# Patient Record
Sex: MALE | Race: BLACK OR AFRICAN AMERICAN | URBAN - METROPOLITAN AREA
[De-identification: names, ages, dates, MRNs, and addresses within clinical notes are randomized per-mention and may not be internally consistent; named-entity substitution may affect disease eponyms.]

---

## 2018-10-28 ENCOUNTER — INPATIENT (INPATIENT)
Facility: HOSPITAL | Age: 58
LOS: 2 days | Discharge: HOME | End: 2018-10-31
Attending: INTERNAL MEDICINE | Admitting: INTERNAL MEDICINE
Payer: COMMERCIAL

## 2018-10-28 VITALS
DIASTOLIC BLOOD PRESSURE: 110 MMHG | OXYGEN SATURATION: 100 % | SYSTOLIC BLOOD PRESSURE: 167 MMHG | HEART RATE: 76 BPM | TEMPERATURE: 99 F | HEIGHT: 69 IN | WEIGHT: 164.91 LBS | RESPIRATION RATE: 20 BRPM

## 2018-10-28 LAB
ALBUMIN SERPL ELPH-MCNC: 3.7 G/DL — SIGNIFICANT CHANGE UP (ref 3.3–5)
ALP SERPL-CCNC: 116 U/L — SIGNIFICANT CHANGE UP (ref 40–120)
ALT FLD-CCNC: 15 U/L — SIGNIFICANT CHANGE UP (ref 12–78)
ANION GAP SERPL CALC-SCNC: 9 MMOL/L — SIGNIFICANT CHANGE UP (ref 5–17)
APPEARANCE UR: CLEAR — SIGNIFICANT CHANGE UP
APTT BLD: 32 SEC — SIGNIFICANT CHANGE UP (ref 27.5–37.4)
AST SERPL-CCNC: 24 U/L — SIGNIFICANT CHANGE UP (ref 15–37)
BACTERIA # UR AUTO: ABNORMAL
BILIRUB SERPL-MCNC: 0.3 MG/DL — SIGNIFICANT CHANGE UP (ref 0.2–1.2)
BILIRUB UR-MCNC: NEGATIVE — SIGNIFICANT CHANGE UP
BUN SERPL-MCNC: 12 MG/DL — SIGNIFICANT CHANGE UP (ref 7–23)
CALCIUM SERPL-MCNC: 9 MG/DL — SIGNIFICANT CHANGE UP (ref 8.5–10.1)
CHLORIDE SERPL-SCNC: 109 MMOL/L — HIGH (ref 96–108)
CK SERPL-CCNC: 186 U/L — SIGNIFICANT CHANGE UP (ref 26–308)
CO2 SERPL-SCNC: 23 MMOL/L — SIGNIFICANT CHANGE UP (ref 22–31)
COLOR SPEC: YELLOW — SIGNIFICANT CHANGE UP
COMMENT - URINE: SIGNIFICANT CHANGE UP
CREAT SERPL-MCNC: 1.39 MG/DL — HIGH (ref 0.5–1.3)
DIFF PNL FLD: ABNORMAL
EPI CELLS # UR: SIGNIFICANT CHANGE UP
GLUCOSE SERPL-MCNC: 112 MG/DL — HIGH (ref 70–99)
GLUCOSE UR QL: NEGATIVE MG/DL — SIGNIFICANT CHANGE UP
HCT VFR BLD CALC: 44.9 % — SIGNIFICANT CHANGE UP (ref 39–50)
HGB BLD-MCNC: 14.1 G/DL — SIGNIFICANT CHANGE UP (ref 13–17)
HYALINE CASTS # UR AUTO: ABNORMAL /LPF
INR BLD: 1.17 RATIO — HIGH (ref 0.88–1.16)
KETONES UR-MCNC: NEGATIVE — SIGNIFICANT CHANGE UP
LACTATE SERPL-SCNC: 3 MMOL/L — HIGH (ref 0.7–2)
LEUKOCYTE ESTERASE UR-ACNC: NEGATIVE — SIGNIFICANT CHANGE UP
MAGNESIUM SERPL-MCNC: 1.6 MG/DL — SIGNIFICANT CHANGE UP (ref 1.6–2.6)
MCHC RBC-ENTMCNC: 30.3 PG — SIGNIFICANT CHANGE UP (ref 27–34)
MCHC RBC-ENTMCNC: 31.4 GM/DL — LOW (ref 32–36)
MCV RBC AUTO: 96.4 FL — SIGNIFICANT CHANGE UP (ref 80–100)
NITRITE UR-MCNC: NEGATIVE — SIGNIFICANT CHANGE UP
NRBC # BLD: 0 /100 WBCS — SIGNIFICANT CHANGE UP (ref 0–0)
NT-PROBNP SERPL-SCNC: 435 PG/ML — HIGH (ref 0–125)
PH UR: 5 — SIGNIFICANT CHANGE UP (ref 5–8)
PLATELET # BLD AUTO: 287 K/UL — SIGNIFICANT CHANGE UP (ref 150–400)
POTASSIUM SERPL-MCNC: 3.6 MMOL/L — SIGNIFICANT CHANGE UP (ref 3.5–5.3)
POTASSIUM SERPL-SCNC: 3.6 MMOL/L — SIGNIFICANT CHANGE UP (ref 3.5–5.3)
PROT SERPL-MCNC: 8.6 GM/DL — HIGH (ref 6–8.3)
PROT UR-MCNC: 30 MG/DL
PROTHROM AB SERPL-ACNC: 12.7 SEC — SIGNIFICANT CHANGE UP (ref 9.8–12.7)
RBC # BLD: 4.66 M/UL — SIGNIFICANT CHANGE UP (ref 4.2–5.8)
RBC # FLD: 13.9 % — SIGNIFICANT CHANGE UP (ref 10.3–14.5)
RBC CASTS # UR COMP ASSIST: ABNORMAL /HPF (ref 0–4)
SODIUM SERPL-SCNC: 141 MMOL/L — SIGNIFICANT CHANGE UP (ref 135–145)
SP GR SPEC: 1.02 — SIGNIFICANT CHANGE UP (ref 1.01–1.02)
TROPONIN I SERPL-MCNC: 0.26 NG/ML — HIGH (ref 0.01–0.04)
TROPONIN I SERPL-MCNC: 1.59 NG/ML — HIGH (ref 0.01–0.04)
UROBILINOGEN FLD QL: NEGATIVE MG/DL — SIGNIFICANT CHANGE UP
WBC # BLD: 10.88 K/UL — HIGH (ref 3.8–10.5)
WBC # FLD AUTO: 10.88 K/UL — HIGH (ref 3.8–10.5)
WBC UR QL: SIGNIFICANT CHANGE UP

## 2018-10-28 PROCEDURE — 71275 CT ANGIOGRAPHY CHEST: CPT | Mod: 26

## 2018-10-28 PROCEDURE — 71046 X-RAY EXAM CHEST 2 VIEWS: CPT | Mod: 26

## 2018-10-28 PROCEDURE — 99285 EMERGENCY DEPT VISIT HI MDM: CPT

## 2018-10-28 PROCEDURE — 93010 ELECTROCARDIOGRAM REPORT: CPT | Mod: 76

## 2018-10-28 RX ORDER — ASPIRIN/CALCIUM CARB/MAGNESIUM 324 MG
81 TABLET ORAL DAILY
Qty: 0 | Refills: 0 | Status: DISCONTINUED | OUTPATIENT
Start: 2018-10-28 | End: 2018-10-31

## 2018-10-28 RX ORDER — METOPROLOL TARTRATE 50 MG
12.5 TABLET ORAL
Qty: 0 | Refills: 0 | Status: DISCONTINUED | OUTPATIENT
Start: 2018-10-28 | End: 2018-10-31

## 2018-10-28 RX ORDER — CLOPIDOGREL BISULFATE 75 MG/1
75 TABLET, FILM COATED ORAL DAILY
Qty: 0 | Refills: 0 | Status: DISCONTINUED | OUTPATIENT
Start: 2018-10-28 | End: 2018-10-31

## 2018-10-28 RX ORDER — HEPARIN SODIUM 5000 [USP'U]/ML
INJECTION INTRAVENOUS; SUBCUTANEOUS
Qty: 25000 | Refills: 0 | Status: DISCONTINUED | OUTPATIENT
Start: 2018-10-28 | End: 2018-10-29

## 2018-10-28 RX ORDER — SODIUM CHLORIDE 9 MG/ML
2200 INJECTION INTRAMUSCULAR; INTRAVENOUS; SUBCUTANEOUS ONCE
Qty: 0 | Refills: 0 | Status: COMPLETED | OUTPATIENT
Start: 2018-10-28 | End: 2018-10-28

## 2018-10-28 RX ORDER — HEPARIN SODIUM 5000 [USP'U]/ML
4400 INJECTION INTRAVENOUS; SUBCUTANEOUS ONCE
Qty: 0 | Refills: 0 | Status: COMPLETED | OUTPATIENT
Start: 2018-10-28 | End: 2018-10-28

## 2018-10-28 RX ORDER — AZITHROMYCIN 500 MG/1
500 TABLET, FILM COATED ORAL ONCE
Qty: 0 | Refills: 0 | Status: COMPLETED | OUTPATIENT
Start: 2018-10-28 | End: 2018-10-28

## 2018-10-28 RX ORDER — HEPARIN SODIUM 5000 [USP'U]/ML
INJECTION INTRAVENOUS; SUBCUTANEOUS
Qty: 25000 | Refills: 0 | Status: DISCONTINUED | OUTPATIENT
Start: 2018-10-28 | End: 2018-10-28

## 2018-10-28 RX ORDER — METOPROLOL TARTRATE 50 MG
25 TABLET ORAL ONCE
Qty: 0 | Refills: 0 | Status: COMPLETED | OUTPATIENT
Start: 2018-10-28 | End: 2018-10-28

## 2018-10-28 RX ORDER — PANTOPRAZOLE SODIUM 20 MG/1
40 TABLET, DELAYED RELEASE ORAL
Qty: 0 | Refills: 0 | Status: DISCONTINUED | OUTPATIENT
Start: 2018-10-28 | End: 2018-10-31

## 2018-10-28 RX ORDER — LISINOPRIL 2.5 MG/1
5 TABLET ORAL DAILY
Qty: 0 | Refills: 0 | Status: DISCONTINUED | OUTPATIENT
Start: 2018-10-28 | End: 2018-10-29

## 2018-10-28 RX ORDER — HEPARIN SODIUM 5000 [USP'U]/ML
4400 INJECTION INTRAVENOUS; SUBCUTANEOUS EVERY 6 HOURS
Qty: 0 | Refills: 0 | Status: DISCONTINUED | OUTPATIENT
Start: 2018-10-28 | End: 2018-10-28

## 2018-10-28 RX ORDER — ASPIRIN/CALCIUM CARB/MAGNESIUM 324 MG
325 TABLET ORAL ONCE
Qty: 0 | Refills: 0 | Status: COMPLETED | OUTPATIENT
Start: 2018-10-28 | End: 2018-10-28

## 2018-10-28 RX ORDER — CLOPIDOGREL BISULFATE 75 MG/1
300 TABLET, FILM COATED ORAL ONCE
Qty: 0 | Refills: 0 | Status: COMPLETED | OUTPATIENT
Start: 2018-10-28 | End: 2018-10-28

## 2018-10-28 RX ORDER — HEPARIN SODIUM 5000 [USP'U]/ML
4400 INJECTION INTRAVENOUS; SUBCUTANEOUS EVERY 6 HOURS
Qty: 0 | Refills: 0 | Status: DISCONTINUED | OUTPATIENT
Start: 2018-10-28 | End: 2018-10-29

## 2018-10-28 RX ORDER — QUINAPRIL HYDROCHLORIDE 40 MG/1
1 TABLET, FILM COATED ORAL
Qty: 0 | Refills: 0 | COMMUNITY

## 2018-10-28 RX ORDER — ATORVASTATIN CALCIUM 80 MG/1
40 TABLET, FILM COATED ORAL AT BEDTIME
Qty: 0 | Refills: 0 | Status: DISCONTINUED | OUTPATIENT
Start: 2018-10-28 | End: 2018-10-31

## 2018-10-28 RX ORDER — CLOPIDOGREL BISULFATE 75 MG/1
75 TABLET, FILM COATED ORAL ONCE
Qty: 0 | Refills: 0 | Status: DISCONTINUED | OUTPATIENT
Start: 2018-10-28 | End: 2018-10-28

## 2018-10-28 RX ORDER — SODIUM CHLORIDE 9 MG/ML
1000 INJECTION INTRAMUSCULAR; INTRAVENOUS; SUBCUTANEOUS
Qty: 0 | Refills: 0 | Status: DISCONTINUED | OUTPATIENT
Start: 2018-10-28 | End: 2018-10-29

## 2018-10-28 RX ORDER — HEPARIN SODIUM 5000 [USP'U]/ML
4400 INJECTION INTRAVENOUS; SUBCUTANEOUS ONCE
Qty: 0 | Refills: 0 | Status: DISCONTINUED | OUTPATIENT
Start: 2018-10-28 | End: 2018-10-28

## 2018-10-28 RX ORDER — CEFTRIAXONE 500 MG/1
1000 INJECTION, POWDER, FOR SOLUTION INTRAMUSCULAR; INTRAVENOUS ONCE
Qty: 0 | Refills: 0 | Status: COMPLETED | OUTPATIENT
Start: 2018-10-28 | End: 2018-10-28

## 2018-10-28 RX ADMIN — HEPARIN SODIUM 900 UNIT(S)/HR: 5000 INJECTION INTRAVENOUS; SUBCUTANEOUS at 21:49

## 2018-10-28 RX ADMIN — SODIUM CHLORIDE 2200 MILLILITER(S): 9 INJECTION INTRAMUSCULAR; INTRAVENOUS; SUBCUTANEOUS at 21:53

## 2018-10-28 RX ADMIN — Medication 25 MILLIGRAM(S): at 21:28

## 2018-10-28 RX ADMIN — SODIUM CHLORIDE 1466.67 MILLILITER(S): 9 INJECTION INTRAMUSCULAR; INTRAVENOUS; SUBCUTANEOUS at 18:09

## 2018-10-28 RX ADMIN — CEFTRIAXONE 1000 MILLIGRAM(S): 500 INJECTION, POWDER, FOR SOLUTION INTRAMUSCULAR; INTRAVENOUS at 18:10

## 2018-10-28 RX ADMIN — HEPARIN SODIUM 4400 UNIT(S): 5000 INJECTION INTRAVENOUS; SUBCUTANEOUS at 21:49

## 2018-10-28 RX ADMIN — AZITHROMYCIN 500 MILLIGRAM(S): 500 TABLET, FILM COATED ORAL at 19:22

## 2018-10-28 RX ADMIN — AZITHROMYCIN 255 MILLIGRAM(S): 500 TABLET, FILM COATED ORAL at 18:10

## 2018-10-28 RX ADMIN — Medication 325 MILLIGRAM(S): at 21:35

## 2018-10-28 RX ADMIN — CLOPIDOGREL BISULFATE 300 MILLIGRAM(S): 75 TABLET, FILM COATED ORAL at 21:34

## 2018-10-28 NOTE — H&P ADULT - NSHPPHYSICALEXAM_GEN_ALL_CORE
Vital Signs Last 24 Hrs  T(C): 37.2 (28 Oct 2018 15:30), Max: 37.2 (28 Oct 2018 15:30)  T(F): 99 (28 Oct 2018 15:30), Max: 99 (28 Oct 2018 15:30)  HR: 69 (28 Oct 2018 17:39) (69 - 76)  BP: 168/95 (28 Oct 2018 17:39) (167/110 - 168/95)  BP(mean): --  RR: 20 (28 Oct 2018 17:39) (20 - 20)  SpO2: 100% (28 Oct 2018 17:39) (100% - 100%)    GEN: appears comfortable  Neuro: AAOx3, moves all extremities  HEENT: NC/AT, EOMI  Neck: no thyroidmegaly, no JVD  Cardiovascular: S1S2 present, regular rhythm, no murmur  Respiratory: breath sounds normal bilaterally, no wheezing, +basilar rales, no rhonchi  Gastrointestinal: bowel sounds normal, soft, no abdominal tenderness  Musculoskeletal: no muscle tenderness  Extremities: trace pitting edema bilaterally  Skin: No rash, tightened skin

## 2018-10-28 NOTE — ED PROVIDER NOTE - CARE PLAN
Principal Discharge DX:	ACS (acute coronary syndrome)  Secondary Diagnosis:	Hypertensive emergency without congestive heart failure

## 2018-10-28 NOTE — ED ADULT NURSE NOTE - NSIMPLEMENTINTERV_GEN_ALL_ED
Implemented All Fall Risk Interventions:  [place] to call system. Call bell, personal items and telephone within reach. Instruct patient to call for assistance. Room bathroom lighting operational. Non-slip footwear when patient is off stretcher. Physically safe environment: no spills, clutter or unnecessary equipment. Stretcher in lowest position, wheels locked, appropriate side rails in place. Provide visual cue, wrist band, yellow gown, etc. Monitor gait and stability. Monitor for mental status changes and reorient to person, place, and time. Review medications for side effects contributing to fall risk. Reinforce activity limits and safety measures with patient and family.
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
## 2018-10-28 NOTE — ED PROVIDER NOTE - CARDIAC, MLM
Normal rate, regular rhythm.  Heart sounds S1, S2.  No murmurs, rubs or gallops. Hypertensive to 190/100.

## 2018-10-28 NOTE — ED PROVIDER NOTE - MEDICAL DECISION MAKING DETAILS
Pt with chest pain with HTN. Concern for ACS, given Hx of autoimmune disease, will send D-dimer to evaluate for risk of PE. Plan for nitropaste, labs, CXR, and admission. No evidence of STEMI on EKG.

## 2018-10-28 NOTE — ED ADULT TRIAGE NOTE - CHIEF COMPLAINT QUOTE
Pt presents to the ED with complaints of palpitations, chest pain, and shortness of breath. which began approx 30 minutes prior to arrival.

## 2018-10-28 NOTE — ED PROVIDER NOTE - NS_ ATTENDINGSCRIBEDETAILS _ED_A_ED_FT
Arnaud Levy DO (Attending): The history, relevant review of systems, past medical and surgical history, medical decision making, and physical examination was documented by the scribe in my presence and I attest to the accuracy of the documentation.

## 2018-10-28 NOTE — ED PROVIDER NOTE - PROGRESS NOTE DETAILS
Arnaud Levy DO (Attending): labs and imaging reviewed to this point.  Discussed positive findings.  Patient reports hx of GIB in past from ulcers.  Will hold ASA. Arnaud Levy DO (Attending): Patient comfortable, nad, reports full resolution of symptoms, BP curently 150/72.  EKG repeated, NSR, no changes. Arnaud Levy DO (Attending): Discussed with Dr. Hall, cardiology, ACS Heparin, beta blocker, CCU.  Discussed with Dr. Acosta to update.

## 2018-10-28 NOTE — ED PROVIDER NOTE - OBJECTIVE STATEMENT
59 y/o male with a PMHx of scleroderma, HTN on quinapril 10mg, pulmonary fibrosis, s/p hip replacement, on Omeprazole 20mg presents to the ED c/o sudden onset severe chest pain today. Pt's blood pressure was 159/92 right after episode. At time of exam pt's chest pain has improved, described as a pressure and worsened with breathing. +chronic cough that is not better or worse than usual. Pt with a fever 3 days ago. No vomiting, diarrhea, abd pain, edema, HA, numbness, tingling, difficulty with speech. No recent travel, injury, fall. Family cardiac Hx: Mother had heart disease, brother  of MI. Never smoker. Occasional alcohol use. No h/o illicit drug use. Pt was on methotrexate 2 years ago, none since. Rheumatologist/PMD: Dr. Lucho Samuels.

## 2018-10-28 NOTE — ED ADULT NURSE NOTE - CHPI ED NUR SYMPTOMS NEG
no vomiting/no fever/no nausea/no syncope/no dizziness/no chills/no back pain/no congestion/no diaphoresis/no shortness of breath

## 2018-10-28 NOTE — H&P ADULT - ASSESSMENT
58 y.o. male with PMH scleroderma, pulmonary fibrosis dx 1 yr ago, hx GI ulcer/bleed ~9 yr ago presents with chest pain today. Pt states getting worse. The chest pain is substernal, pressure like, severe, constant, nothing makes it better, nonradiating. Associated with shortness of breath. Pt reports getting dressed for dinner when symptom occurred. No fever, chills, abd pain, dysuria, diarrhea. Denies any prior similar episode. Currently, reports no chest pain.  Pt reports seeing Dr Samuels as primary physician. Pt was recently sent to different rheumatologist in the city for clinical trial and to start cellcept. Currently not on cellcept.    #chest pain due to ?STEMI  #elevated troponin  #2nd EKG changes - dynamic T inversion  -admit to CCU  -start ASA, plavix, BB, statin  -cont heparin drip  -decrease ACEI dosage  -gentle iv hydration  -NPO after MN for cardiac cath in AM  -echo  -cardio consult, Dr Hall    #scleroderma, pulmonary fibrosis  -PRN supplemental oxygen, pulse ox monitoring  -ACEI dosage decreased due to CHRIS    #CHRIS  -iv fluid    #hx GI ulcer, GERD  -PPI    #elevated lactate  -likely ischemic demand from MI  -UA neg  -repeat lactate    #DVT ppx  -on heparin drip 58 y.o. male with PMH scleroderma, pulmonary fibrosis dx 1 yr ago, hx GI ulcer/bleed ~9 yr ago presents with chest pain today. Pt states getting worse. The chest pain is substernal, pressure like, severe, constant, nothing makes it better, nonradiating. Associated with shortness of breath. Pt reports getting dressed for dinner when symptom occurred. No fever, chills, abd pain, dysuria, diarrhea. Denies any prior similar episode. Currently, reports no chest pain.  Pt reports seeing Dr Samuels as primary physician. Pt was recently sent to different rheumatologist in the city for clinical trial and to start cellcept. Currently not on cellcept.    #chest pain due to ?STEMI  #elevated troponin  #2nd EKG changes - dynamic T inversion  -admit to CCU  -start ASA, plavix, BB, statin  -cont heparin drip  -decrease ACEI dosage  -gentle iv hydration  -NPO after MN for cardiac cath in AM  -echo  -cardio consult, Dr Hall  -discussed case with Dr Hall    #scleroderma, pulmonary fibrosis  -PRN supplemental oxygen, pulse ox monitoring  -ACEI dosage decreased due to CHRIS    #CHRIS  -iv fluid    #hx GI ulcer, GERD  -PPI    #elevated lactate  -likely ischemic demand from MI  -UA neg  -repeat lactate    #DVT ppx  -on heparin drip    IMPROVE VTE Individual Risk Assessment    RISK                                                                Points    [  ] Previous VTE                                                  3    [  ] Thrombophilia                                               2    [  ] Lower limb paralysis                                      2        (unable to hold up >15 seconds)      [  ] Current Cancer                                              2         (within 6 months)    [  ] Immobilization > 24 hrs                                1    [  ] ICU/CCU stay > 24 hours                              1    [  ] Age > 60                                                      1    IMPROVE VTE Score _____0____ negative...

## 2018-10-28 NOTE — ED PROVIDER NOTE - MUSCULOSKELETAL, MLM
Spine appears normal, range of motion is not limited, no muscle or joint tenderness. +clubbing of fingernails of both hands. 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

## 2018-10-28 NOTE — ED PROVIDER NOTE - SEVERE SEPSIS ALERT DETAILS
Patient presenting with chest pain and dyspnea.  DDx includes pneumonia.  Lactate 3.0, WBC 10.8; RR 20; IVF Bolus 30cc/kg ordered as well as empiric antibiotics.  Sepsis possible in DDx however current VS do not support.  Will continue to monitor

## 2018-10-29 DIAGNOSIS — I21.4 NON-ST ELEVATION (NSTEMI) MYOCARDIAL INFARCTION: ICD-10-CM

## 2018-10-29 DIAGNOSIS — I10 ESSENTIAL (PRIMARY) HYPERTENSION: ICD-10-CM

## 2018-10-29 DIAGNOSIS — M34.9 SYSTEMIC SCLEROSIS, UNSPECIFIED: ICD-10-CM

## 2018-10-29 DIAGNOSIS — J84.10 PULMONARY FIBROSIS, UNSPECIFIED: ICD-10-CM

## 2018-10-29 LAB
ANION GAP SERPL CALC-SCNC: 7 MMOL/L — SIGNIFICANT CHANGE UP (ref 5–17)
APTT BLD: 51.6 SEC — HIGH (ref 27.5–37.4)
APTT BLD: 70.4 SEC — HIGH (ref 27.5–37.4)
BLD GP AB SCN SERPL QL: SIGNIFICANT CHANGE UP
BUN SERPL-MCNC: 8 MG/DL — SIGNIFICANT CHANGE UP (ref 7–23)
CALCIUM SERPL-MCNC: 8.4 MG/DL — LOW (ref 8.5–10.1)
CHLORIDE SERPL-SCNC: 113 MMOL/L — HIGH (ref 96–108)
CO2 SERPL-SCNC: 22 MMOL/L — SIGNIFICANT CHANGE UP (ref 22–31)
CREAT SERPL-MCNC: 1.04 MG/DL — SIGNIFICANT CHANGE UP (ref 0.5–1.3)
GLUCOSE SERPL-MCNC: 90 MG/DL — SIGNIFICANT CHANGE UP (ref 70–99)
HCT VFR BLD CALC: 35.9 % — LOW (ref 39–50)
HCT VFR BLD CALC: 36.2 % — LOW (ref 39–50)
HGB BLD-MCNC: 11.4 G/DL — LOW (ref 13–17)
HGB BLD-MCNC: 11.4 G/DL — LOW (ref 13–17)
LACTATE SERPL-SCNC: 0.8 MMOL/L — SIGNIFICANT CHANGE UP (ref 0.7–2)
MCHC RBC-ENTMCNC: 29.8 PG — SIGNIFICANT CHANGE UP (ref 27–34)
MCHC RBC-ENTMCNC: 30.2 PG — SIGNIFICANT CHANGE UP (ref 27–34)
MCHC RBC-ENTMCNC: 31.5 GM/DL — LOW (ref 32–36)
MCHC RBC-ENTMCNC: 31.8 GM/DL — LOW (ref 32–36)
MCV RBC AUTO: 94.5 FL — SIGNIFICANT CHANGE UP (ref 80–100)
MCV RBC AUTO: 95.2 FL — SIGNIFICANT CHANGE UP (ref 80–100)
NRBC # BLD: 0 /100 WBCS — SIGNIFICANT CHANGE UP (ref 0–0)
NRBC # BLD: 0 /100 WBCS — SIGNIFICANT CHANGE UP (ref 0–0)
PLATELET # BLD AUTO: 238 K/UL — SIGNIFICANT CHANGE UP (ref 150–400)
PLATELET # BLD AUTO: 252 K/UL — SIGNIFICANT CHANGE UP (ref 150–400)
POTASSIUM SERPL-MCNC: 3.5 MMOL/L — SIGNIFICANT CHANGE UP (ref 3.5–5.3)
POTASSIUM SERPL-SCNC: 3.5 MMOL/L — SIGNIFICANT CHANGE UP (ref 3.5–5.3)
RBC # BLD: 3.77 M/UL — LOW (ref 4.2–5.8)
RBC # BLD: 3.83 M/UL — LOW (ref 4.2–5.8)
RBC # FLD: 14.1 % — SIGNIFICANT CHANGE UP (ref 10.3–14.5)
RBC # FLD: 14.1 % — SIGNIFICANT CHANGE UP (ref 10.3–14.5)
SODIUM SERPL-SCNC: 142 MMOL/L — SIGNIFICANT CHANGE UP (ref 135–145)
TROPONIN I SERPL-MCNC: 39.7 NG/ML — HIGH (ref 0.01–0.04)
TROPONIN I SERPL-MCNC: 7.86 NG/ML — HIGH (ref 0.01–0.04)
TYPE + AB SCN PNL BLD: SIGNIFICANT CHANGE UP
WBC # BLD: 11.16 K/UL — HIGH (ref 3.8–10.5)
WBC # BLD: 11.92 K/UL — HIGH (ref 3.8–10.5)
WBC # FLD AUTO: 11.16 K/UL — HIGH (ref 3.8–10.5)
WBC # FLD AUTO: 11.92 K/UL — HIGH (ref 3.8–10.5)

## 2018-10-29 PROCEDURE — 93458 L HRT ARTERY/VENTRICLE ANGIO: CPT | Mod: 26,59

## 2018-10-29 PROCEDURE — 92941 PRQ TRLML REVSC TOT OCCL AMI: CPT | Mod: RC

## 2018-10-29 PROCEDURE — 99233 SBSQ HOSP IP/OBS HIGH 50: CPT | Mod: 25

## 2018-10-29 PROCEDURE — 93010 ELECTROCARDIOGRAM REPORT: CPT

## 2018-10-29 PROCEDURE — 99223 1ST HOSP IP/OBS HIGH 75: CPT

## 2018-10-29 PROCEDURE — 99152 MOD SED SAME PHYS/QHP 5/>YRS: CPT

## 2018-10-29 RX ORDER — AMLODIPINE BESYLATE 2.5 MG/1
2.5 TABLET ORAL
Qty: 0 | Refills: 0 | Status: DISCONTINUED | OUTPATIENT
Start: 2018-10-29 | End: 2018-10-31

## 2018-10-29 RX ORDER — SODIUM CHLORIDE 9 MG/ML
1000 INJECTION, SOLUTION INTRAVENOUS
Qty: 0 | Refills: 0 | Status: DISCONTINUED | OUTPATIENT
Start: 2018-10-29 | End: 2018-10-31

## 2018-10-29 RX ORDER — INFLUENZA VIRUS VACCINE 15; 15; 15; 15 UG/.5ML; UG/.5ML; UG/.5ML; UG/.5ML
0.5 SUSPENSION INTRAMUSCULAR ONCE
Qty: 0 | Refills: 0 | Status: COMPLETED | OUTPATIENT
Start: 2018-10-29 | End: 2018-10-31

## 2018-10-29 RX ORDER — AMLODIPINE BESYLATE 2.5 MG/1
2.5 TABLET ORAL ONCE
Qty: 0 | Refills: 0 | Status: COMPLETED | OUTPATIENT
Start: 2018-10-29 | End: 2018-10-29

## 2018-10-29 RX ADMIN — ATORVASTATIN CALCIUM 40 MILLIGRAM(S): 80 TABLET, FILM COATED ORAL at 21:26

## 2018-10-29 RX ADMIN — CLOPIDOGREL BISULFATE 75 MILLIGRAM(S): 75 TABLET, FILM COATED ORAL at 10:20

## 2018-10-29 RX ADMIN — AMLODIPINE BESYLATE 2.5 MILLIGRAM(S): 2.5 TABLET ORAL at 00:52

## 2018-10-29 RX ADMIN — Medication 12.5 MILLIGRAM(S): at 21:26

## 2018-10-29 RX ADMIN — PANTOPRAZOLE SODIUM 40 MILLIGRAM(S): 20 TABLET, DELAYED RELEASE ORAL at 06:42

## 2018-10-29 RX ADMIN — Medication 12.5 MILLIGRAM(S): at 06:42

## 2018-10-29 RX ADMIN — AMLODIPINE BESYLATE 2.5 MILLIGRAM(S): 2.5 TABLET ORAL at 21:26

## 2018-10-29 RX ADMIN — Medication 81 MILLIGRAM(S): at 10:20

## 2018-10-29 RX ADMIN — LISINOPRIL 5 MILLIGRAM(S): 2.5 TABLET ORAL at 06:42

## 2018-10-29 RX ADMIN — HEPARIN SODIUM 900 UNIT(S)/HR: 5000 INJECTION INTRAVENOUS; SUBCUTANEOUS at 04:00

## 2018-10-29 RX ADMIN — SODIUM CHLORIDE 75 MILLILITER(S): 9 INJECTION, SOLUTION INTRAVENOUS at 16:43

## 2018-10-29 NOTE — PACU DISCHARGE NOTE - COMMENTS
Patient discharge home as per orders. pt A/Ox4. Vital signs stable. PIVL removed. No evidence of infiltration noted at discharge. Patient skin intact, Patient with no complain of pain, SOB, or chest pain at discharge. pt ambulating around unit tolerating well. All paperwork reviewed with pt Rx given with understanding, pt to follow up as directed patient verbalized understanding to all and without concerns at this time.  Right groin femoral site soft with no active bleeding.  No ecchymoses noted. Patient discharge home as per orders. pt A/Ox4. Vital signs stable. PIVL removed. No evidence of infiltration noted at discharge. Patient skin intact, Patient with no complain of pain, SOB, or chest pain at discharge. pt ambulating around unit tolerating well. All paperwork reviewed with pt Rx given with understanding, pt to follow up as directed patient verbalized understanding to all and without concerns at this time.  Right groin femoral site soft with no active bleeding.  No ecchymoses noted. Report given to CUONG Burns
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
## 2018-10-30 LAB
ANION GAP SERPL CALC-SCNC: 8 MMOL/L — SIGNIFICANT CHANGE UP (ref 5–17)
BASOPHILS # BLD AUTO: 0.09 K/UL — SIGNIFICANT CHANGE UP (ref 0–0.2)
BASOPHILS NFR BLD AUTO: 0.7 % — SIGNIFICANT CHANGE UP (ref 0–2)
BUN SERPL-MCNC: 10 MG/DL — SIGNIFICANT CHANGE UP (ref 7–23)
CALCIUM SERPL-MCNC: 9 MG/DL — SIGNIFICANT CHANGE UP (ref 8.5–10.1)
CHLORIDE SERPL-SCNC: 109 MMOL/L — HIGH (ref 96–108)
CO2 SERPL-SCNC: 25 MMOL/L — SIGNIFICANT CHANGE UP (ref 22–31)
CREAT SERPL-MCNC: 1.25 MG/DL — SIGNIFICANT CHANGE UP (ref 0.5–1.3)
EOSINOPHIL # BLD AUTO: 0.33 K/UL — SIGNIFICANT CHANGE UP (ref 0–0.5)
EOSINOPHIL NFR BLD AUTO: 2.7 % — SIGNIFICANT CHANGE UP (ref 0–6)
GLUCOSE SERPL-MCNC: 100 MG/DL — HIGH (ref 70–99)
HCT VFR BLD CALC: 39.5 % — SIGNIFICANT CHANGE UP (ref 39–50)
HGB BLD-MCNC: 12.3 G/DL — LOW (ref 13–17)
IMM GRANULOCYTES NFR BLD AUTO: 0.2 % — SIGNIFICANT CHANGE UP (ref 0–1.5)
LYMPHOCYTES # BLD AUTO: 1.78 K/UL — SIGNIFICANT CHANGE UP (ref 1–3.3)
LYMPHOCYTES # BLD AUTO: 14.6 % — SIGNIFICANT CHANGE UP (ref 13–44)
MCHC RBC-ENTMCNC: 29.5 PG — SIGNIFICANT CHANGE UP (ref 27–34)
MCHC RBC-ENTMCNC: 31.1 GM/DL — LOW (ref 32–36)
MCV RBC AUTO: 94.7 FL — SIGNIFICANT CHANGE UP (ref 80–100)
MONOCYTES # BLD AUTO: 1.19 K/UL — HIGH (ref 0–0.9)
MONOCYTES NFR BLD AUTO: 9.8 % — SIGNIFICANT CHANGE UP (ref 2–14)
NEUTROPHILS # BLD AUTO: 8.76 K/UL — HIGH (ref 1.8–7.4)
NEUTROPHILS NFR BLD AUTO: 72 % — SIGNIFICANT CHANGE UP (ref 43–77)
NRBC # BLD: 0 /100 WBCS — SIGNIFICANT CHANGE UP (ref 0–0)
PLATELET # BLD AUTO: 267 K/UL — SIGNIFICANT CHANGE UP (ref 150–400)
POTASSIUM SERPL-MCNC: 3.7 MMOL/L — SIGNIFICANT CHANGE UP (ref 3.5–5.3)
POTASSIUM SERPL-SCNC: 3.7 MMOL/L — SIGNIFICANT CHANGE UP (ref 3.5–5.3)
RBC # BLD: 4.17 M/UL — LOW (ref 4.2–5.8)
RBC # FLD: 14.3 % — SIGNIFICANT CHANGE UP (ref 10.3–14.5)
SODIUM SERPL-SCNC: 142 MMOL/L — SIGNIFICANT CHANGE UP (ref 135–145)
WBC # BLD: 12.18 K/UL — HIGH (ref 3.8–10.5)
WBC # FLD AUTO: 12.18 K/UL — HIGH (ref 3.8–10.5)

## 2018-10-30 PROCEDURE — 93306 TTE W/DOPPLER COMPLETE: CPT | Mod: 26

## 2018-10-30 PROCEDURE — 99233 SBSQ HOSP IP/OBS HIGH 50: CPT

## 2018-10-30 PROCEDURE — 93010 ELECTROCARDIOGRAM REPORT: CPT

## 2018-10-30 RX ADMIN — Medication 12.5 MILLIGRAM(S): at 05:27

## 2018-10-30 RX ADMIN — CLOPIDOGREL BISULFATE 75 MILLIGRAM(S): 75 TABLET, FILM COATED ORAL at 11:10

## 2018-10-30 RX ADMIN — ATORVASTATIN CALCIUM 40 MILLIGRAM(S): 80 TABLET, FILM COATED ORAL at 21:37

## 2018-10-30 RX ADMIN — Medication 81 MILLIGRAM(S): at 11:10

## 2018-10-30 RX ADMIN — Medication 12.5 MILLIGRAM(S): at 18:35

## 2018-10-30 RX ADMIN — AMLODIPINE BESYLATE 2.5 MILLIGRAM(S): 2.5 TABLET ORAL at 21:37

## 2018-10-30 RX ADMIN — AMLODIPINE BESYLATE 2.5 MILLIGRAM(S): 2.5 TABLET ORAL at 11:10

## 2018-10-30 RX ADMIN — PANTOPRAZOLE SODIUM 40 MILLIGRAM(S): 20 TABLET, DELAYED RELEASE ORAL at 11:10

## 2018-10-30 NOTE — PROGRESS NOTE ADULT - PROBLEM SELECTOR PLAN 2
Meds. adjusted as bp running high.
BP improved. will make further adjustment as need but will keep on current regime for now.

## 2018-10-30 NOTE — PROGRESS NOTE ADULT - SUBJECTIVE AND OBJECTIVE BOX
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 RCA: Proximal subsection.95% stenosis 14 mm length reduced to 0%.Pre   procedure RANJITH III flow was noted. Good runoff was present.The lesion was   diagnosed as a moderate risk lesion.The lesion was tubular.The lesion   showed moderate angulation and moderate tortuosity.     Post Procedure RANJITH III flow was present.     Treatment results:Interventional treatment was successful.     Devices used     * 6FR AR 1 LAUNCHER     * COUGAR XT 190CM     * GUIDELINER 5FR     * 2.0 X 15MM MAVERICK 2     Total duration: 32 sec.2 inflation(s). to a max pressure of: 12 MARTHA.     * Resolute JARRED SINDY     Diameter: 2.25 mm. Length: 16 mm. 0 inflation(s).     * 6FR HSI LAUNCHER     * CHOICE PT 182CM     * Synergy SINDY Stent     Diameter: 2.25 mm. Length: 8 mm. Total duration: 15 sec.1 inflation(s).   to a max pressure of: 22 MARTHA.     * Synergy SINDY Stent     Diameter: 2.25 mm. Length: 8 mm. Total duration: 15 sec.1 inflation(s).   to a max pressure of: 22 MARTHA.    Ramus: Diffuse irregularity.    Valves  +------+----------------------------+----------------------------+---------  +  !Valve !Stenosis                    !Insufficiency                 !Comments !  +------+----------------------------+----------------------------+---------  +  !Aortic!No                          !Not assessed                !           !  +------+----------------------------+----------------------------+---------  +  !Mitral!Not assessed         !No insufficiency            !           !  +------+----------------------------+----------------------------+---------  +    LV function assessed as:Normal.  Ejection Fraction  +----------------------------------------------------------------------+---  +  !Method                                                                  !EF%!  +----------------------------------------------------------------------+---  +  !LV gram                                                                 !55 !  +----------------------------------------------------------------------+---  +    VA  Ventriculography Findings:  Normal left ventricular systolic function.     Coronary tree     Dominance: Mixed     Impression     Diagnostic Conclusions   1 Vessel CAd with RCA disease as culprit for NSTEMI. NL LV FX with mid   inferior hypokinesis     Interventional Conclusions     Successful Coronary Intervention SINDY of RCA.     Recommendations     Manage with medical therapy.    < end of copied text >

## 2018-10-30 NOTE — PROGRESS NOTE ADULT - PROBLEM SELECTOR PLAN 4
will need to reassess pulm. pressures in future once recovered from MI as outpt.
will need to reassess pulm. pressures in future once recoverd from MI as outpt.

## 2018-10-30 NOTE — PROGRESS NOTE ADULT - ASSESSMENT
HPI:  58 y.o. male with PMH scleroderma, pulmonary fibrosis dx 1 yr ago, hx GI ulcer/bleed ~9 yr ago presents with chest pain.       Patient now s/p angiogram  with PCI and SINDY x 4  to the RCA    - monitor in CCU  - AM labs and EKG reviewed   - procedure, outcome and f/u care reviewed with patient  - ambulate as tolerated  - continue ASA/ Plavix  - continue statin/ B' blocker/ CCB  - continue hospitalist service   - f/u with MD in 4-7 days of discharge

## 2018-10-30 NOTE — PROGRESS NOTE ADULT - PROBLEM SELECTOR PLAN 1
s/p PCI of RCA on asa and plavix.  CCU care.
s/p PCI of RCA on asa and plavix.  recovering well.  tolerating medications without issue.  ambulate today and if doing well will likely dc kinga.

## 2018-10-31 ENCOUNTER — TRANSCRIPTION ENCOUNTER (OUTPATIENT)
Age: 58
End: 2018-10-31

## 2018-10-31 VITALS — TEMPERATURE: 99 F

## 2018-10-31 LAB
HCT VFR BLD CALC: 41.5 % — SIGNIFICANT CHANGE UP (ref 39–50)
HGB BLD-MCNC: 13 G/DL — SIGNIFICANT CHANGE UP (ref 13–17)
MCHC RBC-ENTMCNC: 30.1 PG — SIGNIFICANT CHANGE UP (ref 27–34)
MCHC RBC-ENTMCNC: 31.3 GM/DL — LOW (ref 32–36)
MCV RBC AUTO: 96.1 FL — SIGNIFICANT CHANGE UP (ref 80–100)
NRBC # BLD: 0 /100 WBCS — SIGNIFICANT CHANGE UP (ref 0–0)
PLATELET # BLD AUTO: 280 K/UL — SIGNIFICANT CHANGE UP (ref 150–400)
RBC # BLD: 4.32 M/UL — SIGNIFICANT CHANGE UP (ref 4.2–5.8)
RBC # FLD: 14.2 % — SIGNIFICANT CHANGE UP (ref 10.3–14.5)
WBC # BLD: 13.12 K/UL — HIGH (ref 3.8–10.5)
WBC # FLD AUTO: 13.12 K/UL — HIGH (ref 3.8–10.5)

## 2018-10-31 RX ORDER — ATORVASTATIN CALCIUM 80 MG/1
1 TABLET, FILM COATED ORAL
Qty: 30 | Refills: 0 | OUTPATIENT
Start: 2018-10-31 | End: 2018-11-29

## 2018-10-31 RX ORDER — PANTOPRAZOLE SODIUM 20 MG/1
1 TABLET, DELAYED RELEASE ORAL
Qty: 30 | Refills: 0 | OUTPATIENT
Start: 2018-10-31 | End: 2018-11-29

## 2018-10-31 RX ORDER — CLOPIDOGREL BISULFATE 75 MG/1
1 TABLET, FILM COATED ORAL
Qty: 30 | Refills: 0
Start: 2018-10-31 | End: 2018-11-29

## 2018-10-31 RX ORDER — METOPROLOL TARTRATE 50 MG
1 TABLET ORAL
Qty: 30 | Refills: 0 | OUTPATIENT
Start: 2018-10-31 | End: 2018-11-29

## 2018-10-31 RX ORDER — ASPIRIN/CALCIUM CARB/MAGNESIUM 324 MG
1 TABLET ORAL
Qty: 0 | Refills: 0 | COMMUNITY
Start: 2018-10-31

## 2018-10-31 RX ADMIN — INFLUENZA VIRUS VACCINE 0.5 MILLILITER(S): 15; 15; 15; 15 SUSPENSION INTRAMUSCULAR at 10:55

## 2018-10-31 RX ADMIN — Medication 12.5 MILLIGRAM(S): at 06:41

## 2018-10-31 RX ADMIN — CLOPIDOGREL BISULFATE 75 MILLIGRAM(S): 75 TABLET, FILM COATED ORAL at 12:03

## 2018-10-31 RX ADMIN — Medication 81 MILLIGRAM(S): at 12:03

## 2018-10-31 RX ADMIN — AMLODIPINE BESYLATE 2.5 MILLIGRAM(S): 2.5 TABLET ORAL at 10:55

## 2018-10-31 RX ADMIN — PANTOPRAZOLE SODIUM 40 MILLIGRAM(S): 20 TABLET, DELAYED RELEASE ORAL at 08:04

## 2018-10-31 NOTE — DISCHARGE NOTE ADULT - CARE PLAN
Principal Discharge DX:	NSTEMI, initial episode of care  Goal:	stable  Assessment and plan of treatment:	take meds as prescribed; take copy of the CT scan to your lung doc and scleroderma doc

## 2018-10-31 NOTE — DISCHARGE NOTE ADULT - CARE PROVIDER_API CALL
August Hall (MD), Cardiovascular Disease  [address] [place] [address]  Phone: [phone]  Fax: [phone]

## 2018-10-31 NOTE — DISCHARGE NOTE ADULT - HOSPITAL COURSE
58 y.o. male with PMH scleroderma, pulmonary fibrosis dx 1 yr ago, hx GI ulcer/bleed ~9 yr ago presents with chest pain today. Pt states getting worse. The chest pain is substernal, pressure like, severe, constant, nothing makes it better, nonradiating. Associated with shortness of breath. Pt reports getting dressed for dinner when symptom occurred. No fever, chills, abd pain, dysuria, diarrhea. Denies any prior similar episode. Currently, reports no chest pain.  Pt reports seeing Dr Samuels as primary physician. Pt was recently sent to different rheumatologist in the city for clinical trial and to start cellcept. Currently not on cellcept.  Pt seen in cath lab, no c/o s/p 4 stents            PHYSICAL EXAM:    Constitutional: NAD, awake and alert, well-developed  HEENT: PERR, EOMI,  No oral cyananosis. appearance c/w systemic sclerosis  Neck:  supple,  No JVD  Respiratory: Breath sounds are clear bilaterally, No wheezing, rales or rhonchi  Cardiovascular: S1 and S2, regular rate and rhythm, no Murmurs, gallops or rubs  Gastrointestinal: Bowel Sounds present, soft, nontender.   Extremities: No peripheral edema. No clubbing or cyanosis.  Vascular: 2+ peripheral pulses  Neurological: A/O x 3, no focal deficits  Musculoskeletal: no calf tenderness.  Skin: No rashes.      LABS: All Labs Reviewed:                          chest pain due to STEMI  #elevated troponin  #2nd EKG changes - dynamic T inversion  -s/p 4 stents    #scleroderma, pulmonary fibrosis  -will take copy of the ct scan to his pulm doc    #CHRIS  -iv fluid    #hx GI ulcer, GERD  -PPI added    #elevated lactate  -likely ischemic demand from MI

## 2018-10-31 NOTE — DISCHARGE NOTE ADULT - PATIENT PORTAL LINK FT
You can access the [facility] Patient Portal, offered by [facility], by registering with the following website: [email or web address]

## 2018-10-31 NOTE — DISCHARGE NOTE ADULT - MEDICATION SUMMARY - MEDICATIONS TO TAKE
I will START or STAY ON the medications listed below when I get home from the hospital:    aspirin 81 mg oral tablet, chewable  -- 1 tab(s) by mouth once a day  -- Indication: For for stent    quinapril 10 mg oral tablet  -- 1 tab(s) by mouth once a day  -- Indication: For HTN (hypertension)    atorvastatin 40 mg oral tablet  -- 1 tab(s) by mouth once a day (at bedtime)  -- Indication: For Stent    clopidogrel 75 mg oral tablet  -- 1 tab(s) by mouth once a day  -- Indication: For Stent    Toprol-XL 25 mg oral tablet, extended release  -- 1 tab(s) by mouth once a day   -- It is very important that you take or use this exactly as directed.  Do not skip doses or discontinue unless directed by your doctor.  May cause drowsiness.  Alcohol may intensify this effect.  Use care when operating dangerous machinery.  Some non-prescription drugs may aggravate your condition.  Read all labels carefully.  If a warning appears, check with your doctor before taking.  Swallow whole.  Do not crush.  Take with food or milk.  This drug may impair the ability to drive or operate machinery.  Use care until you become familiar with its effects.    -- Indication: For Stent    Protonix 40 mg oral delayed release tablet  -- 1 tab(s) by mouth once a day   -- It is very important that you take or use this exactly as directed.  Do not skip doses or discontinue unless directed by your doctor.  Obtain medical advice before taking any non-prescription drugs as some may affect the action of this medication.  Swallow whole.  Do not crush.    -- Indication: For to protect stomach

## 2018-11-02 LAB
CULTURE RESULTS: SIGNIFICANT CHANGE UP
SPECIMEN SOURCE: SIGNIFICANT CHANGE UP

## 2018-11-06 DIAGNOSIS — N17.9 ACUTE KIDNEY FAILURE, UNSPECIFIED: ICD-10-CM

## 2018-11-06 DIAGNOSIS — K21.9 GASTRO-ESOPHAGEAL REFLUX DISEASE WITHOUT ESOPHAGITIS: ICD-10-CM

## 2018-11-06 DIAGNOSIS — J84.10 PULMONARY FIBROSIS, UNSPECIFIED: ICD-10-CM

## 2018-11-06 DIAGNOSIS — M34.9 SYSTEMIC SCLEROSIS, UNSPECIFIED: ICD-10-CM

## 2018-11-06 DIAGNOSIS — I24.8 OTHER FORMS OF ACUTE ISCHEMIC HEART DISEASE: ICD-10-CM

## 2018-11-06 DIAGNOSIS — I10 ESSENTIAL (PRIMARY) HYPERTENSION: ICD-10-CM

## 2018-11-06 DIAGNOSIS — I21.4 NON-ST ELEVATION (NSTEMI) MYOCARDIAL INFARCTION: ICD-10-CM

## 2018-11-06 DIAGNOSIS — R74.8 ABNORMAL LEVELS OF OTHER SERUM ENZYMES: ICD-10-CM

## 2018-11-06 DIAGNOSIS — I25.10 ATHEROSCLEROTIC HEART DISEASE OF NATIVE CORONARY ARTERY WITHOUT ANGINA PECTORIS: ICD-10-CM

## 2018-11-13 PROBLEM — M34.9 SYSTEMIC SCLEROSIS, UNSPECIFIED: Chronic | Status: ACTIVE | Noted: 2018-10-28

## 2018-11-13 PROBLEM — J84.10 PULMONARY FIBROSIS, UNSPECIFIED: Chronic | Status: ACTIVE | Noted: 2018-10-28

## 2018-11-13 PROBLEM — I10 ESSENTIAL (PRIMARY) HYPERTENSION: Chronic | Status: ACTIVE | Noted: 2018-10-28

## 2018-11-16 ENCOUNTER — APPOINTMENT (OUTPATIENT)
Dept: CARDIOLOGY | Facility: CLINIC | Age: 58
End: 2018-11-16

## 2018-11-27 ENCOUNTER — APPOINTMENT (OUTPATIENT)
Dept: CARDIOLOGY | Facility: CLINIC | Age: 58
End: 2018-11-27
Payer: COMMERCIAL

## 2018-11-27 VITALS — SYSTOLIC BLOOD PRESSURE: 147 MMHG | OXYGEN SATURATION: 97 % | DIASTOLIC BLOOD PRESSURE: 83 MMHG | HEART RATE: 80 BPM

## 2018-11-27 VITALS — BODY MASS INDEX: 23.99 KG/M2 | WEIGHT: 162 LBS | HEIGHT: 69 IN

## 2018-11-27 DIAGNOSIS — Z78.9 OTHER SPECIFIED HEALTH STATUS: ICD-10-CM

## 2018-11-27 DIAGNOSIS — Z83.3 FAMILY HISTORY OF DIABETES MELLITUS: ICD-10-CM

## 2018-11-27 DIAGNOSIS — K21.9 GASTRO-ESOPHAGEAL REFLUX DISEASE W/OUT ESOPHAGITIS: ICD-10-CM

## 2018-11-27 DIAGNOSIS — Z83.79 FAMILY HISTORY OF OTHER DISEASES OF THE DIGESTIVE SYSTEM: ICD-10-CM

## 2018-11-27 DIAGNOSIS — Z82.49 FAMILY HISTORY OF ISCHEMIC HEART DISEASE AND OTHER DISEASES OF THE CIRCULATORY SYSTEM: ICD-10-CM

## 2018-11-27 DIAGNOSIS — Z81.1 FAMILY HISTORY OF ALCOHOL ABUSE AND DEPENDENCE: ICD-10-CM

## 2018-11-27 PROCEDURE — 93000 ELECTROCARDIOGRAM COMPLETE: CPT

## 2018-11-27 PROCEDURE — 99215 OFFICE O/P EST HI 40 MIN: CPT | Mod: 25

## 2018-12-03 ENCOUNTER — NON-APPOINTMENT (OUTPATIENT)
Age: 58
End: 2018-12-03

## 2018-12-03 NOTE — REASON FOR VISIT
[Follow-Up - From Hospitalization] : follow-up of a recent hospitalization for [Hyperlipidemia] : hyperlipidemia [Hypertension] : hypertension [Prior Myocardial Infarction] : a prior myocardial infarction

## 2018-12-03 NOTE — CARDIOLOGY SUMMARY
[___] : [unfilled] [LVEF ___%] : LVEF [unfilled]% [None] : normal LV function [Moderate] : moderate pulmonary hypertension [Enlarged] : enlarged LA size [Mild] : mild mitral regurgitation [___] : [unfilled]

## 2018-12-06 PROBLEM — Z81.1 FAMILY HISTORY OF ALCOHOLISM: Status: ACTIVE | Noted: 2018-12-06

## 2018-12-06 PROBLEM — Z82.49 FAMILY HISTORY OF CORONARY ARTERY DISEASE: Status: ACTIVE | Noted: 2018-12-06

## 2018-12-06 PROBLEM — Z78.9 SOCIAL ALCOHOL USE: Status: ACTIVE | Noted: 2018-12-06

## 2018-12-06 PROBLEM — Z83.3 FAMILY HISTORY OF TYPE 2 DIABETES MELLITUS: Status: ACTIVE | Noted: 2018-12-06

## 2018-12-06 PROBLEM — Z83.79 FAMILY HISTORY OF HEPATIC CIRRHOSIS: Status: ACTIVE | Noted: 2018-12-06

## 2018-12-06 PROBLEM — Z82.49 FAMILY HISTORY OF HYPERTENSION: Status: ACTIVE | Noted: 2018-12-06

## 2018-12-06 PROBLEM — K21.9 GERD WITHOUT ESOPHAGITIS: Status: RESOLVED | Noted: 2018-11-27 | Resolved: 2018-12-06

## 2018-12-06 NOTE — HISTORY OF PRESENT ILLNESS
[FreeTextEntry1] : s/p NSTEMI and PCI of RCA.  Has still been SOB somewhat at home with exertion but has been less active since bring home.  Denies chest. palpitations, orthopnea, PND or LE edema. Had discussions with him  regarding his pulmonary status and the possibility that pulmonary HTN may be contributing to his SOB.  he will follow up with a pulmonologist in this regard.

## 2018-12-06 NOTE — DISCUSSION/SUMMARY
[FreeTextEntry1] : NSTEMI/CAD: s/p PCI of dual antiplatelet therapy and doing well.\par HTN: Controlled on current regime.\par SOB/?Pulm. HTN: Screening echocardiogram to evaluate further.\par Follow up 3 months.

## 2018-12-06 NOTE — PHYSICAL EXAM
[General Appearance - Well Developed] : well developed [General Appearance - Well Nourished] : well nourished [Normal Conjunctiva] : the conjunctiva exhibited no abnormalities [Normal Oral Mucosa] : normal oral mucosa [Normal Oropharynx] : normal oropharynx [Normal Jugular Venous A Waves Present] : normal jugular venous A waves present [Normal Jugular Venous V Waves Present] : normal jugular venous V waves present [] : no respiratory distress [Auscultation Breath Sounds / Voice Sounds] : lungs were clear to auscultation bilaterally [5th Left ICS - MCL] : palpated at the 5th LICS in the midclavicular line [Normal] : normal [Rhythm Regular] : regular [Normal S1] : normal S1 [Normal S2] : normal S2 [S4] : an S4 was heard [No Murmur] : no murmurs heard [2+] : left 2+ [No Pitting Edema] : no pitting edema present [Bowel Sounds] : normal bowel sounds [Abdomen Soft] : soft [Abnormal Walk] : normal gait [Nail Clubbing] : no clubbing of the fingernails [Cyanosis, Localized] : no localized cyanosis [FreeTextEntry1] : skin is affected and tight from scleroderma. [No Anxiety] : not feeling anxious

## 2019-02-18 ENCOUNTER — RX RENEWAL (OUTPATIENT)
Age: 59
End: 2019-02-18

## 2019-02-18 ENCOUNTER — MEDICATION RENEWAL (OUTPATIENT)
Age: 59
End: 2019-02-18

## 2019-02-26 ENCOUNTER — APPOINTMENT (OUTPATIENT)
Dept: CARDIOLOGY | Facility: CLINIC | Age: 59
End: 2019-02-26
Payer: COMMERCIAL

## 2019-02-26 ENCOUNTER — NON-APPOINTMENT (OUTPATIENT)
Age: 59
End: 2019-02-26

## 2019-02-26 VITALS
SYSTOLIC BLOOD PRESSURE: 180 MMHG | HEART RATE: 82 BPM | HEIGHT: 69 IN | WEIGHT: 156 LBS | BODY MASS INDEX: 23.11 KG/M2 | OXYGEN SATURATION: 98 % | DIASTOLIC BLOOD PRESSURE: 92 MMHG

## 2019-02-26 DIAGNOSIS — J84.10 PULMONARY FIBROSIS, UNSPECIFIED: ICD-10-CM

## 2019-02-26 PROCEDURE — 93000 ELECTROCARDIOGRAM COMPLETE: CPT

## 2019-02-26 PROCEDURE — 99215 OFFICE O/P EST HI 40 MIN: CPT | Mod: 25

## 2019-02-26 NOTE — PHYSICAL EXAM
[General Appearance - Well Developed] : well developed [General Appearance - Well Nourished] : well nourished [Normal Conjunctiva] : the conjunctiva exhibited no abnormalities [Normal Oral Mucosa] : normal oral mucosa [Normal Oropharynx] : normal oropharynx [Normal Jugular Venous A Waves Present] : normal jugular venous A waves present [Normal Jugular Venous V Waves Present] : normal jugular venous V waves present [] : no respiratory distress [Auscultation Breath Sounds / Voice Sounds] : lungs were clear to auscultation bilaterally [Bowel Sounds] : normal bowel sounds [Abdomen Soft] : soft [Abnormal Walk] : normal gait [Nail Clubbing] : no clubbing of the fingernails [Cyanosis, Localized] : no localized cyanosis [FreeTextEntry1] : skin is affected and tight from scleroderma. [No Anxiety] : not feeling anxious [5th Left ICS - MCL] : palpated at the 5th LICS in the midclavicular line [Normal] : normal [Rhythm Regular] : regular [Normal S1] : normal S1 [Normal S2] : normal S2 [S4] : an S4 was heard [No Murmur] : no murmurs heard [2+] : left 2+ [No Pitting Edema] : no pitting edema present

## 2019-02-26 NOTE — DISCUSSION/SUMMARY
[FreeTextEntry1] : NSTEMI/CAD: s/p PCI of dual antiplatelet therapy and doing well.\par HTN: Controlled on current regime.\par SOB/?Pulm. HTN: Screening echocardiogram to evaluate further. Unclear if LACEY from primary or secondary pulm. HTN or just pulm. fibrosis.\par HL: Blood work today to check lipids.\par F/u in 2 months.\par

## 2019-02-26 NOTE — CARDIOLOGY SUMMARY
[___] : [unfilled] [LVEF ___%] : LVEF [unfilled]% [None] : normal LV function [Moderate] : moderate pulmonary hypertension [Enlarged] : enlarged LA size [Mild] : mild mitral regurgitation

## 2019-02-26 NOTE — HISTORY OF PRESENT ILLNESS
[FreeTextEntry1] : Recent cold which has been tough to get over, but prior to that was doing well without chest pain or more SOB than her baseline but overall his LACEY compared to 6 months prior to heart attack is worse.  Has been diagnosed with pulmonary fibrosis in past. is scheduled to see pulmonary in upcoming time as it got preempted by MI.

## 2019-04-16 ENCOUNTER — APPOINTMENT (OUTPATIENT)
Dept: CARDIOLOGY | Facility: CLINIC | Age: 59
End: 2019-04-16
Payer: COMMERCIAL

## 2019-04-16 LAB
ALBUMIN SERPL ELPH-MCNC: 4.3 G/DL
ALP BLD-CCNC: 119 U/L
ALT SERPL-CCNC: 10 U/L
ANION GAP SERPL CALC-SCNC: 17 MMOL/L
AST SERPL-CCNC: 29 U/L
BASOPHILS # BLD AUTO: 0.1 K/UL
BASOPHILS NFR BLD AUTO: 1 %
BILIRUB SERPL-MCNC: 0.6 MG/DL
BUN SERPL-MCNC: 13 MG/DL
CALCIUM SERPL-MCNC: 9.6 MG/DL
CHLORIDE SERPL-SCNC: 102 MMOL/L
CHOLEST SERPL-MCNC: 127 MG/DL
CHOLEST/HDLC SERPL: 3.3 RATIO
CK SERPL-CCNC: 209 U/L
CO2 SERPL-SCNC: 23 MMOL/L
CREAT SERPL-MCNC: 1.22 MG/DL
EOSINOPHIL # BLD AUTO: 0.17 K/UL
EOSINOPHIL NFR BLD AUTO: 1.7 %
GLUCOSE SERPL-MCNC: 90 MG/DL
HBA1C MFR BLD HPLC: 5.8 %
HCT VFR BLD CALC: 45.2 %
HDLC SERPL-MCNC: 39 MG/DL
HGB BLD-MCNC: 14.2 G/DL
IMM GRANULOCYTES NFR BLD AUTO: 0.2 %
LDLC SERPL CALC-MCNC: 75 MG/DL
LDLC SERPL DIRECT ASSAY-MCNC: 84 MG/DL
LYMPHOCYTES # BLD AUTO: 1.61 K/UL
LYMPHOCYTES NFR BLD AUTO: 16.4 %
MAN DIFF?: NORMAL
MCHC RBC-ENTMCNC: 29.5 PG
MCHC RBC-ENTMCNC: 31.4 GM/DL
MCV RBC AUTO: 94 FL
MONOCYTES # BLD AUTO: 0.81 K/UL
MONOCYTES NFR BLD AUTO: 8.3 %
NEUTROPHILS # BLD AUTO: 7.08 K/UL
NEUTROPHILS NFR BLD AUTO: 72.4 %
PLATELET # BLD AUTO: 286 K/UL
POTASSIUM SERPL-SCNC: 3.9 MMOL/L
PROT SERPL-MCNC: 8 G/DL
RBC # BLD: 4.81 M/UL
RBC # FLD: 16 %
SODIUM SERPL-SCNC: 142 MMOL/L
TRIGL SERPL-MCNC: 63 MG/DL
TSH SERPL-ACNC: 3.7 UIU/ML
WBC # FLD AUTO: 9.79 K/UL

## 2019-04-16 PROCEDURE — 93306 TTE W/DOPPLER COMPLETE: CPT

## 2019-04-22 LAB — 25(OH)D3 SERPL-MCNC: 43.4 NG/ML

## 2019-04-30 ENCOUNTER — NON-APPOINTMENT (OUTPATIENT)
Age: 59
End: 2019-04-30

## 2019-04-30 ENCOUNTER — APPOINTMENT (OUTPATIENT)
Dept: CARDIOLOGY | Facility: CLINIC | Age: 59
End: 2019-04-30

## 2019-04-30 ENCOUNTER — APPOINTMENT (OUTPATIENT)
Dept: CARDIOLOGY | Facility: CLINIC | Age: 59
End: 2019-04-30
Payer: COMMERCIAL

## 2019-04-30 VITALS
WEIGHT: 158 LBS | DIASTOLIC BLOOD PRESSURE: 93 MMHG | HEIGHT: 69 IN | OXYGEN SATURATION: 92 % | RESPIRATION RATE: 18 BRPM | SYSTOLIC BLOOD PRESSURE: 191 MMHG | BODY MASS INDEX: 23.4 KG/M2 | HEART RATE: 67 BPM

## 2019-04-30 PROCEDURE — 93000 ELECTROCARDIOGRAM COMPLETE: CPT

## 2019-04-30 PROCEDURE — 99214 OFFICE O/P EST MOD 30 MIN: CPT | Mod: 25

## 2019-04-30 NOTE — REASON FOR VISIT
[Follow-Up - Clinic] : a clinic follow-up of [Hyperlipidemia] : hyperlipidemia [Prior Myocardial Infarction] : a prior myocardial infarction [Hypertension] : hypertension

## 2019-05-04 NOTE — PHYSICAL EXAM
[General Appearance - Well Nourished] : well nourished [General Appearance - Well Developed] : well developed [Normal Conjunctiva] : the conjunctiva exhibited no abnormalities [Normal Oropharynx] : normal oropharynx [Normal Oral Mucosa] : normal oral mucosa [Normal Jugular Venous A Waves Present] : normal jugular venous A waves present [Normal Jugular Venous V Waves Present] : normal jugular venous V waves present [] : no respiratory distress [Auscultation Breath Sounds / Voice Sounds] : lungs were clear to auscultation bilaterally [Bowel Sounds] : normal bowel sounds [Abdomen Soft] : soft [Abnormal Walk] : normal gait [Nail Clubbing] : no clubbing of the fingernails [Cyanosis, Localized] : no localized cyanosis [No Anxiety] : not feeling anxious [5th Left ICS - MCL] : palpated at the 5th LICS in the midclavicular line [Normal] : normal [Rhythm Regular] : regular [Normal S2] : normal S2 [Normal S1] : normal S1 [S4] : an S4 was heard [No Murmur] : no murmurs heard [No Pitting Edema] : no pitting edema present [2+] : left 2+ [FreeTextEntry1] : skin is affected and tight from scleroderma.

## 2019-05-04 NOTE — DISCUSSION/SUMMARY
[FreeTextEntry1] : NSTEMI/CAD: s/p PCI of dual antiplatelet therapy will continue current therapy. \par HTN: .Will increase Quinapril to 10 mg twice daily. Will maintain blood pressure log. Discussed low salt diet. \par SOB/?Pulm. Will obtain Pharmacological stress test to rule out ischemia. Will also follow up with pulmonary. \par HL: Blood works reviewed.\par OV in 4 weeks after testing. \par

## 2019-05-04 NOTE — HISTORY OF PRESENT ILLNESS
[FreeTextEntry1] : Presented to clinic for routine check up.Patient still has LACEY with no chest pain. He gets winded even after slight exertion. \par Blood pressure elevated today 170/80.\par

## 2019-06-24 ENCOUNTER — MEDICATION RENEWAL (OUTPATIENT)
Age: 59
End: 2019-06-24

## 2019-06-25 ENCOUNTER — APPOINTMENT (OUTPATIENT)
Dept: CARDIOLOGY | Facility: CLINIC | Age: 59
End: 2019-06-25
Payer: COMMERCIAL

## 2019-06-25 PROCEDURE — A9500: CPT

## 2019-06-25 PROCEDURE — 93015 CV STRESS TEST SUPVJ I&R: CPT

## 2019-06-25 PROCEDURE — 78452 HT MUSCLE IMAGE SPECT MULT: CPT

## 2019-07-02 ENCOUNTER — APPOINTMENT (OUTPATIENT)
Dept: CARDIOLOGY | Facility: CLINIC | Age: 59
End: 2019-07-02

## 2019-07-09 ENCOUNTER — TRANSCRIPTION ENCOUNTER (OUTPATIENT)
Age: 59
End: 2019-07-09

## 2019-07-16 ENCOUNTER — APPOINTMENT (OUTPATIENT)
Dept: CARDIOLOGY | Facility: CLINIC | Age: 59
End: 2019-07-16

## 2019-07-31 ENCOUNTER — APPOINTMENT (OUTPATIENT)
Dept: INTERNAL MEDICINE | Facility: CLINIC | Age: 59
End: 2019-07-31
Payer: COMMERCIAL

## 2019-07-31 ENCOUNTER — APPOINTMENT (OUTPATIENT)
Dept: INTERNAL MEDICINE | Facility: CLINIC | Age: 59
End: 2019-07-31

## 2019-07-31 VITALS
WEIGHT: 154 LBS | RESPIRATION RATE: 18 BRPM | HEART RATE: 71 BPM | BODY MASS INDEX: 22.81 KG/M2 | DIASTOLIC BLOOD PRESSURE: 90 MMHG | SYSTOLIC BLOOD PRESSURE: 180 MMHG | TEMPERATURE: 97.5 F | OXYGEN SATURATION: 93 % | HEIGHT: 69 IN

## 2019-07-31 PROCEDURE — 94729 DIFFUSING CAPACITY: CPT

## 2019-07-31 PROCEDURE — 94010 BREATHING CAPACITY TEST: CPT

## 2019-07-31 PROCEDURE — 94727 GAS DIL/WSHOT DETER LNG VOL: CPT

## 2019-07-31 PROCEDURE — 99205 OFFICE O/P NEW HI 60 MIN: CPT | Mod: 25

## 2019-07-31 PROCEDURE — ZZZZZ: CPT

## 2019-08-06 ENCOUNTER — APPOINTMENT (OUTPATIENT)
Dept: CARDIOLOGY | Facility: CLINIC | Age: 59
End: 2019-08-06
Payer: COMMERCIAL

## 2019-08-06 ENCOUNTER — NON-APPOINTMENT (OUTPATIENT)
Age: 59
End: 2019-08-06

## 2019-08-06 VITALS
OXYGEN SATURATION: 93 % | SYSTOLIC BLOOD PRESSURE: 202 MMHG | HEIGHT: 69 IN | DIASTOLIC BLOOD PRESSURE: 96 MMHG | BODY MASS INDEX: 22.81 KG/M2 | WEIGHT: 154 LBS | RESPIRATION RATE: 18 BRPM | HEART RATE: 90 BPM | TEMPERATURE: 97.9 F

## 2019-08-06 PROCEDURE — 99214 OFFICE O/P EST MOD 30 MIN: CPT | Mod: 25

## 2019-08-06 PROCEDURE — 93000 ELECTROCARDIOGRAM COMPLETE: CPT

## 2019-08-06 NOTE — REASON FOR VISIT
[Follow-Up - Clinic] : a clinic follow-up of [Hyperlipidemia] : hyperlipidemia [Hypertension] : hypertension [Prior Myocardial Infarction] : a prior myocardial infarction

## 2019-08-13 NOTE — HISTORY OF PRESENT ILLNESS
[FreeTextEntry1] : Presented to clinic for routine check up.Patient still has LACEY with no chest pain. He gets winded even after slight exertion. Had increased Quinapril to 10mg twice daily. patient reports that  was light headed yesterday when he checked his systolic blood pressure was up in 170s. he did take extra 10 mg quinapril  Blood pressure log at home average 140s/90s. today blood pressure 130/80. had seen Dr Zayas recently wants to discuss with Dr Hall to start patient on Revatio.

## 2019-08-13 NOTE — PHYSICAL EXAM
[General Appearance - Well Developed] : well developed [General Appearance - Well Nourished] : well nourished [Normal Conjunctiva] : the conjunctiva exhibited no abnormalities [Normal Oral Mucosa] : normal oral mucosa [Normal Oropharynx] : normal oropharynx [Normal Jugular Venous A Waves Present] : normal jugular venous A waves present [] : no respiratory distress [Normal Jugular Venous V Waves Present] : normal jugular venous V waves present [Auscultation Breath Sounds / Voice Sounds] : lungs were clear to auscultation bilaterally [Bowel Sounds] : normal bowel sounds [Abdomen Soft] : soft [Abnormal Walk] : normal gait [Nail Clubbing] : no clubbing of the fingernails [Cyanosis, Localized] : no localized cyanosis [5th Left ICS - MCL] : palpated at the 5th LICS in the midclavicular line [No Anxiety] : not feeling anxious [Normal] : normal [Rhythm Regular] : regular [Normal S1] : normal S1 [Normal S2] : normal S2 [S4] : an S4 was heard [No Murmur] : no murmurs heard [No Pitting Edema] : no pitting edema present [2+] : left 2+ [FreeTextEntry1] : skin is affected and tight from scleroderma.

## 2019-08-13 NOTE — DISCUSSION/SUMMARY
[FreeTextEntry1] : NSTEMI/CAD: s/p PCI of dual antiplatelet therapy will continue current therapy. \par HTN: .Will increase Quinapril to 15 mg twice daily. Will maintain blood pressure log. Discussed low salt diet. \par SOB/?Pulm. . \par HL: Blood works reviewed.\par Discussed with Dr Zayas and pamela Norwood to start patient on Revatio for pulmonary HTN.  \par OV in 4 weeks \par

## 2019-08-29 NOTE — REVIEW OF SYSTEMS
[As Noted in HPI] : as noted in HPI [Rheumatologic] : ~T rheumatologic disorder [Negative] : Sleep Disorder

## 2019-08-29 NOTE — ASSESSMENT
[FreeTextEntry1] : Mr. jones is a 59-year-old male presents for initial pulmonary evaluation. 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 Patient has significant dyspnea with even minimal exertion. Disproportion a low diffusing capacities indicative of significant pulmonary hypertension. He would benefit from a trial of sildenafil 20 mg p.o. t.i.d. 2 decrease pulmonary artery pressure. We will continue her current medication regimen. Patient will followup with his cardiologist. Followup in this office as scheduled.

## 2019-08-29 NOTE — PHYSICAL EXAM
[Normal Conjunctiva] : the conjunctiva exhibited no abnormalities [Normal Oropharynx] : normal oropharynx [Eyelids - No Xanthelasma] : the eyelids demonstrated no xanthelasmas [Neck Appearance] : the appearance of the neck was normal [Neck Cervical Mass (___cm)] : no neck mass was observed [Jugular Venous Distention Increased] : there was no jugular-venous distention [Thyroid Diffuse Enlargement] : the thyroid was not enlarged [Heart Rate And Rhythm] : heart rate and rhythm were normal [Thyroid Nodule] : there were no palpable thyroid nodules [Heart Sounds] : normal S1 and S2 [Respiration, Rhythm And Depth] : normal respiratory rhythm and effort [Exaggerated Use Of Accessory Muscles For Inspiration] : no accessory muscle use [Auscultation Breath Sounds / Voice Sounds] : lungs were clear to auscultation bilaterally [Abdomen Tenderness] : non-tender [Abdomen Soft] : soft [Abdomen Mass (___ Cm)] : no abdominal mass palpated [] : no hepato-splenomegaly [Abnormal Walk] : normal gait [Gait - Sufficient For Exercise Testing] : the gait was sufficient for exercise testing [FreeTextEntry1] : Sclerodactyl of both hands [Deep Tendon Reflexes (DTR)] : deep tendon reflexes were 2+ and symmetric [No Focal Deficits] : no focal deficits [Sensation] : the sensory exam was normal to light touch and pinprick

## 2019-08-29 NOTE — REASON FOR VISIT
[Initial Evaluation] : an initial evaluation [FreeTextEntry2] : Scleroderma [Pulmonary Hypertension] : pulmonary hypertension

## 2019-08-29 NOTE — HISTORY OF PRESENT ILLNESS
[FreeTextEntry1] : Mr. Ibrahim is a 59-year-old male with a history of progressive systemic sclerosis diagnosed approximately 10 years ago. He does have shortness of breath with minimal exertion. Activity such as walking or going up an incline or upstairs cause significant dyspnea. The symptoms do resolve with rest. He has no associated chest pain or palpitations. Mr. ibrahim did have a myocardial infarction in October 2018. He currently has 4 intracoronary stents. He has also had a right total hip replacement. Mr. ibrahim has been diagnosed with pulmonary hypertension most likely related to his systemic scleroderma.

## 2019-08-29 NOTE — PROCEDURE
[FreeTextEntry1] : Complete pulmonary tests show moderate restrictive defect by flow rates. FEV1 is 2.1 L which is 67% predicted. FEV1/FVC ratio is 95%. Total lung capacity is 5.96 L which is 93% predicted. Diffusing capacity is 33%. The disproportionately low diffusing capacity is indicative of significant pulmonary vascular disease.

## 2019-10-31 ENCOUNTER — APPOINTMENT (OUTPATIENT)
Dept: INTERNAL MEDICINE | Facility: CLINIC | Age: 59
End: 2019-10-31

## 2019-11-22 ENCOUNTER — MEDICATION RENEWAL (OUTPATIENT)
Age: 59
End: 2019-11-22

## 2019-11-22 ENCOUNTER — RX RENEWAL (OUTPATIENT)
Age: 59
End: 2019-11-22

## 2020-03-03 ENCOUNTER — APPOINTMENT (OUTPATIENT)
Dept: CARDIOLOGY | Facility: CLINIC | Age: 60
End: 2020-03-03

## 2020-04-03 ENCOUNTER — APPOINTMENT (OUTPATIENT)
Dept: CARDIOLOGY | Facility: CLINIC | Age: 60
End: 2020-04-03

## 2020-08-04 ENCOUNTER — APPOINTMENT (OUTPATIENT)
Dept: CARDIOLOGY | Facility: CLINIC | Age: 60
End: 2020-08-04

## 2020-08-05 ENCOUNTER — APPOINTMENT (OUTPATIENT)
Dept: CARDIOLOGY | Facility: CLINIC | Age: 60
End: 2020-08-05
Payer: COMMERCIAL

## 2020-08-05 VITALS
BODY MASS INDEX: 23.7 KG/M2 | SYSTOLIC BLOOD PRESSURE: 125 MMHG | DIASTOLIC BLOOD PRESSURE: 85 MMHG | WEIGHT: 160 LBS | HEIGHT: 69 IN

## 2020-08-05 PROCEDURE — 99214 OFFICE O/P EST MOD 30 MIN: CPT | Mod: 95

## 2020-08-10 NOTE — DISCUSSION/SUMMARY
[FreeTextEntry1] : NSTEMI/CAD: s/p PCI of dual antiplatelet therapy, currently asymptomatic Continue aspirin. \par HTN: .Blood pressure controlled. Discussed low salt diet, continue current medication. \par HL:Will obtain blood works. \par  Dr Hall to call patient to discuss regarding proceeding with cath to rule out pulmonary hypertension. \par \par

## 2020-08-10 NOTE — HISTORY OF PRESENT ILLNESS
[FreeTextEntry1] : Initiated at 10.20\par Patient still with LACEY, currently follows up with Pulmonary ,has pulmonary fibrosis and considering lung transplant. Last echo shows patient has mild pulmonary hypertension. Patient wants to proceed with a right heart cath to confirm pulmonary hypertension. Blood pressure controlled , Maintains blood pressure log. Currently on O2 6 L nasal canula. \par  [FreeTextEntry4] : BIBI Lopez

## 2020-08-11 ENCOUNTER — RX RENEWAL (OUTPATIENT)
Age: 60
End: 2020-08-11

## 2020-08-12 ENCOUNTER — RX RENEWAL (OUTPATIENT)
Age: 60
End: 2020-08-12

## 2020-10-22 ENCOUNTER — APPOINTMENT (OUTPATIENT)
Dept: CARDIOLOGY | Facility: CLINIC | Age: 60
End: 2020-10-22
Payer: COMMERCIAL

## 2020-10-22 ENCOUNTER — NON-APPOINTMENT (OUTPATIENT)
Age: 60
End: 2020-10-22

## 2020-10-22 VITALS
TEMPERATURE: 97.8 F | BODY MASS INDEX: 23.99 KG/M2 | HEART RATE: 83 BPM | HEIGHT: 69 IN | WEIGHT: 162 LBS | SYSTOLIC BLOOD PRESSURE: 151 MMHG | DIASTOLIC BLOOD PRESSURE: 90 MMHG | OXYGEN SATURATION: 90 % | RESPIRATION RATE: 18 BRPM

## 2020-10-22 DIAGNOSIS — I21.4 NON-ST ELEVATION (NSTEMI) MYOCARDIAL INFARCTION: ICD-10-CM

## 2020-10-22 DIAGNOSIS — M34.9 SYSTEMIC SCLEROSIS, UNSPECIFIED: ICD-10-CM

## 2020-10-22 DIAGNOSIS — Z86.79 PERSONAL HISTORY OF OTHER DISEASES OF THE CIRCULATORY SYSTEM: ICD-10-CM

## 2020-10-22 PROCEDURE — 99215 OFFICE O/P EST HI 40 MIN: CPT

## 2020-10-22 PROCEDURE — 93000 ELECTROCARDIOGRAM COMPLETE: CPT

## 2020-10-22 RX ORDER — MYCOPHENOLATE MOFETIL 500 MG/1
500 TABLET, FILM COATED ORAL TWICE DAILY
Refills: 0 | Status: ACTIVE | COMMUNITY

## 2020-10-22 NOTE — CARDIOLOGY SUMMARY
[No Symptoms] : no Symptoms [___] : [unfilled] [LVEF ___%] : LVEF [unfilled]% [None] : normal LV function [Moderate] : moderate pulmonary hypertension [Enlarged] : enlarged LA size [Mild] : mild mitral regurgitation

## 2020-10-22 NOTE — DISCUSSION/SUMMARY
[FreeTextEntry1] : SOB/Scleroderma/ILD/Hx. of pulm. HTN: Echo to evaluate to see if RHC is needed. \par STEMI/CAD: s/p PCI of dual antiplatelet therapy will continue current therapy. Stress test abnormal but asymptomatic angina wise.  Continue to monitor.\par HTN: .Controlled on current regime. Discussed low salt diet. \par HL: Blood works reviewed.\par F/u in 1 month. \par

## 2020-10-22 NOTE — PHYSICAL EXAM
[General Appearance - Well Developed] : well developed [General Appearance - Well Nourished] : well nourished [Normal Conjunctiva] : the conjunctiva exhibited no abnormalities [Normal Oral Mucosa] : normal oral mucosa [Normal Oropharynx] : normal oropharynx [Normal Jugular Venous V Waves Present] : normal jugular venous V waves present [] : no respiratory distress [Bowel Sounds] : normal bowel sounds [Abdomen Soft] : soft [Abnormal Walk] : normal gait [Nail Clubbing] : no clubbing of the fingernails [Cyanosis, Localized] : no localized cyanosis [FreeTextEntry1] : skin is affected and tight from scleroderma. [Oriented To Time, Place, And Person] : oriented to person, place, and time [No Anxiety] : not feeling anxious [5th Left ICS - MCL] : palpated at the 5th LICS in the midclavicular line [Normal] : normal [Rhythm Regular] : regular [Normal S1] : normal S1 [Normal S2] : normal S2 [S4] : an S4 was heard [I] : a grade 1 [2+] : left 2+ [Right Carotid Bruit] : no bruit heard over the right carotid [Left Carotid Bruit] : no bruit heard over the left carotid [1+] : left 1+ [No Pitting Edema] : no pitting edema present

## 2020-10-22 NOTE — HISTORY OF PRESENT ILLNESS
[FreeTextEntry1] : Windedness on exertion has progressed and is on oxygen now but is intermitantly compliant with it.  Denies Chest pain, orthopnea, PND, or Le edema or ascites/abd. bloating. He discussed this with his pulmonologist and he has recommended being evaluated for a lung transplant, but the patient wants to see if there is a reversible component of his SOB (i.e pulmonary HTN from Scleroderma).  Last echo in 2019 Showed a PAP of 48 mmHg.  Lipids done recently are good. \par

## 2020-11-02 ENCOUNTER — RX RENEWAL (OUTPATIENT)
Age: 60
End: 2020-11-02

## 2020-11-10 ENCOUNTER — RX RENEWAL (OUTPATIENT)
Age: 60
End: 2020-11-10

## 2020-11-19 ENCOUNTER — APPOINTMENT (OUTPATIENT)
Dept: CARDIOLOGY | Facility: CLINIC | Age: 60
End: 2020-11-19

## 2021-01-12 ENCOUNTER — APPOINTMENT (OUTPATIENT)
Dept: CARDIOLOGY | Facility: CLINIC | Age: 61
End: 2021-01-12

## 2021-01-19 ENCOUNTER — APPOINTMENT (OUTPATIENT)
Dept: CARDIOLOGY | Facility: CLINIC | Age: 61
End: 2021-01-19

## 2021-01-29 ENCOUNTER — RX RENEWAL (OUTPATIENT)
Age: 61
End: 2021-01-29

## 2021-02-08 ENCOUNTER — RX RENEWAL (OUTPATIENT)
Age: 61
End: 2021-02-08

## 2021-02-19 ENCOUNTER — APPOINTMENT (OUTPATIENT)
Dept: CARDIOLOGY | Facility: CLINIC | Age: 61
End: 2021-02-19

## 2021-02-23 ENCOUNTER — RX RENEWAL (OUTPATIENT)
Age: 61
End: 2021-02-23

## 2021-02-24 ENCOUNTER — APPOINTMENT (OUTPATIENT)
Dept: CARDIOLOGY | Facility: CLINIC | Age: 61
End: 2021-02-24
Payer: COMMERCIAL

## 2021-02-24 PROCEDURE — 99215 OFFICE O/P EST HI 40 MIN: CPT | Mod: 95

## 2021-02-26 NOTE — ASSESSMENT
[FreeTextEntry1] : A/P:\par \par *Pulmonary hypertension:\par -Echo to evaluate pulmonary pressures\par -return in 2 weeks to review results & consider RHC\par will d/w pulmonologist after echo\par \par *STEMI/CAD: s/p PCI of dual antiplatelet therapy, cont. DAPT, statin, metoprolol\par MPI Jun '19 small, mild basal infereior defect cont. to monitor.\par \par *HTN: will check next visit.\par *HLP: controlled.\par \par Return 2 weeks in person to review results of testing & check BP.\par

## 2021-02-26 NOTE — HISTORY OF PRESENT ILLNESS
[FreeTextEntry1] : ANT SILVERMAN (ANT SILVERMAN)1960(60y)M\par \par 61 y/o w/\par \par *CAD s/p PCI Oct '18 SINDY RCA\par *ILD d/t scleroderma-on cellcept\par *suspected pulmonary hypertension-no RHC\par \par here for telehealth followup.\par Last seen by Dr. Hall Oct '20.\par At that time Echo reccomended to evaluate pulmonar pressures\par to determine if RHC is needed. \par 1 month followup reccomended.\par \par Pt reports chronic severe dyspnea requiring O2\par w/ dyspnea w/ minimal activity-w/ walking 30 feet\par \par Follows w/ Dr. Fidencio Cole pulmonologist at [facility].\par per Oct '20 clinic note Diagnosed w/ ILD d/t scleroderma.\par per pt he is possible lung transplant candidate on waiting list.\par However wants evaluation for pulmonary hypertension & maximize\par medical therapy for this prior to considering surgery.\par \par No other cardiac symptoms: chest pain, palpitations, syncope, lightheadness.\par \par Echo ordered last visit but was cancelled 3 times per pt.\par \par \par EKG: 10/22/2020, NSR, NSST's \par MPI Jun '19 small, mild basal infereior defect\par Echo: 10/2018, LVH, +1 TR, 1+ PI,, normal LV function, moderate pulmonary hypertension, enlarged LA size, mild mitral regurgitation LVEF 60-65%. \par Cardiac Cath: 10/2018, 1 Vessel CAD with NL LV FX \par Stent: 10/2018, SINDY to RCA \par  Labs Sep '20 LDL 97 HDL 36 TG 52  TSH 2.15 HbA1c 5.8 Sep '20 [facility] Hb 15 plat 232\par Cr 1.44

## 2021-03-09 ENCOUNTER — OUTPATIENT (OUTPATIENT)
Dept: OUTPATIENT SERVICES | Facility: HOSPITAL | Age: 61
LOS: 1 days | End: 2021-03-09
Payer: COMMERCIAL

## 2021-03-09 DIAGNOSIS — Z86.79 PERSONAL HISTORY OF OTHER DISEASES OF THE CIRCULATORY SYSTEM: ICD-10-CM

## 2021-03-09 PROCEDURE — 93306 TTE W/DOPPLER COMPLETE: CPT | Mod: 26

## 2021-03-09 PROCEDURE — 93306 TTE W/DOPPLER COMPLETE: CPT

## 2021-03-10 DIAGNOSIS — Z86.79 PERSONAL HISTORY OF OTHER DISEASES OF THE CIRCULATORY SYSTEM: ICD-10-CM

## 2021-03-30 ENCOUNTER — APPOINTMENT (OUTPATIENT)
Dept: CARDIOLOGY | Facility: CLINIC | Age: 61
End: 2021-03-30
Payer: COMMERCIAL

## 2021-03-30 VITALS
SYSTOLIC BLOOD PRESSURE: 164 MMHG | HEIGHT: 69 IN | HEART RATE: 73 BPM | WEIGHT: 165 LBS | DIASTOLIC BLOOD PRESSURE: 78 MMHG | BODY MASS INDEX: 24.44 KG/M2 | OXYGEN SATURATION: 91 %

## 2021-03-30 PROCEDURE — 99215 OFFICE O/P EST HI 40 MIN: CPT

## 2021-03-30 PROCEDURE — 99072 ADDL SUPL MATRL&STAF TM PHE: CPT

## 2021-03-30 PROCEDURE — 93000 ELECTROCARDIOGRAM COMPLETE: CPT

## 2021-03-30 RX ORDER — SILDENAFIL 20 MG/1
20 TABLET ORAL 3 TIMES DAILY
Qty: 270 | Refills: 2 | Status: DISCONTINUED | COMMUNITY
Start: 2019-08-12 | End: 2021-03-30

## 2021-08-26 ENCOUNTER — NON-APPOINTMENT (OUTPATIENT)
Age: 61
End: 2021-08-26

## 2021-08-29 ENCOUNTER — TRANSCRIPTION ENCOUNTER (OUTPATIENT)
Age: 61
End: 2021-08-29

## 2021-09-03 ENCOUNTER — NON-APPOINTMENT (OUTPATIENT)
Age: 61
End: 2021-09-03

## 2021-09-03 ENCOUNTER — APPOINTMENT (OUTPATIENT)
Dept: CARDIOLOGY | Facility: CLINIC | Age: 61
End: 2021-09-03
Payer: COMMERCIAL

## 2021-09-03 VITALS
DIASTOLIC BLOOD PRESSURE: 70 MMHG | HEIGHT: 69 IN | BODY MASS INDEX: 23.7 KG/M2 | WEIGHT: 160 LBS | SYSTOLIC BLOOD PRESSURE: 112 MMHG | OXYGEN SATURATION: 96 %

## 2021-09-03 PROCEDURE — 93000 ELECTROCARDIOGRAM COMPLETE: CPT

## 2021-09-03 PROCEDURE — 99214 OFFICE O/P EST MOD 30 MIN: CPT

## 2021-09-03 NOTE — PHYSICAL EXAM
[Well Developed] : well developed [Well Nourished] : well nourished [No Acute Distress] : no acute distress [Normal Conjunctiva] : normal conjunctiva [Normal Venous Pressure] : normal venous pressure [No Carotid Bruit] : no carotid bruit [Normal S1, S2] : normal S1, S2 [No Murmur] : no murmur [No Rub] : no rub [No Gallop] : no gallop [Clear Lung Fields] : clear lung fields [Good Air Entry] : good air entry [No Respiratory Distress] : no respiratory distress  [Soft] : abdomen soft [Non Tender] : non-tender [No Masses/organomegaly] : no masses/organomegaly [Normal Bowel Sounds] : normal bowel sounds [Normal Gait] : normal gait [No Edema] : no edema [No Cyanosis] : no cyanosis [No Clubbing] : no clubbing [No Varicosities] : no varicosities [No Rash] : no rash [No Skin Lesions] : no skin lesions [Moves all extremities] : moves all extremities [No Focal Deficits] : no focal deficits [Normal Speech] : normal speech [Alert and Oriented] : alert and oriented [Normal memory] : normal memory [de-identified] : hyperpigmentation of arms & smooth skin c/w scleroderma

## 2021-09-03 NOTE — HISTORY OF PRESENT ILLNESS
[FreeTextEntry1] : ANT SILVERMAN (ANT SILVERMAN)1960(60y)M\par \par 61 y/o w/\par \par *CAD s/p PCI Oct '18 SINDY RCA\par *ILD d/t scleroderma-on cellcept\par *suspected pulmonary hypertension-no RHC\par \par here for followup.\par \par Since last visit has seen Dr. Fidencio Sanabrai pulmonologist at [place].\par Dr. sanabria is still considering RHC to evaluate pulmonary hypertension.\par as stated in last note, PA systolic pressures estimated as 33 mmHg based on OK signal.\par Pt is still unsure if he wants lung transplant v. medical therapy.\par Per him Dr. Sanabria favors medical therapy.\par \par *EKG: 10/22/2020, NSR, NSST's \par *MPI Jun '19 small, mild basal infereior defect\par \par *Echo Mar '21 60% mild aortic sclerosis, mild TR, mild OK mild LVH\par (I calculated mean PA pressure of 33 mmHg based on peak early OK signal)\par *Echo: Apr '19 60% PASP 48 c/w mild pul HTN EF 60% mild diastolic dysfunction,\par mild-mod OK, mild LAE, mild MR, mild aortic root dilation\par \par  LVH, +1 TR, 1+ PI,, normal LV function, moderate pulmonary hypertension, enlarged LA size, mild mitral regurgitation LVEF 60-65%. \par Cardiac Cath: 10/2018, 1 Vessel CAD with NL LV FX \par Stent: 10/2018, SINDY to RCA \par  Labs Sep '20 LDL 97 HDL 36 TG 52  TSH 2.15 HbA1c 5.8 Sep '20 [facility] Hb 15 plat 232\par Cr 1.44

## 2021-09-03 NOTE — ASSESSMENT
[FreeTextEntry1] : A/P:\par \par *Pulmonary hypertension:\par -Echo Mar '21 indicated mild pulmonary hypertension.\par -Left message w/ pulmonary physician to discuss RHC.\par I believe this should be performed as part of his transplant workup\par at a center where he plans for eventual treatment.\par \par *STEMI/CAD: s/p PCI of dual antiplatelet therapy, cont. DAPT, statin, metoprolol\par MPI Jun '19 small, mild basal infereior defect cont. to monitor.\par \par *HTN: Sept '20 LDL 97 cont. statin at current dose.\par *HLP: controlled.\par \par Return 3 months\par \par

## 2021-09-07 NOTE — HISTORY OF PRESENT ILLNESS
[FreeTextEntry1] : ANT SILVERMAN (ANT SILVERMAN)1960(60y)M\par \par 59 y/o w/\par \par *CAD s/p PCI Oct '18 SINDY RCA\par *ILD d/t scleroderma-on cellcept\par *suspected pulmonary hypertension-no RHC\par \par here for followup.\par Last telehealth visit echo ordered\par to evaluate for pulmonary hypertension.\par \par Reviewed report & images.\par No TR signal for PA pressure estimation\par Early Peak NE signal noted.  Peak velocity 2.4\par estimated PA mean pressure from this is 33 mmHg.\par This suggests mildly elevated PA pressures dysproportionate\par to pt's dyspnea.\par \par Pt reports hypoxia w/ ADLs w/ desaturation.\par I encouraged evaluation by pulmonary transplant team\par as reccomended by Dr. Fidencio Cole pulmonologist at [place].\par \par \par EKG: 10/22/2020, NSR, NSST's \par MPI Jun '19 small, mild basal infereior defect\par \par Echo Mar '21 60% mild aortic sclerosis, mild TR, mild NE mild LVH\par (I calculated mean PA pressure of 33 mmHg based on peak early NE signal)\par Echo: Apr '19 60% PASP 48 c/w mild pul HTN EF 60% mild diastolic dysfunction,\par mild-mod NE, mild LAE, mild MR, mild aortic root dilation\par \par  LVH, +1 TR, 1+ PI,, normal LV function, moderate pulmonary hypertension, enlarged LA size, mild mitral regurgitation LVEF 60-65%. \par Cardiac Cath: 10/2018, 1 Vessel CAD with NL LV FX \par Stent: 10/2018, SINDY to RCA \par  Labs Sep '20 LDL 97 HDL 36 TG 52  TSH 2.15 HbA1c 5.8 Sep '20 [facility] Hb 15 plat 232\par Cr 1.44

## 2021-09-07 NOTE — PHYSICAL EXAM
[General Appearance - Well Developed] : well developed [Normal Appearance] : normal appearance [Well Groomed] : well groomed [No Deformities] : no deformities [General Appearance - Well Nourished] : well nourished [General Appearance - In No Acute Distress] : no acute distress [Normal Conjunctiva] : the conjunctiva exhibited no abnormalities [Eyelids - No Xanthelasma] : the eyelids demonstrated no xanthelasmas [Normal Oral Mucosa] : normal oral mucosa [No Oral Pallor] : no oral pallor [No Oral Cyanosis] : no oral cyanosis [Normal Jugular Venous A Waves Present] : normal jugular venous A waves present [Normal Jugular Venous V Waves Present] : normal jugular venous V waves present [No Jugular Venous Cheek A Waves] : no jugular venous cheek A waves [Respiration, Rhythm And Depth] : normal respiratory rhythm and effort [Exaggerated Use Of Accessory Muscles For Inspiration] : no accessory muscle use [Auscultation Breath Sounds / Voice Sounds] : lungs were clear to auscultation bilaterally [Heart Rate And Rhythm] : heart rate and rhythm were normal [Heart Sounds] : normal S1 and S2 [Murmurs] : no murmurs present [Abdomen Soft] : soft [Abdomen Tenderness] : non-tender [Abdomen Mass (___ Cm)] : no abdominal mass palpated [Gait - Sufficient For Exercise Testing] : the gait was sufficient for exercise testing [] : no rash [Skin Color & Pigmentation] : normal skin color and pigmentation [No Venous Stasis] : no venous stasis [Skin Lesions] : no skin lesions [No Skin Ulcers] : no skin ulcer [No Xanthoma] : no  xanthoma was observed [Oriented To Time, Place, And Person] : oriented to person, place, and time [Affect] : the affect was normal [Mood] : the mood was normal [No Anxiety] : not feeling anxious [FreeTextEntry1] : contracture of hands & skin changes consistent w/ scleroderma

## 2021-09-07 NOTE — ASSESSMENT
[FreeTextEntry1] : \par A/P:\par \par *Pulmonary hypertension:\par -Echo Mar '21 indicated mild pulmonary hypertension.\par unless high suspicion for severe pulmonary hypertension would not purse RHC\par -recommend pulmonary transplant eval.  they may want RHC during their eval.\par -Left message w/ Dr. Fidencio Cole to discuss pt's workup\par & if RHC indicated but at this time I will not plan\par \par *STEMI/CAD: s/p PCI of dual antiplatelet therapy, cont. DAPT, statin, metoprolol\par MPI Jun '19 small, mild basal infereior defect cont. to monitor.\par \par *HTN: Sept '20 LDL 97 cont. statin at current dose.\par *HLP: controlled.\par \par Return 3 months\par \par \par \par

## 2021-10-19 ENCOUNTER — NON-APPOINTMENT (OUTPATIENT)
Age: 61
End: 2021-10-19

## 2021-11-01 ENCOUNTER — RX RENEWAL (OUTPATIENT)
Age: 61
End: 2021-11-01

## 2021-11-09 ENCOUNTER — RX RENEWAL (OUTPATIENT)
Age: 61
End: 2021-11-09

## 2021-12-29 ENCOUNTER — NON-APPOINTMENT (OUTPATIENT)
Age: 61
End: 2021-12-29

## 2022-01-01 ENCOUNTER — RX RENEWAL (OUTPATIENT)
Age: 62
End: 2022-01-01

## 2022-01-01 ENCOUNTER — NON-APPOINTMENT (OUTPATIENT)
Age: 62
End: 2022-01-01

## 2022-01-01 ENCOUNTER — LABORATORY RESULT (OUTPATIENT)
Age: 62
End: 2022-01-01

## 2022-01-01 ENCOUNTER — RX CHANGE (OUTPATIENT)
Age: 62
End: 2022-01-01

## 2022-01-01 ENCOUNTER — APPOINTMENT (OUTPATIENT)
Dept: CARDIOLOGY | Facility: CLINIC | Age: 62
End: 2022-01-01
Payer: COMMERCIAL

## 2022-01-01 ENCOUNTER — APPOINTMENT (OUTPATIENT)
Dept: CARDIOLOGY | Facility: CLINIC | Age: 62
End: 2022-01-01

## 2022-01-01 ENCOUNTER — RESULT CHARGE (OUTPATIENT)
Age: 62
End: 2022-01-01

## 2022-01-01 ENCOUNTER — APPOINTMENT (OUTPATIENT)
Dept: PULMONOLOGY | Facility: CLINIC | Age: 62
End: 2022-01-01

## 2022-01-01 ENCOUNTER — OUTPATIENT (OUTPATIENT)
Dept: OUTPATIENT SERVICES | Facility: HOSPITAL | Age: 62
LOS: 1 days | End: 2022-01-01
Payer: COMMERCIAL

## 2022-01-01 VITALS
DIASTOLIC BLOOD PRESSURE: 79 MMHG | RESPIRATION RATE: 18 BRPM | WEIGHT: 158 LBS | HEIGHT: 69 IN | SYSTOLIC BLOOD PRESSURE: 127 MMHG | BODY MASS INDEX: 23.4 KG/M2 | OXYGEN SATURATION: 99 % | HEART RATE: 71 BPM

## 2022-01-01 VITALS — DIASTOLIC BLOOD PRESSURE: 82 MMHG | SYSTOLIC BLOOD PRESSURE: 142 MMHG

## 2022-01-01 VITALS — DIASTOLIC BLOOD PRESSURE: 86 MMHG | OXYGEN SATURATION: 99 % | SYSTOLIC BLOOD PRESSURE: 158 MMHG | HEART RATE: 79 BPM

## 2022-01-01 DIAGNOSIS — J84.9 INTERSTITIAL PULMONARY DISEASE, UNSPECIFIED: ICD-10-CM

## 2022-01-01 DIAGNOSIS — R06.00 DYSPNEA, UNSPECIFIED: ICD-10-CM

## 2022-01-01 DIAGNOSIS — M34.81 SYSTEMIC SCLEROSIS WITH LUNG INVOLVEMENT: ICD-10-CM

## 2022-01-01 DIAGNOSIS — K21.9 GASTRO-ESOPHAGEAL REFLUX DISEASE WITHOUT ESOPHAGITIS: ICD-10-CM

## 2022-01-01 DIAGNOSIS — I10 ESSENTIAL (PRIMARY) HYPERTENSION: ICD-10-CM

## 2022-01-01 DIAGNOSIS — E78.2 MIXED HYPERLIPIDEMIA: ICD-10-CM

## 2022-01-01 PROCEDURE — 99214 OFFICE O/P EST MOD 30 MIN: CPT | Mod: 25

## 2022-01-01 PROCEDURE — 93306 TTE W/DOPPLER COMPLETE: CPT

## 2022-01-01 PROCEDURE — 93922 UPR/L XTREMITY ART 2 LEVELS: CPT

## 2022-01-01 PROCEDURE — 78598 LUNG PERF&VENTILAT DIFERENTL: CPT | Mod: 26

## 2022-01-01 PROCEDURE — 93000 ELECTROCARDIOGRAM COMPLETE: CPT

## 2022-01-01 PROCEDURE — 76000 FLUOROSCOPY <1 HR PHYS/QHP: CPT

## 2022-01-01 PROCEDURE — A9540: CPT

## 2022-01-01 PROCEDURE — 78598 LUNG PERF&VENTILAT DIFERENTL: CPT

## 2022-01-01 PROCEDURE — 74220 X-RAY XM ESOPHAGUS 1CNTRST: CPT

## 2022-01-01 PROCEDURE — 74220 X-RAY XM ESOPHAGUS 1CNTRST: CPT | Mod: 26

## 2022-01-01 PROCEDURE — 93880 EXTRACRANIAL BILAT STUDY: CPT

## 2022-01-01 PROCEDURE — A9567: CPT

## 2022-01-01 PROCEDURE — 76000 FLUOROSCOPY <1 HR PHYS/QHP: CPT | Mod: 26,59

## 2022-01-01 PROCEDURE — 99205 OFFICE O/P NEW HI 60 MIN: CPT

## 2022-01-01 RX ORDER — QUINAPRIL HYDROCHLORIDE 10 MG/1
10 TABLET, FILM COATED ORAL
Qty: 270 | Refills: 0 | Status: DISCONTINUED | COMMUNITY
Start: 2020-08-12 | End: 2022-01-01

## 2022-02-17 ENCOUNTER — APPOINTMENT (OUTPATIENT)
Dept: CARDIOLOGY | Facility: CLINIC | Age: 62
End: 2022-02-17
Payer: COMMERCIAL

## 2022-02-17 DIAGNOSIS — I27.20 PULMONARY HYPERTENSION, UNSPECIFIED: ICD-10-CM

## 2022-02-17 PROCEDURE — 99214 OFFICE O/P EST MOD 30 MIN: CPT | Mod: 95

## 2022-02-25 ENCOUNTER — RX RENEWAL (OUTPATIENT)
Age: 62
End: 2022-02-25

## 2022-02-28 NOTE — HISTORY OF PRESENT ILLNESS
[FreeTextEntry1] : ANT SILVERMAN (ANT SILVERMAN)1960(60y)M\par \par 59 y/o w/\par \par *CAD s/p PCI Oct '18 SINDY RCA\par *ILD d/t scleroderma-on cellcept\par *suspected pulmonary hypertension-no RHC\par \par here for telehealth vsiit.\par \par since last visit no changes in chronic dyspnea.\par continuing evaluation for lung transplant w/ [facility]\par in [place].\par He presents multiple tests requested by them for preop eval.\par L&R HC to be scheduled there but\par other testing (EUGENIO,  carotid US, TTE) can be scheduled in this office.

## 2022-02-28 NOTE — ASSESSMENT
[FreeTextEntry1] : A/P:\par \par -EUGENIO\par -Carotid US\par -TTE\par \par as part of lung transplant workup ordered.\par \par Will review results of testing w/ pt via telephone\par once they are complete as pt has difficulty travelling.

## 2022-12-19 PROBLEM — M34.81 SYSTEMIC SCLEROSIS WITH LUNG INVOLVEMENT: Status: ACTIVE | Noted: 2022-01-01

## 2022-12-19 NOTE — PHYSICAL EXAM
[Normal Oropharynx] : normal oropharynx [Nasal congestion] : nasal congestion [Normal Appearance] : normal appearance [No Neck Mass] : no neck mass [Normal Rate/Rhythm] : normal rate/rhythm [Normal S1, S2] : normal s1, s2 [No Murmurs] : no murmurs [Wheeze] : wheeze [Rales] : rales [No Abnormalities] : no abnormalities [Benign] : benign [No Focal Deficits] : no focal deficits [Oriented x3] : oriented x3 [TextBox_2] : ill appearing dyspneic at rest [TextBox_99] : difficulty ambulating because of sob [TextBox_105] : scerodactaly and calcifications noted  [TextBox_125] : fibrosis

## 2022-12-19 NOTE — ASSESSMENT
[FreeTextEntry1] : 62M CAD s/p PCI 2018 SINDY RCA, scleroderma ILD, ?pHTN (no RHC)\par \par #lung transplant evaluation\par -Will need GI work up for esophageal dysmotility due to scleroderma\par - being worked up at [facility] in [facility]\par -will assessdata when duran complete\par - overall seems like a reasonable candidate\par - I spent 63 mins face to face discussing sceroderma and lung transplants as an option with survival and complications including permanent GJ feedings post transplant due to aspiratioon risks\par - will require nutrition and exercise program pre op \par \par I spent a total of 83 mins reviewing case documenting and consulting regarding outcomes face to face and organizing pre transplant care today on [id number]\par  [Systemic Sclerosis with Lung Involvement (710.1\M34.81)] : severe of abdomen

## 2022-12-19 NOTE — HISTORY OF PRESENT ILLNESS
[TextBox_4] : 62M CAD s/p PCI 2018 SINDY RCA, scleroderma ILD, ?pHTN (no RHC)\par \par Patient seen in clinic for initial consultation for lung transplant evaluation.  Patient is on ***L NC\par \par Oxygen requirement \par Diabetes Status: \par Functional status:\par [] performs activities of daily living with NO assistance\par [] performs activities of daily living with SOME assistance\par [] performs activities of daily living with TOTAL assistance\par Assisted ventilation:\par \par PSHx:\par Past Family Hx:\par \par Social history:\par Lives with:\par EtoH/tobacco/illicit drug use:\par Exposures: \par \par COVID 19 History/Vaccination Status:\par \par Health maintenance and vaccines \par \par Prior hospitalizations, ICU admission or intubations/blood transfusions/pregnancies\par \par Allergies: \par \par Meds:\par \par DATA REVIEWED:\par \par PFTS:\par Date: FEV1 (%), FVC (%), DLCO, TLC\par \par 6MWT\par Date:\par \par CT CHEST\par \par ECHO\par \par RHC/LHC

## 2022-12-19 NOTE — END OF VISIT
[FreeTextEntry3] : as noted retrun in feuary 2023 and send over records. phone number and business card given to Mr and ms walls at [facility] completion  [Time Spent: ___ minutes] : I have spent [unfilled] minutes of time on the encounter.

## 2022-12-21 PROBLEM — E78.2 HYPERLIPIDEMIA, MIXED: Status: ACTIVE | Noted: 2018-11-27

## 2022-12-21 PROBLEM — R06.00 DOE (DYSPNEA ON EXERTION): Status: ACTIVE | Noted: 2019-02-26

## 2022-12-21 PROBLEM — I10 HTN (HYPERTENSION), BENIGN: Status: ACTIVE | Noted: 2018-11-27

## 2022-12-21 NOTE — HISTORY OF PRESENT ILLNESS
[FreeTextEntry1] : 61 y/o male PMH: CAD s/p PCI Oct '18 SINDY RCA, ILD d/t scleroderma-on cellcept suspected pulmonary hypertension-no RHC presents today for routine follow up.,  He has chronic dyspnea and is currently being evaluated for lung transplant with [facility] in [facility].  He is in the process of completing multiple tests requested by them for preop eval.  He most likely will be underoing L&R HC either in Jan or Feb.  He offers no complaints of chest pain or palpitations.  He notes chronic shortness of breath.  He monitors his BP at home and notes that readings are generally well controlled.\par \par \par \par \par

## 2022-12-21 NOTE — CARDIOLOGY SUMMARY
[de-identified] : 12/21/22 RSR BETTYT [de-identified] : 6/7/22 NL LVF, minimal MR, mild LVH, mild TR, mild-mod PI, Aortic root 3.9 [de-identified] : carotid 6/2022 normal\par \par EUGENIO 6/1/22 mild PAD right; pt asymptomatic

## 2022-12-21 NOTE — ASSESSMENT
[FreeTextEntry1] : 62 year old male above noted history presents today for routine follow up,  He is currently being worked up for lung transplant.  Blood pressure mildly elevated in the office however he states his BP is better controlled at home. He will continue to monitor his BP at home; goal /80 or below.  Labs ordered to reevaluate Cr.  Patient will follow up 3 months\par \par \par \par

## 2023-01-01 ENCOUNTER — INPATIENT (INPATIENT)
Facility: HOSPITAL | Age: 63
LOS: 16 days | DRG: 207 | End: 2023-04-13
Attending: SURGERY | Admitting: SURGERY
Payer: COMMERCIAL

## 2023-01-01 ENCOUNTER — NON-APPOINTMENT (OUTPATIENT)
Age: 63
End: 2023-01-01

## 2023-01-01 ENCOUNTER — APPOINTMENT (OUTPATIENT)
Dept: NEPHROLOGY | Facility: CLINIC | Age: 63
End: 2023-01-01

## 2023-01-01 ENCOUNTER — LABORATORY RESULT (OUTPATIENT)
Age: 63
End: 2023-01-01

## 2023-01-01 ENCOUNTER — RX RENEWAL (OUTPATIENT)
Age: 63
End: 2023-01-01

## 2023-01-01 ENCOUNTER — APPOINTMENT (OUTPATIENT)
Dept: CARDIOLOGY | Facility: CLINIC | Age: 63
End: 2023-01-01
Payer: COMMERCIAL

## 2023-01-01 ENCOUNTER — TRANSCRIPTION ENCOUNTER (OUTPATIENT)
Age: 63
End: 2023-01-01

## 2023-01-01 ENCOUNTER — APPOINTMENT (OUTPATIENT)
Dept: PULMONOLOGY | Facility: CLINIC | Age: 63
End: 2023-01-01

## 2023-01-01 VITALS
HEART RATE: 99 BPM | WEIGHT: 155.21 LBS | SYSTOLIC BLOOD PRESSURE: 127 MMHG | HEIGHT: 70 IN | OXYGEN SATURATION: 96 % | DIASTOLIC BLOOD PRESSURE: 76 MMHG | TEMPERATURE: 99 F | RESPIRATION RATE: 43 BRPM

## 2023-01-01 VITALS
BODY MASS INDEX: 22.81 KG/M2 | DIASTOLIC BLOOD PRESSURE: 54 MMHG | OXYGEN SATURATION: 94 % | SYSTOLIC BLOOD PRESSURE: 90 MMHG | HEIGHT: 69 IN | WEIGHT: 154 LBS | HEART RATE: 76 BPM

## 2023-01-01 DIAGNOSIS — J96.00 ACUTE RESPIRATORY FAILURE, UNSPECIFIED WHETHER WITH HYPOXIA OR HYPERCAPNIA: ICD-10-CM

## 2023-01-01 DIAGNOSIS — Z71.89 OTHER SPECIFIED COUNSELING: ICD-10-CM

## 2023-01-01 DIAGNOSIS — I25.10 ATHEROSCLEROTIC HEART DISEASE OF NATIVE CORONARY ARTERY WITHOUT ANGINA PECTORIS: ICD-10-CM

## 2023-01-01 DIAGNOSIS — G93.49 OTHER ENCEPHALOPATHY: ICD-10-CM

## 2023-01-01 DIAGNOSIS — R09.89 OTHER SPECIFIED SYMPTOMS AND SIGNS INVOLVING THE CIRCULATORY AND RESPIRATORY SYSTEMS: ICD-10-CM

## 2023-01-01 DIAGNOSIS — R00.1 BRADYCARDIA, UNSPECIFIED: ICD-10-CM

## 2023-01-01 DIAGNOSIS — J96.21 ACUTE AND CHRONIC RESPIRATORY FAILURE WITH HYPOXIA: ICD-10-CM

## 2023-01-01 DIAGNOSIS — R06.00 DYSPNEA, UNSPECIFIED: ICD-10-CM

## 2023-01-01 DIAGNOSIS — R77.8 OTHER SPECIFIED ABNORMALITIES OF PLASMA PROTEINS: ICD-10-CM

## 2023-01-01 LAB
ACANTHOCYTES BLD QL SMEAR: SLIGHT — SIGNIFICANT CHANGE UP
ADD ON TEST-SPECIMEN IN LAB: SIGNIFICANT CHANGE UP
ALBUMIN SERPL ELPH-MCNC: 2.4 G/DL — LOW (ref 3.3–5)
ALBUMIN SERPL ELPH-MCNC: 2.5 G/DL — LOW (ref 3.3–5)
ALBUMIN SERPL ELPH-MCNC: 2.6 G/DL — LOW (ref 3.3–5)
ALBUMIN SERPL ELPH-MCNC: 2.6 G/DL — LOW (ref 3.3–5)
ALBUMIN SERPL ELPH-MCNC: 2.8 G/DL — LOW (ref 3.3–5)
ALBUMIN SERPL ELPH-MCNC: 2.9 G/DL — LOW (ref 3.3–5)
ALBUMIN SERPL ELPH-MCNC: 3.5 G/DL — SIGNIFICANT CHANGE UP (ref 3.3–5)
ALP SERPL-CCNC: 126 U/L — HIGH (ref 40–120)
ALP SERPL-CCNC: 128 U/L — HIGH (ref 40–120)
ALP SERPL-CCNC: 133 U/L — HIGH (ref 40–120)
ALP SERPL-CCNC: 148 U/L — HIGH (ref 40–120)
ALP SERPL-CCNC: 164 U/L — HIGH (ref 40–120)
ALP SERPL-CCNC: 196 U/L — HIGH (ref 40–120)
ALP SERPL-CCNC: 221 U/L — HIGH (ref 40–120)
ALP SERPL-CCNC: 94 U/L — SIGNIFICANT CHANGE UP (ref 40–120)
ALT FLD-CCNC: 1037 U/L — HIGH (ref 12–78)
ALT FLD-CCNC: 13 U/L — SIGNIFICANT CHANGE UP (ref 12–78)
ALT FLD-CCNC: 138 U/L — HIGH (ref 12–78)
ALT FLD-CCNC: 202 U/L — HIGH (ref 12–78)
ALT FLD-CCNC: 272 U/L — HIGH (ref 12–78)
ALT FLD-CCNC: 346 U/L — HIGH (ref 12–78)
ALT FLD-CCNC: 648 U/L — HIGH (ref 12–78)
ALT FLD-CCNC: 924 U/L — HIGH (ref 12–78)
AMMONIA BLD-MCNC: 17 UMOL/L — SIGNIFICANT CHANGE UP (ref 11–32)
AMMONIA BLD-MCNC: 77 UMOL/L — HIGH (ref 11–32)
AMMONIA BLD-MCNC: 85 UMOL/L — HIGH (ref 11–32)
ANION GAP SERPL CALC-SCNC: 10 MMOL/L — SIGNIFICANT CHANGE UP (ref 5–17)
ANION GAP SERPL CALC-SCNC: 12 MMOL/L — SIGNIFICANT CHANGE UP (ref 5–17)
ANION GAP SERPL CALC-SCNC: 12 MMOL/L — SIGNIFICANT CHANGE UP (ref 5–17)
ANION GAP SERPL CALC-SCNC: 13 MMOL/L — SIGNIFICANT CHANGE UP (ref 5–17)
ANION GAP SERPL CALC-SCNC: 13 MMOL/L — SIGNIFICANT CHANGE UP (ref 5–17)
ANION GAP SERPL CALC-SCNC: 14 MMOL/L — SIGNIFICANT CHANGE UP (ref 5–17)
ANION GAP SERPL CALC-SCNC: 15 MMOL/L — SIGNIFICANT CHANGE UP (ref 5–17)
ANION GAP SERPL CALC-SCNC: 16 MMOL/L — SIGNIFICANT CHANGE UP (ref 5–17)
ANION GAP SERPL CALC-SCNC: 17 MMOL/L — SIGNIFICANT CHANGE UP (ref 5–17)
ANION GAP SERPL CALC-SCNC: 19 MMOL/L — HIGH (ref 5–17)
ANION GAP SERPL CALC-SCNC: 5 MMOL/L — SIGNIFICANT CHANGE UP (ref 5–17)
ANION GAP SERPL CALC-SCNC: 6 MMOL/L — SIGNIFICANT CHANGE UP (ref 5–17)
ANION GAP SERPL CALC-SCNC: 7 MMOL/L — SIGNIFICANT CHANGE UP (ref 5–17)
ANION GAP SERPL CALC-SCNC: 8 MMOL/L — SIGNIFICANT CHANGE UP (ref 5–17)
ANION GAP SERPL CALC-SCNC: 9 MMOL/L — SIGNIFICANT CHANGE UP (ref 5–17)
ANISOCYTOSIS BLD QL: SLIGHT — SIGNIFICANT CHANGE UP
APPEARANCE UR: CLEAR — SIGNIFICANT CHANGE UP
AST SERPL-CCNC: 1404 U/L — HIGH (ref 15–37)
AST SERPL-CCNC: 1774 U/L — HIGH (ref 15–37)
AST SERPL-CCNC: 249 U/L — HIGH (ref 15–37)
AST SERPL-CCNC: 31 U/L — SIGNIFICANT CHANGE UP (ref 15–37)
AST SERPL-CCNC: 398 U/L — HIGH (ref 15–37)
AST SERPL-CCNC: 413 U/L — HIGH (ref 15–37)
AST SERPL-CCNC: 748 U/L — HIGH (ref 15–37)
AST SERPL-CCNC: 80 U/L — HIGH (ref 15–37)
BACTERIA # UR AUTO: ABNORMAL
BASE EXCESS BLDA CALC-SCNC: -0.7 MMOL/L — SIGNIFICANT CHANGE UP (ref -2–3)
BASE EXCESS BLDA CALC-SCNC: -0.9 MMOL/L — SIGNIFICANT CHANGE UP (ref -2–3)
BASE EXCESS BLDA CALC-SCNC: -1.5 MMOL/L — SIGNIFICANT CHANGE UP (ref -2–3)
BASE EXCESS BLDA CALC-SCNC: -12 MMOL/L — LOW (ref -2–3)
BASE EXCESS BLDA CALC-SCNC: -2.8 MMOL/L — LOW (ref -2–3)
BASE EXCESS BLDA CALC-SCNC: -3.3 MMOL/L — LOW (ref -2–3)
BASE EXCESS BLDA CALC-SCNC: -4 MMOL/L — LOW (ref -2–3)
BASE EXCESS BLDA CALC-SCNC: -5.9 MMOL/L — LOW (ref -2–3)
BASE EXCESS BLDA CALC-SCNC: -6.6 MMOL/L — LOW (ref -2–3)
BASOPHILS # BLD AUTO: 0.05 K/UL — SIGNIFICANT CHANGE UP (ref 0–0.2)
BASOPHILS NFR BLD AUTO: 0.3 % — SIGNIFICANT CHANGE UP (ref 0–2)
BILIRUB SERPL-MCNC: 0.8 MG/DL — SIGNIFICANT CHANGE UP (ref 0.2–1.2)
BILIRUB SERPL-MCNC: 1 MG/DL — SIGNIFICANT CHANGE UP (ref 0.2–1.2)
BILIRUB SERPL-MCNC: 1 MG/DL — SIGNIFICANT CHANGE UP (ref 0.2–1.2)
BILIRUB SERPL-MCNC: 1.1 MG/DL — SIGNIFICANT CHANGE UP (ref 0.2–1.2)
BILIRUB SERPL-MCNC: 1.2 MG/DL — SIGNIFICANT CHANGE UP (ref 0.2–1.2)
BILIRUB SERPL-MCNC: 1.7 MG/DL — HIGH (ref 0.2–1.2)
BILIRUB SERPL-MCNC: 2.4 MG/DL — HIGH (ref 0.2–1.2)
BILIRUB SERPL-MCNC: 2.6 MG/DL — HIGH (ref 0.2–1.2)
BILIRUB UR-MCNC: NEGATIVE — SIGNIFICANT CHANGE UP
BLD GP AB SCN SERPL QL: SIGNIFICANT CHANGE UP
BLOOD GAS COMMENTS ARTERIAL: SIGNIFICANT CHANGE UP
BUN SERPL-MCNC: 107 MG/DL — HIGH (ref 7–23)
BUN SERPL-MCNC: 113 MG/DL — HIGH (ref 7–23)
BUN SERPL-MCNC: 115 MG/DL — HIGH (ref 7–23)
BUN SERPL-MCNC: 131 MG/DL — HIGH (ref 7–23)
BUN SERPL-MCNC: 16 MG/DL — SIGNIFICANT CHANGE UP (ref 7–23)
BUN SERPL-MCNC: 23 MG/DL — SIGNIFICANT CHANGE UP (ref 7–23)
BUN SERPL-MCNC: 41 MG/DL — HIGH (ref 7–23)
BUN SERPL-MCNC: 47 MG/DL — HIGH (ref 7–23)
BUN SERPL-MCNC: 53 MG/DL — HIGH (ref 7–23)
BUN SERPL-MCNC: 56 MG/DL — HIGH (ref 7–23)
BUN SERPL-MCNC: 58 MG/DL — HIGH (ref 7–23)
BUN SERPL-MCNC: 60 MG/DL — HIGH (ref 7–23)
BUN SERPL-MCNC: 60 MG/DL — HIGH (ref 7–23)
BUN SERPL-MCNC: 66 MG/DL — HIGH (ref 7–23)
BUN SERPL-MCNC: 73 MG/DL — HIGH (ref 7–23)
BUN SERPL-MCNC: 74 MG/DL — HIGH (ref 7–23)
BUN SERPL-MCNC: 75 MG/DL — HIGH (ref 7–23)
BUN SERPL-MCNC: 83 MG/DL — HIGH (ref 7–23)
BUN SERPL-MCNC: 83 MG/DL — HIGH (ref 7–23)
BUN SERPL-MCNC: 91 MG/DL — HIGH (ref 7–23)
BUN SERPL-MCNC: 93 MG/DL — HIGH (ref 7–23)
CALCIUM SERPL-MCNC: 6.9 MG/DL — LOW (ref 8.5–10.1)
CALCIUM SERPL-MCNC: 6.9 MG/DL — LOW (ref 8.5–10.1)
CALCIUM SERPL-MCNC: 7 MG/DL — LOW (ref 8.5–10.1)
CALCIUM SERPL-MCNC: 7.1 MG/DL — LOW (ref 8.5–10.1)
CALCIUM SERPL-MCNC: 7.2 MG/DL — LOW (ref 8.5–10.1)
CALCIUM SERPL-MCNC: 7.2 MG/DL — LOW (ref 8.5–10.1)
CALCIUM SERPL-MCNC: 7.3 MG/DL — LOW (ref 8.5–10.1)
CALCIUM SERPL-MCNC: 7.4 MG/DL — LOW (ref 8.5–10.1)
CALCIUM SERPL-MCNC: 7.7 MG/DL — LOW (ref 8.5–10.1)
CALCIUM SERPL-MCNC: 7.8 MG/DL — LOW (ref 8.5–10.1)
CALCIUM SERPL-MCNC: 7.8 MG/DL — LOW (ref 8.5–10.1)
CALCIUM SERPL-MCNC: 7.9 MG/DL — LOW (ref 8.5–10.1)
CALCIUM SERPL-MCNC: 8 MG/DL — LOW (ref 8.5–10.1)
CALCIUM SERPL-MCNC: 8.1 MG/DL — LOW (ref 8.5–10.1)
CALCIUM SERPL-MCNC: 8.2 MG/DL — LOW (ref 8.5–10.1)
CALCIUM SERPL-MCNC: 8.2 MG/DL — LOW (ref 8.5–10.1)
CALCIUM SERPL-MCNC: 8.4 MG/DL — LOW (ref 8.5–10.1)
CALCIUM SERPL-MCNC: 9.1 MG/DL — SIGNIFICANT CHANGE UP (ref 8.5–10.1)
CALCIUM SERPL-MCNC: 9.5 MG/DL — SIGNIFICANT CHANGE UP (ref 8.5–10.1)
CHLORIDE SERPL-SCNC: 100 MMOL/L — SIGNIFICANT CHANGE UP (ref 96–108)
CHLORIDE SERPL-SCNC: 101 MMOL/L — SIGNIFICANT CHANGE UP (ref 96–108)
CHLORIDE SERPL-SCNC: 101 MMOL/L — SIGNIFICANT CHANGE UP (ref 96–108)
CHLORIDE SERPL-SCNC: 102 MMOL/L — SIGNIFICANT CHANGE UP (ref 96–108)
CHLORIDE SERPL-SCNC: 104 MMOL/L — SIGNIFICANT CHANGE UP (ref 96–108)
CHLORIDE SERPL-SCNC: 104 MMOL/L — SIGNIFICANT CHANGE UP (ref 96–108)
CHLORIDE SERPL-SCNC: 105 MMOL/L — SIGNIFICANT CHANGE UP (ref 96–108)
CHLORIDE SERPL-SCNC: 105 MMOL/L — SIGNIFICANT CHANGE UP (ref 96–108)
CHLORIDE SERPL-SCNC: 106 MMOL/L — SIGNIFICANT CHANGE UP (ref 96–108)
CHLORIDE SERPL-SCNC: 109 MMOL/L — HIGH (ref 96–108)
CHLORIDE SERPL-SCNC: 95 MMOL/L — LOW (ref 96–108)
CHLORIDE SERPL-SCNC: 95 MMOL/L — LOW (ref 96–108)
CHLORIDE SERPL-SCNC: 96 MMOL/L — SIGNIFICANT CHANGE UP (ref 96–108)
CHLORIDE SERPL-SCNC: 97 MMOL/L — SIGNIFICANT CHANGE UP (ref 96–108)
CHLORIDE SERPL-SCNC: 98 MMOL/L — SIGNIFICANT CHANGE UP (ref 96–108)
CHLORIDE SERPL-SCNC: 98 MMOL/L — SIGNIFICANT CHANGE UP (ref 96–108)
CHLORIDE SERPL-SCNC: 99 MMOL/L — SIGNIFICANT CHANGE UP (ref 96–108)
CK SERPL-CCNC: 131 U/L — SIGNIFICANT CHANGE UP (ref 26–308)
CK SERPL-CCNC: 228 U/L — SIGNIFICANT CHANGE UP (ref 26–308)
CK SERPL-CCNC: 258 U/L — SIGNIFICANT CHANGE UP (ref 26–308)
CO2 BLDA-SCNC: 15 MMOL/L — LOW (ref 19–24)
CO2 BLDA-SCNC: 20 MMOL/L — SIGNIFICANT CHANGE UP (ref 19–24)
CO2 BLDA-SCNC: 22 MMOL/L — SIGNIFICANT CHANGE UP (ref 19–24)
CO2 BLDA-SCNC: 22 MMOL/L — SIGNIFICANT CHANGE UP (ref 19–24)
CO2 BLDA-SCNC: 25 MMOL/L — HIGH (ref 19–24)
CO2 BLDA-SCNC: 28 MMOL/L — HIGH (ref 19–24)
CO2 BLDA-SCNC: 29 MMOL/L — HIGH (ref 19–24)
CO2 BLDA-SCNC: 31 MMOL/L — HIGH (ref 19–24)
CO2 BLDA-SCNC: 33 MMOL/L — HIGH (ref 19–24)
CO2 SERPL-SCNC: 16 MMOL/L — LOW (ref 22–31)
CO2 SERPL-SCNC: 18 MMOL/L — LOW (ref 22–31)
CO2 SERPL-SCNC: 20 MMOL/L — LOW (ref 22–31)
CO2 SERPL-SCNC: 21 MMOL/L — LOW (ref 22–31)
CO2 SERPL-SCNC: 22 MMOL/L — SIGNIFICANT CHANGE UP (ref 22–31)
CO2 SERPL-SCNC: 22 MMOL/L — SIGNIFICANT CHANGE UP (ref 22–31)
CO2 SERPL-SCNC: 23 MMOL/L — SIGNIFICANT CHANGE UP (ref 22–31)
CO2 SERPL-SCNC: 24 MMOL/L — SIGNIFICANT CHANGE UP (ref 22–31)
CO2 SERPL-SCNC: 24 MMOL/L — SIGNIFICANT CHANGE UP (ref 22–31)
CO2 SERPL-SCNC: 25 MMOL/L — SIGNIFICANT CHANGE UP (ref 22–31)
CO2 SERPL-SCNC: 26 MMOL/L — SIGNIFICANT CHANGE UP (ref 22–31)
CO2 SERPL-SCNC: 26 MMOL/L — SIGNIFICANT CHANGE UP (ref 22–31)
CO2 SERPL-SCNC: 27 MMOL/L — SIGNIFICANT CHANGE UP (ref 22–31)
CO2 SERPL-SCNC: 28 MMOL/L — SIGNIFICANT CHANGE UP (ref 22–31)
COLOR SPEC: YELLOW — SIGNIFICANT CHANGE UP
COMMENT - URINE: SIGNIFICANT CHANGE UP
CORTIS AM PEAK SERPL-MCNC: 8.4 UG/DL — SIGNIFICANT CHANGE UP (ref 6–18.4)
CREAT SERPL-MCNC: 1.61 MG/DL — HIGH (ref 0.5–1.3)
CREAT SERPL-MCNC: 1.68 MG/DL — HIGH (ref 0.5–1.3)
CREAT SERPL-MCNC: 10.1 MG/DL — HIGH (ref 0.5–1.3)
CREAT SERPL-MCNC: 11.2 MG/DL — HIGH (ref 0.5–1.3)
CREAT SERPL-MCNC: 11.7 MG/DL — HIGH (ref 0.5–1.3)
CREAT SERPL-MCNC: 13.4 MG/DL — HIGH (ref 0.5–1.3)
CREAT SERPL-MCNC: 4.86 MG/DL — HIGH (ref 0.5–1.3)
CREAT SERPL-MCNC: 5.55 MG/DL — HIGH (ref 0.5–1.3)
CREAT SERPL-MCNC: 6.31 MG/DL — HIGH (ref 0.5–1.3)
CREAT SERPL-MCNC: 6.57 MG/DL — HIGH (ref 0.5–1.3)
CREAT SERPL-MCNC: 6.66 MG/DL — HIGH (ref 0.5–1.3)
CREAT SERPL-MCNC: 6.76 MG/DL — HIGH (ref 0.5–1.3)
CREAT SERPL-MCNC: 6.96 MG/DL — HIGH (ref 0.5–1.3)
CREAT SERPL-MCNC: 7.79 MG/DL — HIGH (ref 0.5–1.3)
CREAT SERPL-MCNC: 7.98 MG/DL — HIGH (ref 0.5–1.3)
CREAT SERPL-MCNC: 8.51 MG/DL — HIGH (ref 0.5–1.3)
CREAT SERPL-MCNC: 8.54 MG/DL — HIGH (ref 0.5–1.3)
CREAT SERPL-MCNC: 9.14 MG/DL — HIGH (ref 0.5–1.3)
CREAT SERPL-MCNC: 9.18 MG/DL — HIGH (ref 0.5–1.3)
CREAT SERPL-MCNC: 9.84 MG/DL — HIGH (ref 0.5–1.3)
CREAT SERPL-MCNC: 9.9 MG/DL — HIGH (ref 0.5–1.3)
CULTURE RESULTS: NO GROWTH — SIGNIFICANT CHANGE UP
CULTURE RESULTS: SIGNIFICANT CHANGE UP
DACRYOCYTES BLD QL SMEAR: SLIGHT — SIGNIFICANT CHANGE UP
DIFF PNL FLD: ABNORMAL
EGFR: 11 ML/MIN/1.73M2 — LOW
EGFR: 13 ML/MIN/1.73M2 — LOW
EGFR: 4 ML/MIN/1.73M2 — LOW
EGFR: 4 ML/MIN/1.73M2 — LOW
EGFR: 45 ML/MIN/1.73M2 — LOW
EGFR: 47 ML/MIN/1.73M2 — LOW
EGFR: 5 ML/MIN/1.73M2 — LOW
EGFR: 6 ML/MIN/1.73M2 — LOW
EGFR: 7 ML/MIN/1.73M2 — LOW
EGFR: 7 ML/MIN/1.73M2 — LOW
EGFR: 8 ML/MIN/1.73M2 — LOW
EGFR: 8 ML/MIN/1.73M2 — LOW
EGFR: 9 ML/MIN/1.73M2 — LOW
EOSINOPHIL # BLD AUTO: 0.52 K/UL — HIGH (ref 0–0.5)
EOSINOPHIL NFR BLD AUTO: 3 % — SIGNIFICANT CHANGE UP (ref 0–6)
EPI CELLS # UR: SIGNIFICANT CHANGE UP
FUNGITELL: <31 PG/ML — SIGNIFICANT CHANGE UP
GAS PNL BLDA: SIGNIFICANT CHANGE UP
GLUCOSE SERPL-MCNC: 100 MG/DL — HIGH (ref 70–99)
GLUCOSE SERPL-MCNC: 104 MG/DL — HIGH (ref 70–99)
GLUCOSE SERPL-MCNC: 114 MG/DL — HIGH (ref 70–99)
GLUCOSE SERPL-MCNC: 119 MG/DL — HIGH (ref 70–99)
GLUCOSE SERPL-MCNC: 121 MG/DL — HIGH (ref 70–99)
GLUCOSE SERPL-MCNC: 122 MG/DL — HIGH (ref 70–99)
GLUCOSE SERPL-MCNC: 124 MG/DL — HIGH (ref 70–99)
GLUCOSE SERPL-MCNC: 124 MG/DL — HIGH (ref 70–99)
GLUCOSE SERPL-MCNC: 127 MG/DL — HIGH (ref 70–99)
GLUCOSE SERPL-MCNC: 139 MG/DL — HIGH (ref 70–99)
GLUCOSE SERPL-MCNC: 147 MG/DL — HIGH (ref 70–99)
GLUCOSE SERPL-MCNC: 150 MG/DL — HIGH (ref 70–99)
GLUCOSE SERPL-MCNC: 156 MG/DL — HIGH (ref 70–99)
GLUCOSE SERPL-MCNC: 162 MG/DL — HIGH (ref 70–99)
GLUCOSE SERPL-MCNC: 163 MG/DL — HIGH (ref 70–99)
GLUCOSE SERPL-MCNC: 176 MG/DL — HIGH (ref 70–99)
GLUCOSE SERPL-MCNC: 96 MG/DL — SIGNIFICANT CHANGE UP (ref 70–99)
GLUCOSE SERPL-MCNC: 97 MG/DL — SIGNIFICANT CHANGE UP (ref 70–99)
GLUCOSE UR QL: NEGATIVE — SIGNIFICANT CHANGE UP
GRAM STN FLD: SIGNIFICANT CHANGE UP
GRAM STN FLD: SIGNIFICANT CHANGE UP
HAV IGM SER-ACNC: SIGNIFICANT CHANGE UP
HBV CORE IGM SER-ACNC: SIGNIFICANT CHANGE UP
HBV SURFACE AG SER-ACNC: SIGNIFICANT CHANGE UP
HCO3 BLDA-SCNC: 14 MMOL/L — LOW (ref 21–28)
HCO3 BLDA-SCNC: 19 MMOL/L — LOW (ref 21–28)
HCO3 BLDA-SCNC: 20 MMOL/L — LOW (ref 21–28)
HCO3 BLDA-SCNC: 21 MMOL/L — SIGNIFICANT CHANGE UP (ref 21–28)
HCO3 BLDA-SCNC: 24 MMOL/L — SIGNIFICANT CHANGE UP (ref 21–28)
HCO3 BLDA-SCNC: 26 MMOL/L — SIGNIFICANT CHANGE UP (ref 21–28)
HCO3 BLDA-SCNC: 27 MMOL/L — SIGNIFICANT CHANGE UP (ref 21–28)
HCO3 BLDA-SCNC: 29 MMOL/L — HIGH (ref 21–28)
HCO3 BLDA-SCNC: 30 MMOL/L — HIGH (ref 21–28)
HCT VFR BLD CALC: 20.9 % — CRITICAL LOW (ref 39–50)
HCT VFR BLD CALC: 23.7 % — LOW (ref 39–50)
HCT VFR BLD CALC: 24 % — LOW (ref 39–50)
HCT VFR BLD CALC: 24.3 % — LOW (ref 39–50)
HCT VFR BLD CALC: 24.4 % — LOW (ref 39–50)
HCT VFR BLD CALC: 25.9 % — LOW (ref 39–50)
HCT VFR BLD CALC: 26.1 % — LOW (ref 39–50)
HCT VFR BLD CALC: 27.2 % — LOW (ref 39–50)
HCT VFR BLD CALC: 28.7 % — LOW (ref 39–50)
HCT VFR BLD CALC: 29 % — LOW (ref 39–50)
HCT VFR BLD CALC: 29.3 % — LOW (ref 39–50)
HCT VFR BLD CALC: 31.1 % — LOW (ref 39–50)
HCT VFR BLD CALC: 32 % — LOW (ref 39–50)
HCT VFR BLD CALC: 35.2 % — LOW (ref 39–50)
HCT VFR BLD CALC: 35.7 % — LOW (ref 39–50)
HCT VFR BLD CALC: 37.6 % — LOW (ref 39–50)
HCV AB S/CO SERPL IA: 0.08 S/CO — SIGNIFICANT CHANGE UP (ref 0–0.99)
HCV AB S/CO SERPL IA: 0.09 S/CO — SIGNIFICANT CHANGE UP (ref 0–0.99)
HCV AB SERPL-IMP: SIGNIFICANT CHANGE UP
HCV AB SERPL-IMP: SIGNIFICANT CHANGE UP
HGB BLD-MCNC: 10 G/DL — LOW (ref 13–17)
HGB BLD-MCNC: 10.2 G/DL — LOW (ref 13–17)
HGB BLD-MCNC: 11.1 G/DL — LOW (ref 13–17)
HGB BLD-MCNC: 11.1 G/DL — LOW (ref 13–17)
HGB BLD-MCNC: 11.4 G/DL — LOW (ref 13–17)
HGB BLD-MCNC: 6.6 G/DL — CRITICAL LOW (ref 13–17)
HGB BLD-MCNC: 7.7 G/DL — LOW (ref 13–17)
HGB BLD-MCNC: 7.7 G/DL — LOW (ref 13–17)
HGB BLD-MCNC: 7.8 G/DL — LOW (ref 13–17)
HGB BLD-MCNC: 8.1 G/DL — LOW (ref 13–17)
HGB BLD-MCNC: 8.3 G/DL — LOW (ref 13–17)
HGB BLD-MCNC: 8.4 G/DL — LOW (ref 13–17)
HGB BLD-MCNC: 8.7 G/DL — LOW (ref 13–17)
HGB BLD-MCNC: 8.9 G/DL — LOW (ref 13–17)
HGB BLD-MCNC: 9.2 G/DL — LOW (ref 13–17)
HGB BLD-MCNC: 9.4 G/DL — LOW (ref 13–17)
IMM GRANULOCYTES NFR BLD AUTO: 0.9 % — SIGNIFICANT CHANGE UP (ref 0–0.9)
KETONES UR-MCNC: NEGATIVE — SIGNIFICANT CHANGE UP
LACTATE SERPL-SCNC: 0.8 MMOL/L — SIGNIFICANT CHANGE UP (ref 0.7–2)
LACTATE SERPL-SCNC: 1.2 MMOL/L — SIGNIFICANT CHANGE UP (ref 0.7–2)
LDH SERPL L TO P-CCNC: 1555 U/L — HIGH (ref 84–241)
LEGIONELLA AG UR QL: NEGATIVE — SIGNIFICANT CHANGE UP
LEUKOCYTE ESTERASE UR-ACNC: ABNORMAL
LYMPHOCYTES # BLD AUTO: 0.73 K/UL — LOW (ref 1–3.3)
LYMPHOCYTES # BLD AUTO: 4.3 % — LOW (ref 13–44)
MACROCYTES BLD QL: SLIGHT — SIGNIFICANT CHANGE UP
MAGNESIUM SERPL-MCNC: 2.4 MG/DL — SIGNIFICANT CHANGE UP (ref 1.6–2.6)
MAGNESIUM SERPL-MCNC: 2.4 MG/DL — SIGNIFICANT CHANGE UP (ref 1.6–2.6)
MAGNESIUM SERPL-MCNC: 2.5 MG/DL — SIGNIFICANT CHANGE UP (ref 1.6–2.6)
MAGNESIUM SERPL-MCNC: 2.7 MG/DL — HIGH (ref 1.6–2.6)
MAGNESIUM SERPL-MCNC: 2.9 MG/DL — HIGH (ref 1.6–2.6)
MAGNESIUM SERPL-MCNC: 3.2 MG/DL — HIGH (ref 1.6–2.6)
MANUAL SMEAR VERIFICATION: SIGNIFICANT CHANGE UP
MCHC RBC-ENTMCNC: 28.8 PG — SIGNIFICANT CHANGE UP (ref 27–34)
MCHC RBC-ENTMCNC: 29.4 PG — SIGNIFICANT CHANGE UP (ref 27–34)
MCHC RBC-ENTMCNC: 29.7 PG — SIGNIFICANT CHANGE UP (ref 27–34)
MCHC RBC-ENTMCNC: 29.9 PG — SIGNIFICANT CHANGE UP (ref 27–34)
MCHC RBC-ENTMCNC: 29.9 PG — SIGNIFICANT CHANGE UP (ref 27–34)
MCHC RBC-ENTMCNC: 30 PG — SIGNIFICANT CHANGE UP (ref 27–34)
MCHC RBC-ENTMCNC: 30.1 PG — SIGNIFICANT CHANGE UP (ref 27–34)
MCHC RBC-ENTMCNC: 30.2 PG — SIGNIFICANT CHANGE UP (ref 27–34)
MCHC RBC-ENTMCNC: 30.2 PG — SIGNIFICANT CHANGE UP (ref 27–34)
MCHC RBC-ENTMCNC: 30.3 GM/DL — LOW (ref 32–36)
MCHC RBC-ENTMCNC: 30.3 PG — SIGNIFICANT CHANGE UP (ref 27–34)
MCHC RBC-ENTMCNC: 30.3 PG — SIGNIFICANT CHANGE UP (ref 27–34)
MCHC RBC-ENTMCNC: 30.4 GM/DL — LOW (ref 32–36)
MCHC RBC-ENTMCNC: 30.4 PG — SIGNIFICANT CHANGE UP (ref 27–34)
MCHC RBC-ENTMCNC: 30.4 PG — SIGNIFICANT CHANGE UP (ref 27–34)
MCHC RBC-ENTMCNC: 30.6 PG — SIGNIFICANT CHANGE UP (ref 27–34)
MCHC RBC-ENTMCNC: 30.7 PG — SIGNIFICANT CHANGE UP (ref 27–34)
MCHC RBC-ENTMCNC: 30.7 PG — SIGNIFICANT CHANGE UP (ref 27–34)
MCHC RBC-ENTMCNC: 31.1 GM/DL — LOW (ref 32–36)
MCHC RBC-ENTMCNC: 31.5 GM/DL — LOW (ref 32–36)
MCHC RBC-ENTMCNC: 31.6 GM/DL — LOW (ref 32–36)
MCHC RBC-ENTMCNC: 31.9 GM/DL — LOW (ref 32–36)
MCHC RBC-ENTMCNC: 32 GM/DL — SIGNIFICANT CHANGE UP (ref 32–36)
MCHC RBC-ENTMCNC: 32.1 GM/DL — SIGNIFICANT CHANGE UP (ref 32–36)
MCHC RBC-ENTMCNC: 32.1 GM/DL — SIGNIFICANT CHANGE UP (ref 32–36)
MCHC RBC-ENTMCNC: 32.2 GM/DL — SIGNIFICANT CHANGE UP (ref 32–36)
MCHC RBC-ENTMCNC: 32.2 GM/DL — SIGNIFICANT CHANGE UP (ref 32–36)
MCHC RBC-ENTMCNC: 32.4 GM/DL — SIGNIFICANT CHANGE UP (ref 32–36)
MCHC RBC-ENTMCNC: 32.5 GM/DL — SIGNIFICANT CHANGE UP (ref 32–36)
MCHC RBC-ENTMCNC: 33.3 GM/DL — SIGNIFICANT CHANGE UP (ref 32–36)
MCV RBC AUTO: 90.3 FL — SIGNIFICANT CHANGE UP (ref 80–100)
MCV RBC AUTO: 91.3 FL — SIGNIFICANT CHANGE UP (ref 80–100)
MCV RBC AUTO: 92.4 FL — SIGNIFICANT CHANGE UP (ref 80–100)
MCV RBC AUTO: 92.8 FL — SIGNIFICANT CHANGE UP (ref 80–100)
MCV RBC AUTO: 93.2 FL — SIGNIFICANT CHANGE UP (ref 80–100)
MCV RBC AUTO: 93.3 FL — SIGNIFICANT CHANGE UP (ref 80–100)
MCV RBC AUTO: 94.1 FL — SIGNIFICANT CHANGE UP (ref 80–100)
MCV RBC AUTO: 94.5 FL — SIGNIFICANT CHANGE UP (ref 80–100)
MCV RBC AUTO: 94.8 FL — SIGNIFICANT CHANGE UP (ref 80–100)
MCV RBC AUTO: 94.9 FL — SIGNIFICANT CHANGE UP (ref 80–100)
MCV RBC AUTO: 95 FL — SIGNIFICANT CHANGE UP (ref 80–100)
MCV RBC AUTO: 95.9 FL — SIGNIFICANT CHANGE UP (ref 80–100)
MCV RBC AUTO: 96.1 FL — SIGNIFICANT CHANGE UP (ref 80–100)
MCV RBC AUTO: 96.2 FL — SIGNIFICANT CHANGE UP (ref 80–100)
MCV RBC AUTO: 98.6 FL — SIGNIFICANT CHANGE UP (ref 80–100)
MCV RBC AUTO: 99.7 FL — SIGNIFICANT CHANGE UP (ref 80–100)
MICROCYTES BLD QL: SLIGHT — SIGNIFICANT CHANGE UP
MONOCYTES # BLD AUTO: 1.77 K/UL — HIGH (ref 0–0.9)
MONOCYTES NFR BLD AUTO: 10.3 % — SIGNIFICANT CHANGE UP (ref 2–14)
MRSA PCR RESULT.: SIGNIFICANT CHANGE UP
NEUTROPHILS # BLD AUTO: 13.92 K/UL — HIGH (ref 1.8–7.4)
NEUTROPHILS NFR BLD AUTO: 81.2 % — HIGH (ref 43–77)
NITRITE UR-MCNC: NEGATIVE — SIGNIFICANT CHANGE UP
P JIROVECII DNA L RESP QL NAA+NON-PROBE: NEGATIVE — SIGNIFICANT CHANGE UP
PCO2 BLDA: 106 MMHG — CRITICAL HIGH (ref 35–48)
PCO2 BLDA: 32 MMHG — LOW (ref 35–48)
PCO2 BLDA: 37 MMHG — SIGNIFICANT CHANGE UP (ref 35–48)
PCO2 BLDA: 38 MMHG — SIGNIFICANT CHANGE UP (ref 35–48)
PCO2 BLDA: 41 MMHG — SIGNIFICANT CHANGE UP (ref 35–48)
PCO2 BLDA: 41 MMHG — SIGNIFICANT CHANGE UP (ref 35–48)
PCO2 BLDA: 56 MMHG — HIGH (ref 35–48)
PCO2 BLDA: 64 MMHG — HIGH (ref 35–48)
PCO2 BLDA: 70 MMHG — CRITICAL HIGH (ref 35–48)
PH BLDA: 7.06 — CRITICAL LOW (ref 7.35–7.45)
PH BLDA: 7.22 — LOW (ref 7.35–7.45)
PH BLDA: 7.22 — LOW (ref 7.35–7.45)
PH BLDA: 7.25 — LOW (ref 7.35–7.45)
PH BLDA: 7.29 — LOW (ref 7.35–7.45)
PH BLDA: 7.3 — LOW (ref 7.35–7.45)
PH BLDA: 7.31 — LOW (ref 7.35–7.45)
PH BLDA: 7.36 — SIGNIFICANT CHANGE UP (ref 7.35–7.45)
PH BLDA: 7.37 — SIGNIFICANT CHANGE UP (ref 7.35–7.45)
PH UR: 6 — SIGNIFICANT CHANGE UP (ref 5–8)
PHOSPHATE SERPL-MCNC: 10.2 MG/DL — HIGH (ref 2.5–4.5)
PHOSPHATE SERPL-MCNC: 10.6 MG/DL — HIGH (ref 2.5–4.5)
PHOSPHATE SERPL-MCNC: 10.8 MG/DL — HIGH (ref 2.5–4.5)
PHOSPHATE SERPL-MCNC: 11 MG/DL — HIGH (ref 2.5–4.5)
PHOSPHATE SERPL-MCNC: 6.8 MG/DL — HIGH (ref 2.5–4.5)
PHOSPHATE SERPL-MCNC: 7.5 MG/DL — HIGH (ref 2.5–4.5)
PHOSPHATE SERPL-MCNC: 7.7 MG/DL — HIGH (ref 2.5–4.5)
PHOSPHATE SERPL-MCNC: 8.9 MG/DL — HIGH (ref 2.5–4.5)
PHOSPHATE SERPL-MCNC: 8.9 MG/DL — HIGH (ref 2.5–4.5)
PHOSPHATE SERPL-MCNC: SIGNIFICANT CHANGE UP MG/DL (ref 2.5–4.5)
PLAT MORPH BLD: NORMAL — SIGNIFICANT CHANGE UP
PLATELET # BLD AUTO: 186 K/UL — SIGNIFICANT CHANGE UP (ref 150–400)
PLATELET # BLD AUTO: 188 K/UL — SIGNIFICANT CHANGE UP (ref 150–400)
PLATELET # BLD AUTO: 204 K/UL — SIGNIFICANT CHANGE UP (ref 150–400)
PLATELET # BLD AUTO: 214 K/UL — SIGNIFICANT CHANGE UP (ref 150–400)
PLATELET # BLD AUTO: 230 K/UL — SIGNIFICANT CHANGE UP (ref 150–400)
PLATELET # BLD AUTO: 230 K/UL — SIGNIFICANT CHANGE UP (ref 150–400)
PLATELET # BLD AUTO: 231 K/UL — SIGNIFICANT CHANGE UP (ref 150–400)
PLATELET # BLD AUTO: 248 K/UL — SIGNIFICANT CHANGE UP (ref 150–400)
PLATELET # BLD AUTO: 260 K/UL — SIGNIFICANT CHANGE UP (ref 150–400)
PLATELET # BLD AUTO: 274 K/UL — SIGNIFICANT CHANGE UP (ref 150–400)
PLATELET # BLD AUTO: 286 K/UL — SIGNIFICANT CHANGE UP (ref 150–400)
PLATELET # BLD AUTO: 307 K/UL — SIGNIFICANT CHANGE UP (ref 150–400)
PLATELET # BLD AUTO: 338 K/UL — SIGNIFICANT CHANGE UP (ref 150–400)
PLATELET # BLD AUTO: 344 K/UL — SIGNIFICANT CHANGE UP (ref 150–400)
PLATELET # BLD AUTO: 375 K/UL — SIGNIFICANT CHANGE UP (ref 150–400)
PLATELET # BLD AUTO: 385 K/UL — SIGNIFICANT CHANGE UP (ref 150–400)
PO2 BLDA: 141 MMHG — HIGH (ref 83–108)
PO2 BLDA: 217 MMHG — HIGH (ref 83–108)
PO2 BLDA: 325 MMHG — HIGH (ref 83–108)
PO2 BLDA: 344 MMHG — HIGH (ref 83–108)
PO2 BLDA: 53 MMHG — LOW (ref 83–108)
PO2 BLDA: 80 MMHG — LOW (ref 83–108)
PO2 BLDA: 89 MMHG — SIGNIFICANT CHANGE UP (ref 83–108)
PO2 BLDA: 94 MMHG — SIGNIFICANT CHANGE UP (ref 83–108)
PO2 BLDA: 97 MMHG — SIGNIFICANT CHANGE UP (ref 83–108)
POIKILOCYTOSIS BLD QL AUTO: SLIGHT — SIGNIFICANT CHANGE UP
POTASSIUM SERPL-MCNC: 3.2 MMOL/L — LOW (ref 3.5–5.3)
POTASSIUM SERPL-MCNC: 3.8 MMOL/L — SIGNIFICANT CHANGE UP (ref 3.5–5.3)
POTASSIUM SERPL-MCNC: 3.9 MMOL/L — SIGNIFICANT CHANGE UP (ref 3.5–5.3)
POTASSIUM SERPL-MCNC: 3.9 MMOL/L — SIGNIFICANT CHANGE UP (ref 3.5–5.3)
POTASSIUM SERPL-MCNC: 4.1 MMOL/L — SIGNIFICANT CHANGE UP (ref 3.5–5.3)
POTASSIUM SERPL-MCNC: 4.2 MMOL/L — SIGNIFICANT CHANGE UP (ref 3.5–5.3)
POTASSIUM SERPL-MCNC: 4.2 MMOL/L — SIGNIFICANT CHANGE UP (ref 3.5–5.3)
POTASSIUM SERPL-MCNC: 4.4 MMOL/L — SIGNIFICANT CHANGE UP (ref 3.5–5.3)
POTASSIUM SERPL-MCNC: 4.4 MMOL/L — SIGNIFICANT CHANGE UP (ref 3.5–5.3)
POTASSIUM SERPL-MCNC: 4.5 MMOL/L — SIGNIFICANT CHANGE UP (ref 3.5–5.3)
POTASSIUM SERPL-MCNC: 4.6 MMOL/L — SIGNIFICANT CHANGE UP (ref 3.5–5.3)
POTASSIUM SERPL-MCNC: 4.8 MMOL/L — SIGNIFICANT CHANGE UP (ref 3.5–5.3)
POTASSIUM SERPL-MCNC: 5 MMOL/L — SIGNIFICANT CHANGE UP (ref 3.5–5.3)
POTASSIUM SERPL-MCNC: 5.3 MMOL/L — SIGNIFICANT CHANGE UP (ref 3.5–5.3)
POTASSIUM SERPL-MCNC: 5.3 MMOL/L — SIGNIFICANT CHANGE UP (ref 3.5–5.3)
POTASSIUM SERPL-MCNC: 5.7 MMOL/L — HIGH (ref 3.5–5.3)
POTASSIUM SERPL-MCNC: 5.9 MMOL/L — HIGH (ref 3.5–5.3)
POTASSIUM SERPL-MCNC: 6.4 MMOL/L — CRITICAL HIGH (ref 3.5–5.3)
POTASSIUM SERPL-MCNC: 6.6 MMOL/L — CRITICAL HIGH (ref 3.5–5.3)
POTASSIUM SERPL-SCNC: 3.2 MMOL/L — LOW (ref 3.5–5.3)
POTASSIUM SERPL-SCNC: 3.8 MMOL/L — SIGNIFICANT CHANGE UP (ref 3.5–5.3)
POTASSIUM SERPL-SCNC: 3.9 MMOL/L — SIGNIFICANT CHANGE UP (ref 3.5–5.3)
POTASSIUM SERPL-SCNC: 3.9 MMOL/L — SIGNIFICANT CHANGE UP (ref 3.5–5.3)
POTASSIUM SERPL-SCNC: 4.1 MMOL/L — SIGNIFICANT CHANGE UP (ref 3.5–5.3)
POTASSIUM SERPL-SCNC: 4.2 MMOL/L — SIGNIFICANT CHANGE UP (ref 3.5–5.3)
POTASSIUM SERPL-SCNC: 4.2 MMOL/L — SIGNIFICANT CHANGE UP (ref 3.5–5.3)
POTASSIUM SERPL-SCNC: 4.4 MMOL/L — SIGNIFICANT CHANGE UP (ref 3.5–5.3)
POTASSIUM SERPL-SCNC: 4.4 MMOL/L — SIGNIFICANT CHANGE UP (ref 3.5–5.3)
POTASSIUM SERPL-SCNC: 4.5 MMOL/L — SIGNIFICANT CHANGE UP (ref 3.5–5.3)
POTASSIUM SERPL-SCNC: 4.6 MMOL/L — SIGNIFICANT CHANGE UP (ref 3.5–5.3)
POTASSIUM SERPL-SCNC: 4.8 MMOL/L — SIGNIFICANT CHANGE UP (ref 3.5–5.3)
POTASSIUM SERPL-SCNC: 5 MMOL/L — SIGNIFICANT CHANGE UP (ref 3.5–5.3)
POTASSIUM SERPL-SCNC: 5.3 MMOL/L — SIGNIFICANT CHANGE UP (ref 3.5–5.3)
POTASSIUM SERPL-SCNC: 5.3 MMOL/L — SIGNIFICANT CHANGE UP (ref 3.5–5.3)
POTASSIUM SERPL-SCNC: 5.7 MMOL/L — HIGH (ref 3.5–5.3)
POTASSIUM SERPL-SCNC: 5.9 MMOL/L — HIGH (ref 3.5–5.3)
POTASSIUM SERPL-SCNC: 6.4 MMOL/L — CRITICAL HIGH (ref 3.5–5.3)
POTASSIUM SERPL-SCNC: 6.6 MMOL/L — CRITICAL HIGH (ref 3.5–5.3)
PROCALCITONIN SERPL-MCNC: 16.2 NG/ML — HIGH (ref 0.02–0.1)
PROCALCITONIN SERPL-MCNC: 94.8 NG/ML — HIGH (ref 0.02–0.1)
PROT SERPL-MCNC: 6.2 GM/DL — SIGNIFICANT CHANGE UP (ref 6–8.3)
PROT SERPL-MCNC: 6.4 GM/DL — SIGNIFICANT CHANGE UP (ref 6–8.3)
PROT SERPL-MCNC: 6.5 GM/DL — SIGNIFICANT CHANGE UP (ref 6–8.3)
PROT SERPL-MCNC: 6.7 GM/DL — SIGNIFICANT CHANGE UP (ref 6–8.3)
PROT SERPL-MCNC: 6.7 GM/DL — SIGNIFICANT CHANGE UP (ref 6–8.3)
PROT SERPL-MCNC: 6.8 GM/DL — SIGNIFICANT CHANGE UP (ref 6–8.3)
PROT SERPL-MCNC: 7.1 GM/DL — SIGNIFICANT CHANGE UP (ref 6–8.3)
PROT SERPL-MCNC: 7.7 GM/DL — SIGNIFICANT CHANGE UP (ref 6–8.3)
PROT UR-MCNC: 100
RBC # BLD: 2.2 M/UL — LOW (ref 4.2–5.8)
RBC # BLD: 2.54 M/UL — LOW (ref 4.2–5.8)
RBC # BLD: 2.55 M/UL — LOW (ref 4.2–5.8)
RBC # BLD: 2.57 M/UL — LOW (ref 4.2–5.8)
RBC # BLD: 2.69 M/UL — LOW (ref 4.2–5.8)
RBC # BLD: 2.7 M/UL — LOW (ref 4.2–5.8)
RBC # BLD: 2.86 M/UL — LOW (ref 4.2–5.8)
RBC # BLD: 2.93 M/UL — LOW (ref 4.2–5.8)
RBC # BLD: 3.08 M/UL — LOW (ref 4.2–5.8)
RBC # BLD: 3.09 M/UL — LOW (ref 4.2–5.8)
RBC # BLD: 3.14 M/UL — LOW (ref 4.2–5.8)
RBC # BLD: 3.29 M/UL — LOW (ref 4.2–5.8)
RBC # BLD: 3.33 M/UL — LOW (ref 4.2–5.8)
RBC # BLD: 3.62 M/UL — LOW (ref 4.2–5.8)
RBC # BLD: 3.66 M/UL — LOW (ref 4.2–5.8)
RBC # BLD: 3.77 M/UL — LOW (ref 4.2–5.8)
RBC # FLD: 13 % — SIGNIFICANT CHANGE UP (ref 10.3–14.5)
RBC # FLD: 13.1 % — SIGNIFICANT CHANGE UP (ref 10.3–14.5)
RBC # FLD: 13.2 % — SIGNIFICANT CHANGE UP (ref 10.3–14.5)
RBC # FLD: 13.3 % — SIGNIFICANT CHANGE UP (ref 10.3–14.5)
RBC # FLD: 13.4 % — SIGNIFICANT CHANGE UP (ref 10.3–14.5)
RBC # FLD: 13.4 % — SIGNIFICANT CHANGE UP (ref 10.3–14.5)
RBC # FLD: 13.5 % — SIGNIFICANT CHANGE UP (ref 10.3–14.5)
RBC # FLD: 13.6 % — SIGNIFICANT CHANGE UP (ref 10.3–14.5)
RBC # FLD: 13.7 % — SIGNIFICANT CHANGE UP (ref 10.3–14.5)
RBC # FLD: 14 % — SIGNIFICANT CHANGE UP (ref 10.3–14.5)
RBC # FLD: 14.1 % — SIGNIFICANT CHANGE UP (ref 10.3–14.5)
RBC # FLD: 14.6 % — HIGH (ref 10.3–14.5)
RBC # FLD: 14.7 % — HIGH (ref 10.3–14.5)
RBC # FLD: 15.4 % — HIGH (ref 10.3–14.5)
RBC BLD AUTO: ABNORMAL
RBC CASTS # UR COMP ASSIST: ABNORMAL /HPF (ref 0–4)
S AUREUS DNA NOSE QL NAA+PROBE: SIGNIFICANT CHANGE UP
SAO2 % BLDA: 100 % — HIGH (ref 94–98)
SAO2 % BLDA: 81 % — LOW (ref 94–98)
SAO2 % BLDA: 96 % — SIGNIFICANT CHANGE UP (ref 94–98)
SAO2 % BLDA: 97 % — SIGNIFICANT CHANGE UP (ref 94–98)
SAO2 % BLDA: 98 % — SIGNIFICANT CHANGE UP (ref 94–98)
SCHISTOCYTES BLD QL AUTO: SLIGHT — SIGNIFICANT CHANGE UP
SODIUM SERPL-SCNC: 130 MMOL/L — LOW (ref 135–145)
SODIUM SERPL-SCNC: 133 MMOL/L — LOW (ref 135–145)
SODIUM SERPL-SCNC: 134 MMOL/L — LOW (ref 135–145)
SODIUM SERPL-SCNC: 135 MMOL/L — SIGNIFICANT CHANGE UP (ref 135–145)
SODIUM SERPL-SCNC: 136 MMOL/L — SIGNIFICANT CHANGE UP (ref 135–145)
SODIUM SERPL-SCNC: 137 MMOL/L — SIGNIFICANT CHANGE UP (ref 135–145)
SODIUM SERPL-SCNC: 138 MMOL/L — SIGNIFICANT CHANGE UP (ref 135–145)
SODIUM SERPL-SCNC: 138 MMOL/L — SIGNIFICANT CHANGE UP (ref 135–145)
SODIUM SERPL-SCNC: 140 MMOL/L — SIGNIFICANT CHANGE UP (ref 135–145)
SODIUM SERPL-SCNC: 142 MMOL/L — SIGNIFICANT CHANGE UP (ref 135–145)
SP GR SPEC: 1.01 — SIGNIFICANT CHANGE UP (ref 1.01–1.02)
SPECIMEN SOURCE: SIGNIFICANT CHANGE UP
SPHEROCYTES BLD QL SMEAR: SLIGHT — SIGNIFICANT CHANGE UP
TROPONIN I, HIGH SENSITIVITY RESULT: 477.06 NG/L — HIGH
UROBILINOGEN FLD QL: NEGATIVE — SIGNIFICANT CHANGE UP
WBC # BLD: 12.14 K/UL — HIGH (ref 3.8–10.5)
WBC # BLD: 12.31 K/UL — HIGH (ref 3.8–10.5)
WBC # BLD: 13.49 K/UL — HIGH (ref 3.8–10.5)
WBC # BLD: 13.58 K/UL — HIGH (ref 3.8–10.5)
WBC # BLD: 13.83 K/UL — HIGH (ref 3.8–10.5)
WBC # BLD: 14.02 K/UL — HIGH (ref 3.8–10.5)
WBC # BLD: 16.11 K/UL — HIGH (ref 3.8–10.5)
WBC # BLD: 16.41 K/UL — HIGH (ref 3.8–10.5)
WBC # BLD: 17.15 K/UL — HIGH (ref 3.8–10.5)
WBC # BLD: 17.4 K/UL — HIGH (ref 3.8–10.5)
WBC # BLD: 19.07 K/UL — HIGH (ref 3.8–10.5)
WBC # BLD: 19.47 K/UL — HIGH (ref 3.8–10.5)
WBC # BLD: 21.58 K/UL — HIGH (ref 3.8–10.5)
WBC # BLD: 24.24 K/UL — HIGH (ref 3.8–10.5)
WBC # BLD: 28.56 K/UL — HIGH (ref 3.8–10.5)
WBC # BLD: 30.07 K/UL — HIGH (ref 3.8–10.5)
WBC # FLD AUTO: 12.14 K/UL — HIGH (ref 3.8–10.5)
WBC # FLD AUTO: 12.31 K/UL — HIGH (ref 3.8–10.5)
WBC # FLD AUTO: 13.49 K/UL — HIGH (ref 3.8–10.5)
WBC # FLD AUTO: 13.58 K/UL — HIGH (ref 3.8–10.5)
WBC # FLD AUTO: 13.83 K/UL — HIGH (ref 3.8–10.5)
WBC # FLD AUTO: 14.02 K/UL — HIGH (ref 3.8–10.5)
WBC # FLD AUTO: 16.11 K/UL — HIGH (ref 3.8–10.5)
WBC # FLD AUTO: 16.41 K/UL — HIGH (ref 3.8–10.5)
WBC # FLD AUTO: 17.15 K/UL — HIGH (ref 3.8–10.5)
WBC # FLD AUTO: 17.4 K/UL — HIGH (ref 3.8–10.5)
WBC # FLD AUTO: 19.07 K/UL — HIGH (ref 3.8–10.5)
WBC # FLD AUTO: 19.47 K/UL — HIGH (ref 3.8–10.5)
WBC # FLD AUTO: 21.58 K/UL — HIGH (ref 3.8–10.5)
WBC # FLD AUTO: 24.24 K/UL — HIGH (ref 3.8–10.5)
WBC # FLD AUTO: 28.56 K/UL — HIGH (ref 3.8–10.5)
WBC # FLD AUTO: 30.07 K/UL — HIGH (ref 3.8–10.5)
WBC UR QL: SIGNIFICANT CHANGE UP /HPF (ref 0–5)

## 2023-01-01 PROCEDURE — 76937 US GUIDE VASCULAR ACCESS: CPT | Mod: 26

## 2023-01-01 PROCEDURE — 93000 ELECTROCARDIOGRAM COMPLETE: CPT

## 2023-01-01 PROCEDURE — 74176 CT ABD & PELVIS W/O CONTRAST: CPT

## 2023-01-01 PROCEDURE — 86901 BLOOD TYPING SEROLOGIC RH(D): CPT

## 2023-01-01 PROCEDURE — 71045 X-RAY EXAM CHEST 1 VIEW: CPT | Mod: 26

## 2023-01-01 PROCEDURE — 99291 CRITICAL CARE FIRST HOUR: CPT | Mod: 25

## 2023-01-01 PROCEDURE — 99233 SBSQ HOSP IP/OBS HIGH 50: CPT

## 2023-01-01 PROCEDURE — 93308 TTE F-UP OR LMTD: CPT

## 2023-01-01 PROCEDURE — 87086 URINE CULTURE/COLONY COUNT: CPT

## 2023-01-01 PROCEDURE — 99232 SBSQ HOSP IP/OBS MODERATE 35: CPT

## 2023-01-01 PROCEDURE — 71045 X-RAY EXAM CHEST 1 VIEW: CPT

## 2023-01-01 PROCEDURE — 99223 1ST HOSP IP/OBS HIGH 75: CPT

## 2023-01-01 PROCEDURE — 99291 CRITICAL CARE FIRST HOUR: CPT

## 2023-01-01 PROCEDURE — 99255 IP/OBS CONSLTJ NEW/EST HI 80: CPT

## 2023-01-01 PROCEDURE — 94003 VENT MGMT INPAT SUBQ DAY: CPT

## 2023-01-01 PROCEDURE — 87081 CULTURE SCREEN ONLY: CPT

## 2023-01-01 PROCEDURE — 87449 NOS EACH ORGANISM AG IA: CPT

## 2023-01-01 PROCEDURE — 31500 INSERT EMERGENCY AIRWAY: CPT

## 2023-01-01 PROCEDURE — 74176 CT ABD & PELVIS W/O CONTRAST: CPT | Mod: 26

## 2023-01-01 PROCEDURE — 94660 CPAP INITIATION&MGMT: CPT

## 2023-01-01 PROCEDURE — 95819 EEG AWAKE AND ASLEEP: CPT

## 2023-01-01 PROCEDURE — 99497 ADVNCD CARE PLAN 30 MIN: CPT

## 2023-01-01 PROCEDURE — 82803 BLOOD GASES ANY COMBINATION: CPT

## 2023-01-01 PROCEDURE — 80074 ACUTE HEPATITIS PANEL: CPT

## 2023-01-01 PROCEDURE — 36600 WITHDRAWAL OF ARTERIAL BLOOD: CPT

## 2023-01-01 PROCEDURE — 84100 ASSAY OF PHOSPHORUS: CPT

## 2023-01-01 PROCEDURE — 83735 ASSAY OF MAGNESIUM: CPT

## 2023-01-01 PROCEDURE — 81001 URINALYSIS AUTO W/SCOPE: CPT

## 2023-01-01 PROCEDURE — 87040 BLOOD CULTURE FOR BACTERIA: CPT

## 2023-01-01 PROCEDURE — 84145 PROCALCITONIN (PCT): CPT

## 2023-01-01 PROCEDURE — 82140 ASSAY OF AMMONIA: CPT

## 2023-01-01 PROCEDURE — 99214 OFFICE O/P EST MOD 30 MIN: CPT | Mod: 25

## 2023-01-01 PROCEDURE — 93308 TTE F-UP OR LMTD: CPT | Mod: 26

## 2023-01-01 PROCEDURE — 87641 MR-STAPH DNA AMP PROBE: CPT

## 2023-01-01 PROCEDURE — 93970 EXTREMITY STUDY: CPT

## 2023-01-01 PROCEDURE — 99498 ADVNCD CARE PLAN ADDL 30 MIN: CPT

## 2023-01-01 PROCEDURE — 93010 ELECTROCARDIOGRAM REPORT: CPT

## 2023-01-01 PROCEDURE — 87070 CULTURE OTHR SPECIMN AEROBIC: CPT

## 2023-01-01 PROCEDURE — 90935 HEMODIALYSIS ONE EVALUATION: CPT

## 2023-01-01 PROCEDURE — 95819 EEG AWAKE AND ASLEEP: CPT | Mod: 26

## 2023-01-01 PROCEDURE — 70450 CT HEAD/BRAIN W/O DYE: CPT

## 2023-01-01 PROCEDURE — 82550 ASSAY OF CK (CPK): CPT

## 2023-01-01 PROCEDURE — 86923 COMPATIBILITY TEST ELECTRIC: CPT

## 2023-01-01 PROCEDURE — 36415 COLL VENOUS BLD VENIPUNCTURE: CPT

## 2023-01-01 PROCEDURE — 85025 COMPLETE CBC W/AUTO DIFF WBC: CPT

## 2023-01-01 PROCEDURE — 99292 CRITICAL CARE ADDL 30 MIN: CPT | Mod: 25

## 2023-01-01 PROCEDURE — 86803 HEPATITIS C AB TEST: CPT

## 2023-01-01 PROCEDURE — 94002 VENT MGMT INPAT INIT DAY: CPT

## 2023-01-01 PROCEDURE — 93306 TTE W/DOPPLER COMPLETE: CPT | Mod: 26

## 2023-01-01 PROCEDURE — 80069 RENAL FUNCTION PANEL: CPT

## 2023-01-01 PROCEDURE — 87798 DETECT AGENT NOS DNA AMP: CPT

## 2023-01-01 PROCEDURE — 84484 ASSAY OF TROPONIN QUANT: CPT

## 2023-01-01 PROCEDURE — 80048 BASIC METABOLIC PNL TOTAL CA: CPT

## 2023-01-01 PROCEDURE — 83605 ASSAY OF LACTIC ACID: CPT

## 2023-01-01 PROCEDURE — C9113: CPT

## 2023-01-01 PROCEDURE — 82533 TOTAL CORTISOL: CPT

## 2023-01-01 PROCEDURE — 83615 LACTATE (LD) (LDH) ENZYME: CPT

## 2023-01-01 PROCEDURE — 70450 CT HEAD/BRAIN W/O DYE: CPT | Mod: 26

## 2023-01-01 PROCEDURE — 92950 HEART/LUNG RESUSCITATION CPR: CPT

## 2023-01-01 PROCEDURE — 36800 INSERTION OF CANNULA: CPT

## 2023-01-01 PROCEDURE — P9047: CPT

## 2023-01-01 PROCEDURE — 86900 BLOOD TYPING SEROLOGIC ABO: CPT

## 2023-01-01 PROCEDURE — 80053 COMPREHEN METABOLIC PANEL: CPT

## 2023-01-01 PROCEDURE — 87640 STAPH A DNA AMP PROBE: CPT

## 2023-01-01 PROCEDURE — 99285 EMERGENCY DEPT VISIT HI MDM: CPT

## 2023-01-01 PROCEDURE — 85027 COMPLETE CBC AUTOMATED: CPT

## 2023-01-01 PROCEDURE — 93970 EXTREMITY STUDY: CPT | Mod: 26

## 2023-01-01 PROCEDURE — 86850 RBC ANTIBODY SCREEN: CPT

## 2023-01-01 PROCEDURE — 93306 TTE W/DOPPLER COMPLETE: CPT

## 2023-01-01 PROCEDURE — 71275 CT ANGIOGRAPHY CHEST: CPT | Mod: 26

## 2023-01-01 PROCEDURE — 93005 ELECTROCARDIOGRAM TRACING: CPT

## 2023-01-01 PROCEDURE — 71045 X-RAY EXAM CHEST 1 VIEW: CPT | Mod: 26,77

## 2023-01-01 RX ORDER — ENOXAPARIN SODIUM 100 MG/ML
40 INJECTION SUBCUTANEOUS
Qty: 0 | Refills: 0 | DISCHARGE
Start: 2023-01-01

## 2023-01-01 RX ORDER — SODIUM ZIRCONIUM CYCLOSILICATE 10 G/10G
10 POWDER, FOR SUSPENSION ORAL THREE TIMES A DAY
Refills: 0 | Status: DISCONTINUED | OUTPATIENT
Start: 2023-01-01 | End: 2023-01-01

## 2023-01-01 RX ORDER — AZITHROMYCIN 500 MG/1
500 TABLET, FILM COATED ORAL EVERY 24 HOURS
Refills: 0 | Status: COMPLETED | OUTPATIENT
Start: 2023-01-01 | End: 2023-01-01

## 2023-01-01 RX ORDER — HYDROCORTISONE 20 MG
50 TABLET ORAL EVERY 6 HOURS
Refills: 0 | Status: DISCONTINUED | OUTPATIENT
Start: 2023-01-01 | End: 2023-01-01

## 2023-01-01 RX ORDER — LACTULOSE 10 G/15ML
20 SOLUTION ORAL EVERY 6 HOURS
Refills: 0 | Status: DISCONTINUED | OUTPATIENT
Start: 2023-01-01 | End: 2023-01-01

## 2023-01-01 RX ORDER — FUROSEMIDE 40 MG
20 TABLET ORAL ONCE
Refills: 0 | Status: COMPLETED | OUTPATIENT
Start: 2023-01-01 | End: 2023-01-01

## 2023-01-01 RX ORDER — PANTOPRAZOLE SODIUM 20 MG/1
40 TABLET, DELAYED RELEASE ORAL DAILY
Refills: 0 | Status: DISCONTINUED | OUTPATIENT
Start: 2023-01-01 | End: 2023-01-01

## 2023-01-01 RX ORDER — LISINOPRIL 5 MG/1
5 TABLET ORAL DAILY
Qty: 2 | Refills: 3 | Status: ACTIVE | COMMUNITY
Start: 1900-01-01 | End: 1900-01-01

## 2023-01-01 RX ORDER — ACETAMINOPHEN 500 MG
650 TABLET ORAL EVERY 8 HOURS
Refills: 0 | Status: DISCONTINUED | OUTPATIENT
Start: 2023-01-01 | End: 2023-01-01

## 2023-01-01 RX ORDER — SODIUM BICARBONATE 1 MEQ/ML
0.09 SYRINGE (ML) INTRAVENOUS
Qty: 150 | Refills: 0 | Status: DISCONTINUED | OUTPATIENT
Start: 2023-01-01 | End: 2023-01-01

## 2023-01-01 RX ORDER — ACETAMINOPHEN 500 MG
1000 TABLET ORAL ONCE
Refills: 0 | Status: COMPLETED | OUTPATIENT
Start: 2023-01-01 | End: 2023-01-01

## 2023-01-01 RX ORDER — MYCOPHENOLATE MOFETIL 250 MG/1
1000 CAPSULE ORAL
Refills: 0 | Status: DISCONTINUED | OUTPATIENT
Start: 2023-01-01 | End: 2023-01-01

## 2023-01-01 RX ORDER — NOREPINEPHRINE BITARTRATE/D5W 8 MG/250ML
0.05 PLASTIC BAG, INJECTION (ML) INTRAVENOUS
Qty: 8 | Refills: 0 | Status: DISCONTINUED | OUTPATIENT
Start: 2023-01-01 | End: 2023-01-01

## 2023-01-01 RX ORDER — NOREPINEPHRINE BITARTRATE/D5W 8 MG/250ML
0.05 PLASTIC BAG, INJECTION (ML) INTRAVENOUS
Qty: 16 | Refills: 0 | Status: DISCONTINUED | OUTPATIENT
Start: 2023-01-01 | End: 2023-01-01

## 2023-01-01 RX ORDER — TREPROSTINIL 48 UG/1
48 INHALANT ORAL 4 TIMES DAILY
Refills: 0 | Status: ACTIVE | COMMUNITY

## 2023-01-01 RX ORDER — MIDODRINE HYDROCHLORIDE 2.5 MG/1
10 TABLET ORAL EVERY 8 HOURS
Refills: 0 | Status: DISCONTINUED | OUTPATIENT
Start: 2023-01-01 | End: 2023-01-01

## 2023-01-01 RX ORDER — ATORVASTATIN CALCIUM 80 MG/1
20 TABLET, FILM COATED ORAL AT BEDTIME
Refills: 0 | Status: DISCONTINUED | OUTPATIENT
Start: 2023-01-01 | End: 2023-01-01

## 2023-01-01 RX ORDER — SODIUM ZIRCONIUM CYCLOSILICATE 10 G/10G
10 POWDER, FOR SUSPENSION ORAL EVERY 8 HOURS
Refills: 0 | Status: COMPLETED | OUTPATIENT
Start: 2023-01-01 | End: 2023-01-01

## 2023-01-01 RX ORDER — CHLORHEXIDINE GLUCONATE 213 G/1000ML
1 SOLUTION TOPICAL
Refills: 0 | Status: DISCONTINUED | OUTPATIENT
Start: 2023-01-01 | End: 2023-01-01

## 2023-01-01 RX ORDER — MEROPENEM 1 G/30ML
500 INJECTION INTRAVENOUS EVERY 24 HOURS
Refills: 0 | Status: COMPLETED | OUTPATIENT
Start: 2023-01-01 | End: 2023-01-01

## 2023-01-01 RX ORDER — CHLORHEXIDINE GLUCONATE 213 G/1000ML
15 SOLUTION TOPICAL EVERY 12 HOURS
Refills: 0 | Status: DISCONTINUED | OUTPATIENT
Start: 2023-01-01 | End: 2023-01-01

## 2023-01-01 RX ORDER — SODIUM CHLORIDE 9 MG/ML
10 INJECTION INTRAMUSCULAR; INTRAVENOUS; SUBCUTANEOUS
Refills: 0 | Status: DISCONTINUED | OUTPATIENT
Start: 2023-01-01 | End: 2023-01-01

## 2023-01-01 RX ORDER — AZITHROMYCIN 500 MG/1
500 TABLET, FILM COATED ORAL
Qty: 0 | Refills: 0 | DISCHARGE
Start: 2023-01-01

## 2023-01-01 RX ORDER — VASOPRESSIN 20 [USP'U]/ML
0.04 INJECTION INTRAVENOUS
Qty: 40 | Refills: 0 | Status: DISCONTINUED | OUTPATIENT
Start: 2023-01-01 | End: 2023-01-01

## 2023-01-01 RX ORDER — MYCOPHENOLATE MOFETIL 250 MG/1
4 CAPSULE ORAL
Qty: 0 | Refills: 0 | DISCHARGE
Start: 2023-01-01

## 2023-01-01 RX ORDER — PIPERACILLIN AND TAZOBACTAM 4; .5 G/20ML; G/20ML
3.38 INJECTION, POWDER, LYOPHILIZED, FOR SOLUTION INTRAVENOUS EVERY 8 HOURS
Refills: 0 | Status: DISCONTINUED | OUTPATIENT
Start: 2023-01-01 | End: 2023-01-01

## 2023-01-01 RX ORDER — METOPROLOL SUCCINATE 25 MG/1
25 TABLET, EXTENDED RELEASE ORAL DAILY
Qty: 90 | Refills: 1 | Status: ACTIVE | COMMUNITY
Start: 2021-10-30 | End: 1900-01-01

## 2023-01-01 RX ORDER — INSULIN HUMAN 100 [IU]/ML
5 INJECTION, SOLUTION SUBCUTANEOUS ONCE
Refills: 0 | Status: COMPLETED | OUTPATIENT
Start: 2023-01-01 | End: 2023-01-01

## 2023-01-01 RX ORDER — SODIUM BICARBONATE 1 MEQ/ML
50 SYRINGE (ML) INTRAVENOUS ONCE
Refills: 0 | Status: COMPLETED | OUTPATIENT
Start: 2023-01-01 | End: 2023-01-01

## 2023-01-01 RX ORDER — RAMIPRIL 5 MG/1
5 CAPSULE ORAL
Qty: 90 | Refills: 0 | Status: DISCONTINUED | COMMUNITY
Start: 2022-01-01 | End: 2023-01-01

## 2023-01-01 RX ORDER — PIPERACILLIN AND TAZOBACTAM 4; .5 G/20ML; G/20ML
3.38 INJECTION, POWDER, LYOPHILIZED, FOR SOLUTION INTRAVENOUS
Qty: 0 | Refills: 0 | DISCHARGE
Start: 2023-01-01

## 2023-01-01 RX ORDER — CASPOFUNGIN ACETATE 7 MG/ML
INJECTION, POWDER, LYOPHILIZED, FOR SOLUTION INTRAVENOUS
Refills: 0 | Status: DISCONTINUED | OUTPATIENT
Start: 2023-01-01 | End: 2023-01-01

## 2023-01-01 RX ORDER — ATORVASTATIN CALCIUM 40 MG/1
40 TABLET, FILM COATED ORAL
Qty: 90 | Refills: 1 | Status: ACTIVE | COMMUNITY
Start: 2019-11-22 | End: 1900-01-01

## 2023-01-01 RX ORDER — PIPERACILLIN AND TAZOBACTAM 4; .5 G/20ML; G/20ML
3.38 INJECTION, POWDER, LYOPHILIZED, FOR SOLUTION INTRAVENOUS ONCE
Refills: 0 | Status: COMPLETED | OUTPATIENT
Start: 2023-01-01 | End: 2023-01-01

## 2023-01-01 RX ORDER — MIDAZOLAM HYDROCHLORIDE 1 MG/ML
2 INJECTION, SOLUTION INTRAMUSCULAR; INTRAVENOUS ONCE
Refills: 0 | Status: COMPLETED | OUTPATIENT
Start: 2023-01-01 | End: 2023-01-01

## 2023-01-01 RX ORDER — SODIUM BICARBONATE 1 MEQ/ML
0.27 SYRINGE (ML) INTRAVENOUS
Qty: 150 | Refills: 0 | Status: DISCONTINUED | OUTPATIENT
Start: 2023-01-01 | End: 2023-01-01

## 2023-01-01 RX ORDER — ATOVAQUONE 750 MG/5ML
750 SUSPENSION ORAL EVERY 12 HOURS
Refills: 0 | Status: DISCONTINUED | OUTPATIENT
Start: 2023-01-01 | End: 2023-01-01

## 2023-01-01 RX ORDER — FENTANYL CITRATE 50 UG/ML
50 INJECTION INTRAVENOUS ONCE
Refills: 0 | Status: DISCONTINUED | OUTPATIENT
Start: 2023-01-01 | End: 2023-01-01

## 2023-01-01 RX ORDER — ALBUMIN HUMAN 25 %
50 VIAL (ML) INTRAVENOUS
Refills: 0 | Status: DISCONTINUED | OUTPATIENT
Start: 2023-01-01 | End: 2023-01-01

## 2023-01-01 RX ORDER — ASPIRIN/CALCIUM CARB/MAGNESIUM 324 MG
81 TABLET ORAL DAILY
Refills: 0 | Status: DISCONTINUED | OUTPATIENT
Start: 2023-01-01 | End: 2023-01-01

## 2023-01-01 RX ORDER — PIPERACILLIN AND TAZOBACTAM 4; .5 G/20ML; G/20ML
3.38 INJECTION, POWDER, LYOPHILIZED, FOR SOLUTION INTRAVENOUS EVERY 12 HOURS
Refills: 0 | Status: DISCONTINUED | OUTPATIENT
Start: 2023-01-01 | End: 2023-01-01

## 2023-01-01 RX ORDER — ALBUMIN HUMAN 25 %
50 VIAL (ML) INTRAVENOUS
Refills: 0 | Status: COMPLETED | OUTPATIENT
Start: 2023-01-01 | End: 2023-01-01

## 2023-01-01 RX ORDER — PROPOFOL 10 MG/ML
20 INJECTION, EMULSION INTRAVENOUS
Qty: 1000 | Refills: 0 | Status: DISCONTINUED | OUTPATIENT
Start: 2023-01-01 | End: 2023-01-01

## 2023-01-01 RX ORDER — PANTOPRAZOLE SODIUM 20 MG/1
40 TABLET, DELAYED RELEASE ORAL
Refills: 0 | Status: DISCONTINUED | OUTPATIENT
Start: 2023-01-01 | End: 2023-01-01

## 2023-01-01 RX ORDER — FUROSEMIDE 40 MG
40 TABLET ORAL ONCE
Refills: 0 | Status: COMPLETED | OUTPATIENT
Start: 2023-01-01 | End: 2023-01-01

## 2023-01-01 RX ORDER — ENOXAPARIN SODIUM 100 MG/ML
40 INJECTION SUBCUTANEOUS EVERY 24 HOURS
Refills: 0 | Status: DISCONTINUED | OUTPATIENT
Start: 2023-01-01 | End: 2023-01-01

## 2023-01-01 RX ORDER — DEXTROSE 50 % IN WATER 50 %
25 SYRINGE (ML) INTRAVENOUS ONCE
Refills: 0 | Status: COMPLETED | OUTPATIENT
Start: 2023-01-01 | End: 2023-01-01

## 2023-01-01 RX ORDER — SODIUM ZIRCONIUM CYCLOSILICATE 10 G/10G
10 POWDER, FOR SUSPENSION ORAL ONCE
Refills: 0 | Status: COMPLETED | OUTPATIENT
Start: 2023-01-01 | End: 2023-01-01

## 2023-01-01 RX ORDER — POTASSIUM CHLORIDE 20 MEQ
40 PACKET (EA) ORAL ONCE
Refills: 0 | Status: COMPLETED | OUTPATIENT
Start: 2023-01-01 | End: 2023-01-01

## 2023-01-01 RX ORDER — CALCIUM GLUCONATE 100 MG/ML
2 VIAL (ML) INTRAVENOUS ONCE
Refills: 0 | Status: COMPLETED | OUTPATIENT
Start: 2023-01-01 | End: 2023-01-01

## 2023-01-01 RX ORDER — FENTANYL CITRATE 50 UG/ML
0.5 INJECTION INTRAVENOUS
Qty: 2500 | Refills: 0 | Status: DISCONTINUED | OUTPATIENT
Start: 2023-01-01 | End: 2023-01-01

## 2023-01-01 RX ORDER — PHENYLEPHRINE HYDROCHLORIDE 10 MG/ML
0.1 INJECTION INTRAVENOUS
Qty: 160 | Refills: 0 | Status: DISCONTINUED | OUTPATIENT
Start: 2023-01-01 | End: 2023-01-01

## 2023-01-01 RX ORDER — FENTANYL CITRATE 50 UG/ML
50 INJECTION INTRAVENOUS
Refills: 0 | Status: DISCONTINUED | OUTPATIENT
Start: 2023-01-01 | End: 2023-01-01

## 2023-01-01 RX ORDER — CASPOFUNGIN ACETATE 7 MG/ML
50 INJECTION, POWDER, LYOPHILIZED, FOR SOLUTION INTRAVENOUS EVERY 24 HOURS
Refills: 0 | Status: DISCONTINUED | OUTPATIENT
Start: 2023-01-01 | End: 2023-01-01

## 2023-01-01 RX ORDER — FUROSEMIDE 40 MG/1
40 TABLET ORAL DAILY
Qty: 30 | Refills: 0 | Status: ACTIVE | COMMUNITY

## 2023-01-01 RX ORDER — CLOPIDOGREL BISULFATE 75 MG/1
75 TABLET, FILM COATED ORAL
Qty: 90 | Refills: 1 | Status: DISCONTINUED | COMMUNITY
Start: 2022-01-01 | End: 2023-01-01

## 2023-01-01 RX ORDER — MICAFUNGIN SODIUM 100 MG/1
100 INJECTION, POWDER, LYOPHILIZED, FOR SOLUTION INTRAVENOUS ONCE
Refills: 0 | Status: COMPLETED | OUTPATIENT
Start: 2023-01-01 | End: 2023-01-01

## 2023-01-01 RX ORDER — HEPARIN SODIUM 5000 [USP'U]/ML
5000 INJECTION INTRAVENOUS; SUBCUTANEOUS EVERY 12 HOURS
Refills: 0 | Status: DISCONTINUED | OUTPATIENT
Start: 2023-01-01 | End: 2023-01-01

## 2023-01-01 RX ADMIN — HEPARIN SODIUM 5000 UNIT(S): 5000 INJECTION INTRAVENOUS; SUBCUTANEOUS at 21:52

## 2023-01-01 RX ADMIN — Medication 258.25 MILLIGRAM(S): at 00:28

## 2023-01-01 RX ADMIN — PROPOFOL 8.45 MICROGRAM(S)/KG/MIN: 10 INJECTION, EMULSION INTRAVENOUS at 20:25

## 2023-01-01 RX ADMIN — HEPARIN SODIUM 5000 UNIT(S): 5000 INJECTION INTRAVENOUS; SUBCUTANEOUS at 21:41

## 2023-01-01 RX ADMIN — Medication 1 DROP(S): at 23:46

## 2023-01-01 RX ADMIN — CASPOFUNGIN ACETATE 260 MILLIGRAM(S): 7 INJECTION, POWDER, LYOPHILIZED, FOR SOLUTION INTRAVENOUS at 14:18

## 2023-01-01 RX ADMIN — PIPERACILLIN AND TAZOBACTAM 25 GRAM(S): 4; .5 INJECTION, POWDER, LYOPHILIZED, FOR SOLUTION INTRAVENOUS at 15:11

## 2023-01-01 RX ADMIN — Medication 2 MILLIGRAM(S): at 21:52

## 2023-01-01 RX ADMIN — Medication 40 MILLIGRAM(S): at 06:06

## 2023-01-01 RX ADMIN — ATORVASTATIN CALCIUM 20 MILLIGRAM(S): 80 TABLET, FILM COATED ORAL at 21:59

## 2023-01-01 RX ADMIN — MYCOPHENOLATE MOFETIL 1000 MILLIGRAM(S): 250 CAPSULE ORAL at 22:00

## 2023-01-01 RX ADMIN — Medication 40 MILLIGRAM(S): at 13:29

## 2023-01-01 RX ADMIN — CHLORHEXIDINE GLUCONATE 15 MILLILITER(S): 213 SOLUTION TOPICAL at 10:45

## 2023-01-01 RX ADMIN — Medication 2 MILLIGRAM(S): at 10:04

## 2023-01-01 RX ADMIN — ATOVAQUONE 750 MILLIGRAM(S): 750 SUSPENSION ORAL at 21:52

## 2023-01-01 RX ADMIN — PANTOPRAZOLE SODIUM 40 MILLIGRAM(S): 20 TABLET, DELAYED RELEASE ORAL at 10:44

## 2023-01-01 RX ADMIN — Medication 40 MILLIGRAM(S): at 13:50

## 2023-01-01 RX ADMIN — Medication 1 DROP(S): at 12:09

## 2023-01-01 RX ADMIN — PROPOFOL 8.45 MICROGRAM(S)/KG/MIN: 10 INJECTION, EMULSION INTRAVENOUS at 23:52

## 2023-01-01 RX ADMIN — HEPARIN SODIUM 5000 UNIT(S): 5000 INJECTION INTRAVENOUS; SUBCUTANEOUS at 21:33

## 2023-01-01 RX ADMIN — MEROPENEM 100 MILLIGRAM(S): 1 INJECTION INTRAVENOUS at 05:02

## 2023-01-01 RX ADMIN — PROPOFOL 8.45 MICROGRAM(S)/KG/MIN: 10 INJECTION, EMULSION INTRAVENOUS at 15:06

## 2023-01-01 RX ADMIN — MIDODRINE HYDROCHLORIDE 10 MILLIGRAM(S): 2.5 TABLET ORAL at 21:28

## 2023-01-01 RX ADMIN — PANTOPRAZOLE SODIUM 40 MILLIGRAM(S): 20 TABLET, DELAYED RELEASE ORAL at 09:46

## 2023-01-01 RX ADMIN — Medication 1 DROP(S): at 00:00

## 2023-01-01 RX ADMIN — Medication 40 MILLIGRAM(S): at 06:00

## 2023-01-01 RX ADMIN — Medication 81 MILLIGRAM(S): at 10:12

## 2023-01-01 RX ADMIN — CHLORHEXIDINE GLUCONATE 15 MILLILITER(S): 213 SOLUTION TOPICAL at 21:41

## 2023-01-01 RX ADMIN — CHLORHEXIDINE GLUCONATE 15 MILLILITER(S): 213 SOLUTION TOPICAL at 21:21

## 2023-01-01 RX ADMIN — PROPOFOL 8.45 MICROGRAM(S)/KG/MIN: 10 INJECTION, EMULSION INTRAVENOUS at 03:08

## 2023-01-01 RX ADMIN — ENOXAPARIN SODIUM 40 MILLIGRAM(S): 100 INJECTION SUBCUTANEOUS at 10:00

## 2023-01-01 RX ADMIN — MEROPENEM 100 MILLIGRAM(S): 1 INJECTION INTRAVENOUS at 05:30

## 2023-01-01 RX ADMIN — MYCOPHENOLATE MOFETIL 1000 MILLIGRAM(S): 250 CAPSULE ORAL at 21:28

## 2023-01-01 RX ADMIN — AZITHROMYCIN 255 MILLIGRAM(S): 500 TABLET, FILM COATED ORAL at 10:06

## 2023-01-01 RX ADMIN — HEPARIN SODIUM 5000 UNIT(S): 5000 INJECTION INTRAVENOUS; SUBCUTANEOUS at 10:12

## 2023-01-01 RX ADMIN — Medication 81 MILLIGRAM(S): at 10:16

## 2023-01-01 RX ADMIN — PROPOFOL 8.45 MICROGRAM(S)/KG/MIN: 10 INJECTION, EMULSION INTRAVENOUS at 03:13

## 2023-01-01 RX ADMIN — Medication 40 MILLIGRAM(S): at 05:48

## 2023-01-01 RX ADMIN — Medication 1 DROP(S): at 05:03

## 2023-01-01 RX ADMIN — MIDODRINE HYDROCHLORIDE 10 MILLIGRAM(S): 2.5 TABLET ORAL at 06:06

## 2023-01-01 RX ADMIN — CHLORHEXIDINE GLUCONATE 15 MILLILITER(S): 213 SOLUTION TOPICAL at 09:41

## 2023-01-01 RX ADMIN — Medication 1 DROP(S): at 11:09

## 2023-01-01 RX ADMIN — Medication 81 MILLIGRAM(S): at 10:37

## 2023-01-01 RX ADMIN — PANTOPRAZOLE SODIUM 40 MILLIGRAM(S): 20 TABLET, DELAYED RELEASE ORAL at 21:33

## 2023-01-01 RX ADMIN — PIPERACILLIN AND TAZOBACTAM 200 GRAM(S): 4; .5 INJECTION, POWDER, LYOPHILIZED, FOR SOLUTION INTRAVENOUS at 07:00

## 2023-01-01 RX ADMIN — CHLORHEXIDINE GLUCONATE 1 APPLICATION(S): 213 SOLUTION TOPICAL at 09:08

## 2023-01-01 RX ADMIN — Medication 1 DROP(S): at 12:30

## 2023-01-01 RX ADMIN — ATOVAQUONE 750 MILLIGRAM(S): 750 SUSPENSION ORAL at 21:03

## 2023-01-01 RX ADMIN — CHLORHEXIDINE GLUCONATE 15 MILLILITER(S): 213 SOLUTION TOPICAL at 21:40

## 2023-01-01 RX ADMIN — CHLORHEXIDINE GLUCONATE 15 MILLILITER(S): 213 SOLUTION TOPICAL at 10:09

## 2023-01-01 RX ADMIN — AZITHROMYCIN 255 MILLIGRAM(S): 500 TABLET, FILM COATED ORAL at 09:59

## 2023-01-01 RX ADMIN — MIDODRINE HYDROCHLORIDE 10 MILLIGRAM(S): 2.5 TABLET ORAL at 05:48

## 2023-01-01 RX ADMIN — VASOPRESSIN 6 UNIT(S)/MIN: 20 INJECTION INTRAVENOUS at 18:47

## 2023-01-01 RX ADMIN — Medication 81 MILLIGRAM(S): at 11:07

## 2023-01-01 RX ADMIN — CHLORHEXIDINE GLUCONATE 1 APPLICATION(S): 213 SOLUTION TOPICAL at 11:15

## 2023-01-01 RX ADMIN — PROPOFOL 8.45 MICROGRAM(S)/KG/MIN: 10 INJECTION, EMULSION INTRAVENOUS at 14:24

## 2023-01-01 RX ADMIN — CHLORHEXIDINE GLUCONATE 15 MILLILITER(S): 213 SOLUTION TOPICAL at 21:52

## 2023-01-01 RX ADMIN — PROPOFOL 8.45 MICROGRAM(S)/KG/MIN: 10 INJECTION, EMULSION INTRAVENOUS at 20:32

## 2023-01-01 RX ADMIN — ATORVASTATIN CALCIUM 20 MILLIGRAM(S): 80 TABLET, FILM COATED ORAL at 22:00

## 2023-01-01 RX ADMIN — MIDODRINE HYDROCHLORIDE 10 MILLIGRAM(S): 2.5 TABLET ORAL at 12:59

## 2023-01-01 RX ADMIN — MEROPENEM 100 MILLIGRAM(S): 1 INJECTION INTRAVENOUS at 16:55

## 2023-01-01 RX ADMIN — CHLORHEXIDINE GLUCONATE 15 MILLILITER(S): 213 SOLUTION TOPICAL at 22:27

## 2023-01-01 RX ADMIN — Medication 3.3 MICROGRAM(S)/KG/MIN: at 17:31

## 2023-01-01 RX ADMIN — MYCOPHENOLATE MOFETIL 1000 MILLIGRAM(S): 250 CAPSULE ORAL at 10:21

## 2023-01-01 RX ADMIN — Medication 40 MILLIGRAM(S): at 21:42

## 2023-01-01 RX ADMIN — Medication 1 DROP(S): at 13:54

## 2023-01-01 RX ADMIN — Medication 300 MILLILITER(S): at 13:55

## 2023-01-01 RX ADMIN — CHLORHEXIDINE GLUCONATE 15 MILLILITER(S): 213 SOLUTION TOPICAL at 10:10

## 2023-01-01 RX ADMIN — Medication 50 MILLILITER(S): at 13:39

## 2023-01-01 RX ADMIN — HEPARIN SODIUM 5000 UNIT(S): 5000 INJECTION INTRAVENOUS; SUBCUTANEOUS at 09:47

## 2023-01-01 RX ADMIN — Medication 40 MEQ/KG/HR: at 20:57

## 2023-01-01 RX ADMIN — PANTOPRAZOLE SODIUM 40 MILLIGRAM(S): 20 TABLET, DELAYED RELEASE ORAL at 21:29

## 2023-01-01 RX ADMIN — AZITHROMYCIN 255 MILLIGRAM(S): 500 TABLET, FILM COATED ORAL at 11:26

## 2023-01-01 RX ADMIN — Medication 2 MILLIGRAM(S): at 11:24

## 2023-01-01 RX ADMIN — Medication 400 MILLIGRAM(S): at 21:52

## 2023-01-01 RX ADMIN — CHLORHEXIDINE GLUCONATE 15 MILLILITER(S): 213 SOLUTION TOPICAL at 21:34

## 2023-01-01 RX ADMIN — CHLORHEXIDINE GLUCONATE 1 APPLICATION(S): 213 SOLUTION TOPICAL at 11:57

## 2023-01-01 RX ADMIN — PROPOFOL 8.45 MICROGRAM(S)/KG/MIN: 10 INJECTION, EMULSION INTRAVENOUS at 06:06

## 2023-01-01 RX ADMIN — ATOVAQUONE 750 MILLIGRAM(S): 750 SUSPENSION ORAL at 09:05

## 2023-01-01 RX ADMIN — MYCOPHENOLATE MOFETIL 1000 MILLIGRAM(S): 250 CAPSULE ORAL at 14:31

## 2023-01-01 RX ADMIN — CHLORHEXIDINE GLUCONATE 1 APPLICATION(S): 213 SOLUTION TOPICAL at 06:15

## 2023-01-01 RX ADMIN — PANTOPRAZOLE SODIUM 40 MILLIGRAM(S): 20 TABLET, DELAYED RELEASE ORAL at 10:37

## 2023-01-01 RX ADMIN — PROPOFOL 8.45 MICROGRAM(S)/KG/MIN: 10 INJECTION, EMULSION INTRAVENOUS at 10:56

## 2023-01-01 RX ADMIN — Medication 50 MILLIEQUIVALENT(S): at 08:47

## 2023-01-01 RX ADMIN — MIDODRINE HYDROCHLORIDE 10 MILLIGRAM(S): 2.5 TABLET ORAL at 21:41

## 2023-01-01 RX ADMIN — MIDODRINE HYDROCHLORIDE 10 MILLIGRAM(S): 2.5 TABLET ORAL at 10:17

## 2023-01-01 RX ADMIN — CHLORHEXIDINE GLUCONATE 15 MILLILITER(S): 213 SOLUTION TOPICAL at 23:23

## 2023-01-01 RX ADMIN — SODIUM ZIRCONIUM CYCLOSILICATE 10 GRAM(S): 10 POWDER, FOR SUSPENSION ORAL at 09:01

## 2023-01-01 RX ADMIN — MEROPENEM 100 MILLIGRAM(S): 1 INJECTION INTRAVENOUS at 16:34

## 2023-01-01 RX ADMIN — Medication 40 MILLIGRAM(S): at 21:33

## 2023-01-01 RX ADMIN — PROPOFOL 8.45 MICROGRAM(S)/KG/MIN: 10 INJECTION, EMULSION INTRAVENOUS at 05:02

## 2023-01-01 RX ADMIN — ATOVAQUONE 750 MILLIGRAM(S): 750 SUSPENSION ORAL at 11:09

## 2023-01-01 RX ADMIN — ATOVAQUONE 750 MILLIGRAM(S): 750 SUSPENSION ORAL at 22:00

## 2023-01-01 RX ADMIN — Medication 2 MILLIGRAM(S): at 18:35

## 2023-01-01 RX ADMIN — PANTOPRAZOLE SODIUM 40 MILLIGRAM(S): 20 TABLET, DELAYED RELEASE ORAL at 09:43

## 2023-01-01 RX ADMIN — ATOVAQUONE 750 MILLIGRAM(S): 750 SUSPENSION ORAL at 10:45

## 2023-01-01 RX ADMIN — MYCOPHENOLATE MOFETIL 1000 MILLIGRAM(S): 250 CAPSULE ORAL at 21:34

## 2023-01-01 RX ADMIN — PIPERACILLIN AND TAZOBACTAM 25 GRAM(S): 4; .5 INJECTION, POWDER, LYOPHILIZED, FOR SOLUTION INTRAVENOUS at 21:52

## 2023-01-01 RX ADMIN — MEROPENEM 100 MILLIGRAM(S): 1 INJECTION INTRAVENOUS at 16:32

## 2023-01-01 RX ADMIN — Medication 258.25 MILLIGRAM(S): at 12:00

## 2023-01-01 RX ADMIN — Medication 81 MILLIGRAM(S): at 09:47

## 2023-01-01 RX ADMIN — CHLORHEXIDINE GLUCONATE 1 APPLICATION(S): 213 SOLUTION TOPICAL at 10:00

## 2023-01-01 RX ADMIN — MEROPENEM 100 MILLIGRAM(S): 1 INJECTION INTRAVENOUS at 19:48

## 2023-01-01 RX ADMIN — PIPERACILLIN AND TAZOBACTAM 25 GRAM(S): 4; .5 INJECTION, POWDER, LYOPHILIZED, FOR SOLUTION INTRAVENOUS at 05:43

## 2023-01-01 RX ADMIN — PROPOFOL 8.45 MICROGRAM(S)/KG/MIN: 10 INJECTION, EMULSION INTRAVENOUS at 18:12

## 2023-01-01 RX ADMIN — CHLORHEXIDINE GLUCONATE 1 APPLICATION(S): 213 SOLUTION TOPICAL at 07:30

## 2023-01-01 RX ADMIN — Medication 1000 MILLIGRAM(S): at 00:03

## 2023-01-01 RX ADMIN — Medication 81 MILLIGRAM(S): at 10:45

## 2023-01-01 RX ADMIN — PROPOFOL 8.45 MICROGRAM(S)/KG/MIN: 10 INJECTION, EMULSION INTRAVENOUS at 06:42

## 2023-01-01 RX ADMIN — PROPOFOL 8.45 MICROGRAM(S)/KG/MIN: 10 INJECTION, EMULSION INTRAVENOUS at 22:43

## 2023-01-01 RX ADMIN — Medication 3.3 MICROGRAM(S)/KG/MIN: at 16:28

## 2023-01-01 RX ADMIN — PROPOFOL 8.45 MICROGRAM(S)/KG/MIN: 10 INJECTION, EMULSION INTRAVENOUS at 10:01

## 2023-01-01 RX ADMIN — ATORVASTATIN CALCIUM 20 MILLIGRAM(S): 80 TABLET, FILM COATED ORAL at 21:40

## 2023-01-01 RX ADMIN — Medication 1 DROP(S): at 23:31

## 2023-01-01 RX ADMIN — HEPARIN SODIUM 5000 UNIT(S): 5000 INJECTION INTRAVENOUS; SUBCUTANEOUS at 21:23

## 2023-01-01 RX ADMIN — Medication 81 MILLIGRAM(S): at 11:05

## 2023-01-01 RX ADMIN — HEPARIN SODIUM 5000 UNIT(S): 5000 INJECTION INTRAVENOUS; SUBCUTANEOUS at 21:42

## 2023-01-01 RX ADMIN — CHLORHEXIDINE GLUCONATE 1 APPLICATION(S): 213 SOLUTION TOPICAL at 06:42

## 2023-01-01 RX ADMIN — Medication 1 DROP(S): at 18:00

## 2023-01-01 RX ADMIN — Medication 40 MILLIGRAM(S): at 14:18

## 2023-01-01 RX ADMIN — ATOVAQUONE 750 MILLIGRAM(S): 750 SUSPENSION ORAL at 08:46

## 2023-01-01 RX ADMIN — MEROPENEM 100 MILLIGRAM(S): 1 INJECTION INTRAVENOUS at 05:52

## 2023-01-01 RX ADMIN — Medication 1 DROP(S): at 05:30

## 2023-01-01 RX ADMIN — CHLORHEXIDINE GLUCONATE 15 MILLILITER(S): 213 SOLUTION TOPICAL at 10:21

## 2023-01-01 RX ADMIN — CHLORHEXIDINE GLUCONATE 1 APPLICATION(S): 213 SOLUTION TOPICAL at 15:24

## 2023-01-01 RX ADMIN — FENTANYL CITRATE 3.52 MICROGRAM(S)/KG/HR: 50 INJECTION INTRAVENOUS at 09:59

## 2023-01-01 RX ADMIN — MYCOPHENOLATE MOFETIL 1000 MILLIGRAM(S): 250 CAPSULE ORAL at 14:56

## 2023-01-01 RX ADMIN — MYCOPHENOLATE MOFETIL 1000 MILLIGRAM(S): 250 CAPSULE ORAL at 11:10

## 2023-01-01 RX ADMIN — MIDODRINE HYDROCHLORIDE 10 MILLIGRAM(S): 2.5 TABLET ORAL at 14:18

## 2023-01-01 RX ADMIN — MYCOPHENOLATE MOFETIL 1000 MILLIGRAM(S): 250 CAPSULE ORAL at 21:41

## 2023-01-01 RX ADMIN — MYCOPHENOLATE MOFETIL 1000 MILLIGRAM(S): 250 CAPSULE ORAL at 21:52

## 2023-01-01 RX ADMIN — ATORVASTATIN CALCIUM 20 MILLIGRAM(S): 80 TABLET, FILM COATED ORAL at 21:41

## 2023-01-01 RX ADMIN — FENTANYL CITRATE 50 MICROGRAM(S): 50 INJECTION INTRAVENOUS at 14:19

## 2023-01-01 RX ADMIN — MIDODRINE HYDROCHLORIDE 10 MILLIGRAM(S): 2.5 TABLET ORAL at 13:50

## 2023-01-01 RX ADMIN — MIDODRINE HYDROCHLORIDE 10 MILLIGRAM(S): 2.5 TABLET ORAL at 21:59

## 2023-01-01 RX ADMIN — ATORVASTATIN CALCIUM 20 MILLIGRAM(S): 80 TABLET, FILM COATED ORAL at 21:03

## 2023-01-01 RX ADMIN — Medication 1 DROP(S): at 06:01

## 2023-01-01 RX ADMIN — PROPOFOL 8.45 MICROGRAM(S)/KG/MIN: 10 INJECTION, EMULSION INTRAVENOUS at 22:00

## 2023-01-01 RX ADMIN — Medication 300 MILLILITER(S): at 12:51

## 2023-01-01 RX ADMIN — PROPOFOL 8.45 MICROGRAM(S)/KG/MIN: 10 INJECTION, EMULSION INTRAVENOUS at 10:37

## 2023-01-01 RX ADMIN — MIDODRINE HYDROCHLORIDE 10 MILLIGRAM(S): 2.5 TABLET ORAL at 05:16

## 2023-01-01 RX ADMIN — Medication 1 DROP(S): at 05:02

## 2023-01-01 RX ADMIN — MICAFUNGIN SODIUM 105 MILLIGRAM(S): 100 INJECTION, POWDER, LYOPHILIZED, FOR SOLUTION INTRAVENOUS at 03:59

## 2023-01-01 RX ADMIN — MYCOPHENOLATE MOFETIL 1000 MILLIGRAM(S): 250 CAPSULE ORAL at 10:18

## 2023-01-01 RX ADMIN — HEPARIN SODIUM 5000 UNIT(S): 5000 INJECTION INTRAVENOUS; SUBCUTANEOUS at 10:26

## 2023-01-01 RX ADMIN — ATOVAQUONE 750 MILLIGRAM(S): 750 SUSPENSION ORAL at 10:24

## 2023-01-01 RX ADMIN — SODIUM ZIRCONIUM CYCLOSILICATE 10 GRAM(S): 10 POWDER, FOR SUSPENSION ORAL at 05:55

## 2023-01-01 RX ADMIN — HEPARIN SODIUM 5000 UNIT(S): 5000 INJECTION INTRAVENOUS; SUBCUTANEOUS at 09:40

## 2023-01-01 RX ADMIN — PROPOFOL 8.45 MICROGRAM(S)/KG/MIN: 10 INJECTION, EMULSION INTRAVENOUS at 00:50

## 2023-01-01 RX ADMIN — MYCOPHENOLATE MOFETIL 1000 MILLIGRAM(S): 250 CAPSULE ORAL at 11:07

## 2023-01-01 RX ADMIN — LACTULOSE 20 GRAM(S): 10 SOLUTION ORAL at 11:46

## 2023-01-01 RX ADMIN — ATOVAQUONE 750 MILLIGRAM(S): 750 SUSPENSION ORAL at 21:02

## 2023-01-01 RX ADMIN — Medication 2 MILLIGRAM(S): at 12:25

## 2023-01-01 RX ADMIN — Medication 81 MILLIGRAM(S): at 09:02

## 2023-01-01 RX ADMIN — CHLORHEXIDINE GLUCONATE 15 MILLILITER(S): 213 SOLUTION TOPICAL at 09:46

## 2023-01-01 RX ADMIN — Medication 1 DROP(S): at 05:41

## 2023-01-01 RX ADMIN — PIPERACILLIN AND TAZOBACTAM 25 GRAM(S): 4; .5 INJECTION, POWDER, LYOPHILIZED, FOR SOLUTION INTRAVENOUS at 22:36

## 2023-01-01 RX ADMIN — ATOVAQUONE 750 MILLIGRAM(S): 750 SUSPENSION ORAL at 21:33

## 2023-01-01 RX ADMIN — MYCOPHENOLATE MOFETIL 1000 MILLIGRAM(S): 250 CAPSULE ORAL at 00:05

## 2023-01-01 RX ADMIN — Medication 2 MILLIGRAM(S): at 00:56

## 2023-01-01 RX ADMIN — CHLORHEXIDINE GLUCONATE 15 MILLILITER(S): 213 SOLUTION TOPICAL at 08:45

## 2023-01-01 RX ADMIN — Medication 40 MILLIGRAM(S): at 12:58

## 2023-01-01 RX ADMIN — HEPARIN SODIUM 5000 UNIT(S): 5000 INJECTION INTRAVENOUS; SUBCUTANEOUS at 21:53

## 2023-01-01 RX ADMIN — PIPERACILLIN AND TAZOBACTAM 25 GRAM(S): 4; .5 INJECTION, POWDER, LYOPHILIZED, FOR SOLUTION INTRAVENOUS at 15:10

## 2023-01-01 RX ADMIN — Medication 1 DROP(S): at 17:29

## 2023-01-01 RX ADMIN — Medication 81 MILLIGRAM(S): at 14:55

## 2023-01-01 RX ADMIN — PANTOPRAZOLE SODIUM 40 MILLIGRAM(S): 20 TABLET, DELAYED RELEASE ORAL at 10:21

## 2023-01-01 RX ADMIN — HEPARIN SODIUM 5000 UNIT(S): 5000 INJECTION INTRAVENOUS; SUBCUTANEOUS at 08:46

## 2023-01-01 RX ADMIN — Medication 1 DROP(S): at 18:05

## 2023-01-01 RX ADMIN — Medication 1 DROP(S): at 18:36

## 2023-01-01 RX ADMIN — PROPOFOL 8.45 MICROGRAM(S)/KG/MIN: 10 INJECTION, EMULSION INTRAVENOUS at 15:59

## 2023-01-01 RX ADMIN — ATORVASTATIN CALCIUM 20 MILLIGRAM(S): 80 TABLET, FILM COATED ORAL at 21:20

## 2023-01-01 RX ADMIN — Medication 1 DROP(S): at 23:23

## 2023-01-01 RX ADMIN — Medication 1 DROP(S): at 14:30

## 2023-01-01 RX ADMIN — Medication 1 DROP(S): at 23:15

## 2023-01-01 RX ADMIN — Medication 1 DROP(S): at 06:21

## 2023-01-01 RX ADMIN — CHLORHEXIDINE GLUCONATE 15 MILLILITER(S): 213 SOLUTION TOPICAL at 21:53

## 2023-01-01 RX ADMIN — Medication 3.3 MICROGRAM(S)/KG/MIN: at 21:29

## 2023-01-01 RX ADMIN — SODIUM ZIRCONIUM CYCLOSILICATE 10 GRAM(S): 10 POWDER, FOR SUSPENSION ORAL at 12:45

## 2023-01-01 RX ADMIN — Medication 1 DROP(S): at 12:44

## 2023-01-01 RX ADMIN — PANTOPRAZOLE SODIUM 40 MILLIGRAM(S): 20 TABLET, DELAYED RELEASE ORAL at 21:59

## 2023-01-01 RX ADMIN — PROPOFOL 8.45 MICROGRAM(S)/KG/MIN: 10 INJECTION, EMULSION INTRAVENOUS at 09:45

## 2023-01-01 RX ADMIN — HEPARIN SODIUM 5000 UNIT(S): 5000 INJECTION INTRAVENOUS; SUBCUTANEOUS at 21:27

## 2023-01-01 RX ADMIN — CHLORHEXIDINE GLUCONATE 15 MILLILITER(S): 213 SOLUTION TOPICAL at 10:25

## 2023-01-01 RX ADMIN — MYCOPHENOLATE MOFETIL 1000 MILLIGRAM(S): 250 CAPSULE ORAL at 21:40

## 2023-01-01 RX ADMIN — PIPERACILLIN AND TAZOBACTAM 25 GRAM(S): 4; .5 INJECTION, POWDER, LYOPHILIZED, FOR SOLUTION INTRAVENOUS at 14:48

## 2023-01-01 RX ADMIN — PANTOPRAZOLE SODIUM 40 MILLIGRAM(S): 20 TABLET, DELAYED RELEASE ORAL at 09:02

## 2023-01-01 RX ADMIN — CHLORHEXIDINE GLUCONATE 1 APPLICATION(S): 213 SOLUTION TOPICAL at 10:10

## 2023-01-01 RX ADMIN — LACTULOSE 20 GRAM(S): 10 SOLUTION ORAL at 05:48

## 2023-01-01 RX ADMIN — ATOVAQUONE 750 MILLIGRAM(S): 750 SUSPENSION ORAL at 21:29

## 2023-01-01 RX ADMIN — Medication 3.3 MICROGRAM(S)/KG/MIN: at 10:16

## 2023-01-01 RX ADMIN — MYCOPHENOLATE MOFETIL 1000 MILLIGRAM(S): 250 CAPSULE ORAL at 21:02

## 2023-01-01 RX ADMIN — MIDODRINE HYDROCHLORIDE 10 MILLIGRAM(S): 2.5 TABLET ORAL at 13:29

## 2023-01-01 RX ADMIN — PROPOFOL 8.45 MICROGRAM(S)/KG/MIN: 10 INJECTION, EMULSION INTRAVENOUS at 10:17

## 2023-01-01 RX ADMIN — MEROPENEM 100 MILLIGRAM(S): 1 INJECTION INTRAVENOUS at 16:44

## 2023-01-01 RX ADMIN — Medication 81 MILLIGRAM(S): at 10:24

## 2023-01-01 RX ADMIN — MYCOPHENOLATE MOFETIL 1000 MILLIGRAM(S): 250 CAPSULE ORAL at 21:31

## 2023-01-01 RX ADMIN — HEPARIN SODIUM 5000 UNIT(S): 5000 INJECTION INTRAVENOUS; SUBCUTANEOUS at 21:30

## 2023-01-01 RX ADMIN — Medication 1 DROP(S): at 00:19

## 2023-01-01 RX ADMIN — Medication 1 DROP(S): at 05:55

## 2023-01-01 RX ADMIN — FENTANYL CITRATE 3.52 MICROGRAM(S)/KG/HR: 50 INJECTION INTRAVENOUS at 01:12

## 2023-01-01 RX ADMIN — MEROPENEM 100 MILLIGRAM(S): 1 INJECTION INTRAVENOUS at 05:49

## 2023-01-01 RX ADMIN — CHLORHEXIDINE GLUCONATE 15 MILLILITER(S): 213 SOLUTION TOPICAL at 11:22

## 2023-01-01 RX ADMIN — Medication 1 DROP(S): at 11:37

## 2023-01-01 RX ADMIN — Medication 650 MILLIGRAM(S): at 21:56

## 2023-01-01 RX ADMIN — HEPARIN SODIUM 5000 UNIT(S): 5000 INJECTION INTRAVENOUS; SUBCUTANEOUS at 10:16

## 2023-01-01 RX ADMIN — Medication 100 GRAM(S): at 08:45

## 2023-01-01 RX ADMIN — AZITHROMYCIN 255 MILLIGRAM(S): 500 TABLET, FILM COATED ORAL at 09:10

## 2023-01-01 RX ADMIN — Medication 40 MILLIGRAM(S): at 13:53

## 2023-01-01 RX ADMIN — PANTOPRAZOLE SODIUM 40 MILLIGRAM(S): 20 TABLET, DELAYED RELEASE ORAL at 21:27

## 2023-01-01 RX ADMIN — PIPERACILLIN AND TAZOBACTAM 25 GRAM(S): 4; .5 INJECTION, POWDER, LYOPHILIZED, FOR SOLUTION INTRAVENOUS at 22:00

## 2023-01-01 RX ADMIN — MIDODRINE HYDROCHLORIDE 10 MILLIGRAM(S): 2.5 TABLET ORAL at 13:53

## 2023-01-01 RX ADMIN — Medication 81 MILLIGRAM(S): at 11:22

## 2023-01-01 RX ADMIN — MYCOPHENOLATE MOFETIL 1000 MILLIGRAM(S): 250 CAPSULE ORAL at 15:25

## 2023-01-01 RX ADMIN — CHLORHEXIDINE GLUCONATE 15 MILLILITER(S): 213 SOLUTION TOPICAL at 21:58

## 2023-01-01 RX ADMIN — PROPOFOL 8.45 MICROGRAM(S)/KG/MIN: 10 INJECTION, EMULSION INTRAVENOUS at 21:09

## 2023-01-01 RX ADMIN — HEPARIN SODIUM 5000 UNIT(S): 5000 INJECTION INTRAVENOUS; SUBCUTANEOUS at 11:06

## 2023-01-01 RX ADMIN — ATOVAQUONE 750 MILLIGRAM(S): 750 SUSPENSION ORAL at 21:21

## 2023-01-01 RX ADMIN — Medication 2 MILLIGRAM(S): at 14:21

## 2023-01-01 RX ADMIN — PROPOFOL 8.45 MICROGRAM(S)/KG/MIN: 10 INJECTION, EMULSION INTRAVENOUS at 15:49

## 2023-01-01 RX ADMIN — Medication 3.3 MICROGRAM(S)/KG/MIN: at 23:02

## 2023-01-01 RX ADMIN — Medication 3.3 MICROGRAM(S)/KG/MIN: at 03:07

## 2023-01-01 RX ADMIN — PROPOFOL 8.45 MICROGRAM(S)/KG/MIN: 10 INJECTION, EMULSION INTRAVENOUS at 11:07

## 2023-01-01 RX ADMIN — Medication 20 MILLIGRAM(S): at 13:12

## 2023-01-01 RX ADMIN — ATORVASTATIN CALCIUM 20 MILLIGRAM(S): 80 TABLET, FILM COATED ORAL at 21:30

## 2023-01-01 RX ADMIN — PANTOPRAZOLE SODIUM 40 MILLIGRAM(S): 20 TABLET, DELAYED RELEASE ORAL at 21:42

## 2023-01-01 RX ADMIN — CHLORHEXIDINE GLUCONATE 15 MILLILITER(S): 213 SOLUTION TOPICAL at 09:59

## 2023-01-01 RX ADMIN — HEPARIN SODIUM 5000 UNIT(S): 5000 INJECTION INTRAVENOUS; SUBCUTANEOUS at 21:03

## 2023-01-01 RX ADMIN — MEROPENEM 100 MILLIGRAM(S): 1 INJECTION INTRAVENOUS at 17:18

## 2023-01-01 RX ADMIN — ENOXAPARIN SODIUM 40 MILLIGRAM(S): 100 INJECTION SUBCUTANEOUS at 10:30

## 2023-01-01 RX ADMIN — Medication 81 MILLIGRAM(S): at 10:00

## 2023-01-01 RX ADMIN — MYCOPHENOLATE MOFETIL 1000 MILLIGRAM(S): 250 CAPSULE ORAL at 10:25

## 2023-01-01 RX ADMIN — MIDODRINE HYDROCHLORIDE 10 MILLIGRAM(S): 2.5 TABLET ORAL at 21:20

## 2023-01-01 RX ADMIN — CHLORHEXIDINE GLUCONATE 1 APPLICATION(S): 213 SOLUTION TOPICAL at 05:31

## 2023-01-01 RX ADMIN — Medication 40 MILLIGRAM(S): at 10:30

## 2023-01-01 RX ADMIN — MYCOPHENOLATE MOFETIL 1000 MILLIGRAM(S): 250 CAPSULE ORAL at 21:21

## 2023-01-01 RX ADMIN — Medication 650 MILLIGRAM(S): at 01:30

## 2023-01-01 RX ADMIN — Medication 40 MILLIEQUIVALENT(S): at 10:38

## 2023-01-01 RX ADMIN — Medication 40 MILLIGRAM(S): at 21:40

## 2023-01-01 RX ADMIN — CHLORHEXIDINE GLUCONATE 1 APPLICATION(S): 213 SOLUTION TOPICAL at 10:17

## 2023-01-01 RX ADMIN — Medication 1 DROP(S): at 06:06

## 2023-01-01 RX ADMIN — Medication 40 MILLIGRAM(S): at 06:21

## 2023-01-01 RX ADMIN — MYCOPHENOLATE MOFETIL 1000 MILLIGRAM(S): 250 CAPSULE ORAL at 09:06

## 2023-01-01 RX ADMIN — MYCOPHENOLATE MOFETIL 1000 MILLIGRAM(S): 250 CAPSULE ORAL at 09:41

## 2023-01-01 RX ADMIN — PROPOFOL 8.45 MICROGRAM(S)/KG/MIN: 10 INJECTION, EMULSION INTRAVENOUS at 17:31

## 2023-01-01 RX ADMIN — ATOVAQUONE 750 MILLIGRAM(S): 750 SUSPENSION ORAL at 21:40

## 2023-01-01 RX ADMIN — PROPOFOL 8.45 MICROGRAM(S)/KG/MIN: 10 INJECTION, EMULSION INTRAVENOUS at 21:30

## 2023-01-01 RX ADMIN — Medication 1 DROP(S): at 17:21

## 2023-01-01 RX ADMIN — SODIUM ZIRCONIUM CYCLOSILICATE 10 GRAM(S): 10 POWDER, FOR SUSPENSION ORAL at 17:55

## 2023-01-01 RX ADMIN — ATOVAQUONE 750 MILLIGRAM(S): 750 SUSPENSION ORAL at 21:41

## 2023-01-01 RX ADMIN — PROPOFOL 8.45 MICROGRAM(S)/KG/MIN: 10 INJECTION, EMULSION INTRAVENOUS at 20:57

## 2023-01-01 RX ADMIN — INSULIN HUMAN 5 UNIT(S): 100 INJECTION, SOLUTION SUBCUTANEOUS at 08:59

## 2023-01-01 RX ADMIN — ATORVASTATIN CALCIUM 20 MILLIGRAM(S): 80 TABLET, FILM COATED ORAL at 21:52

## 2023-01-01 RX ADMIN — PANTOPRAZOLE SODIUM 40 MILLIGRAM(S): 20 TABLET, DELAYED RELEASE ORAL at 10:26

## 2023-01-01 RX ADMIN — Medication 1 DROP(S): at 23:01

## 2023-01-01 RX ADMIN — Medication 1 DROP(S): at 23:19

## 2023-01-01 RX ADMIN — MYCOPHENOLATE MOFETIL 1000 MILLIGRAM(S): 250 CAPSULE ORAL at 21:04

## 2023-01-01 RX ADMIN — PANTOPRAZOLE SODIUM 40 MILLIGRAM(S): 20 TABLET, DELAYED RELEASE ORAL at 08:46

## 2023-01-01 RX ADMIN — ATORVASTATIN CALCIUM 20 MILLIGRAM(S): 80 TABLET, FILM COATED ORAL at 21:28

## 2023-01-01 RX ADMIN — ATOVAQUONE 750 MILLIGRAM(S): 750 SUSPENSION ORAL at 09:41

## 2023-01-01 RX ADMIN — Medication 1 DROP(S): at 18:58

## 2023-01-01 RX ADMIN — Medication 258.25 MILLIGRAM(S): at 06:13

## 2023-01-01 RX ADMIN — Medication 3.3 MICROGRAM(S)/KG/MIN: at 01:48

## 2023-01-01 RX ADMIN — HEPARIN SODIUM 5000 UNIT(S): 5000 INJECTION INTRAVENOUS; SUBCUTANEOUS at 09:02

## 2023-01-01 RX ADMIN — Medication 1 DROP(S): at 17:59

## 2023-01-01 RX ADMIN — PANTOPRAZOLE SODIUM 40 MILLIGRAM(S): 20 TABLET, DELAYED RELEASE ORAL at 21:01

## 2023-01-01 RX ADMIN — PANTOPRAZOLE SODIUM 40 MILLIGRAM(S): 20 TABLET, DELAYED RELEASE ORAL at 21:40

## 2023-01-01 RX ADMIN — PROPOFOL 8.45 MICROGRAM(S)/KG/MIN: 10 INJECTION, EMULSION INTRAVENOUS at 04:26

## 2023-01-01 RX ADMIN — HEPARIN SODIUM 5000 UNIT(S): 5000 INJECTION INTRAVENOUS; SUBCUTANEOUS at 11:07

## 2023-01-01 RX ADMIN — CHLORHEXIDINE GLUCONATE 15 MILLILITER(S): 213 SOLUTION TOPICAL at 11:09

## 2023-01-01 RX ADMIN — PROPOFOL 8.45 MICROGRAM(S)/KG/MIN: 10 INJECTION, EMULSION INTRAVENOUS at 09:02

## 2023-01-01 RX ADMIN — MYCOPHENOLATE MOFETIL 1000 MILLIGRAM(S): 250 CAPSULE ORAL at 10:44

## 2023-01-01 RX ADMIN — Medication 1 DROP(S): at 15:27

## 2023-01-01 RX ADMIN — CHLORHEXIDINE GLUCONATE 15 MILLILITER(S): 213 SOLUTION TOPICAL at 21:31

## 2023-01-01 RX ADMIN — PROPOFOL 8.45 MICROGRAM(S)/KG/MIN: 10 INJECTION, EMULSION INTRAVENOUS at 11:55

## 2023-01-01 RX ADMIN — Medication 1 DROP(S): at 18:50

## 2023-01-01 RX ADMIN — LACTULOSE 20 GRAM(S): 10 SOLUTION ORAL at 23:50

## 2023-01-01 RX ADMIN — Medication 40 MILLIGRAM(S): at 15:15

## 2023-01-01 RX ADMIN — ATOVAQUONE 750 MILLIGRAM(S): 750 SUSPENSION ORAL at 11:06

## 2023-01-01 RX ADMIN — PROPOFOL 8.45 MICROGRAM(S)/KG/MIN: 10 INJECTION, EMULSION INTRAVENOUS at 01:47

## 2023-01-01 RX ADMIN — PROPOFOL 8.45 MICROGRAM(S)/KG/MIN: 10 INJECTION, EMULSION INTRAVENOUS at 23:12

## 2023-01-01 RX ADMIN — ATORVASTATIN CALCIUM 20 MILLIGRAM(S): 80 TABLET, FILM COATED ORAL at 21:02

## 2023-01-01 RX ADMIN — HEPARIN SODIUM 5000 UNIT(S): 5000 INJECTION INTRAVENOUS; SUBCUTANEOUS at 10:44

## 2023-01-01 RX ADMIN — Medication 3.3 MICROGRAM(S)/KG/MIN: at 17:33

## 2023-01-01 RX ADMIN — Medication 2 MILLIGRAM(S): at 05:20

## 2023-01-01 RX ADMIN — Medication 1 DROP(S): at 23:53

## 2023-01-01 RX ADMIN — CHLORHEXIDINE GLUCONATE 15 MILLILITER(S): 213 SOLUTION TOPICAL at 10:38

## 2023-01-01 RX ADMIN — Medication 40 MILLIGRAM(S): at 15:04

## 2023-01-01 RX ADMIN — Medication 1 DROP(S): at 18:10

## 2023-01-01 RX ADMIN — CHLORHEXIDINE GLUCONATE 1 APPLICATION(S): 213 SOLUTION TOPICAL at 10:43

## 2023-01-01 RX ADMIN — PROPOFOL 8.45 MICROGRAM(S)/KG/MIN: 10 INJECTION, EMULSION INTRAVENOUS at 18:09

## 2023-01-01 RX ADMIN — HEPARIN SODIUM 5000 UNIT(S): 5000 INJECTION INTRAVENOUS; SUBCUTANEOUS at 10:21

## 2023-01-01 RX ADMIN — Medication 1 DROP(S): at 05:17

## 2023-01-01 RX ADMIN — CHLORHEXIDINE GLUCONATE 15 MILLILITER(S): 213 SOLUTION TOPICAL at 10:18

## 2023-01-01 RX ADMIN — PROPOFOL 8.45 MICROGRAM(S)/KG/MIN: 10 INJECTION, EMULSION INTRAVENOUS at 23:50

## 2023-01-01 RX ADMIN — PIPERACILLIN AND TAZOBACTAM 25 GRAM(S): 4; .5 INJECTION, POWDER, LYOPHILIZED, FOR SOLUTION INTRAVENOUS at 06:00

## 2023-01-01 RX ADMIN — CHLORHEXIDINE GLUCONATE 1 APPLICATION(S): 213 SOLUTION TOPICAL at 06:27

## 2023-01-01 RX ADMIN — CASPOFUNGIN ACETATE 260 MILLIGRAM(S): 7 INJECTION, POWDER, LYOPHILIZED, FOR SOLUTION INTRAVENOUS at 15:06

## 2023-01-01 RX ADMIN — PROPOFOL 8.45 MICROGRAM(S)/KG/MIN: 10 INJECTION, EMULSION INTRAVENOUS at 10:09

## 2023-01-01 RX ADMIN — Medication 40 MILLIGRAM(S): at 05:16

## 2023-01-01 RX ADMIN — Medication 125 MEQ/KG/HR: at 10:11

## 2023-01-01 RX ADMIN — Medication 50 MILLILITER(S): at 12:39

## 2023-01-01 RX ADMIN — PIPERACILLIN AND TAZOBACTAM 25 GRAM(S): 4; .5 INJECTION, POWDER, LYOPHILIZED, FOR SOLUTION INTRAVENOUS at 10:30

## 2023-01-01 RX ADMIN — PANTOPRAZOLE SODIUM 40 MILLIGRAM(S): 20 TABLET, DELAYED RELEASE ORAL at 21:52

## 2023-01-01 RX ADMIN — HEPARIN SODIUM 5000 UNIT(S): 5000 INJECTION INTRAVENOUS; SUBCUTANEOUS at 21:59

## 2023-01-01 RX ADMIN — Medication 1 DROP(S): at 14:08

## 2023-01-01 RX ADMIN — CHLORHEXIDINE GLUCONATE 15 MILLILITER(S): 213 SOLUTION TOPICAL at 09:05

## 2023-01-01 RX ADMIN — PROPOFOL 8.45 MICROGRAM(S)/KG/MIN: 10 INJECTION, EMULSION INTRAVENOUS at 08:46

## 2023-01-01 RX ADMIN — Medication 40 MILLIGRAM(S): at 21:30

## 2023-01-01 RX ADMIN — Medication 3.3 MICROGRAM(S)/KG/MIN: at 01:04

## 2023-01-01 RX ADMIN — PROPOFOL 8.45 MICROGRAM(S)/KG/MIN: 10 INJECTION, EMULSION INTRAVENOUS at 00:28

## 2023-01-01 RX ADMIN — Medication 125 MEQ/KG/HR: at 18:42

## 2023-01-01 RX ADMIN — MIDODRINE HYDROCHLORIDE 10 MILLIGRAM(S): 2.5 TABLET ORAL at 06:15

## 2023-01-01 RX ADMIN — Medication 3.3 MICROGRAM(S)/KG/MIN: at 00:28

## 2023-01-01 RX ADMIN — PANTOPRAZOLE SODIUM 40 MILLIGRAM(S): 20 TABLET, DELAYED RELEASE ORAL at 21:30

## 2023-01-01 RX ADMIN — MEROPENEM 100 MILLIGRAM(S): 1 INJECTION INTRAVENOUS at 16:28

## 2023-01-01 RX ADMIN — Medication 25 GRAM(S): at 08:48

## 2023-01-01 RX ADMIN — MIDODRINE HYDROCHLORIDE 10 MILLIGRAM(S): 2.5 TABLET ORAL at 14:28

## 2023-01-01 RX ADMIN — PROPOFOL 8.45 MICROGRAM(S)/KG/MIN: 10 INJECTION, EMULSION INTRAVENOUS at 00:00

## 2023-01-01 RX ADMIN — ATOVAQUONE 750 MILLIGRAM(S): 750 SUSPENSION ORAL at 10:07

## 2023-01-01 RX ADMIN — Medication 2 MILLIGRAM(S): at 14:45

## 2023-01-01 RX ADMIN — MEROPENEM 100 MILLIGRAM(S): 1 INJECTION INTRAVENOUS at 05:41

## 2023-01-01 RX ADMIN — Medication 1 DROP(S): at 11:45

## 2023-01-01 RX ADMIN — SODIUM ZIRCONIUM CYCLOSILICATE 10 GRAM(S): 10 POWDER, FOR SUSPENSION ORAL at 00:19

## 2023-01-01 RX ADMIN — Medication 3.3 MICROGRAM(S)/KG/MIN: at 08:30

## 2023-01-01 RX ADMIN — PANTOPRAZOLE SODIUM 40 MILLIGRAM(S): 20 TABLET, DELAYED RELEASE ORAL at 11:10

## 2023-01-01 RX ADMIN — CHLORHEXIDINE GLUCONATE 1 APPLICATION(S): 213 SOLUTION TOPICAL at 09:43

## 2023-01-01 RX ADMIN — Medication 1 DROP(S): at 19:49

## 2023-01-01 RX ADMIN — VASOPRESSIN 6 UNIT(S)/MIN: 20 INJECTION INTRAVENOUS at 22:26

## 2023-01-01 RX ADMIN — CHLORHEXIDINE GLUCONATE 1 APPLICATION(S): 213 SOLUTION TOPICAL at 10:28

## 2023-01-01 RX ADMIN — FENTANYL CITRATE 3.52 MICROGRAM(S)/KG/HR: 50 INJECTION INTRAVENOUS at 06:13

## 2023-01-01 RX ADMIN — PANTOPRAZOLE SODIUM 40 MILLIGRAM(S): 20 TABLET, DELAYED RELEASE ORAL at 10:18

## 2023-01-01 RX ADMIN — MYCOPHENOLATE MOFETIL 1000 MILLIGRAM(S): 250 CAPSULE ORAL at 21:29

## 2023-01-01 RX ADMIN — HEPARIN SODIUM 5000 UNIT(S): 5000 INJECTION INTRAVENOUS; SUBCUTANEOUS at 10:17

## 2023-01-01 RX ADMIN — CHLORHEXIDINE GLUCONATE 1 APPLICATION(S): 213 SOLUTION TOPICAL at 05:17

## 2023-01-01 RX ADMIN — MYCOPHENOLATE MOFETIL 1000 MILLIGRAM(S): 250 CAPSULE ORAL at 10:07

## 2023-01-01 RX ADMIN — Medication 1 DROP(S): at 18:11

## 2023-01-01 RX ADMIN — Medication 1 DROP(S): at 05:49

## 2023-01-01 RX ADMIN — PANTOPRAZOLE SODIUM 40 MILLIGRAM(S): 20 TABLET, DELAYED RELEASE ORAL at 21:21

## 2023-01-01 RX ADMIN — HEPARIN SODIUM 5000 UNIT(S): 5000 INJECTION INTRAVENOUS; SUBCUTANEOUS at 21:40

## 2023-01-01 RX ADMIN — Medication 1 DROP(S): at 05:43

## 2023-01-01 RX ADMIN — Medication 81 MILLIGRAM(S): at 08:46

## 2023-01-01 RX ADMIN — Medication 40 MILLIGRAM(S): at 05:51

## 2023-01-01 RX ADMIN — MIDODRINE HYDROCHLORIDE 10 MILLIGRAM(S): 2.5 TABLET ORAL at 06:20

## 2023-01-01 RX ADMIN — PROPOFOL 8.45 MICROGRAM(S)/KG/MIN: 10 INJECTION, EMULSION INTRAVENOUS at 09:15

## 2023-01-01 RX ADMIN — AZITHROMYCIN 255 MILLIGRAM(S): 500 TABLET, FILM COATED ORAL at 10:16

## 2023-01-01 RX ADMIN — Medication 40 MILLIGRAM(S): at 21:52

## 2023-01-01 RX ADMIN — Medication 40 MILLIGRAM(S): at 05:42

## 2023-01-01 RX ADMIN — MYCOPHENOLATE MOFETIL 1000 MILLIGRAM(S): 250 CAPSULE ORAL at 09:47

## 2023-01-01 RX ADMIN — PROPOFOL 8.45 MICROGRAM(S)/KG/MIN: 10 INJECTION, EMULSION INTRAVENOUS at 07:43

## 2023-01-01 RX ADMIN — Medication 50 MILLILITER(S): at 11:29

## 2023-01-01 RX ADMIN — MIDODRINE HYDROCHLORIDE 10 MILLIGRAM(S): 2.5 TABLET ORAL at 22:43

## 2023-01-01 RX ADMIN — Medication 3.3 MICROGRAM(S)/KG/MIN: at 15:14

## 2023-01-01 RX ADMIN — ATOVAQUONE 750 MILLIGRAM(S): 750 SUSPENSION ORAL at 09:46

## 2023-01-01 RX ADMIN — PROPOFOL 8.45 MICROGRAM(S)/KG/MIN: 10 INJECTION, EMULSION INTRAVENOUS at 12:02

## 2023-01-01 RX ADMIN — Medication 40 MILLIGRAM(S): at 21:58

## 2023-01-01 RX ADMIN — CHLORHEXIDINE GLUCONATE 15 MILLILITER(S): 213 SOLUTION TOPICAL at 21:01

## 2023-01-01 RX ADMIN — Medication 81 MILLIGRAM(S): at 09:40

## 2023-01-01 RX ADMIN — PROPOFOL 8.45 MICROGRAM(S)/KG/MIN: 10 INJECTION, EMULSION INTRAVENOUS at 15:25

## 2023-01-01 RX ADMIN — MEROPENEM 100 MILLIGRAM(S): 1 INJECTION INTRAVENOUS at 05:07

## 2023-01-01 RX ADMIN — Medication 1 DROP(S): at 23:27

## 2023-01-01 RX ADMIN — ATOVAQUONE 750 MILLIGRAM(S): 750 SUSPENSION ORAL at 21:28

## 2023-01-01 RX ADMIN — ATOVAQUONE 750 MILLIGRAM(S): 750 SUSPENSION ORAL at 10:39

## 2023-01-01 RX ADMIN — CHLORHEXIDINE GLUCONATE 15 MILLILITER(S): 213 SOLUTION TOPICAL at 21:30

## 2023-01-01 RX ADMIN — PANTOPRAZOLE SODIUM 40 MILLIGRAM(S): 20 TABLET, DELAYED RELEASE ORAL at 11:05

## 2023-01-01 RX ADMIN — Medication 81 MILLIGRAM(S): at 10:21

## 2023-01-01 RX ADMIN — ATOVAQUONE 750 MILLIGRAM(S): 750 SUSPENSION ORAL at 21:31

## 2023-01-01 RX ADMIN — PROPOFOL 8.45 MICROGRAM(S)/KG/MIN: 10 INJECTION, EMULSION INTRAVENOUS at 15:12

## 2023-01-01 RX ADMIN — HEPARIN SODIUM 5000 UNIT(S): 5000 INJECTION INTRAVENOUS; SUBCUTANEOUS at 10:37

## 2023-01-01 RX ADMIN — MIDODRINE HYDROCHLORIDE 10 MILLIGRAM(S): 2.5 TABLET ORAL at 15:04

## 2023-01-01 RX ADMIN — ENOXAPARIN SODIUM 40 MILLIGRAM(S): 100 INJECTION SUBCUTANEOUS at 11:22

## 2023-01-01 RX ADMIN — ATORVASTATIN CALCIUM 20 MILLIGRAM(S): 80 TABLET, FILM COATED ORAL at 21:33

## 2023-01-01 RX ADMIN — Medication 40 MILLIGRAM(S): at 21:27

## 2023-01-01 RX ADMIN — MIDODRINE HYDROCHLORIDE 10 MILLIGRAM(S): 2.5 TABLET ORAL at 05:42

## 2023-01-01 RX ADMIN — Medication 1 DROP(S): at 23:47

## 2023-01-01 RX ADMIN — PROPOFOL 8.45 MICROGRAM(S)/KG/MIN: 10 INJECTION, EMULSION INTRAVENOUS at 05:41

## 2023-01-01 RX ADMIN — CHLORHEXIDINE GLUCONATE 1 APPLICATION(S): 213 SOLUTION TOPICAL at 05:48

## 2023-01-01 RX ADMIN — MYCOPHENOLATE MOFETIL 1000 MILLIGRAM(S): 250 CAPSULE ORAL at 10:00

## 2023-01-01 RX ADMIN — Medication 40 MILLIGRAM(S): at 21:21

## 2023-01-01 RX ADMIN — Medication 3.3 MICROGRAM(S)/KG/MIN: at 05:42

## 2023-01-01 RX ADMIN — Medication 2 MILLIGRAM(S): at 18:17

## 2023-01-01 RX ADMIN — ATOVAQUONE 750 MILLIGRAM(S): 750 SUSPENSION ORAL at 10:22

## 2023-01-01 RX ADMIN — MIDODRINE HYDROCHLORIDE 10 MILLIGRAM(S): 2.5 TABLET ORAL at 21:33

## 2023-01-01 RX ADMIN — VASOPRESSIN 6 UNIT(S)/MIN: 20 INJECTION INTRAVENOUS at 10:01

## 2023-01-01 RX ADMIN — Medication 1 DROP(S): at 05:07

## 2023-01-01 RX ADMIN — PANTOPRAZOLE SODIUM 40 MILLIGRAM(S): 20 TABLET, DELAYED RELEASE ORAL at 10:07

## 2023-01-01 RX ADMIN — MYCOPHENOLATE MOFETIL 1000 MILLIGRAM(S): 250 CAPSULE ORAL at 10:39

## 2023-01-01 RX ADMIN — LACTULOSE 20 GRAM(S): 10 SOLUTION ORAL at 18:04

## 2023-03-24 NOTE — REASON FOR VISIT
[Initial] : an initial visit [Cough] : cough [Dysphagia] : dysphagia [Pulmonary Fibrosis] : pulmonary fibrosis [TextBox_44] : initial lung transplant evaluation [TextBox_13] : Vijay Garcia

## 2023-03-24 NOTE — HISTORY OF PRESENT ILLNESS
[FreeTextEntry1] : 63 y/o male PMH: CAD s/p PCI Oct '18 SINDY RCA, ILD d/t scleroderma-on cellcept suspected pulmonary hypertension-no RHC presents today for routine follow up.,\par \par Since last visit had L & R HC at [facility]\par Cors w/o obstructive disease, severe pulmonary hypertension.\par Admitted for 6 days for initiation of iloprost & starting lasix.\par Some improvement in pressures per pt  (reviewed data on phone). \par  but renal function worsened on lasix\par Referred to nephrology - Dr. Jensen in April.\par Also transplant lung [facility] Manas mccracken\par pulmonary hypertension Dr. Chaves also [place]\par \par [facility] Huff general pulmonary\par Rheumatologist [place] \par \par SBPs 90s  today.\par \par Tyvaso uptitrated last on Mon

## 2023-03-24 NOTE — ASSESSMENT
[FreeTextEntry1] : A/P:\par \par Reduce lisinpril from 10 to 5 mg daily.\par Records from [facility]\par \par Return in 4 weeks.

## 2023-03-24 NOTE — ASSESSMENT
[FreeTextEntry1] : 62M CAD s/p PCI 2018 SINDY RCA, scleroderma ILD, ?pHTN (no RHC)\par \par #lung transplant evaluation\par -Will need GI work up for esophageal dysmotility due to scleroderma\par - being worked up at [facility] in [place]\par

## 2023-03-28 NOTE — H&P ADULT - ASSESSMENT
62M hx PSS (scleroderma) on cellcept with pulmonary fibrosis on 8L of 02  24/7, HTN HLD  CAD with 4 stents    P/W SOB and hypoxemia       Type 1 resp failure due to underlying fibrosis/ scleroderma/pulm HTN  ?? SI PNA    BIPAP dependent at this point high risk for intubation.    CTA no PE + interstitial infiltrates bronchiectasis Blebs Progression of disease form the last CT 2018     Broad ABX coverage as at risk for MDR organisms including gram neg     Trop elevation will get ECG >  no CP maybe demand from hypoxemia and resp distress.  ?? NSTEMI He claims that he had a left heart cath in Feb and was told all was ok.     Asymmetric  leg swelling L>R check venous doppler.       Will call [facility] in the AM to inform of decompensation and see if they want the patient transferred       Need to check a viral panel  not sent     Critical care time 45 min 62M hx PSS (scleroderma) on cellcept with pulmonary fibrosis on 8L of 02  24/7, HTN HLD  CAD with 4 stents    P/W SOB and hypoxemia       Type 1 resp failure due to underlying fibrosis/ scleroderma/pulm HTN  ?? SI PNA    BIPAP dependent at this point high risk for intubation.    CTA no PE + interstitial infiltrates bronchiectasis Blebs Progression of disease form the last CT 2018     Broad ABX coverage as at risk for MDR organisms including gram neg     Defer steroids for now     POCUS on arrival to look for lung water  2D echo     Trop elevation will get ECG >  no CP maybe demand from hypoxemia and resp distress.  ?? NSTEMI He claims that he had a left heart cath in Feb and was told all was ok.     Asymmetric  leg swelling L>R check venous doppler.       Will call [facility] in the AM to inform of decompensation and see if they want the patient transferred       Need to check a viral panel  not sent     Critical care time 45 min

## 2023-03-28 NOTE — PROGRESS NOTE ADULT - ASSESSMENT
62M hx PSS (scleroderma) on cellcept with pulmonary fibrosis on 8L of 02 24/7, HTN HLD  CAD with 4 stents  P/W SOB and hypoxemia   known severe Pulmonary htn  was on lasix, was on bipap overnight now on Non rebreather  Type 1 resp failure due to underlying fibrosis/ scleroderma/pulm HTN  ?? SI PNA    CTA no PE + interstitial infiltrates bronchiectasis Blebs Progression of disease form the last CT 2018   Broad ABX coverage as at risk for MDR organisms including gram neg Zosyn , zithromax  cellcept for ILD  will give lasix for pulmonary htn  some leg swelling doppler negative   trop elevation mild at recent negative heart cath  awaiting call back from Dr. bartlett office at [place], where pateint receives his care

## 2023-03-28 NOTE — PROGRESS NOTE ADULT - SUBJECTIVE AND OBJECTIVE BOX
Events Overnight: Patient admitted, now on nrb saturation in mid 90s    HPI:       62M hx PSS (scleroderma) on cellcept with pulmonary fibrosis on 8L of 02 24/7, HTN HLD  CAD with 4 stents to the RCA 2018 with Dr Griffith.  has had recent cardiac cath which showed no CAD  He is on the lung transplant list at [facility] with Dr Manas Priest (transplant pulmonologist).  He was hospitalized in Feb at [facility] with a flare of fibrosis and was in the ICU on inhaled pulmonary vasodialtors.    At that time he had a R heart cath and he was told that he has severe pulmonary hypertension.,   He is being teed up for transplant waiting for immunizations/colonoscopy etc.   He was discharged on Tyvaso (treprostinil) but only started using it a few days ago due to expense and approval.     Over the last three weeks he has had a poor appetite and progressive SOB leaving him unable to walk with a walker more than 10 feet without stopping to catch his breath.  He also noted more hypoxemia on his home pulse ox.  He has a cough with clear phlegm.  He  arrived in the ED with severe SOB and hypoxemia placed on BIPAP.  Type 1 respiratory failure   Trop and BNP elevation   CT shows pulmonary fibrosis    Review of Systems:  Other Review of Systems: All other review of systems negative, except as noted in HPI      PMH:       as above     MEDICATIONS  (STANDING):  aspirin  chewable 81 milliGRAM(s) Oral daily  atorvastatin 20 milliGRAM(s) Oral at bedtime  azithromycin  IVPB 500 milliGRAM(s) IV Intermittent every 24 hours  chlorhexidine 4% Liquid 1 Application(s) Topical <User Schedule>  enoxaparin Injectable 40 milliGRAM(s) SubCutaneous every 24 hours  mycophenolate mofetil 1000 milliGRAM(s) Oral two times a day  piperacillin/tazobactam IVPB. 3.375 Gram(s) IV Intermittent once  piperacillin/tazobactam IVPB.- 3.375 Gram(s) IV Intermittent once  piperacillin/tazobactam IVPB.- 3.375 Gram(s) IV Intermittent once  piperacillin/tazobactam IVPB.. 3.375 Gram(s) IV Intermittent every 8 hours      Height (cm): 177.8 (03-28 @ 04:15)  Weight (kg): 70.4 (03-28 @ 04:15)  BMI (kg/m2): 22.3 (03-28 @ 04:15)    ICU Vital Signs Last 24 Hrs  T(C): 37.3 (28 Mar 2023 04:15), Max: 37.3 (28 Mar 2023 04:15)  T(F): 99.2 (28 Mar 2023 04:15), Max: 99.2 (28 Mar 2023 04:15)  HR: 99 (28 Mar 2023 04:15) (99 - 99)  BP: 127/76 (28 Mar 2023 04:15) (127/76 - 127/76)  BP(mean): 88 (28 Mar 2023 04:15) (88 - 88)  ABP: --  ABP(mean): --  RR: 43 (28 Mar 2023 04:15) (43 - 43)  SpO2: 96% (28 Mar 2023 04:15) (96% - 96%)    O2 Parameters below as of 28 Mar 2023 04:15  Patient On (Oxygen Delivery Method): BiPAP/CPAP    Physical Exam    General thin frail  Neuro awake , alert appropriate  HEENT mucous membranes dry  neck no jvd  lungs bilateral crackles  cv rrr  abdomen soft, non tender  extremities warm  skin no rash                        11.1   12.14 )-----------( 231      ( 28 Mar 2023 06:17 )             35.2       03-28    137  |  105  |  16  ----------------------------<  176<H>  4.2   |  26  |  1.61<H>    Ca    9.5      28 Mar 2023 06:17    TPro  7.7  /  Alb  3.5  /  TBili  1.1  /  DBili  x   /  AST  31  /  ALT  13  /  AlkPhos  94  03-28      CARDIAC MARKERS ( 28 Mar 2023 06:17 )  x     / x     / 131 U/L / x     / x        DVT Prophylaxis:  Lovenox                                                               Advanced Directives: FUll Code

## 2023-03-28 NOTE — H&P ADULT - HISTORY OF PRESENT ILLNESS
62M hx PSS (scleroderma) on cellcept with pulmonary fibrosis on 8L of 02 24/7, HTN HLD  CAD with 4 stents to the RCA 2018 with Dr Griffith.    He is on the lung transplant list at [facility] with Dr Manas Priest (transplant pulmonologist).    He was hospitalized in Feb at [facility] with a flare of fibrosis and was in the ICU on inhaled pulmonary vasodialtors.    At that time he had a R heart cath and he was told that he has severe pulmonary hypertension.,   He is being teed up for transplant waiting for immunizations/colonoscopy etc.   He was discharged on Tyvaso (treprostinil) but only started using it a few days ago due to expense and approval.       Over the last three weeks he has had a poor appetite and progressive SOB leaving him unable to walk with a walker more than 10 feet without stopping to catch his breath.  He also noted more hypoxemia on his home pulse ox.  He has a cough with clear phlegm.    He  arrived in the ED with severe SOB and hypoxemia placed on BIPAP.  Type 1 respiratory failure   Trop and BNP elevation

## 2023-03-28 NOTE — DISCHARGE NOTE PROVIDER - NSDCFUSCHEDAPPT_GEN_ALL_CORE_FT
Yuly Jensen  [place] [facility]  NEPHRO [address]  Scheduled Appointment: 04/19/2023    Julian Rose  [place] [facility]  CARDIOLOGY [address]  Scheduled Appointment: 04/20/2023

## 2023-03-28 NOTE — DISCHARGE NOTE PROVIDER - NSDCMRMEDTOKEN_GEN_ALL_CORE_FT
aspirin 81 mg oral tablet, chewable: 1 tab(s) orally once a day  atorvastatin 40 mg oral tablet: 1 tab(s) orally once a day (at bedtime)  azithromycin 500 mg intravenous injection: 500 milligram(s) intravenous every 24 hours  enoxaparin: 40 milligram(s) subcutaneous once a day  mycophenolate mofetil 250 mg oral capsule: 4 cap(s) orally 2 times a day  piperacillin-tazobactam: 3.375 gram(s) injectable every 8 hours  Protonix 40 mg oral delayed release tablet: 1 tab(s) orally once a day   quinapril 10 mg oral tablet: 1 tab(s) orally once a day  Toprol-XL 25 mg oral tablet, extended release: 1 tab(s) orally once a day

## 2023-03-28 NOTE — CONSULT NOTE ADULT - PROBLEM SELECTOR RECOMMENDATION 2
-minimal trop elevation.  Recent LHC cath at [facility]  w/ no obstructive disease. Likely demand ischemia.  Check ECG to confirm no ischemic changes.

## 2023-03-28 NOTE — DISCHARGE NOTE PROVIDER - NSDCCPCAREPLAN_GEN_ALL_CORE_FT
PRINCIPAL DISCHARGE DIAGNOSIS  Diagnosis: Pulmonary fibrosis  Assessment and Plan of Treatment:

## 2023-03-28 NOTE — DISCHARGE NOTE PROVIDER - CARE PROVIDER_API CALL
[place],   transfer to [place] for transplant consideration  Phone: (   )    -  Fax: (   )    -  Follow Up Time:

## 2023-03-28 NOTE — PROVIDER CONTACT NOTE (EICU) - ACTION/TREATMENT ORDERED:
ICU  BIPAP  Bronchodilator therapy  transfer to his established transplant center  empiric antibiotics

## 2023-03-28 NOTE — CONSULT NOTE ADULT - PROBLEM SELECTOR RECOMMENDATION 9
-2/2 worsening pulmonary fibrosis & pulmonary hypertension.  -Pt is being evaluated for lung transplant at [facility] w/ plans for transfer.

## 2023-03-28 NOTE — H&P ADULT - NSHPPHYSICALEXAM_GEN_ALL_CORE
Alert awake on BIPAP    Lungs bilateral rhonchi  S1 s2 reg   abd soft + BS   ext bilateral LE swelling L>R  Typical PSS skin changes and contractures of th  fingers n/a

## 2023-03-28 NOTE — GOALS OF CARE CONVERSATION - ADVANCED CARE PLANNING - CONVERSATION DETAILS
We discussed his scleroderma and the advancement of his disease.    He is hopeful that he will receive a lung transplant.    Wants all measures of treatment and stabilization.     Did not want to complete a MOLST form

## 2023-03-28 NOTE — DISCHARGE NOTE PROVIDER - PROVIDER TOKENS
FREE:[LAST:[[place]],PHONE:[(   )    -],FAX:[(   )    -],ADDRESS:[transfer to [place] for transplant consideration]]

## 2023-03-28 NOTE — DISCHARGE NOTE PROVIDER - HOSPITAL COURSE
Patient with Sarcoid, on cellcept  severe pulmonary HTN  pulmonary fibrosis  recent negative left heart cath at [place],  severe pulmonary htn  being evaluate for lung transplant  has had worsening hypoxia  ct on admission shows pulmonary fibrosis, no pe, some bronchiectasis  creatinine 1.6 baseline 1.4 ,   on zosyn, zithromax for possible superimposed pneumonia(but doubt)  d/w patient md at [place] to be transferred, on NRB mask  for continuity and consideration iif transplant possible

## 2023-03-28 NOTE — CONSULT NOTE ADULT - SUBJECTIVE AND OBJECTIVE BOX
HPI:  62M hx PSS (scleroderma) on cellcept with pulmonary fibrosis on 8L of 02 24/7, HTN HLD  CAD with 4 stents to the RCA 2018 with Dr Griffith.    He is on the lung transplant list at [facility] with Dr Manas Priest (transplant pulmonologist).    He was hospitalized in Feb at [facility] with a flare of fibrosis and was in the ICU on inhaled pulmonary vasodialtors.    At that time he had a R heart cath and he was told that he has severe pulmonary hypertension.,   He is being teed up for transplant waiting for immunizations/colonoscopy etc.   He was discharged on Tyvaso (treprostinil) but only started using it a few days ago due to expense and approval.       Over the last three weeks he has had a poor appetite and progressive SOB leaving him unable to walk with a walker more than 10 feet without stopping to catch his breath.  He also noted more hypoxemia on his home pulse ox.  He has a cough with clear phlegm.    He  arrived in the ED with severe SOB and hypoxemia placed on BIPAP.  Type 1 respiratory failure   Trop and BNP elevation  (28 Mar 2023 02:01)    Consulted as pt is known to cardiology clinic.  Worsening dyspnea since last evaluation in cardiology clinic last week.  No weight gain or orthopnea. Minimal LE edema.  No chest pain.      PAST MEDICAL AND SURGICAL HISTORY:  PAST MEDICAL & SURGICAL HISTORY:  HTN (hypertension)      Pulmonary fibrosis      Scleroderma          ALLERGIES:  Allergies    No Known Allergies    Intolerances        SOCIAL HISTORY:  Social History:   with children, non smoker (28 Mar 2023 02:01)      FAMILY  HISTORY:  FAMILY HISTORY:      MEDICATIONS:  OUTPATIENT:  Home Medications:  aspirin 81 mg oral tablet, chewable: 1 tab(s) orally once a day (31 Oct 2018 10:16)  azithromycin 500 mg intravenous injection: 500 milligram(s) intravenous every 24 hours (28 Mar 2023 14:31)  enoxaparin: 40 milligram(s) subcutaneous once a day (28 Mar 2023 14:31)  mycophenolate mofetil 250 mg oral capsule: 4 cap(s) orally 2 times a day (28 Mar 2023 14:31)  piperacillin-tazobactam: 3.375 gram(s) injectable every 8 hours (28 Mar 2023 14:31)  quinapril 10 mg oral tablet: 1 tab(s) orally once a day (28 Oct 2018 22:58)      INPATIENT:  MEDICATIONS  (STANDING):  aspirin  chewable 81 milliGRAM(s) Oral daily  atorvastatin 20 milliGRAM(s) Oral at bedtime  azithromycin  IVPB 500 milliGRAM(s) IV Intermittent every 24 hours  chlorhexidine 4% Liquid 1 Application(s) Topical <User Schedule>  enoxaparin Injectable 40 milliGRAM(s) SubCutaneous every 24 hours  mycophenolate mofetil 1000 milliGRAM(s) Oral two times a day  piperacillin/tazobactam IVPB.. 3.375 Gram(s) IV Intermittent every 8 hours    MEDICATIONS  (PRN):    MEDICATIONS  (PRN):      REVIEW OF SYSTEMS:  ===============================  ===============================  CONSTITUTIONAL: No weakness, fevers or chills  EYES/ENT: No visual changes;  No vertigo or throat pain   NECK: No pain or stiffness  RESPIRATORY: No cough, wheezing, hemoptysis; No shortness of breath  CARDIOVASCULAR: No chest pain or palpitations  GASTROINTESTINAL: No abdominal or epigastric pain. No nausea, vomiting, or hematemesis;   No diarrhea or constipation. No melena or hematochezia.  GENITOURINARY: No dysuria, frequency or hematuria  NEUROLOGICAL: No numbness or weakness  SKIN: No itching, burning, rashes, or lesions   All other review of systems is negative unless indicated above    Vital Signs Last 24 Hrs  T(C): 37.3 (28 Mar 2023 04:15), Max: 37.3 (28 Mar 2023 04:15)  T(F): 99.2 (28 Mar 2023 04:15), Max: 99.2 (28 Mar 2023 04:15)  HR: 87 (28 Mar 2023 19:00) (81 - 99)  BP: 109/73 (28 Mar 2023 19:00) (101/63 - 127/76)  BP(mean): 80 (28 Mar 2023 19:00) (72 - 88)  RR: 30 (28 Mar 2023 19:00) (30 - 43)  SpO2: 96% (28 Mar 2023 19:00) (85% - 100%)    Parameters below as of 28 Mar 2023 04:15  Patient On (Oxygen Delivery Method): BiPAP/CPAP        I&O's Summary    28 Mar 2023 07:01  -  28 Mar 2023 19:45  --------------------------------------------------------  IN: 350 mL / OUT: 525 mL / NET: -175 mL        I&O's Detail    28 Mar 2023 07:01  -  28 Mar 2023 19:45  --------------------------------------------------------  IN:    IV PiggyBack: 350 mL  Total IN: 350 mL    OUT:    Voided (mL): 525 mL  Total OUT: 525 mL    Total NET: -175 mL          PHYSICAL EXAM:    Constitutional: NAD, thin frail  HEENT: PERR, EOMI,  No oral cyananosis.  Neck:  supple,  No JVD  Respiratory: bibasilar crackles No wheezing, rales or rhonchi  Cardiovascular: S1 and S2, regular rate and rhythm, no Murmurs, gallops or rubs  Gastrointestinal: Bowel Sounds present, soft, nontender.   Extremities: No peripheral edema. No clubbing or cyanosis.  Vascular: 2+ peripheral pulses      ===============================  ===============================  LABS:                         11.1   12.14 )-----------( 231      ( 28 Mar 2023 06:17 )             35.2                         10.7   16.54 )-----------( 257      ( 28 Mar 2023 00:10 )             34.4     28 Mar 2023 06:17    137    |  105    |  16     ----------------------------<  176    4.2     |  26     |  1.61   28 Mar 2023 00:10    138    |  105    |  14     ----------------------------<  131    3.6     |  28     |  1.47     Ca    9.5        28 Mar 2023 06:17  Ca    9.2        28 Mar 2023 00:10    TPro  7.7    /  Alb  3.5    /  TBili  1.1    /  DBili  x      /  AST  31     /  ALT  13     /  AlkPhos  94     28 Mar 2023 06:17    PT/INR - ( 28 Mar 2023 00:10 )   PT: 15.8 sec;   INR: 1.36 ratio         PTT - ( 28 Mar 2023 00:10 )  PTT:31.7 sec    THYROID STUDIES:    ===============================  ===============================  CARDIAC BIOMARKERS:  -------  -BNP VALUES:    -------  -TROPONIN VALUES:   Troponin I, High Sensitivity Result: 477.06 ng/L *H* (03-28-23 @ 06:17)  Troponin I, High Sensitivity Result: 441.04 ng/L *H* (03-28-23 @ 00:10)  Troponin I, Serum: 39.700 ng/mL *H* [0.015 - 0.045] (10-29-18 @ 06:51)  Troponin I, Serum: 7.860 ng/mL *H* [0.015 - 0.045] (10-28-18 @ 23:22)  Troponin I, Serum: 1.590 ng/mL *H* [0.015 - 0.045] (10-28-18 @ 19:47)  Troponin I, Serum: 0.255 ng/mL *H* [0.015 - 0.045] (10-28-18 @ 16:16)  ===============================  ===============================  EKG:  No ECG this admit.        ===============================  RADIOLOGY:  < from: CT Angio Chest PE Protocol w/ IV Cont (03.28.23 @ 00:21) >  ACC: [id number] EXAM:  CT ANGIO CHEST PULM ART [facility]   ORDERED BY: ROME RUFFIN     PROCEDURE DATE:  03/28/2023          INTERPRETATION:  CLINICAL INFORMATION: Shortness of breath. History of   scleroderma.    COMPARISON: 10/28/2018    CONTRAST/COMPLICATIONS:  IV Contrast: Omnipaque 350  90 cc administered   10 cc discarded  Oral Contrast: NONE  Complications: None reported at time of study completion    PROCEDURE:  CT Angiography of the Chest.  Sagittal and coronal reformats were performed as well as 3D (MIP)   reconstructions.    FINDINGS:    LUNGS AND AIRWAYS: Patent central airways.  Revisualized chronic   interstitial disease including hazy bilateral ground glass opacities,   reticulation and worsened bronchiectasis bilaterally. Large bulla in the   left upper lobe.  PLEURA: No pleural effusion.  MEDIASTINUM AND MALLORY: No lymphadenopathy.  VESSELS: Aortic and coronary artery atherosclerosis. Enlarged main   pulmonary artery, unchanged, can be seen with pulmonary arterial   hypertension. No pulmonary embolism.  HEART: Cardiomegaly. Small pericardial effusion.  CHEST WALL AND LOWER NECK: Bilateral mediastinal and hilar adenopathy   increased since prior exam including extensive adenopathy in the   prevascular space.  VISUALIZED UPPER ABDOMEN: Right renal hypodensity too small to   characterize. Colonic diverticulosis.  BONES: Degenerative changes. Dextroscoliosis of the thoracic spine.   Chronic right rib deformities.    IMPRESSION:  No pulmonary embolism.  Chronic interstitial disease with worsened bronchiectasis.  New mediastinal and hilar adenopathy, which is indeterminate but could be   related to scleroderma and interstitial disease. Continued follow-up is   recommended.        --- End of Report ---            TYE CULVER; Attending Radiologist  This document has been electronically signed. Mar 28 2023  1:01AM    < end of copied text >      ===============================  ECHO:    Outpatient Echo Jun '22: EF 55-60%, borderline pulmonary hypertension.  normal RV size & function.            ===============================    Julian Rose M.D.  Cardiology, [facility]  Cell: [phone]  Office:   [phone] ([facility] Office)  [phone] ([facility] Office)    ===============================

## 2023-03-29 NOTE — DIETITIAN INITIAL EVALUATION ADULT - NSFNSGIIOFT_GEN_A_CORE
I&O's Detail    28 Mar 2023 07:01  -  29 Mar 2023 07:00  --------------------------------------------------------  IN:    IV PiggyBack: 550 mL    Oral Fluid: 50 mL  Total IN: 600 mL    OUT:    Voided (mL): 1125 mL  Total OUT: 1125 mL    Total NET: -525 mL

## 2023-03-29 NOTE — DIETITIAN INITIAL EVALUATION ADULT - ADD RECOMMEND
1. Initiate TF as above rx   2. Place on aspiration precautions - keep head of bed >45 degrees when feeding   3. Recommend MVI w/ minerals daily to ensure 100% RDA met   4. Consider adding thiamine 100 mg daily 2/2 poor PO intake/ malnutrition   5. Monitor lytes and hydration replete prn   6. Monitor bowel movements, if no BM for >3 days, consider implementing bowel regimen.   7. Monitor blood glucose, maintain within 140-180 mg/dL   8. Monitor wt daily to track/trend wt changes  9. Confirm goals of care regarding LONG-TERM nutrition support   RD will continue to monitor TF tolerance, labs, hydration, and wt prn.

## 2023-03-29 NOTE — DIETITIAN NUTRITION RISK NOTIFICATION - TREATMENT: THE FOLLOWING DIET HAS BEEN RECOMMENDED
Diet, NPO with Tube Feed:   Tube Feeding Modality: Orogastric  Vital 1.5 El (VITAL1.5)  Total Volume for 24 Hours (mL): 1200  Continuous  Starting Tube Feed Rate {mL per Hour}: 25  Until Goal Tube Feed Rate (mL per Hour): 50  Tube Feed Duration (in Hours): 24  Tube Feed Start Time: 10:00 (03-29-23 @ 09:40) [Active]

## 2023-03-29 NOTE — PROGRESS NOTE ADULT - SUBJECTIVE AND OBJECTIVE BOX
ICU Progress Note    HPI:    S:    Pt seen and examined  HD # 2  FULL CODE  62M hx PSS (scleroderma) on cellcept with pulmonary fibrosis on 8L of 02 24/7, HTN HLD  CAD with 4 stents to the RCA 2018 with Dr Griffith.    He is on the lung transplant list at [facility] with Dr Manas Priest (transplant pulmonologist).    He was hospitalized in Feb at [facility] with a flare of fibrosis and was in the ICU on inhaled pulmonary vasodialtors.    At that time he had a R heart cath and he was told that he has severe pulmonary hypertension.,   He is being teed up for transplant waiting for immunizations/colonoscopy etc.   He was discharged on Tyvaso (treprostinil) but only started using it a few days ago due to expense and approval.       Over the last three weeks he has had a poor appetite and progressive SOB leaving him unable to walk with a walker more than 10 feet without stopping to catch his breath.  He also noted more hypoxemia on his home pulse ox.  He has a cough with clear phlegm.    He  arrived in the ED with severe SOB and hypoxemia placed on BIPAP.  Type 1 respiratory failure   Trop and BNP elevation     3/29: Intubated on pressors.    ROS: Unable to obtain 2/2 Pt condition (intubated)    Allergies    No Known Allergies    Intolerances    MEDICATIONS  (STANDING):    aspirin  chewable 81 milliGRAM(s) Oral daily  atorvastatin 20 milliGRAM(s) Oral at bedtime  azithromycin  IVPB 500 milliGRAM(s) IV Intermittent every 24 hours  chlorhexidine 0.12% Liquid 15 milliLiter(s) Oral Mucosa every 12 hours  chlorhexidine 4% Liquid 1 Application(s) Topical <User Schedule>  enoxaparin Injectable 40 milliGRAM(s) SubCutaneous every 24 hours  mycophenolate mofetil Suspension 1000 milliGRAM(s) Oral two times a day  norepinephrine Infusion 0.05 MICROgram(s)/kG/Min (6.6 mL/Hr) IV Continuous <Continuous>  piperacillin/tazobactam IVPB.. 3.375 Gram(s) IV Intermittent every 8 hours  propofol Infusion 20 MICROgram(s)/kG/Min (8.45 mL/Hr) IV Continuous <Continuous>  sodium bicarbonate  Infusion 0.085 mEq/kG/Hr (40 mL/Hr) IV Continuous <Continuous>    MEDICATIONS  (PRN):    Drug Dosing Weight  Height (cm): 177.8 (28 Mar 2023 04:15)  Weight (kg): 70.4 (28 Mar 2023 04:15)  BMI (kg/m2): 22.3 (28 Mar 2023 04:15)  BSA (m2): 1.87 (28 Mar 2023 04:15)    PAST MEDICAL & SURGICAL HISTORY:    HTN (hypertension)  Pulmonary fibrosis  Scleroderma    FAMILY HISTORY:    ROS: See HPI; otherwise, all systems reviewed and negative.    O:    ICU Vital Signs Last 24 Hrs  T(C): 37.3 (29 Mar 2023 08:20), Max: 37.3 (29 Mar 2023 01:07)  T(F): 99.1 (29 Mar 2023 08:20), Max: 99.2 (29 Mar 2023 01:07)  HR: 97 (29 Mar 2023 16:00) (83 - 135)  BP: 83/55 (29 Mar 2023 16:00) (76/49 - 163/108)  BP(mean): 62 (29 Mar 2023 16:00) (56 - 124)  ABP: --  ABP(mean): --  RR: 28 (29 Mar 2023 16:00) (28 - 36)  SpO2: 97% (29 Mar 2023 09:30) (85% - 98%)    O2 Parameters below as of 29 Mar 2023 01:00  Patient On (Oxygen Delivery Method): BiPAP/CPAP    ABG - ( 29 Mar 2023 16:46 )  pH, Arterial: 7.06  pH, Blood: x     /  pCO2: 106   /  pO2: 89    / HCO3: 30    / Base Excess: -3.3  /  SaO2: 96        I&O's Detail    28 Mar 2023 07:01  -  29 Mar 2023 07:00  --------------------------------------------------------  IN:    IV PiggyBack: 550 mL    Oral Fluid: 50 mL  Total IN: 600 mL    OUT:    Voided (mL): 1125 mL  Total OUT: 1125 mL    Total NET: -525 mL    Mode: AC/ CMV (Assist Control/ Continuous Mandatory Ventilation)  RR (machine): 25  TV (machine): 0.45  FiO2: 100  PEEP: 5  PIP: 22    PE:    Adult M lying in bed  Appears chronically ill  No JVD trachea midline  S1S2+  Coarse BS B/L  Abd soft NTND  No leg swelling/edema noted  Intubated, sedated  Skin pink and well perfused    LABS:    CBC Full  -  ( 29 Mar 2023 06:09 )  WBC Count : 19.47 K/uL  RBC Count : 3.33 M/uL  Hemoglobin : 10.2 g/dL  Hematocrit : 32.0 %  Platelet Count - Automated : 248 K/uL  Mean Cell Volume : 96.1 fl  Mean Cell Hemoglobin : 30.6 pg  Mean Cell Hemoglobin Concentration : 31.9 gm/dL  Auto Neutrophil # : x  Auto Lymphocyte # : x  Auto Monocyte # : x  Auto Eosinophil # : x  Auto Basophil # : x  Auto Neutrophil % : x  Auto Lymphocyte % : x  Auto Monocyte % : x  Auto Eosinophil % : x  Auto Basophil % : x    03-29    142  |  109<H>  |  23  ----------------------------<  124<H>  4.1   |  28  |  1.68<H>    Ca    9.1      29 Mar 2023 06:09    TPro  7.7  /  Alb  3.5  /  TBili  1.1  /  DBili  x   /  AST  31  /  ALT  13  /  AlkPhos  94  03-28    PT/INR - ( 28 Mar 2023 00:10 )   PT: 15.8 sec;   INR: 1.36 ratio         PTT - ( 28 Mar 2023 00:10 )  PTT:31.7 sec    CAPILLARY BLOOD GLUCOSE        CARDIAC MARKERS ( 28 Mar 2023 06:17 )  x     / x     / 131 U/L / x     / x          LIVER FUNCTIONS - ( 28 Mar 2023 06:17 )  Alb: 3.5 g/dL / Pro: 7.7 gm/dL / ALK PHOS: 94 U/L / ALT: 13 U/L / AST: 31 U/L / GGT: x

## 2023-03-29 NOTE — DIETITIAN INITIAL EVALUATION ADULT - FACTORS AFF FOOD INTAKE
[facility]  [address] [facility], [address] [place], [place], [address]  [phone] phone  [phone] fax       Physicians Verbal Orders      Patient name           ISAAK Hendricks    01 Physician    Dusty Tobias NP Date Ordered/Time    21 3:02PM   Orders: The patient would greatly benefit from trigger point dry needling. Please indicate agreement below with your signature.     Thank you,    Natalie Malcolm, PT, DPT, MTC, CMTPT   Orders Taken by  (Signature/Title) Physicians Signature Date / Time   Physician please sign and return within 48 hours                      [id number] [facility], [address], [place], [address], [phone], [phone] Intubated upon visit; had worsening SOB/persistent lack of appetite/NPO

## 2023-03-29 NOTE — DIETITIAN INITIAL EVALUATION ADULT - PERTINENT MEDS FT
MEDICATIONS  (STANDING):  aspirin  chewable 81 milliGRAM(s) Oral daily  atorvastatin 20 milliGRAM(s) Oral at bedtime  azithromycin  IVPB 500 milliGRAM(s) IV Intermittent every 24 hours  chlorhexidine 0.12% Liquid 15 milliLiter(s) Oral Mucosa every 12 hours  chlorhexidine 4% Liquid 1 Application(s) Topical <User Schedule>  enoxaparin Injectable 40 milliGRAM(s) SubCutaneous every 24 hours  mycophenolate mofetil Suspension 1000 milliGRAM(s) Oral two times a day  piperacillin/tazobactam IVPB.. 3.375 Gram(s) IV Intermittent every 8 hours  propofol Infusion 20 MICROgram(s)/kG/Min (8.45 mL/Hr) IV Continuous <Continuous>    MEDICATIONS  (PRN):   MEDICATIONS  (STANDING):  aspirin  chewable 81 milliGRAM(s) Oral daily  atorvastatin 20 milliGRAM(s) Oral at bedtime  azithromycin  IVPB 500 milliGRAM(s) IV Intermittent every 24 hours  chlorhexidine 0.12% Liquid 15 milliLiter(s) Oral Mucosa every 12 hours  chlorhexidine 4% Liquid 1 Application(s) Topical <User Schedule>  enoxaparin Injectable 40 milliGRAM(s) SubCutaneous every 24 hours  mycophenolate mofetil Suspension 1000 milliGRAM(s) Oral two times a day  piperacillin/tazobactam IVPB.. 3.375 Gram(s) IV Intermittent every 8 hours  propofol Infusion 20 MICROgram(s)/kG/Min (8.45 mL/Hr) IV Continuous <Continuous>   **On propofol**    *Not on bowel regimen

## 2023-03-29 NOTE — PROGRESS NOTE ADULT - SUBJECTIVE AND OBJECTIVE BOX
PCP:    REQUESTING PHYSICIAN:    REASON FOR CONSULT:    CHIEF COMPLAINT:    62M hx PSS (scleroderma) on cellcept with pulmonary fibrosis on 8L of , HTN HLD  CAD with 4 stents to the RCA  with Dr Griffith.    He is on the lung transplant list at [facility] with Dr Manas Priest (transplant pulmonologist).    He was hospitalized in Feb at [facility] with a flare of fibrosis and was in the ICU on inhaled pulmonary vasodialtors.    At that time he had a R heart cath and he was told that he has severe pulmonary hypertension.,   He is being teed up for transplant waiting for immunizations/colonoscopy etc.   He was discharged on Tyvaso (treprostinil) but only started using it a few days ago due to expense and approval.       Over the last three weeks he has had a poor appetite and progressive SOB leaving him unable to walk with a walker more than 10 feet without stopping to catch his breath.  He also noted more hypoxemia on his home pulse ox.  He has a cough with clear phlegm.    He  arrived in the ED with severe SOB and hypoxemia placed on BIPAP.  Type 1 respiratory failure   Trop and BNP elevation  (28 Mar 2023 02:01)    Consulted as pt is known to cardiology clinic.  Worsening dyspnea since last evaluation in cardiology clinic last week.  No weight gain or orthopnea. Minimal LE edema.  No chest pain.  3/29/23: Pt intubated and sedated.    PAST MEDICAL & SURGICAL HISTORY:  HTN (hypertension)      Pulmonary fibrosis      Scleroderma          SOCIAL HISTORY:    FAMILY HISTORY:      ALLERGIES:  Allergies    No Known Allergies    Intolerances        MEDICATIONS:    MEDICATIONS  (STANDING):  aspirin  chewable 81 milliGRAM(s) Oral daily  atorvastatin 20 milliGRAM(s) Oral at bedtime  azithromycin  IVPB 500 milliGRAM(s) IV Intermittent every 24 hours  chlorhexidine 0.12% Liquid 15 milliLiter(s) Oral Mucosa every 12 hours  chlorhexidine 4% Liquid 1 Application(s) Topical <User Schedule>  enoxaparin Injectable 40 milliGRAM(s) SubCutaneous every 24 hours  mycophenolate mofetil Suspension 1000 milliGRAM(s) Oral two times a day  piperacillin/tazobactam IVPB.. 3.375 Gram(s) IV Intermittent every 8 hours  propofol Infusion 20 MICROgram(s)/kG/Min (8.45 mL/Hr) IV Continuous <Continuous>    MEDICATIONS  (PRN):        Vital Signs Last 24 Hrs  T(C): 37.3 (29 Mar 2023 08:20), Max: 37.3 (29 Mar 2023 01:07)  T(F): 99.1 (29 Mar 2023 08:20), Max: 99.2 (29 Mar 2023 01:07)  HR: 123 (29 Mar 2023 11:30) (83 - 135)  BP: 125/86 (29 Mar 2023 11:30) (95/68 - 163/108)  BP(mean): 92 (29 Mar 2023 11:30) (72 - 124)  RR: 30 (29 Mar 2023 06:00) (30 - 38)  SpO2: 97% (29 Mar 2023 09:30) (85% - 100%)    Parameters below as of 29 Mar 2023 01:00  Patient On (Oxygen Delivery Method): BiPAP/CPAP    Daily     Daily Weight in k.5 (29 Mar 2023 05:00)I&O's Summary    28 Mar 2023 07:01  -  29 Mar 2023 07:00  --------------------------------------------------------  IN: 600 mL / OUT: 1125 mL / NET: -525 mL        PHYSICAL EXAM:    Constitutional: NAD, awake and alert, well-developed  HEENT: PERR, EOMI,  No oral cyananosis.  Neck:  supple,  No JVD  Respiratory: Breath sounds are clear bilaterally, No wheezing, rales or rhonchi  Cardiovascular: S1 and S2, regular rate and rhythm, no Murmurs, gallops or rubs  Gastrointestinal: Bowel Sounds present, soft, nontender.   Extremities: No peripheral edema. No clubbing or cyanosis.  Vascular: 2+ peripheral pulses  Neurological: A/O x 3, no focal deficits  Musculoskeletal: no calf tenderness.  Skin: No rashes.      LABS: All Labs Reviewed:                        10.2   19.47 )-----------( 248      ( 29 Mar 2023 06:09 )             32.0                         11.1   12.14 )-----------( 231      ( 28 Mar 2023 06:17 )             35.2                         10.7   16.54 )-----------( 257      ( 28 Mar 2023 00:10 )             34.4     29 Mar 2023 06:09    142    |  109    |  23     ----------------------------<  124    4.1     |  28     |  1.68   28 Mar 2023 06:17    137    |  105    |  16     ----------------------------<  176    4.2     |  26     |  1.61   28 Mar 2023 00:10    138    |  105    |  14     ----------------------------<  131    3.6     |  28     |  1.47     Ca    9.1        29 Mar 2023 06:09  Ca    9.5        28 Mar 2023 06:17  Ca    9.2        28 Mar 2023 00:10    TPro  7.7    /  Alb  3.5    /  TBili  1.1    /  DBili  x      /  AST  31     /  ALT  13     /  AlkPhos  94     28 Mar 2023 06:17    PT/INR - ( 28 Mar 2023 00:10 )   PT: 15.8 sec;   INR: 1.36 ratio         PTT - ( 28 Mar 2023 00:10 )  PTT:31.7 sec  CARDIAC MARKERS ( 28 Mar 2023 06:17 )  x     / x     / 131 U/L / x     / x          Blood Culture: Organism --  Gram Stain Blood -- Gram Stain --  Specimen Source .Blood None  Culture-Blood --            RADIOLOGY/EKG:      ECHO/CARDIAC CATHTERIZATION/STRESS TEST:< from: TTE Echo Complete w/o Contrast w/ Doppler (21 @ 11:44) >  Impression     Summary     Trace mitral regurgitation is present.   Mild aortic sclerosis is present with normal valvular opening.   Normal appearing tricuspid valve structure and function.   Mild (1+) tricuspid valve regurgitation is present.   Mild pulmonic valvular regurgitation (1+) is present.   Normal appearing left atrium.   The left ventricle is normal in size, wall motion and contractility.   Mild concentric left ventricular hypertrophy is present.   Estimated left ventricular ejection fraction is 60-65 %.     Signature     ----------------------------------------------------------------   Electronically signed by Citlaly Lovelace MD([facility]   physician) on 2021 06:51 PM   ----------------------------------------------------------------    Valves    < end of copied text >

## 2023-03-29 NOTE — PROGRESS NOTE ADULT - ASSESSMENT
A:    62M  HD # 2  FULL CODE    Here for:    1. Acute hypoxic resp failure 2/2  2. Pulmonary fibrosis  3. Shock, possible sepsis, not present on admission, 2/2   4. PNA  5. Sarcoidosis  6. CHRIS    This patient requires critical care for support of one or more vital organ systems with a high probability of imminent or life threatening deterioration in his/her condition    P:    Emergently intubated today  Will require central vascular access    Analgosedation  HD monitoring, vasopressors for distributive shock 2/2 sepsis, levophed, add 2nd pressor as needed, vasopressin; TTE wnl  TF's  Broad spectrum abx, f/u Cx's, white count, fever curve  VTE ppx PUD ppx  f/u labs, replete lytes PRN  ABG, f/u  f/u CXR  Place need lines  No s/s active bleeding  Steroids    Dispo: Cont critical care.    TOTAL CRITICAL CARE TIME:  50 minutes (EXCLUSIVE of any non bundled procedures)    Note: This time spent INCLUDES time spent directly as this patient's bedside with evaluation, review of chart including review of laboratory and imaging studies, interpretation of vital signs and cardiac output measurements, any necessary ventilator management, and time spent discussing plan of care with patient and family, including goals of care discussion.

## 2023-03-29 NOTE — DIETITIAN INITIAL EVALUATION ADULT - OTHER INFO
62M hx PSS (scleroderma) on cellcept with pulmonary fibrosis on 8L of 02  24/7, HTN HLD CAD with 4 stents to the RCA 2018 with Dr Griffith. He is on the lung transplant list at [facility] with Dr Manas Priest (transplant pulmonologist). He was hospitalized in Feb at [facility] with a flare of fibrosis and was in the ICU on inhaled pulmonary vasodialtors. He is being teed up for transplant waiting for immunizations/colonoscopy etc. Over the last three weeks he has had a poor appetite and progressive SOB leaving him unable to walk with a walker more than 10 feet without stopping to catch his breath. He  arrived in the ED with severe SOB and hypoxemia placed on BIPAP. Type 1 respiratory failure. Trop and BNP elevation. Admit for acute respiratory failure and pulmonary fibrosis.     Experiencing worsening SOB and intubated upon visit this morning, on lung transplant list. Was on regular diet x 1 day, now NPO on TF. Initiate EN as per RD recommendations. No phos or Mg levels available, K WNL, recommend to monitor lytes. Unable to obtain diet/wt hx 2/2 intubation. Bed scale wt of 143# obtained on 3/29/23 by RD w/ trace edema doc'd, possibly skewing wt. NFPE reveals moderate-severe muscle/fat wasting - pt appears thin, frail, weak, and malnourished. As per goals of care note on 3/28/23, pt wants all measures of treatment and stabilization. See TF recommendations below.  62M hx PSS (scleroderma) on cellcept with pulmonary fibrosis on 8L of 02 24/7, HTN HLD CAD with 4 stents to the RCA 2018 with Dr Griffith. He is on the lung transplant list at [facility] with Dr Manas Priest (transplant pulmonologist). He was hospitalized in Feb at [facility] with a flare of fibrosis and was in the ICU on inhaled pulmonary vasodialtors. He is being teed up for transplant waiting for immunizations/colonoscopy etc. Over the last three weeks he has had a poor appetite and progressive SOB leaving him unable to walk with a walker more than 10 feet without stopping to catch his breath. He  arrived in the ED with severe SOB and hypoxemia placed on BIPAP. Type 1 respiratory failure. Trop and BNP elevation. Admit for acute respiratory failure and pulmonary fibrosis.     Experiencing worsening SOB and intubated upon visit this morning, on lung transplant list. Propofol running @ 6.3 mL/hr (providing 166 kcals/day). Was on regular diet x 1 day, now NPO on TF. TF not running upon second visit this afternoon. Recommend initiating EN as per RD recommendations. No phos or Mg levels available, K WNL, recommend to monitor lytes and replete prn. Unable to obtain diet/wt hx 2/2 intubation. Bed scale wt of 143# obtained on 3/29/23 by RD w/ trace edema doc'd, possibly skewing wt. NFPE reveals moderate-severe muscle/fat wasting - pt appears thin, frail, weak, and malnourished. As per goals of care note on 3/28/23, pt wants all measures of treatment and stabilization. See TF recommendations below.  61 y/o M with a PMHx of PSS (scleroderma) on cellcept with pulmonary fibrosis on 8L of 02 24/7, HTN, HLD, CAD with 4 stents to the RCA 2018 with Dr Griffith. He is on the lung transplant list at [facility] with Dr Manas Priest (transplant pulmonologist). He was hospitalized in Feb at [facility] with a flare of fibrosis and was in the ICU on inhaled pulmonary vasodialtors. He is being teed up for transplant waiting for immunizations/colonoscopy etc. Over the last three weeks he has had a poor appetite and progressive SOB leaving him unable to walk with a walker more than 10 feet without stopping to catch his breath. He arrived in the ED with severe SOB and hypoxemia placed on BIPAP. Type 1 respiratory failure. Trop and BNP elevation. Admit for acute respiratory failure and pulmonary fibrosis.     Experiencing worsening SOB and intubated upon visit this morning, on lung transplant list. Propofol running @ 6.3 mL/hr (providing 166 kcals/day). Was on regular diet x 1 day, now NPO on TF. TF not running upon second visit this afternoon. Recommend initiating EN as per RD recommendations. No phos or Mg levels available, K WNL, recommend to monitor lytes and replete prn. Unable to obtain diet/wt hx 2/2 intubation. Bed scale wt of 143# obtained on 3/29/23 by RD w/ trace edema doc'd, possibly skewing wt. NFPE reveals moderate-severe muscle/fat wasting - pt appears thin, frail, weak, and malnourished. As per goals of care note on 3/28/23, pt wants all measures of treatment and stabilization. See TF recommendations below.

## 2023-03-29 NOTE — DIETITIAN INITIAL EVALUATION ADULT - ENTERAL
1. Initiate Glucerna 1.5 TF via [place] starting @ 20 cc/hr until goal rate of 70 cc/hr met (total volume = 1400 mL). Will provide 2100 kcal, 116 g protein, and 1063 mL free water. Consider free water flush as per MD orders to maintain hydration and adjust PRN.

## 2023-03-29 NOTE — PROGRESS NOTE ADULT - SUBJECTIVE AND OBJECTIVE BOX
Patient ABG showed 7.06/107/89  Patient was given amp of bicarbonate  rate increased , tv increased  patient subsequently became bradycardic  had bradycardic arrest    received 1 epinephrine  1 atropine  received cpr for approximately 3 minutes.  started on Levophed Patient ABG showed 7.06/107/89  Patient was given amp of bicarbonate  rate increased , tv increased  patient subsequently became bradycardic  had bradycardic arrest    received 1 epinephrine  1 atropine  received cpr for approximately 3 minutes.  started on Levophed    I had GOC discussion with wife, still Full code at  this time

## 2023-03-29 NOTE — DIETITIAN INITIAL EVALUATION ADULT - PERTINENT LABORATORY DATA
03-29    142  |  109<H>  |  23  ----------------------------<  124<H>  4.1   |  28  |  1.68<H>    Ca    9.1      29 Mar 2023 06:09    TPro  7.7  /  Alb  3.5  /  TBili  1.1  /  DBili  x   /  AST  31  /  ALT  13  /  AlkPhos  94  03-28    *Elevated Cr 2/2 possible renal failure; slightly elevated glucose 2/2 malnutrition / possible thiamine def. / infection

## 2023-03-29 NOTE — PROCEDURE NOTE - TRACHEAL INTUBATION: NUMBER OF VISUALIZATIONS
1 [place] Counseling: I discussed with the patient the risks of tildrakizumab including but not limited to immunosuppression, malignancy, posterior leukoencephalopathy syndrome, and serious infections.  The patient understands that monitoring is required including a PPD at baseline and must alert us or the primary physician if symptoms of infection or other concerning signs are noted.

## 2023-03-29 NOTE — DIETITIAN INITIAL EVALUATION ADULT - ORAL INTAKE PTA/DIET HISTORY
Unable to obtain diet hx 2/2 difficulty breathing. Has had poor appetite and progressive SOB x last 3 weeks as per H&P.

## 2023-03-29 NOTE — PROGRESS NOTE ADULT - ASSESSMENT
Patient with CAD, Pulmonary Fibrosis, Sarcodosis  was on transplant list, now with worsening hypoxia  possible progression of fibrosis  now intubated, for hypoxic respiratory failure  check cxr  culture sputum  on zosyn and zithromax  start tube feeds  diuresis for pulmonary htn,  awaiting to hear from [facility]

## 2023-03-29 NOTE — PROCEDURE NOTE - ADDITIONAL PROCEDURE DETAILS
Dx/indications: Shock, hemodynamic instability  Details:    Done under dynamic US guidance  Image obtained  No complications
Dx/indications: Shock, need for definitive IV access, vasopressors  Details:    Dynamic US used throughout  1st stick  Image obtained  No complications

## 2023-03-29 NOTE — DIETITIAN INITIAL EVALUATION ADULT - NUTRITIONGOAL OUTCOME1
Pt will be able to meet >/=80% ENN via EN Pt will be able to meet >/=80% ENN via EN for as long as medically necessary

## 2023-03-29 NOTE — DIETITIAN INITIAL EVALUATION ADULT - NS FNS DIET ORDER
Diet, NPO with Tube Feed:   Tube Feeding Modality: Orogastric  Vital 1.5 El (VITAL1.5)  Total Volume for 24 Hours (mL): 1200  Continuous  Starting Tube Feed Rate {mL per Hour}: 25  Until Goal Tube Feed Rate (mL per Hour): 50  Tube Feed Duration (in Hours): 24  Tube Feed Start Time: 10:00 (03-29-23 @ 09:40)

## 2023-03-29 NOTE — DIETITIAN INITIAL EVALUATION ADULT - PHYSCIAL ASSESSMENT
Pt appears thin, frail, weak, and malnourished BMI = 20.5/other (specify) BMI = 20.5/underweight/other (specify)

## 2023-03-29 NOTE — PROGRESS NOTE ADULT - SUBJECTIVE AND OBJECTIVE BOX
Events Overnight:  Patient was on bipap, got more sob this am and required intubation    HPI:    62M hx PSS (scleroderma) on cellcept with pulmonary fibrosis on 8L of 02 24/7, HTN HLD  CAD with 4 stents to the RCA 2018 with Dr Griffith.  has had recent cardiac cath which showed no CAD  He is on the lung transplant list at [facility] with Dr Manas Priest (transplant pulmonologist).  He was hospitalized in Feb at [facility] with a flare of fibrosis and was in the ICU on inhaled pulmonary vasodialtors.    At that time he had a R heart cath and he was told that he has severe pulmonary hypertension.,   He is being teed up for transplant waiting for immunizations/colonoscopy etc.   He was discharged on Tyvaso (treprostinil) but only started using it a few days ago due to expense and approval.     Over the last three weeks he has had a poor appetite and progressive SOB leaving him unable to walk with a walker more than 10 feet without stopping to catch his breath.  He also noted more hypoxemia on his home pulse ox.  He has a cough with clear phlegm.  He  arrived in the ED with severe SOB and hypoxemia placed on BIPAP.  Type 1 respiratory failure   Trop and BNP elevation   CT shows pulmonary fibrosis/bronchiectasis  This am with worsening sob and was inttubated    PMH:        as above     MEDICATIONS  (STANDING):  aspirin  chewable 81 milliGRAM(s) Oral daily  atorvastatin 20 milliGRAM(s) Oral at bedtime  azithromycin  IVPB 500 milliGRAM(s) IV Intermittent every 24 hours  chlorhexidine 0.12% Liquid 15 milliLiter(s) Oral Mucosa every 12 hours  chlorhexidine 4% Liquid 1 Application(s) Topical <User Schedule>  enoxaparin Injectable 40 milliGRAM(s) SubCutaneous every 24 hours  furosemide   Injectable 20 milliGRAM(s) IV Push once  mycophenolate mofetil 1000 milliGRAM(s) Oral two times a day  piperacillin/tazobactam IVPB.. 3.375 Gram(s) IV Intermittent every 8 hours  propofol Infusion 20 MICROgram(s)/kG/Min (8.45 mL/Hr) IV Continuous <Continuous>    ICU Vital Signs Last 24 Hrs  T(C): 37.1 (29 Mar 2023 05:00), Max: 37.3 (29 Mar 2023 01:07)  T(F): 98.8 (29 Mar 2023 05:00), Max: 99.2 (29 Mar 2023 01:07)  HR: 89 (29 Mar 2023 06:09) (81 - 102)  BP: 95/68 (29 Mar 2023 06:00) (95/68 - 135/74)  BP(mean): 74 (29 Mar 2023 06:00) (72 - 88)  ABP: --  ABP(mean): --  RR: 30 (29 Mar 2023 06:00) (30 - 42)  SpO2: 96% (29 Mar 2023 06:09) (90% - 100%)    O2 Parameters below as of 29 Mar 2023 01:00  Patient On (Oxygen Delivery Method): BiPAP/CPAP    Physical Exam    General:  thin frail  Neuro:  awake , alert appropriate prior to intubated, now on propofol  HEENT mucous membranes dry  neck no jvd  lungs bilateral crackles  cv rrr  abdomen soft, non tender  extremities warm  skin no rash    I&O's Summary    28 Mar 2023 07:01  -  29 Mar 2023 07:00  --------------------------------------------------------  IN: 600 mL / OUT: 1125 mL / NET: -525 mL                        10.2   19.47 )-----------( 248      ( 29 Mar 2023 06:09 )             32.0       03-29    142  |  109<H>  |  23  ----------------------------<  124<H>  4.1   |  28  |  1.68<H>    Ca    9.1      29 Mar 2023 06:09    TPro  7.7  /  Alb  3.5  /  TBili  1.1  /  DBili  x   /  AST  31  /  ALT  13  /  AlkPhos  94  03-28      CARDIAC MARKERS ( 28 Mar 2023 06:17 )  x     / x     / 131 U/L / x     / x        DVT Prophylaxis:  Lovenox                                                                Advanced Directives: Full Code

## 2023-03-29 NOTE — PROGRESS NOTE ADULT - ASSESSMENT
Pulmonary Htn  severe Pulmonary HTN  hypotension  with respiratory acidosis  CHRIS    now on levophed  POCUS good lv function now, slightly dilated rv  wife updated  check f/u cultures  bicarbonate drip for respiratory acidosis with follow up abg  [place] updated on situation  will have to defer transfer for now until more stable  will place keny and central line for monitoring

## 2023-03-30 NOTE — CONSULT NOTE ADULT - SUBJECTIVE AND OBJECTIVE BOX
Chief complaints. presented with worsening SOB    HPI:  63 yo man with PMHX of Scleroderma treated with Cellcept and pulmonary fibrosis on home O2 8 L.  Hx of CAD ( stents x 4 to RCA)  Admission at [facility] in Feb due to resp distress and treated in ICU with pulmonary vasodilators.  R cath done with severe pulmonary HTN and was to have evaluation done for TP.  Started on Tyvaso several days ago.  Presented with 3 week hx fo progressive SOB, poor appetite and worsening LACEY walking short distance.  Admitted early 3/28 to CCU and placed on Bipap , given IV lasix,  and Zosyn/Azithromax.  CTA negative for PE.  Was awaiting transfer to [facility].  on 3/29--resp status further deteriorate--Intubated  and started on pressors.  Late yesterday, resuscitated post cardiac arrest.  Now noted with acute worsening of renal function.    PMHX and PSHX.  --Pulmonary fibrosis  --Scleroderma  --CAD ( stent x 4)    Home Medications:   * Patient Currently Takes Medications as of 31-Oct-2018 10:21 documented in Structured Notes  · 	Protonix 40 mg oral delayed release tablet: 1 tab(s) orally once a day   · 	Toprol-XL 25 mg oral tablet, extended release: 1 tab(s) orally once a day   · 	clopidogrel 75 mg oral tablet: 1 tab(s) orally once a day  · 	aspirin 81 mg oral tablet, chewable: 1 tab(s) orally once a day  · 	atorvastatin 40 mg oral tablet: 1 tab(s) orally once a day (at bedtime)  · 	quinapril 10 mg oral tablet: 1 tab(s) orally once a dayFAMILY HISTORY:    SOCIAL HISTORY : Unknown    Allergies :  No Known Allergies    REVIEW OF SYSTEMS:  unable.    MEDICATIONS  (STANDING):  acetaminophen   IVPB .. 1000 milliGRAM(s) IV Intermittent once  aspirin  chewable 81 milliGRAM(s) Oral daily  atorvastatin 20 milliGRAM(s) Oral at bedtime  azithromycin  IVPB 500 milliGRAM(s) IV Intermittent every 24 hours  chlorhexidine 0.12% Liquid 15 milliLiter(s) Oral Mucosa every 12 hours  chlorhexidine 4% Liquid 1 Application(s) Topical <User Schedule>  fentaNYL   Infusion. 0.5 MICROgram(s)/kG/Hr (3.52 mL/Hr) IV Continuous <Continuous>  heparin   Injectable 5000 Unit(s) SubCutaneous every 12 hours  mycophenolate mofetil Suspension 1000 milliGRAM(s) Oral two times a day  norepinephrine Infusion 0.05 MICROgram(s)/kG/Min (3.3 mL/Hr) IV Continuous <Continuous>  piperacillin/tazobactam IVPB.. 3.375 Gram(s) IV Intermittent every 8 hours  propofol Infusion 20 MICROgram(s)/kG/Min (8.45 mL/Hr) IV Continuous <Continuous>  sodium zirconium cyclosilicate 10 Gram(s) Oral once  vasopressin Infusion 0.04 Unit(s)/Min (6 mL/Hr) IV Continuous <Continuous>    MEDICATIONS  (PRN):      Vital Signs Last 24 Hrs  T(C): 37.8 (30 Mar 2023 10:45), Max: 38.8 (29 Mar 2023 23:00)  T(F): 100 (30 Mar 2023 10:45), Max: 101.8 (29 Mar 2023 23:00)  HR: 92 (30 Mar 2023 10:45) (79 - 123)  BP: 83/55 (29 Mar 2023 16:00) (76/49 - 125/86)  BP(mean): 62 (29 Mar 2023 16:00) (56 - 92)  RR: 28 (29 Mar 2023 16:00) (28 - 28)  SpO2: 97% (29 Mar 2023 22:05) (97% - 97%)      Daily Weight in k (30 Mar 2023 04:00)  I&O's Summary    29 Mar 2023 07:01  -  30 Mar 2023 07:00  --------------------------------------------------------  IN: 3301.4 mL / OUT: 60 mL / NET: 3241.4 mL    PHYSICAL EXAM:  GEN: on vent   HEENT: on vent  NECK : supple  CV: S1S2 RRR  LUNGS: decreased bs  ABD: soft  EXT: trace edema    LABS:                        11.4   21.58 )-----------( 260      ( 30 Mar 2023 06:13 )             37.6     03-30    140  |  106  |  41<H>  ----------------------------<  162<H>  6.4<HH>   |  26  |  4.86<H>    Ca    7.0<L>      30 Mar 2023 06:13            ABG - ( 30 Mar 2023 06:20 )  pH, Arterial: 7.29  pH, Blood: x     /  pCO2: 56    /  pO2: 325   / HCO3: 27    / Base Excess: -0.7  /  SaO2: 100

## 2023-03-30 NOTE — PROGRESS NOTE ADULT - SUBJECTIVE AND OBJECTIVE BOX
CC:  Patient is a 62y old  Male who presents with a chief complaint of SOB hypoxemia (29 Mar 2023 18:06)      HPI/BRIEF HOSPITAL COURSE:   62M hx PSS (scleroderma) on cellcept with pulmonary fibrosis on 8L of 02 24/7, HTN HLD  CAD with 4 stents to the RCA 2018 with Dr Griffith.    He is on the lung transplant list at [facility] with Dr Manas Priest (transplant pulmonologist).    He was hospitalized in Feb at [facility] with a flare of fibrosis and was in the ICU on inhaled pulmonary vasodialtors.    At that time he had a R heart cath and he was told that he has severe pulmonary hypertension.,   He is being teed up for transplant waiting for immunizations/colonoscopy etc.   He was discharged on Tyvaso (treprostinil) but only started using it a few days ago due to expense and approval.       Over the last three weeks he has had a poor appetite and progressive SOB leaving him unable to walk with a walker more than 10 feet without stopping to catch his breath.  He also noted more hypoxemia on his home pulse ox.  He has a cough with clear phlegm.    He  arrived in the ED with severe SOB and hypoxemia placed on BIPAP.  Type 1 respiratory failure   Trop and BNP elevation     3/29: Intubated on pressors. Ian-arrested receiving atropine and epi x 1, CPR alsted 3 minutes before ROSC.     Events last 24 hours:   Now i dual pressor distributive shock on levo/vaso. Continued to breath stack in setting of high peak pressures, increased propofol and added fentnyl gtt for vent compliance. Repeat ABG showed pH 7.22 with Pa Co2 clearing to 70, remains on bicarb gtt to help with exaggerated metabolic compensation, will allow permissve hypercapnia in setting of high airway pressures.      PAST MEDICAL & SURGICAL HISTORY:  HTN (hypertension)      Pulmonary fibrosis      Scleroderma        Allergies    No Known Allergies    Intolerances      FAMILY HISTORY:      Review of Systems:  Negative 2/2 intubated/sedated.       Medications:  azithromycin  IVPB 500 milliGRAM(s) IV Intermittent every 24 hours  piperacillin/tazobactam IVPB.. 3.375 Gram(s) IV Intermittent every 8 hours    norepinephrine Infusion 0.05 MICROgram(s)/kG/Min IV Continuous <Continuous>      acetaminophen   IVPB .. 1000 milliGRAM(s) IV Intermittent once  fentaNYL   Infusion. 0.5 MICROgram(s)/kG/Hr IV Continuous <Continuous>  propofol Infusion 20 MICROgram(s)/kG/Min IV Continuous <Continuous>      aspirin  chewable 81 milliGRAM(s) Oral daily  enoxaparin Injectable 40 milliGRAM(s) SubCutaneous every 24 hours        atorvastatin 20 milliGRAM(s) Oral at bedtime  vasopressin Infusion 0.04 Unit(s)/Min IV Continuous <Continuous>    sodium bicarbonate  Infusion 0.085 mEq/kG/Hr IV Continuous <Continuous>    mycophenolate mofetil Suspension 1000 milliGRAM(s) Oral two times a day    chlorhexidine 0.12% Liquid 15 milliLiter(s) Oral Mucosa every 12 hours  chlorhexidine 4% Liquid 1 Application(s) Topical <User Schedule>        Mode: AC/ CMV (Assist Control/ Continuous Mandatory Ventilation)  RR (machine): 25  TV (machine): 470  FiO2: 100  PEEP: 5  PIP: 28      ICU Vital Signs Last 24 Hrs  T(C): 38.4 (30 Mar 2023 03:00), Max: 38.8 (29 Mar 2023 23:00)  T(F): 101.1 (30 Mar 2023 03:00), Max: 101.8 (29 Mar 2023 23:00)  HR: 99 (30 Mar 2023 03:00) (83 - 135)  BP: 83/55 (29 Mar 2023 16:00) (76/49 - 163/108)  BP(mean): 62 (29 Mar 2023 16:00) (56 - 124)  ABP: 147/83 (30 Mar 2023 03:00) (99/70 - 147/83)  ABP(mean): 108 (30 Mar 2023 03:00) (78 - 108)  RR: 28 (29 Mar 2023 16:00) (28 - 34)  SpO2: 97% (29 Mar 2023 22:05) (85% - 98%)      Vital Signs Last 24 Hrs  T(C): 38.4 (30 Mar 2023 03:00), Max: 38.8 (29 Mar 2023 23:00)  T(F): 101.1 (30 Mar 2023 03:00), Max: 101.8 (29 Mar 2023 23:00)  HR: 99 (30 Mar 2023 03:00) (83 - 135)  BP: 83/55 (29 Mar 2023 16:00) (76/49 - 163/108)  BP(mean): 62 (29 Mar 2023 16:00) (56 - 124)  RR: 28 (29 Mar 2023 16:00) (28 - 34)  SpO2: 97% (29 Mar 2023 22:05) (85% - 98%)        ABG - ( 30 Mar 2023 00:03 )  pH, Arterial: 7.22  pH, Blood: x     /  pCO2: 64    /  pO2: 217   / HCO3: 26    / Base Excess: -2.8  /  SaO2: 100                 I&O's Detail    28 Mar 2023 07:01  -  29 Mar 2023 07:00  --------------------------------------------------------  IN:    IV PiggyBack: 550 mL    Oral Fluid: 50 mL  Total IN: 600 mL    OUT:    Voided (mL): 1125 mL  Total OUT: 1125 mL    Total NET: -525 mL            LABS:                        10.2   19.47 )-----------( 248      ( 29 Mar 2023 06:09 )             32.0     03-29    142  |  109<H>  |  23  ----------------------------<  124<H>  4.1   |  28  |  1.68<H>    Ca    9.1      29 Mar 2023 06:09    TPro  7.7  /  Alb  3.5  /  TBili  1.1  /  DBili  x   /  AST  31  /  ALT  13  /  AlkPhos  94  03-28      CARDIAC MARKERS ( 28 Mar 2023 06:17 )  x     / x     / 131 U/L / x     / x          CAPILLARY BLOOD GLUCOSE            CULTURES:  Culture Results:   No growth to date. (03-28 @ 00:10)  Culture Results:   No growth to date. (03-28 @ 00:10)    azithromycin  IVPB 500 milliGRAM(s) IV Intermittent every 24 hours  piperacillin/tazobactam IVPB.. 3.375 Gram(s) IV Intermittent every 8 hours      Physical Examination:  General: Intubated/ sedated  NEURO: Intubated/sedated. + gag reflex.   HEENT: Pupils equal, reactive to light.  Symmetric.  PULM: CTA BL, no significant sputum production, no wheezes, rales, rhonchi  CVS: Regular rate and rhythm, no murmurs, rubs, or gallops  ABD: Soft, nondistended, nontender, normoactive bowel sounds, no masses  EXT: No edema, nontender  SKIN: Warm and well perfused, no rashes noted      RADIOLOGY:   < from: CT Angio Chest PE Protocol w/ IV Cont (03.28.23 @ 00:21) >    IMPRESSION:  No pulmonary embolism.  Chronic interstitial disease with worsened bronchiectasis.  New mediastinal and hilar adenopathy, which is indeterminate but could be   related to scleroderma and interstitial disease. Continued follow-up is   recommended.      < end of copied text >

## 2023-03-30 NOTE — PROGRESS NOTE ADULT - SUBJECTIVE AND OBJECTIVE BOX
Events Overnight:  Patient on 0.7 of Levophed, vasopressin 0.04, pco2 bettter on blood gas but worsening renal failure    HPI:  62M hx PSS (scleroderma) on cellcept with pulmonary fibrosis on 8L of 02 24/7, HTN HLD  CAD with 4 stents to the RCA 2018 with Dr Griffith.  He is on the lung transplant list at [facility] with Dr Manas Priest (transplant pulmonologist).  He was hospitalized in Feb at [facility] with a flare of fibrosis and was in the ICU on inhaled pulmonary vasodialtors.    At that time he had a R heart cath and he was told that he has severe pulmonary hypertension.,   He is being teed up for transplant waiting for immunizations/colonoscopy etc.  He was discharged on Tyvaso (treprostinil) but only started using it a few days ago due to expense and approval.     Over the last three weeks he has had a poor appetite and progressive SOB leaving him unable to walk with a walker more than 10 feet without stopping to catch his breath.    He arrived in the ED with severe SOB and hypoxemia placed on BIPAP.  Type 1 respiratory failure   Trop and BNP elevation  (28 Mar 2023 02:01)  3/29 am was intubated, had approximately 5 minute cardiac arrest/ now on pressors and developing worsening renal renal failure  with Hyperkalemia.     PMH:        as above     MEDICATIONS  (STANDING):  acetaminophen   IVPB .. 1000 milliGRAM(s) IV Intermittent once  aspirin  chewable 81 milliGRAM(s) Oral daily  atorvastatin 20 milliGRAM(s) Oral at bedtime  azithromycin  IVPB 500 milliGRAM(s) IV Intermittent every 24 hours  chlorhexidine 0.12% Liquid 15 milliLiter(s) Oral Mucosa every 12 hours  chlorhexidine 4% Liquid 1 Application(s) Topical <User Schedule>  enoxaparin Injectable 40 milliGRAM(s) SubCutaneous every 24 hours  fentaNYL   Infusion. 0.5 MICROgram(s)/kG/Hr (3.52 mL/Hr) IV Continuous <Continuous>  mycophenolate mofetil Suspension 1000 milliGRAM(s) Oral two times a day  norepinephrine Infusion 0.05 MICROgram(s)/kG/Min (3.3 mL/Hr) IV Continuous <Continuous>  piperacillin/tazobactam IVPB.. 3.375 Gram(s) IV Intermittent every 8 hours  propofol Infusion 20 MICROgram(s)/kG/Min (8.45 mL/Hr) IV Continuous <Continuous>  sodium zirconium cyclosilicate 10 Gram(s) Oral once  vasopressin Infusion 0.04 Unit(s)/Min (6 mL/Hr) IV Continuous <Continuous>    ICU Vital Signs Last 24 Hrs  T(C): 37.7 (30 Mar 2023 09:30), Max: 38.8 (29 Mar 2023 23:00)  T(F): 99.9 (30 Mar 2023 09:30), Max: 101.8 (29 Mar 2023 23:00)  HR: 82 (30 Mar 2023 09:30) (79 - 132)  BP: 83/55 (29 Mar 2023 16:00) (76/49 - 130/87)  BP(mean): 62 (29 Mar 2023 16:00) (56 - 97)  ABP: 125/70 (30 Mar 2023 09:30) (99/70 - 147/83)  ABP(mean): 92 (30 Mar 2023 09:30) (78 - 108)  RR: 28 (29 Mar 2023 16:00) (28 - 28)  SpO2: 97% (29 Mar 2023 22:05) (97% - 97%)    Mode: AC/ CMV (Assist Control/ Continuous Mandatory Ventilation)  RR (machine): 24  TV (machine): 450  FiO2: 80  PEEP: 5  PIP: 30    Physical Exam    General - ill  HEENT - orally intubated  Neuro sedated, minimally responsive  lungs bilateral rhonchi, sputum culture few pms  cv rrr  abdomen soft, non tender  extremities warm  skin no rash    I&O's Summary    29 Mar 2023 07:01  -  30 Mar 2023 07:00  --------------------------------------------------------  IN: 3301.4 mL / OUT: 60 mL / NET: 3241.4 mL                      11.4   21.58 )-----------( 260      ( 30 Mar 2023 06:13 )             37.6     03-30    140  |  106  |  41<H>  ----------------------------<  162<H>  6.4<HH>   |  26  |  4.86<H>    Ca    7.0<L>      30 Mar 2023 06:13    ABG - ( 30 Mar 2023 06:20 )  pH, Arterial: 7.29  pH, Blood: x     /  pCO2: 56    /  pO2: 325   / HCO3: 27    / Base Excess: -0.7  /  SaO2: 100       DVT Prophylaxis:    change lovenox to hepatin subq                                                           Advanced Directives: Full Code

## 2023-03-30 NOTE — PROGRESS NOTE ADULT - SUBJECTIVE AND OBJECTIVE BOX
REASON FOR VISIT: S/p cardiac arrest    HPI:  62 year old man with a history of scleroderma, pulmonary fibrosis with chronic respiratory failure (supplemental O2 dependent; listed on lung-transplant list at [facility]), severe pulmonary hypertension, CAD s/p RCA stents (2018), HTN, HLD admitted with acute/chronic respiratory failure (required mechanical ventilation) with hospitalization complicated by cardiac arrest preceded by severe bradycardia (3/29/23).    3/29/23: Pt intubated and sedated.  3/30/23:  Events past 24 hours noted and discussed with CCU team; patient is sedated on vent.    MEDICATIONS  (STANDING):  acetaminophen   IVPB .. 1000 milliGRAM(s) IV Intermittent once  aspirin  chewable 81 milliGRAM(s) Oral daily  atorvastatin 20 milliGRAM(s) Oral at bedtime  azithromycin  IVPB 500 milliGRAM(s) IV Intermittent every 24 hours  chlorhexidine 0.12% Liquid 15 milliLiter(s) Oral Mucosa every 12 hours  chlorhexidine 4% Liquid 1 Application(s) Topical <User Schedule>  enoxaparin Injectable 40 milliGRAM(s) SubCutaneous every 24 hours  fentaNYL   Infusion. 0.5 MICROgram(s)/kG/Hr (3.52 mL/Hr) IV Continuous <Continuous>  mycophenolate mofetil Suspension 1000 milliGRAM(s) Oral two times a day  norepinephrine Infusion 0.05 MICROgram(s)/kG/Min (3.3 mL/Hr) IV Continuous <Continuous>  piperacillin/tazobactam IVPB.. 3.375 Gram(s) IV Intermittent every 8 hours  propofol Infusion 20 MICROgram(s)/kG/Min (8.45 mL/Hr) IV Continuous <Continuous>  vasopressin Infusion 0.04 Unit(s)/Min (6 mL/Hr) IV Continuous <Continuous>    Vital Signs Last 24 Hrs  T(C): 37.6 (30 Mar 2023 08:00), Max: 38.8 (29 Mar 2023 23:00)  T(F): 99.7 (30 Mar 2023 08:00), Max: 101.8 (29 Mar 2023 23:00)  HR: 79 (30 Mar 2023 08:00) (79 - 135)  BP: 83/55 (29 Mar 2023 16:00) (76/49 - 155/113)  BP(mean): 62 (29 Mar 2023 16:00) (56 - 124)  RR: 28 (29 Mar 2023 16:00) (28 - 28)  SpO2: 97% (29 Mar 2023 22:05) (97% - 97%)    PHYSICAL EXAM:  Constitutional: Sedated on vent; semirecumbent in bed, cooling blanket  Respiratory: ETT to vent, coarse breath sounds  Cardiovascular: S1 and S2, regular rate and rhythm  Gastrointestinal: Bowel Sounds present, soft, nontender.   Skin: Cool, dry    LABS:               11.4   21.58 )-----------( 260      ( 30 Mar 2023 06:13 )             37.6     140  |  106  |  41<H>  ----------------------------<  162<H>  6.4<HH>   |  26  |  4.86<H>    Ca    7.0<L>      30 Mar 2023 06:13    TroponinI hsT: 477.06, 441.04    TTE Echo Complete w/o Contrast w/ Doppler (03.09.21 @ 11:44):   Trace mitral regurgitation is present.   Mild aortic sclerosis is present with normal valvular opening.   Normal appearing tricuspid valve structure and function.   Mild (1+) tricuspid valve regurgitation is present.   Mild pulmonic valvular regurgitation (1+) is present.   Normal appearing left atrium.   The left ventricle is normal in size, wall motion and contractility. Mild concentric left ventricular hypertrophy.  Estimated left ventricular ejection fraction is 60-65 %.    Tele: SR

## 2023-03-30 NOTE — PROGRESS NOTE ADULT - PROBLEM SELECTOR PLAN 2
Resuscitated cardiac arrest - preceded by bradycardic and likely related to respiratory failure; now hypotensive and requiring vasopressors.  Currently in SR with normal rate.  Continue supportive care - currently on hypothermia protocol.    * Case discussed with Dr Watson.

## 2023-03-30 NOTE — PROGRESS NOTE ADULT - ASSESSMENT
Assessment/Plan  62M hx PSS (scleroderma) on cellcept with pulmonary fibrosis on 8L of 02 24/7, HTN HLD  CAD with 4 stents to the RCA remains in CCU for active treatment of   1. Shock likely distributive   2. Acute on chonric hypoxic/ hypercapnic RF 2/2  3. Pulmonary fibrosis  4. Cardiac arrest  5. CHRIS  6. Cannot exclude PNA    Neuro: Intubated. Breath stacking overnight increased propofol and added fentanyl gtt for better vent compliance. S/p cardiac arrest, was having purposeful movements after arrest. Avoid fevers with cooling blanket and ofirmev.   Cardio; Shock likley distributive on levo and vaso activley titrating to maintain MAP > 65. S/p bradycardic arrest 2/2 acidosis? Bedside pocus shows   Pulm: Intubated, actively titrating Fio2 to maintain Spo2 > 90 %. Acute on cornic hypercapnic RF with intial pH of 7.06, incrased rate and tidal volume, imrpvoemetn with pH 7.2 and Pa Co2 70, will allow permissive hypercapnia in setting of high airway pressures. Pulmonary fibrosis XF to [place] for possible lung transplant now on hold 2/2 instability.   GI: NPO   Renal: CHRIS worsneing likely ATN in setting of shock vs ischemic?. I/O's. Monitor uop > .5 cc/kg/hr. Trend lytes and replete as needed. Avoid nephrotoxic agents.  Heme: Transition to hep sub Q for dvt prophylaxis in setting of CHRIS  ID: C/w zosyn and azitrho for possible PNA coverage. F/u cx. WBC increasing, possible septic component to dsitributive shock   Endo; No active issues.   Dispo; Critically ill reamins in ICU for acute hypoxic RF 2/2 pulm fibrosis with course complicated by cardiac arrest and CHRIS.         Critical Care time: 46 mins assessing presenting problems of acute illness that poses high probability of life threatening deterioration or end organ damage/dysfunction.  Medical decision making including Initiating plan of care, reviewing data, reviewing radiology, direct patient bedside evaluation and interpretation of vital signs, any necessary ventilator management , discussion with multidisciplinary team, discussing goals of care with patient/family, all non inclusive of procedures

## 2023-03-30 NOTE — CONSULT NOTE ADULT - ASSESSMENT
61 yo man with scleroderma, pulmonary fibrosis /severe pulmonary HTN admitted with progressive resp failure.  Now intubated and on pressors due to shock and now post cardiac arrest, septic vs cardiogenic.  --CHRIS post CTA ( unclear hx of CKD, creat 1.47 on admission and on ACEI) and shock. oliguric.    No immediate need for dialysis.    Check repeat labs for K and acidosis.    Continue pressor/vent support    Continue abtx.    D/w Dr Watson.

## 2023-03-30 NOTE — PROGRESS NOTE ADULT - ASSESSMENT
Patient with CAD, Pulmonary Fibrosis, Sarcodosis  was on transplant list, now with worsening hypoxia  likely progression of fibrosis  now intubated, for hypoxic respiratory failure  culture sputum no growth to date on zosyn and zithromax  shock, s/p arrest, titrate down levophed,   start tube feeds  now acute renal failure, with hyperkalemia, may need to consider dialysis  would not be able to transfer at this time

## 2023-03-31 NOTE — PROGRESS NOTE ADULT - SUBJECTIVE AND OBJECTIVE BOX
CC: respiratory failure    BRIEF HOSPITAL COURSE: 62M with pmxh scleroderma on cellcept with pulmonary fibrosis (on 8-10L home O2) awaiting lung txp, severe pHTN came in for three weeks of worsening SOB, increasing O2 reqs/hypoxemia and severe LACEY. He was recently admitted in feb at [facility] where he was treated with an IPF flare and was on inhaled pulmonary vasodilators, dc'ed on treprostinil. Admitted to ICU for treatment of acute on chronic hypoxic respiratory failure, failed bipap and required intubation. CTA neg PE. ICU course c/b bradycardic arrest, rosc achieved quickly after a round of cpr and atropine and epi x1. Further complicated by dual pressor shock state and oliguric CHRIS.     Events last 24 hours: Remans in dual-vasopressor dependent shock (norepi 0.8/fixed dose vaso). Intubated, AC 18/420/30/5 and sedated on prop. O2 decreased today from 100 to 40%.     ROS:   Due to altered mental status/intubation, subjective information was not able to be obtained from the patient. History was obtained, to the extent possible, from review of the chart and collateral sources of information.    PAST MEDICAL & SURGICAL HISTORY:  HTN (hypertension)  Pulmonary fibrosis  Scleroderma    Medications:  azithromycin  IVPB 500 milliGRAM(s) IV Intermittent every 24 hours  piperacillin/tazobactam IVPB.. 3.375 Gram(s) IV Intermittent every 12 hours  trimethoprim / sulfamethoxazole IVPB 132 milliGRAM(s) IV Intermittent every 6 hours    norepinephrine Infusion 0.05 MICROgram(s)/kG/Min IV Continuous <Continuous>      fentaNYL   Infusion. 0.5 MICROgram(s)/kG/Hr IV Continuous <Continuous>  propofol Infusion 20 MICROgram(s)/kG/Min IV Continuous <Continuous>      aspirin  chewable 81 milliGRAM(s) Oral daily  heparin   Injectable 5000 Unit(s) SubCutaneous every 12 hours        atorvastatin 20 milliGRAM(s) Oral at bedtime  vasopressin Infusion 0.04 Unit(s)/Min IV Continuous <Continuous>      mycophenolate mofetil Suspension 1000 milliGRAM(s) Oral two times a day    chlorhexidine 0.12% Liquid 15 milliLiter(s) Oral Mucosa every 12 hours  chlorhexidine 4% Liquid 1 Application(s) Topical <User Schedule>        Mode: AC/ CMV (Assist Control/ Continuous Mandatory Ventilation)  RR (machine): 24  TV (machine): 450  FiO2: 35  PEEP: 5  ITime: 0.8  MAP: 13  PIP: 28      ICU Vital Signs Last 24 Hrs  T(C): 37.7 (31 Mar 2023 00:00), Max: 38.8 (30 Mar 2023 01:00)  T(F): 99.9 (31 Mar 2023 00:00), Max: 101.8 (30 Mar 2023 01:00)  HR: 93 (31 Mar 2023 00:00) (79 - 108)  BP: --  BP(mean): --  ABP: 120/75 (31 Mar 2023 00:00) (69/49 - 147/83)  ABP(mean): 95 (31 Mar 2023 00:00) (58 - 108)  RR: --  SpO2: 98% (30 Mar 2023 23:24) (98% - 100%)    Physical Examination:    General: NAD   HEENT: Pupils equal, reactive to light. 2mm. Symmetric.  PULM: Clear to auscultation bilaterally, intubated   CVS: Regular rate and rhythm, no murmurs, rubs, or gallops  ABD: Soft, nondistended, nontender, normoactive bowel sounds, no masses  EXT: No edema, nontender  SKIN: Warm and well perfused  NEURO: sedated    ABG - ( 30 Mar 2023 06:20 )  pH, Arterial: 7.29  pH, Blood: x     /  pCO2: 56    /  pO2: 325   / HCO3: 27    / Base Excess: -0.7  /  SaO2: 100       I&O's Detail    29 Mar 2023 07:01  -  30 Mar 2023 07:00  --------------------------------------------------------  IN:    FentaNYL: 119.1 mL    IV PiggyBack: 200 mL    Norepinephrine: 1720.2 mL    Propofol: 231.9 mL    Sodium Bicarbonate: 966.7 mL    Vasopressin: 63.6 mL  Total IN: 3301.4 mL    OUT:    Indwelling Catheter - Urethral (mL): 60 mL  Total OUT: 60 mL    Total NET: 3241.4 mL      30 Mar 2023 07:01  -  31 Mar 2023 00:42  --------------------------------------------------------  IN:    FentaNYL: 314.6 mL    IV PiggyBack: 66 mL    Norepinephrine: 580.8 mL    Propofol: 92.4 mL  Total IN: 1053.8 mL    OUT:  Total OUT: 0 mL    Total NET: 1053.8 mL          LABS:                        11.4   21.58 )-----------( 260      ( 30 Mar 2023 06:13 )             37.6     03-30    138  |  104  |  47<H>  ----------------------------<  127<H>  5.7<H>   |  27  |  5.55<H>    Ca    7.2<L>      30 Mar 2023 14:15        CARDIAC MARKERS ( 30 Mar 2023 14:15 )  x     / x     / 258 U/L / x     / x          CAPILLARY BLOOD GLUCOSE      CULTURES:  Culture Results:   No growth to date. (03-29 @ 16:59)  Culture Results:   No growth to date. (03-28 @ 00:10)  Culture Results:   No growth to date. (03-28 @ 00:10)  Rapid RVP Result: NotDetec (03-27 @ 23:00)    RADIOLOGY: < from: CT Angio Chest PE Protocol w/ IV Cont (03.28.23 @ 00:21) >    IMPRESSION:  No pulmonary embolism.  Chronic interstitial disease with worsened bronchiectasis.  New mediastinal and hilar adenopathy, which is indeterminate but could be   related to scleroderma and interstitial disease. Continued follow-up is   recommended.    < end of copied text >      INVASIVE LINES: L IJ, right axillary art line   INDWELLING JETER: Y   VTE PROPHYLAXIS: heparin sq  CODE STATUS: full       CRITICAL CARE TIME SPENT: 55 minutes spent performing frequent bedside reassessments and augmenting plan of care to address problems of acute critical illness that pose high probability of life threatening deterioration and/or end organ damage/dysfunction and discussing goals of care, non-inclusive of time spent on procedures performed. CC: respiratory failure    BRIEF HOSPITAL COURSE: 62M with pmxh scleroderma on cellcept with pulmonary fibrosis (on 8-10L home O2) awaiting lung txp, severe pHTN came in for three weeks of worsening SOB, increasing O2 reqs/hypoxemia and severe LACEY. He was recently admitted in feb at [facility] where he was treated with an IPF flare and was on inhaled pulmonary vasodilators, dc'ed on treprostinil. Admitted to ICU for treatment of acute on chronic hypoxic respiratory failure, failed bipap and required intubation. CTA neg PE. ICU course c/b bradycardic arrest, rosc achieved quickly after a round of cpr and atropine and epi x1. Further complicated by dual pressor shock state and oliguric CHRIS.     Events last 24 hours: Remans in dual-vasopressor dependent shock (norepi 0.8/fixed dose vaso). Intubated, AC 18/420/30/5 and sedated on prop. O2 decreased today from 100 to 40%.     ROS:   Due to altered mental status/intubation, subjective information was not able to be obtained from the patient. History was obtained, to the extent possible, from review of the chart and collateral sources of information.    PAST MEDICAL & SURGICAL HISTORY:  HTN (hypertension)  Pulmonary fibrosis  Scleroderma    Medications:  azithromycin  IVPB 500 milliGRAM(s) IV Intermittent every 24 hours  piperacillin/tazobactam IVPB.. 3.375 Gram(s) IV Intermittent every 12 hours  trimethoprim / sulfamethoxazole IVPB 132 milliGRAM(s) IV Intermittent every 6 hours    norepinephrine Infusion 0.05 MICROgram(s)/kG/Min IV Continuous <Continuous>      fentaNYL   Infusion. 0.5 MICROgram(s)/kG/Hr IV Continuous <Continuous>  propofol Infusion 20 MICROgram(s)/kG/Min IV Continuous <Continuous>      aspirin  chewable 81 milliGRAM(s) Oral daily  heparin   Injectable 5000 Unit(s) SubCutaneous every 12 hours        atorvastatin 20 milliGRAM(s) Oral at bedtime  vasopressin Infusion 0.04 Unit(s)/Min IV Continuous <Continuous>      mycophenolate mofetil Suspension 1000 milliGRAM(s) Oral two times a day    chlorhexidine 0.12% Liquid 15 milliLiter(s) Oral Mucosa every 12 hours  chlorhexidine 4% Liquid 1 Application(s) Topical <User Schedule>        Mode: AC/ CMV (Assist Control/ Continuous Mandatory Ventilation)  RR (machine): 24  TV (machine): 450  FiO2: 35  PEEP: 5  ITime: 0.8  MAP: 13  PIP: 28      ICU Vital Signs Last 24 Hrs  T(C): 37.7 (31 Mar 2023 00:00), Max: 38.8 (30 Mar 2023 01:00)  T(F): 99.9 (31 Mar 2023 00:00), Max: 101.8 (30 Mar 2023 01:00)  HR: 93 (31 Mar 2023 00:00) (79 - 108)  BP: --  BP(mean): --  ABP: 120/75 (31 Mar 2023 00:00) (69/49 - 147/83)  ABP(mean): 95 (31 Mar 2023 00:00) (58 - 108)  RR: --  SpO2: 98% (30 Mar 2023 23:24) (98% - 100%)    Physical Examination:    General: NAD   HEENT: Pupils equal, reactive to light. 2mm. Symmetric.  PULM: Clear to auscultation bilaterally, intubated   CVS: Regular rate and rhythm, no murmurs, rubs, or gallops  ABD: Soft, nondistended, nontender, normoactive bowel sounds, no masses  EXT: No edema, nontender  SKIN: Warm and well perfused  NEURO: sedated    ABG - ( 30 Mar 2023 06:20 )  pH, Arterial: 7.29  pH, Blood: x     /  pCO2: 56    /  pO2: 325   / HCO3: 27    / Base Excess: -0.7  /  SaO2: 100       I&O's Detail    29 Mar 2023 07:01  -  30 Mar 2023 07:00  --------------------------------------------------------  IN:    FentaNYL: 119.1 mL    IV PiggyBack: 200 mL    Norepinephrine: 1720.2 mL    Propofol: 231.9 mL    Sodium Bicarbonate: 966.7 mL    Vasopressin: 63.6 mL  Total IN: 3301.4 mL    OUT:    Indwelling Catheter - Urethral (mL): 60 mL  Total OUT: 60 mL    Total NET: 3241.4 mL      30 Mar 2023 07:01  -  31 Mar 2023 00:42  --------------------------------------------------------  IN:    FentaNYL: 314.6 mL    IV PiggyBack: 66 mL    Norepinephrine: 580.8 mL    Propofol: 92.4 mL  Total IN: 1053.8 mL    OUT:  Total OUT: 0 mL    Total NET: 1053.8 mL          LABS:                        11.4   21.58 )-----------( 260      ( 30 Mar 2023 06:13 )             37.6     03-30    138  |  104  |  47<H>  ----------------------------<  127<H>  5.7<H>   |  27  |  5.55<H>    Ca    7.2<L>      30 Mar 2023 14:15        CARDIAC MARKERS ( 30 Mar 2023 14:15 )  x     / x     / 258 U/L / x     / x          CAPILLARY BLOOD GLUCOSE      CULTURES:  Culture Results:   No growth to date. (03-29 @ 16:59)  Culture Results:   No growth to date. (03-28 @ 00:10)  Culture Results:   No growth to date. (03-28 @ 00:10)  Rapid RVP Result: NotDetec (03-27 @ 23:00)    RADIOLOGY: < from: CT Angio Chest PE Protocol w/ IV Cont (03.28.23 @ 00:21) >    IMPRESSION:  No pulmonary embolism.  Chronic interstitial disease with worsened bronchiectasis.  New mediastinal and hilar adenopathy, which is indeterminate but could be   related to scleroderma and interstitial disease. Continued follow-up is   recommended.    < end of copied text >      INVASIVE LINES: L IJ, right axillary art line   INDWELLING JEETR: Y   VTE PROPHYLAXIS: heparin sq  CODE STATUS: full       CRITICAL CARE TIME SPENT: 55 minutes spent performing frequent bedside reassessments and augmenting plan of care to address problems of acute critical illness that pose high probability of life threatening deterioration and/or end organ damage/dysfunction and discussing goals of care, non-inclusive of time spent on procedures performed.

## 2023-03-31 NOTE — PROGRESS NOTE ADULT - ASSESSMENT
61 yo man with scleroderma, pulmonary fibrosis /severe pulmonary HTN admitted with progressive resp failure.  Now intubated and on pressors due to shock and now post cardiac arrest, septic vs cardiogenic.  --CHRIS post CTA ( unclear hx of CKD, creat 1.47 on admission and on ACEI) and shock. oliguric.    No immediate need for dialysis.    Check repeat labs for K and acidosis.    Continue pressor/vent support    Continue abtx.    D/w Dr Watson.    3/31 SY  --CHRIS post cardiac arrest :  Continues to worsen with Oliguria     No immediate need for dialysis.    Will assess again in am    D/w Dr Watson re : poor pulmonary prognosis.     Quality 110: Preventive Care And Screening: Influenza Immunization: Influenza Immunization Administered during Influenza season Detail Level: Detailed

## 2023-03-31 NOTE — PROGRESS NOTE ADULT - SUBJECTIVE AND OBJECTIVE BOX
PCP:    REQUESTING PHYSICIAN:    REASON FOR CONSULT:    CHIEF COMPLAINT:    HPI:  HPI:  62 year old man with a history of scleroderma, pulmonary fibrosis with chronic respiratory failure (supplemental O2 dependent; listed on lung-transplant list at [place]), severe pulmonary hypertension, CAD s/p RCA stents (2018), HTN, HLD admitted with acute/chronic respiratory failure (required mechanical ventilation) with hospitalization complicated by cardiac arrest preceded by severe bradycardia (3/29/23).    3/29/23: Pt intubated and sedated.  3/30/23:  Events past 24 hours noted and discussed with CCU team; patient is sedated on vent.  3/31/23: Pt sedated on pressors. Family at bedside    PAST MEDICAL & SURGICAL HISTORY:  HTN (hypertension)      Pulmonary fibrosis      Scleroderma          SOCIAL HISTORY:    FAMILY HISTORY:      ALLERGIES:  Allergies    No Known Allergies    Intolerances        MEDICATIONS:    MEDICATIONS  (STANDING):  artificial tears (preservative free) Ophthalmic Solution 1 Drop(s) Both EYES four times a day  aspirin  chewable 81 milliGRAM(s) Oral daily  atorvastatin 20 milliGRAM(s) Oral at bedtime  atovaquone  Suspension 750 milliGRAM(s) Oral every 12 hours  azithromycin  IVPB 500 milliGRAM(s) IV Intermittent every 24 hours  caspofungin IVPB 50 milliGRAM(s) IV Intermittent every 24 hours  chlorhexidine 0.12% Liquid 15 milliLiter(s) Oral Mucosa every 12 hours  chlorhexidine 4% Liquid 1 Application(s) Topical <User Schedule>  fentaNYL   Infusion. 0.5 MICROgram(s)/kG/Hr (3.52 mL/Hr) IV Continuous <Continuous>  heparin   Injectable 5000 Unit(s) SubCutaneous every 12 hours  meropenem  IVPB 500 milliGRAM(s) IV Intermittent every 24 hours  mycophenolate mofetil Suspension 1000 milliGRAM(s) Oral two times a day  norepinephrine Infusion 0.05 MICROgram(s)/kG/Min (3.3 mL/Hr) IV Continuous <Continuous>  pantoprazole   Suspension 40 milliGRAM(s) Oral daily  propofol Infusion 20 MICROgram(s)/kG/Min (8.45 mL/Hr) IV Continuous <Continuous>  vasopressin Infusion 0.04 Unit(s)/Min (6 mL/Hr) IV Continuous <Continuous>    MEDICATIONS  (PRN):      REVIEW OF SYSTEMS:    CONSTITUTIONAL: No weakness, fevers or chills  EYES/ENT: No visual changes;  No vertigo or throat pain   NECK: No pain or stiffness  RESPIRATORY: No cough, wheezing, hemoptysis; No shortness of breath  CARDIOVASCULAR: No chest pain or palpitations  GASTROINTESTINAL: No abdominal or epigastric pain. No nausea, vomiting, or hematemesis; No diarrhea or constipation. No melena or hematochezia.  GENITOURINARY: No dysuria, frequency or hematuria  NEUROLOGICAL: No numbness or weakness  SKIN: No itching, burning, rashes, or lesions   All other review of systems is negative unless indicated above    Vital Signs Last 24 Hrs  T(C): 37.8 (31 Mar 2023 12:00), Max: 38.5 (30 Mar 2023 18:30)  T(F): 100 (31 Mar 2023 12:00), Max: 101.3 (30 Mar 2023 18:30)  HR: 92 (31 Mar 2023 12:07) (84 - 100)  BP: --  BP(mean): --  RR: --  SpO2: 94% (31 Mar 2023 12:07) (93% - 100%)    Daily     Daily Weight in k.9 (31 Mar 2023 05:22)I&O's Summary    30 Mar 2023 07:01  -  31 Mar 2023 07:00  --------------------------------------------------------  IN: 3055.3 mL / OUT: 150 mL / NET: 2905.3 mL    31 Mar 2023 07:01  -  31 Mar 2023 13:29  --------------------------------------------------------  IN: 500 mL / OUT: 0 mL / NET: 500 mL        PHYSICAL EXAM:    Constitutional: NAD, awake and alert, well-developed  HEENT: PERR, EOMI,  No oral cyananosis.  Neck:  supple,  No JVD  Respiratory: Breath sounds are clear bilaterally, No wheezing, rales or rhonchi  Cardiovascular: S1 and S2, regular rate and rhythm, no Murmurs, gallops or rubs  Gastrointestinal: Bowel Sounds present, soft, nontender.   Extremities: No peripheral edema. No clubbing or cyanosis.  Vascular: 2+ peripheral pulses  Neurological: A/O x 3, no focal deficits  Musculoskeletal: no calf tenderness.  Skin: No rashes.      LABS: All Labs Reviewed:                        11.1   17.40 )-----------( 230      ( 31 Mar 2023 06:05 )             35.7                         11.4   21.58 )-----------( 260      ( 30 Mar 2023 06:13 )             37.6                         10.2   19.47 )-----------( 248      ( 29 Mar 2023 06:09 )             32.0     31 Mar 2023 06:05    137    |  104    |  56     ----------------------------<  150    5.3     |  24     |  6.96   31 Mar 2023 00:27    138    |  105    |  53     ----------------------------<  122    5.9     |  24     |  6.76   30 Mar 2023 14:15    138    |  104    |  47     ----------------------------<  127    5.7     |  27     |  5.55     Ca    6.9        31 Mar 2023 06:05  Ca    6.9        31 Mar 2023 00:27  Ca    7.2        30 Mar 2023 14:15    TPro  6.5    /  Alb  2.4    /  TBili  2.4    /  DBili  x      /  AST  1404   /  ALT  924    /  AlkPhos  126    31 Mar 2023 06:05  TPro  6.8    /  Alb  2.5    /  TBili  2.6    /  DBili  x      /  AST  1774   /  ALT  1037   /  AlkPhos  128    31 Mar 2023 00:27      CARDIAC MARKERS ( 31 Mar 2023 00:27 )  x     / x     / 228 U/L / x     / x      CARDIAC MARKERS ( 30 Mar 2023 14:15 )  x     / x     / 258 U/L / x     / x          Blood Culture: Organism --  Gram Stain Blood -- Gram Stain   Few polymorphonuclear leukocytes per low power field  No Squamous epithelial cells per low power field  No organisms seen per oil power field  Specimen Source .Sputum Sputum  Culture-Blood --    Organism --  Gram Stain Blood -- Gram Stain --  Specimen Source .Blood None  Culture-Blood --            RADIOLOGY/EKG:      ECHO/CARDIAC CATHTERIZATION/STRESS TEST:

## 2023-03-31 NOTE — CONSULT NOTE ADULT - ASSESSMENT
62M hx PSS (scleroderma) on cellcept with pulmonary fibrosis on 8L of 02  24/7, HTN HLD  CAD with 4 stents to the RCA 2018 with Dr Griffith. He is on the lung transplant list at [facility] with Dr Mansa Priest (transplant pulmonologist). He was hospitalized in Feb at [facility] with a flare of fibrosis and was in the ICU on inhaled pulmonary vasodialtors.    At that time he had a R heart cath and he was told that he has severe pulmonary hypertension.,  He was  being teed up for transplant waiting for immunizations/colonoscopy etc.  He was discharged on Tyvaso (treprostinil) but only started using it a few days ago due to expense and approval. Over the last three weeks he has had a poor appetite and progressive SOB leaving him unable to walk with a walker more than 10 feet without stopping to catch his breath. He arrived in the ED with severe SOB and hypoxemia placed on BIPAP.  Type 1 respiratory failure Trop and BNP elevation. 3/29 am was intubated, had approximately 5 minute cardiac arrest/ now on pressors and developing worsening renal renal failure  creatinine has increased now 6, on high dose pressors echo, dec rv function and severe pulmonary htn, was started on abx, pcp tx, and fungal coverage.     1. Acute respiratory failure. Probable septic shock. Scleroderma with pulmonary fibrosis. Immunocompromised host. s/p Cardiac arrest. Shock liver. CHRIS  - imaging and chart reviewed, intubated, on 2 pressors, multiorgan failure   - s/p zosyn 3/28-3/30, now on merrem [id number] renally dose adjusted #2  - s/p micafungin for antifungal coverage, start caspofungin 50mg q24h  - on azithromycin 500mg daily atypical coverage #4, complete 5 days f/u legionella ag  - change bactrim d/t renal failure to mepron 750mg BID for pcp coverage  - sputum cx and blood cx 3/28 no growth  - f/u fungitell, repeat blood cx from 3/31  - continue wtih abx/antifungal coverage  - may require hd  - not candidate for transplant now d/t renal failure  - case d/w ccu attg  - monitor temps  - tolerating abx well so far; no side effects noted  - reason for abx use and side effects reviewed with patient  - supportive care  - fu cbc    2. other issues - care per medicine  62M hx PSS (scleroderma) on cellcept with pulmonary fibrosis on 8L of 02  24/7, HTN HLD  CAD with 4 stents to the RCA 2018 with Dr Griffith. He is on the lung transplant list at [facility] with Dr Manas Priest (transplant pulmonologist). He was hospitalized in Feb at [facility] with a flare of fibrosis and was in the ICU on inhaled pulmonary vasodialtors.    At that time he had a R heart cath and he was told that he has severe pulmonary hypertension.,  He was  being teed up for transplant waiting for immunizations/colonoscopy etc.  He was discharged on Tyvaso (treprostinil) but only started using it a few days ago due to expense and approval. Over the last three weeks he has had a poor appetite and progressive SOB leaving him unable to walk with a walker more than 10 feet without stopping to catch his breath. He arrived in the ED with severe SOB and hypoxemia placed on BIPAP.  Type 1 respiratory failure Trop and BNP elevation. 3/29 am was intubated, had approximately 5 minute cardiac arrest/ now on pressors and developing worsening renal renal failure  creatinine has increased now 6, on high dose pressors echo, dec rv function and severe pulmonary htn, was started on abx, pcp tx, and fungal coverage.     1. Acute respiratory failure. Probable septic shock. Scleroderma. Pulmonary fibrosis with acute flare. Multifocal pneumonia. Immunocompromised host. s/p Cardiac arrest. Shock liver. CHRIS  - imaging and chart reviewed, intubated, on 2 pressors, multiorgan failure   - s/p zosyn 3/28-3/30, now on merrem [id number] renally dose adjusted #2  - s/p micafungin for antifungal coverage, start caspofungin 50mg q24h  - on azithromycin 500mg daily atypical coverage #4, complete 5 days f/u legionella ag  - change bactrim d/t renal failure to mepron 750mg BID for pcp coverage  - sputum cx and blood cx 3/28 no growth  - f/u fungitell, repeat blood cx from 3/31  - continue wtih abx/antifungal coverage  - may require hd  - not candidate for transplant now d/t renal failure  - case d/w ccu attg  - monitor temps  - tolerating abx well so far; no side effects noted  - reason for abx use and side effects reviewed with patient  - supportive care  - fu cbc    2. other issues - care per medicine

## 2023-03-31 NOTE — PROGRESS NOTE ADULT - ASSESSMENT
62M with pmxh scleroderma on cellcept with pulmonary fibrosis (on 8-10L home O2) awaiting lung txp, severe pHTN came in for three weeks of worsening SOB, increasing O2 reqs/hypoxemia and severe LACEY. Now with type 1/2 respiratory failure requiring intubation and cardiac arrest suspect in setting of metabolic derangements. He is with decreasing oxygen requirements but persistent shock state.     acute on chronic hypoxemic respiratory failure  acute hypercapnic respiratory failure  shock, NOS  oliguirc CHRIS  hyperkalemia   cardiac arrest   shock liver     Neuro: sedated with propofol/fent  CV: levo/vaso to maintain map >65 although with worsening end points of perfusion. unclear etiology of shock state. IJ has been placed, will check CVP and send am cortisol   Pulm: on AC 24/450/40/5, continuing to wean down oxygen to maintain fio2 >90%. mycophenate.  GI: tube feeds, protonix gi ppx   Renal: renal function worsening. will treat hyperkalemia. bicarb likely wouldnt be helpful in absence of acidemia. suspect impending HD req. nephro following   ID: bcx and sputum cx ngtd. he has been persistently febrile and with worsening leukocytosis, on immunosuppressives. will broaden zosyn to teri (renally dosed). Send fungitell and empiric bactrim (dosing discussed with pharmacy). ID consult placed   Endo: goal bg 110-180  Heme: DVT ppx with sq heparin     Dispo:    62M with pmxh scleroderma on cellcept with pulmonary fibrosis (on 8-10L home O2) awaiting lung txp, severe pHTN came in for three weeks of worsening SOB, increasing O2 reqs/hypoxemia and severe LACEY. Now with type 1/2 respiratory failure requiring intubation and cardiac arrest suspect in setting of metabolic derangements. He is with decreasing oxygen requirements but persistent shock state, fevers and worsening leukocytosis.     Problem list:  acute on chronic hypoxemic respiratory failure  acute hypercapnic respiratory failure  shock, NOS   ?PNA  oliguirc CHRIS  hyperkalemia   cardiac arrest   shock liver     Neuro: sedated with propofol/fent  CV: levo/vaso to maintain map >65 although with worsening end points of perfusion. unclear etiology of shock state. IJ has been placed, will check CVP and send am cortisol. if low would start stress steroids  Pulm: on AC 24/450/40/5, continuing to wean down oxygen to maintain fio2 >90%. mycophenate.  GI: tube feeds, protonix gi ppx   Renal: renal function worsening. will treat hyperkalemia. bicarb likely wouldnt be helpful in absence of acidemia. likely impending HD req. nephro following   ID: bcx and sputum cx ngtd. he has been persistently febrile and with worsening leukocytosis, on immunosuppressives. will broaden zosyn to teri (renally dosed). Send fungitell. now with shock liver, will hold off on empiric antifungal coverage at this time. Add empiric bactrim (dosing discussed with pharmacy). Vanc dose x1. ID consult placed   Endo: goal bg 110-180  Heme: DVT ppx with sq heparin     Dispo: remain in CCU. Transfer on hold    Above d/w EICU Dr. Vogel    62M with pmxh scleroderma on cellcept with pulmonary fibrosis (on 8-10L home O2) awaiting lung txp, severe pHTN came in for three weeks of worsening SOB, increasing O2 reqs/hypoxemia and severe LACEY. Now with type 1/2 respiratory failure requiring intubation and cardiac arrest suspect in setting of metabolic derangements. He is with decreasing oxygen requirements but persistent shock state, fevers and worsening leukocytosis.     Problem list:  acute on chronic hypoxemic respiratory failure  acute hypercapnic respiratory failure  shock, NOS   ?PNA  oliguirc CHRIS  hyperkalemia   cardiac arrest   shock liver   pulmonary htn     Neuro: sedated with propofol/fent  CV: levo/vaso to maintain map >65 although with worsening end points of perfusion. unclear etiology of shock state. IJ has been placed, will check CVP and send am cortisol. if low would start stress steroids  Pulm: on AC 24/450/40/5, continuing to wean down oxygen to maintain fio2 >90%. mycophenate.  GI: tube feeds, protonix gi ppx   Renal: renal function worsening. will treat hyperkalemia. bicarb likely wouldnt be helpful in absence of acidemia. likely impending HD req. nephro following   ID: bcx and sputum cx ngtd. he has been persistently febrile and with worsening leukocytosis, on immunosuppressives. will broaden zosyn to teri (renally dosed). Send fungitell. now with shock liver, will hold off on empiric antifungal coverage at this time. Add empiric bactrim (dosing discussed with pharmacy). Vanc dose x1. ID consult placed   Endo: goal bg 110-180  Heme: DVT ppx with sq heparin     Dispo: remain in CCU. Transfer on hold    Above d/w EICU Dr. Vogel    62M with pmxh scleroderma on cellcept with pulmonary fibrosis (on 8-10L home O2) awaiting lung txp, severe pHTN came in for three weeks of worsening SOB, increasing O2 reqs/hypoxemia and severe LACEY. Now with type 1/2 respiratory failure requiring intubation and cardiac arrest suspect in setting of metabolic derangements. He is with decreasing oxygen requirements but persistent shock state, fevers and worsening leukocytosis.     Problem list:  acute on chronic hypoxemic respiratory failure  acute hypercapnic respiratory failure  shock, NOS   ?PNA  oliguirc CHRIS  hyperkalemia   cardiac arrest   shock liver   pulmonary htn     Neuro: sedated with propofol/fent  CV: levo/vaso to maintain map >65 although with worsening end points of perfusion. unclear etiology of shock state. IJ has been placed, will check CVP and send am cortisol. if low would start stress steroids  Pulm: on AC 24/450/40/5, continuing to wean down oxygen to maintain fio2 >90%. mycophenate.  GI: tube feeds, protonix gi ppx   Renal: renal function worsening. will treat hyperkalemia. bicarb likely wouldnt be helpful in absence of acidemia. likely impending HD req. nephro following   ID: bcx and sputum cx ngtd. he has been persistently febrile and with worsening leukocytosis, on immunosuppressives. will broaden zosyn to teri (renally dosed). Send fungitell, micafungin 100mg iv x1. Add empiric bactrim (dosing discussed with pharmacy). ID consult placed   Endo: goal bg 110-180  Heme: DVT ppx with sq heparin     Dispo: remain in CCU. Transfer on hold    Case d/w EICU 62M with pmxh scleroderma on cellcept with pulmonary fibrosis (on 8-10L home O2) awaiting lung txp, severe pHTN came in for three weeks of worsening SOB, increasing O2 reqs/hypoxemia and severe LACEY. Now with type 1/2 respiratory failure requiring intubation and cardiac arrest suspect in setting of metabolic derangements. He is with decreasing oxygen requirements but persistent shock state, multisystem organ failure, fevers and worsening leukocytosis.     Problem list:  acute on chronic hypoxemic respiratory failure  acute hypercapnic respiratory failure  shock, NOS   ?PNA  oliguirc CHRIS  hyperkalemia   cardiac arrest   acute liver failure/shock liver   pulmonary htn     Neuro: sedated with propofol/fent  CV: levo/vaso to maintain map >65 although with worsening end points of perfusion. unclear etiology of shock state. IJ has been placed, will check CVP and send am cortisol. if low would start stress steroids  Pulm: on AC 24/450/40/5, continuing to wean down oxygen to maintain fio2 >90%. mycophenate.  GI: tube feeds, protonix gi ppx   Renal: renal function worsening. will treat hyperkalemia. bicarb likely wouldnt be helpful in absence of acidemia. likely impending HD req. nephro following   ID: bcx and sputum cx ngtd. he has been persistently febrile and with worsening leukocytosis, on immunosuppressives. will broaden zosyn to teri (renally dosed). Send fungitell, micafungin 100mg iv x1. Add empiric bactrim (dosing discussed with pharmacy). ID consult placed   Endo: goal bg 110-180  Heme: DVT ppx with sq heparin     Dispo: remain in CCU. Transfer on hold    Case d/w EICU 62M with pmxh scleroderma on cellcept with pulmonary fibrosis (on 8-10L home O2) awaiting lung txp, severe pHTN came in for three weeks of worsening SOB, increasing O2 reqs/hypoxemia and severe LACEY. Now with type 1/2 respiratory failure requiring intubation and cardiac arrest suspect in setting of metabolic derangements. He is with decreasing oxygen requirements but persistent shock state, multisystem organ failure, fevers and worsening leukocytosis.     Problem list:  acute on chronic hypoxemic respiratory failure  acute hypercapnic respiratory failure  shock, NOS   ?PNA  oliguirc CHRIS  hyperkalemia   cardiac arrest   acute liver failure/shock liver   pulmonary htn     Neuro: sedated with propofol/fent  CV: levo/vaso to maintain map >65 although with worsening end points of perfusion. unclear etiology of shock state. IJ has been placed, will check CVP and send am cortisol. if low would start stress steroids  Pulm: on AC 24/450/40/5, continuing to wean down oxygen to maintain fio2 >90%. mycophenate.  GI: tube feeds, protonix gi ppx   Renal: renal function worsening. will treat hyperkalemia with lokelma. bicarb likely wouldnt be helpful in absence of acidemia. likely impending HD req. nephro following   ID: bcx and sputum cx ngtd. he has been persistently febrile and with worsening leukocytosis, on immunosuppressives. will broaden zosyn to teri (renally dosed). Send fungitell, micafungin 100mg iv x1. Add empiric bactrim (dosing discussed with pharmacy). cont azithro. ID consult placed   Endo: goal bg 110-180  Heme: DVT ppx with sq heparin     Dispo: remain in CCU. Transfer on hold    Case d/w EICU

## 2023-03-31 NOTE — PROGRESS NOTE ADULT - PROBLEM SELECTOR PLAN 2
Resuscitated cardiac arrest - preceded by bradycardic and likely related to respiratory failure; now hypotensive and requiring vasopressors. Attempts to wean have been unsuccessful thus far.   Currently in SR with normal rate.    * Case discussed with Dr Watson.

## 2023-03-31 NOTE — PROGRESS NOTE ADULT - SUBJECTIVE AND OBJECTIVE BOX
Events Overnight: Overnight to 100.8, urine output 100 cc, sedated on Fentanyl and propofol, on 0.6 levophed and vasopressin        HPI:      62M hx PSS (scleroderma) on cellcept with pulmonary fibrosis on 8L of 02 24/7, HTN HLD  CAD with 4 stents to the RCA 2018 with Dr Griffith.  He is on the lung transplant list at [facility] with Dr Manas Priest (transplant pulmonologist).  He was hospitalized in Feb at [facility] with a flare of fibrosis and was in the ICU on inhaled pulmonary vasodialtors.    At that time he had a R heart cath and he was told that he has severe pulmonary hypertension.,   He was  being teed up for transplant waiting for immunizations/colonoscopy etc.  He was discharged on Tyvaso (treprostinil) but only started using it a few days ago due to expense and approval.     Over the last three weeks he has had a poor appetite and progressive SOB leaving him unable to walk with a walker more than 10 feet without stopping to catch his breath.    He arrived in the ED with severe SOB and hypoxemia placed on BIPAP.  Type 1 respiratory failure   Trop and BNP elevation  (28 Mar 2023 02:01)  3/29 am was intubated, had approximately 5 minute cardiac arrest/ now on pressors and developing worsening renal renal failure  creatinine has increased now 6, on high dose pressors  echo, dec rv function and severe pulmonary htn    PMH:        as above     MEDICATIONS  (STANDING):  aspirin  chewable 81 milliGRAM(s) Oral daily  atorvastatin 20 milliGRAM(s) Oral at bedtime  azithromycin  IVPB 500 milliGRAM(s) IV Intermittent every 24 hours  chlorhexidine 0.12% Liquid 15 milliLiter(s) Oral Mucosa every 12 hours  chlorhexidine 4% Liquid 1 Application(s) Topical <User Schedule>  fentaNYL   Infusion. 0.5 MICROgram(s)/kG/Hr (3.52 mL/Hr) IV Continuous <Continuous>  heparin   Injectable 5000 Unit(s) SubCutaneous every 12 hours  meropenem  IVPB 500 milliGRAM(s) IV Intermittent every 24 hours  mycophenolate mofetil Suspension 1000 milliGRAM(s) Oral two times a day  norepinephrine Infusion 0.05 MICROgram(s)/kG/Min (3.3 mL/Hr) IV Continuous <Continuous>  pantoprazole   Suspension 40 milliGRAM(s) Oral daily  propofol Infusion 20 MICROgram(s)/kG/Min (8.45 mL/Hr) IV Continuous <Continuous>  trimethoprim / sulfamethoxazole IVPB 132 milliGRAM(s) IV Intermittent every 6 hours  vasopressin Infusion 0.04 Unit(s)/Min (6 mL/Hr) IV Continuous <Continuous>    ICU Vital Signs Last 24 Hrs  T(C): 37.7 (31 Mar 2023 07:55), Max: 38.5 (30 Mar 2023 18:30)  T(F): 99.9 (31 Mar 2023 07:55), Max: 101.3 (30 Mar 2023 18:30)  HR: 93 (31 Mar 2023 06:00) (80 - 100)  BP: --  BP(mean): --  ABP: 130/81 (31 Mar 2023 06:00) (69/49 - 141/79)  ABP(mean): 101 (31 Mar 2023 06:00) (58 - 104)  RR: --  SpO2: 95% (31 Mar 2023 05:22) (95% - 100%)    Mode: AC/ CMV (Assist Control/ Continuous Mandatory Ventilation)  RR (machine): 24  TV (machine): 450  FiO2: 40  PEEP: 5  ITime: 0.8  MAP: 14  PIP: 31    Physical Exam    General - ill  HEENT - orally intubated  Neuro - sedated, minimally responsive  lungs bilateral rhonchi, sputum culture negative   cv rrr  abdomen soft, non tender  extremities warm  skin no rash      I&O's Summary    30 Mar 2023 07:01  -  31 Mar 2023 07:00  --------------------------------------------------------  IN: 3055.3 mL / OUT: 150 mL / NET: 2905.3 mL                        11.1   17.40 )-----------( 230      ( 31 Mar 2023 06:05 )             35.7       03-31    137  |  104  |  56<H>  ----------------------------<  150<H>  5.3   |  24  |  6.96<H>    Ca    6.9<L>      31 Mar 2023 06:05    TPro  6.5  /  Alb  2.4<L>  /  TBili  2.4<H>  /  DBili  x   /  AST  1404<H>  /  ALT  924<H>  /  AlkPhos  126<H>  03-31      CARDIAC MARKERS ( 31 Mar 2023 00:27 )  x     / x     / 228 U/L / x     / x      CARDIAC MARKERS ( 30 Mar 2023 14:15 )  x     / x     / 258 U/L / x     / x        ABG - ( 30 Mar 2023 06:20 )  pH, Arterial: 7.29  pH, Blood: x     /  pCO2: 56    /  pO2: 325   / HCO3: 27    / Base Excess: -0.7  /  SaO2: 100       DVT Prophylaxis:  Heparin subq                                                            Advanced Directives: Full Codes

## 2023-03-31 NOTE — CONSULT NOTE ADULT - SUBJECTIVE AND OBJECTIVE BOX
Patient is a 62y old  Male who presents with a chief complaint of SOB hypoxemia (31 Mar 2023 09:09)    HPI:  62M hx PSS (scleroderma) on cellcept with pulmonary fibrosis on 8L of , HTN HLD  CAD with 4 stents to the RCA  with Dr Griffith. He is on the lung transplant list at [facility] with Dr Manas Priest (transplant pulmonologist). He was hospitalized in Feb at [facility] with a flare of fibrosis and was in the ICU on inhaled pulmonary vasodialtors.    At that time he had a R heart cath and he was told that he has severe pulmonary hypertension. He is being teed up for transplant waiting for immunizations/colonoscopy etc.   He was discharged on Tyvaso (treprostinil) but only started using it a few days ago due to expense and approval.  Over the last three weeks he has had a poor appetite and progressive SOB leaving him unable to walk with a walker more than 10 feet without stopping to catch his breath.  He also noted more hypoxemia on his home pulse ox.  He has a cough with clear phlegm. He arrived in the ED with severe SOB and hypoxemia placed on BIPAP.  Type 1 respiratory failure Trop and BNP elevation        PMH: as above  PSH: as above  Meds: per reconciliation sheet, noted below  MEDICATIONS  (STANDING):  aspirin  chewable 81 milliGRAM(s) Oral daily  atorvastatin 20 milliGRAM(s) Oral at bedtime  azithromycin  IVPB 500 milliGRAM(s) IV Intermittent every 24 hours  chlorhexidine 0.12% Liquid 15 milliLiter(s) Oral Mucosa every 12 hours  chlorhexidine 4% Liquid 1 Application(s) Topical <User Schedule>  fentaNYL   Infusion. 0.5 MICROgram(s)/kG/Hr (3.52 mL/Hr) IV Continuous <Continuous>  heparin   Injectable 5000 Unit(s) SubCutaneous every 12 hours  meropenem  IVPB 500 milliGRAM(s) IV Intermittent every 24 hours  mycophenolate mofetil Suspension 1000 milliGRAM(s) Oral two times a day  norepinephrine Infusion 0.05 MICROgram(s)/kG/Min (3.3 mL/Hr) IV Continuous <Continuous>  pantoprazole   Suspension 40 milliGRAM(s) Oral daily  propofol Infusion 20 MICROgram(s)/kG/Min (8.45 mL/Hr) IV Continuous <Continuous>  vasopressin Infusion 0.04 Unit(s)/Min (6 mL/Hr) IV Continuous <Continuous>      Allergies    No Known Allergies    Intolerances      Social: no smoking, no alcohol, no illegal drugs; no recent travel, no exposure to TB  FAMILY HISTORY:     no history of premature cardiovascular disease in first degree relatives    ROS: the patient denies fever, no chills, no HA, no dizziness, no sore throat, no blurry vision, no CP, no palpitations, no SOB, no cough, no abdominal pain, no diarrhea, no N/V, no dysuria, no leg pain, no claudication, no rash, no joint aches, no rectal pain or bleeding, no night sweats    All other systems reviewed and are negative    Vital Signs Last 24 Hrs  T(C): 37.8 (31 Mar 2023 12:00), Max: 38.5 (30 Mar 2023 18:30)  T(F): 100 (31 Mar 2023 12:00), Max: 101.3 (30 Mar 2023 18:30)  HR: 92 (31 Mar 2023 12:07) (84 - 100)  BP: --  BP(mean): --  RR: --  SpO2: 94% (31 Mar 2023 12:07) (93% - 100%)      Daily     Daily Weight in k.9 (31 Mar 2023 05:22)    PE:  Constitutional: frail looking  HEENT: NC/AT, EOMI, PERRLA, conjunctivae clear; ears and nose atraumatic; pharynx benign  Neck: supple; thyroid not palpable  Back: no tenderness  Respiratory: respiratory effort normal; clear to auscultation  Cardiovascular: S1S2 regular, no murmurs  Abdomen: soft, not tender, not distended, positive BS; liver and spleen WNL  Genitourinary: no suprapubic tenderness  Lymphatic: no LN palpable  Musculoskeletal: no muscle tenderness, no joint swelling or tenderness  Extremities: no pedal edema  Neurological/ Psychiatric: AxOx3, Judgement and insight normal;  moving all extremities  Skin: no rashes; no palpable lesions    Labs: all available labs reviewed                        40 )-----------( 230      ( 31 Mar 2023 06:05 )             35.7     03-    137  |  104  |  56<H>  ----------------------------<  150<H>  5.3   |  24  |  6.96<H>    Ca    6.9<L>      31 Mar 2023 06:05    TPro  6.5  /  Alb  2.4<L>  /  TBili  2.4<H>  /  DBili  x   /  AST  1404<H>  /  ALT  924<H>  /  AlkPhos  126<H>  03-     LIVER FUNCTIONS - ( 31 Mar 2023 06:05 )  Alb: 2.4 g/dL / Pro: 6.5 gm/dL / ALK PHOS: 126 U/L / ALT: 924 U/L / AST: 1404 U/L / GGT: x                 Radiology: all available radiological tests reviewed  < from: Xray Chest 1 View-PORTABLE IMMEDIATE (Xray Chest 1 View-PORTABLE IMMEDIATE .) (23 @ 18:33) >    ACC: [id number] EXAM:  XR CHEST PORTABLE IMMED 1V   ORDERED BY: RALPH PRATER     PROCEDURE DATE:  2023          INTERPRETATION:  Portable chest radiograph    CLINICAL INFORMATION:   Short of breath.    TECHNIQUE:  Portable  AP view of the chest was obtained.    COMPARISON: 3/29/2023 chest x-ray available for review.    FINDINGS: ET tube tip above tracheal bifurcation.  NG tube tip beyond GE junction.  LEFT IJ catheter tip in SVC.      There is cardiomegaly, vascular congestion and perihilar interstitial   infiltrates without gross effusion.  Trachea midline. No hilar mass.  Visualized osseous structures are intact.        IMPRESSION: Catheters and tubes in place. An  Cardiomegaly.  Bilateral perihilar/bibasilar diffuse airspace disease.        ACC: [id number] [facility]   ORDERED BY: ROME RUFFIN     PROCEDURE DATE:  2023          INTERPRETATION:  CLINICAL INFORMATION: Shortness of breath. History of   scleroderma.    COMPARISON: 10/28/2018    CONTRAST/COMPLICATIONS:  IV Contrast: Omnipaque 350  90 cc administered   10 cc discarded  Oral Contrast: NONE  Complications: None reported at time of study completion    PROCEDURE:  CT Angiography of the Chest.  Sagittal and coronal reformats were performed as well as 3D (MIP)   reconstructions.    FINDINGS:    LUNGS AND AIRWAYS: Patent central airways.  Revisualized chronic   interstitial disease including hazy bilateral ground glass opacities,   reticulation and worsened bronchiectasis bilaterally. Large bulla in the   left upper lobe.  PLEURA: No pleural effusion.  MEDIASTINUM AND MALLORY: No lymphadenopathy.  VESSELS: Aortic and coronary artery atherosclerosis. Enlarged main   pulmonary artery, unchanged, can be seen with pulmonary arterial   hypertension. No pulmonary embolism.  HEART: Cardiomegaly. Small pericardial effusion.  CHEST WALL AND LOWER NECK: Bilateral mediastinal and hilar adenopathy   increased since prior exam including extensive adenopathy in the   prevascular space.  VISUALIZED UPPER ABDOMEN: Right renal hypodensity too small to   characterize. Colonic diverticulosis.  BONES: Degenerative changes. Dextroscoliosis of the thoracic spine.   Chronic right rib deformities.    IMPRESSION:  No pulmonary embolism.  Chronic interstitial disease with worsened bronchiectasis.  New mediastinal and hilar adenopathy, which is indeterminate but could be   related to scleroderma and interstitial disease. Continued follow-up is   recommended.        --- End of Report ---    < end of copied text >    Advanced directives addressed: full resuscitation   HPI:  62M hx PSS (scleroderma) on cellcept with pulmonary fibrosis on 8L of , HTN HLD  CAD with 4 stents to the RCA 2018 with Dr Griffith. He is on the lung transplant list at [facility] with Dr Manas Priest (transplant pulmonologist). He was hospitalized in Feb at [facility] with a flare of fibrosis and was in the ICU on inhaled pulmonary vasodialtors.    At that time he had a R heart cath and he was told that he has severe pulmonary hypertension.  He was  being teed up for transplant waiting for immunizations/colonoscopy etc.  He was discharged on Tyvaso (treprostinil) but only started using it a few days ago due to expense and approval. Over the last three weeks he has had a poor appetite and progressive SOB leaving him unable to walk with a walker more than 10 feet without stopping to catch his breath. He arrived in the ED with severe SOB and hypoxemia placed on BIPAP.  Type 1 respiratory failure Trop and BNP elevation. 3/29 am was intubated, had approximately 5 minute cardiac arrest/ now on pressors and developing worsening renal renal failure  creatinine has increased now 6, on high dose pressors echo, dec rv function and severe pulmonary htn, was started on abx, pcp tx, and fungal coverage.       PMH: as above  PSH: as above  Meds: per reconciliation sheet, noted below  MEDICATIONS  (STANDING):  aspirin  chewable 81 milliGRAM(s) Oral daily  atorvastatin 20 milliGRAM(s) Oral at bedtime  azithromycin  IVPB 500 milliGRAM(s) IV Intermittent every 24 hours  chlorhexidine 0.12% Liquid 15 milliLiter(s) Oral Mucosa every 12 hours  chlorhexidine 4% Liquid 1 Application(s) Topical <User Schedule>  fentaNYL   Infusion. 0.5 MICROgram(s)/kG/Hr (3.52 mL/Hr) IV Continuous <Continuous>  heparin   Injectable 5000 Unit(s) SubCutaneous every 12 hours  meropenem  IVPB 500 milliGRAM(s) IV Intermittent every 24 hours  mycophenolate mofetil Suspension 1000 milliGRAM(s) Oral two times a day  norepinephrine Infusion 0.05 MICROgram(s)/kG/Min (3.3 mL/Hr) IV Continuous <Continuous>  pantoprazole   Suspension 40 milliGRAM(s) Oral daily  propofol Infusion 20 MICROgram(s)/kG/Min (8.45 mL/Hr) IV Continuous <Continuous>  vasopressin Infusion 0.04 Unit(s)/Min (6 mL/Hr) IV Continuous <Continuous>      Allergies    No Known Allergies    Intolerances      Social: no toxic habits, no recent travel, no exposure to TB  FAMILY HISTORY:     no history of premature cardiovascular disease in first degree relatives    ROS: unable to obtain d/t medical condition     All other systems reviewed and are negative    Vital Signs Last 24 Hrs  T(C): 37.8 (31 Mar 2023 12:00), Max: 38.5 (30 Mar 2023 18:30)  T(F): 100 (31 Mar 2023 12:00), Max: 101.3 (30 Mar 2023 18:30)  HR: 92 (31 Mar 2023 12:07) (84 - 100)  BP: 103/66  RR: 18  SpO2: 94% (31 Mar 2023 12:07) (93% - 100%)    Daily     Daily Weight in k.9 (31 Mar 2023 05:22)    PE:  Constitutional: NAD, sedated/intubated   HEENT: NC/AT, EOMI, PERRLA, conjunctivae clear; ears and nose atraumatic; pharynx benign  Neck: supple; thyroid not palpable  Back: no tenderness  Respiratory: decreased breath sounds, rales   Cardiovascular: S1S2 regular, no murmurs  Abdomen: soft, not tender, not distended, positive BS; liver and spleen WNL  Genitourinary: no suprapubic tenderness  Lymphatic: no LN palpable  Musculoskeletal: no muscle tenderness, no joint swelling or tenderness  Extremities: no pedal edema  Neurological/ Psychiatric:  moving all extremities  Skin: no rashes; no palpable lesions    Labs: all available labs reviewed                           1740 )-----------( 230      ( 31 Mar 2023 06:05 )             35.7     03-31    137  |  104  |  56<H>  ----------------------------<  150<H>  5.3   |  24  |  6.96<H>    Ca    6.9<L>      31 Mar 2023 06:05    TPro  6.5  /  Alb  2.4<L>  /  TBili  2.4<H>  /  DBili  x   /  AST  1404<H>  /  ALT  924<H>  /  AlkPhos  126<H>       LIVER FUNCTIONS - ( 31 Mar 2023 06:05 )  Alb: 2.4 g/dL / Pro: 6.5 gm/dL / ALK PHOS: 126 U/L / ALT: 924 U/L / AST: 1404 U/L / GGT: x                 Radiology: all available radiological tests reviewed  < from: Xray Chest 1 View-PORTABLE IMMEDIATE (Xray Chest 1 View-PORTABLE IMMEDIATE .) (23 @ 18:33) >    ACC: [id number] EXAM:  XR CHEST PORTABLE IMMED 1V   ORDERED BY: RALPH PRATER     PROCEDURE DATE:  2023          INTERPRETATION:  Portable chest radiograph    CLINICAL INFORMATION:   Short of breath.    TECHNIQUE:  Portable  AP view of the chest was obtained.    COMPARISON: 3/29/2023 chest x-ray available for review.    FINDINGS: ET tube tip above tracheal bifurcation.  NG tube tip beyond GE junction.  LEFT IJ catheter tip in SVC.      There is cardiomegaly, vascular congestion and perihilar interstitial   infiltrates without gross effusion.  Trachea midline. No hilar mass.  Visualized osseous structures are intact.        IMPRESSION: Catheters and tubes in place. An  Cardiomegaly.  Bilateral perihilar/bibasilar diffuse airspace disease.        ACC: [id number] EXAM:  CT ANGIO CHEST PULM [facility]   ORDERED BY: ROME RUFFIN     PROCEDURE DATE:  2023          INTERPRETATION:  CLINICAL INFORMATION: Shortness of breath. History of   scleroderma.    COMPARISON: 10/28/2018    CONTRAST/COMPLICATIONS:  IV Contrast: Omnipaque 350  90 cc administered   10 cc discarded  Oral Contrast: NONE  Complications: None reported at time of study completion    PROCEDURE:  CT Angiography of the Chest.  Sagittal and coronal reformats were performed as well as 3D (MIP)   reconstructions.    FINDINGS:    LUNGS AND AIRWAYS: Patent central airways.  Revisualized chronic   interstitial disease including hazy bilateral ground glass opacities,   reticulation and worsened bronchiectasis bilaterally. Large bulla in the   left upper lobe.  PLEURA: No pleural effusion.  MEDIASTINUM AND MALLORY: No lymphadenopathy.  VESSELS: Aortic and coronary artery atherosclerosis. Enlarged main   pulmonary artery, unchanged, can be seen with pulmonary arterial   hypertension. No pulmonary embolism.  HEART: Cardiomegaly. Small pericardial effusion.  CHEST WALL AND LOWER NECK: Bilateral mediastinal and hilar adenopathy   increased since prior exam including extensive adenopathy in the   prevascular space.  VISUALIZED UPPER ABDOMEN: Right renal hypodensity too small to   characterize. Colonic diverticulosis.  BONES: Degenerative changes. Dextroscoliosis of the thoracic spine.   Chronic right rib deformities.    IMPRESSION:  No pulmonary embolism.  Chronic interstitial disease with worsened bronchiectasis.  New mediastinal and hilar adenopathy, which is indeterminate but could be   related to scleroderma and interstitial disease. Continued follow-up is   recommended.        --- End of Report ---    < end of copied text >    Advanced directives addressed: full resuscitation   HPI:  62M hx PSS (scleroderma) on cellcept with pulmonary fibrosis on 8L of , HTN HLD  CAD with 4 stents to the RCA 2018 with Dr Griffith. He is on the lung transplant list at [facility] with Dr Manas Priest (transplant pulmonologist). He was hospitalized in Feb at [facility] with a flare of fibrosis and was in the ICU on inhaled pulmonary vasodilators.    At that time he had a R heart cath and he was told that he has severe pulmonary hypertension.  He was  being teed up for transplant waiting for immunizations/colonoscopy etc.  He was discharged on Tyvaso (treprostinil) but only started using it a few days ago due to expense and approval. Over the last three weeks he has had a poor appetite and progressive SOB leaving him unable to walk with a walker more than 10 feet without stopping to catch his breath. He arrived in the ED with severe SOB and hypoxemia placed on BIPAP.  Type 1 respiratory failure Trop and BNP elevation. 3/29 am was intubated, had approximately 5 minute cardiac arrest/ now on pressors and developing worsening renal renal failure  creatinine has increased now 6, on high dose pressors echo, dec rv function and severe pulmonary htn, was started on abx, pcp tx, and fungal coverage.       PMH: as above  PSH: as above  Meds: per reconciliation sheet, noted below  MEDICATIONS  (STANDING):  aspirin  chewable 81 milliGRAM(s) Oral daily  atorvastatin 20 milliGRAM(s) Oral at bedtime  azithromycin  IVPB 500 milliGRAM(s) IV Intermittent every 24 hours  chlorhexidine 0.12% Liquid 15 milliLiter(s) Oral Mucosa every 12 hours  chlorhexidine 4% Liquid 1 Application(s) Topical <User Schedule>  fentaNYL   Infusion. 0.5 MICROgram(s)/kG/Hr (3.52 mL/Hr) IV Continuous <Continuous>  heparin   Injectable 5000 Unit(s) SubCutaneous every 12 hours  meropenem  IVPB 500 milliGRAM(s) IV Intermittent every 24 hours  mycophenolate mofetil Suspension 1000 milliGRAM(s) Oral two times a day  norepinephrine Infusion 0.05 MICROgram(s)/kG/Min (3.3 mL/Hr) IV Continuous <Continuous>  pantoprazole   Suspension 40 milliGRAM(s) Oral daily  propofol Infusion 20 MICROgram(s)/kG/Min (8.45 mL/Hr) IV Continuous <Continuous>  vasopressin Infusion 0.04 Unit(s)/Min (6 mL/Hr) IV Continuous <Continuous>      Allergies    No Known Allergies    Intolerances      Social: no toxic habits, no recent travel, no exposure to TB  FAMILY HISTORY:     no history of premature cardiovascular disease in first degree relatives    ROS: unable to obtain d/t medical condition     All other systems reviewed and are negative    Vital Signs Last 24 Hrs  T(C): 37.8 (31 Mar 2023 12:00), Max: 38.5 (30 Mar 2023 18:30)  T(F): 100 (31 Mar 2023 12:00), Max: 101.3 (30 Mar 2023 18:30)  HR: 92 (31 Mar 2023 12:07) (84 - 100)  BP: 103/66  RR: 18  SpO2: 94% (31 Mar 2023 12:07) (93% - 100%)    Daily     Daily Weight in k.9 (31 Mar 2023 05:22)    PE:  Constitutional: NAD, sedated/intubated   HEENT: NC/AT, EOMI, PERRLA, conjunctivae clear; ears and nose atraumatic; pharynx benign  Neck: supple; thyroid not palpable  Back: no tenderness  Respiratory: decreased breath sounds, rales   Cardiovascular: S1S2 regular, no murmurs  Abdomen: soft, not tender, not distended, positive BS; liver and spleen WNL  Genitourinary: no suprapubic tenderness  Lymphatic: no LN palpable  Musculoskeletal: no muscle tenderness, no joint swelling or tenderness  Extremities: no pedal edema  Neurological/ Psychiatric:  moving all extremities  Skin: no rashes; no palpable lesions    Labs: all available labs reviewed                        .   17.40 )-----------( 230      ( 31 Mar 2023 06:05 )             35.7     -    137  |  104  |  56<H>  ----------------------------<  150<H>  5.3   |  24  |  6.96<H>    Ca    6.9<L>      31 Mar 2023 06:05    TPro  6.5  /  Alb  2.4<L>  /  TBili  2.4<H>  /  DBili  x   /  AST  1404<H>  /  ALT  924<H>  /  AlkPhos  126<H>  03-31     LIVER FUNCTIONS - ( 31 Mar 2023 06:05 )  Alb: 2.4 g/dL / Pro: 6.5 gm/dL / ALK PHOS: 126 U/L / ALT: 924 U/L / AST: 1404 U/L / GGT: x                 Radiology: all available radiological tests reviewed  < from: Xray Chest 1 View-PORTABLE IMMEDIATE (Xray Chest 1 View-PORTABLE IMMEDIATE .) (23 @ 18:33) >    ACC: [id number] EXAM:  XR CHEST PORTABLE IMMED 1V   ORDERED BY: RALPH PRATER     PROCEDURE DATE:  2023          INTERPRETATION:  Portable chest radiograph    CLINICAL INFORMATION:   Short of breath.    TECHNIQUE:  Portable  AP view of the chest was obtained.    COMPARISON: 3/29/2023 chest x-ray available for review.    FINDINGS: ET tube tip above tracheal bifurcation.  NG tube tip beyond GE junction.  LEFT IJ catheter tip in SVC.      There is cardiomegaly, vascular congestion and perihilar interstitial   infiltrates without gross effusion.  Trachea midline. No hilar mass.  Visualized osseous structures are intact.        IMPRESSION: Catheters and tubes in place. An  Cardiomegaly.  Bilateral perihilar/bibasilar diffuse airspace disease.        ACC: [id number] EXAM:  CT ANGIO CHEST PULM [facility]   ORDERED BY: ROME RUFFIN     PROCEDURE DATE:  2023          INTERPRETATION:  CLINICAL INFORMATION: Shortness of breath. History of   scleroderma.    COMPARISON: 10/28/2018    CONTRAST/COMPLICATIONS:  IV Contrast: Omnipaque 350  90 cc administered   10 cc discarded  Oral Contrast: NONE  Complications: None reported at time of study completion    PROCEDURE:  CT Angiography of the Chest.  Sagittal and coronal reformats were performed as well as 3D (MIP)   reconstructions.    FINDINGS:    LUNGS AND AIRWAYS: Patent central airways.  Revisualized chronic   interstitial disease including hazy bilateral ground glass opacities,   reticulation and worsened bronchiectasis bilaterally. Large bulla in the   left upper lobe.  PLEURA: No pleural effusion.  MEDIASTINUM AND MALLORY: No lymphadenopathy.  VESSELS: Aortic and coronary artery atherosclerosis. Enlarged main   pulmonary artery, unchanged, can be seen with pulmonary arterial   hypertension. No pulmonary embolism.  HEART: Cardiomegaly. Small pericardial effusion.  CHEST WALL AND LOWER NECK: Bilateral mediastinal and hilar adenopathy   increased since prior exam including extensive adenopathy in the   prevascular space.  VISUALIZED UPPER ABDOMEN: Right renal hypodensity too small to   characterize. Colonic diverticulosis.  BONES: Degenerative changes. Dextroscoliosis of the thoracic spine.   Chronic right rib deformities.    IMPRESSION:  No pulmonary embolism.  Chronic interstitial disease with worsened bronchiectasis.  New mediastinal and hilar adenopathy, which is indeterminate but could be   related to scleroderma and interstitial disease. Continued follow-up is   recommended.        --- End of Report ---    < end of copied text >    Advanced directives addressed: full resuscitation

## 2023-03-31 NOTE — PROVIDER CONTACT NOTE (CHANGE IN STATUS NOTIFICATION) - SITUATION
Patient's HR on monitor reading vent bigeminy with a rate from 120's-160's; rhythm appears sinus and HR was  all day; however, will trouble shoot

## 2023-03-31 NOTE — PROVIDER CONTACT NOTE (EICU) - ASSESSMENT
- Continue sedation for vent coordination  - Maintain MAP >65; continue levo/vaso tirate to maintain MAP; low threshold to initiate stress dose steroids  - Mechanically vented for hypoxic and hypercarbic respiratory failure - downtrending FiO2 requirements noted, now on 35%; continue to titrate vent to abg  - shock liver noted; avoid hepatotoxic agents.   - Persistent fevers, follow up blood and sputum cultures; Agree with broadening antibiotics to meropenem, vancomycin x 1 IVP, bactrim x 1, renally dosed. Follow up Fungitell, procalcitonin, and ID consult; fevers could be centrally mediated, though wbc rising.    - HSQ for dvt ppx.    Continue care in CCU.

## 2023-03-31 NOTE — CHART NOTE - NSCHARTNOTEFT_GEN_A_CORE
Clinical Nutrition BRIEF Note    *63 y/o M with a PMHx of PSS (scleroderma) on cellcept with pulmonary fibrosis on 8L of 02 24/7, HTN, HLD, CAD with 4 stents to the RCA 2018 with Dr Griffith. He is on the lung transplant list at [facility] with Dr Manas Priest (transplant pulmonologist). He was hospitalized in Feb at [facility] with a flare of fibrosis and was in the ICU on inhaled pulmonary vasodialtors. He is being teed up for transplant waiting for immunizations/colonoscopy etc. Over the last three weeks he has had a poor appetite and progressive SOB leaving him unable to walk with a walker more than 10 feet without stopping to catch his breath. He arrived in the ED with severe SOB and hypoxemia placed on BIPAP. Type 1 respiratory failure. Trop and BNP elevation. Admit for acute respiratory failure and pulmonary fibrosis.    *being followed up for      *labs reviewed; 03-31    137  |  104  |  56<H>  ----------------------------<  150<H>  5.3   |  24  |  6.96<H>    Ca    6.9<L>      31 Mar 2023 06:05    TPro  6.5  /  Alb  2.4<L>  /  TBili  2.4<H>  /  DBili  x   /  AST  1404<H>  /  ALT  924<H>  /  AlkPhos  126<H>  03-31      *BM documented:      Diet, NPO with Tube Feed:   Tube Feeding Modality: Orogastric  Glucerna 1.5 El (GLUCERNA1.5)  Total Volume for 24 Hours (mL): 1680  Continuous  Starting Tube Feed Rate {mL per Hour}: 20  Increase Tube Feed Rate by (mL): 10     Every 6 hours  Until Goal Tube Feed Rate (mL per Hour): 70  Tube Feed Duration (in Hours): 24  Tube Feed Start Time: 00:00  Free Water Flush  Free Water Flush Instructions:  Free water flushes per MD (03-29-23 @ 14:57)      ESTIMATED NUTR NEEDS based on Kg                 Kcal  (       Kcal/Kg)                  g      (      g/Kg)                 mL   (     mL/Kg)        RECOMMENDATIONS            *will continue to monitor and adjust nutrition plan prn* Clinical Nutrition BRIEF Note    *61 y/o M with a PMHx of PSS (scleroderma) on cellcept with pulmonary fibrosis on 8L of 02 24/7, HTN, HLD, CAD with 4 stents to the RCA 2018 with Dr Griffith. He is on the lung transplant list at [facility] with Dr Manas Priest (transplant pulmonologist). He was hospitalized in Feb at [facility] with a flare of fibrosis and was in the ICU on inhaled pulmonary vasodialtors. He was being teed up for transplant waiting for immunizations/colonoscopy etc. Over the last three weeks he has had a poor appetite and progressive SOB leaving him unable to walk with a walker more than 10 feet without stopping to catch his breath. He arrived in the ED with severe SOB and hypoxemia placed on BIPAP. Type 1 respiratory failure. Trop and BNP elevation. Admit for acute respiratory failure and pulmonary fibrosis.    *Remains intubated, on pressors. Propofol running @ 13.5 mL/hr (providing ~356 kcal daily), propofol was originally providing ~166 kcals/day, will re-calculate TF recommendations. TF was not running upon visit this morning and yesterday 2/2 no TF pump available. Meeting 0% ENN x past 2 days. Bed scale wt of 163# obtained on 3/31 by RN, ? accuracy. As per previous RD note, wt of 143# obtained on 3/29 w/ trace edema noted. ? wt changes. See below for TF recommendations.     *labs reviewed; 03-31    137  |  104  |  56<H>  ----------------------------<  150<H>  5.3   |  24  |  6.96<H>    Ca    6.9<L>      31 Mar 2023 06:05    TPro  6.5  /  Alb  2.4<L>  /  TBili  2.4<H>  /  DBili  x   /  AST  1404<H>  /  ALT  924<H>  /  AlkPhos  126<H>  03-31    *Elevated BUN/Cr 2/2 renal failure; elevated glucose 2/2 malnutrition / possible thiamine def. / stress / shock     *BM documented: None doc'd; not on bowel regimen.    Diet, NPO with Tube Feed:   Tube Feeding Modality: Orogastric  Glucerna 1.5 El (GLUCERNA1.5)  Total Volume for 24 Hours (mL): 1680  Continuous  Starting Tube Feed Rate {mL per Hour}: 20  Increase Tube Feed Rate by (mL): 10     Every 6 hours  Until Goal Tube Feed Rate (mL per Hour): 70  Tube Feed Duration (in Hours): 24  Tube Feed Start Time: 00:00  Free Water Flush  Free Water Flush Instructions:  Free water flushes per MD (03-29-23 @ 14:57)    ESTIMATED NUTR NEEDS based on 64.8 Kg  5686-2716 Kcal  (30-35 Kcal/Kg)  97.2-129.6 g (1.5-2.0 g/Kg)  2435-9775 mL (30-35 mL/Kg)    RECOMMENDATIONS:  1. Initiate Glucerna 1.5 + 3 pckts Prosource TF starting @ 20 cc/hr until goal rate of 55 cc/hr met (total volume = 1100 mL). Will provide ~2126 kcal (including 356 kcal from propofol), 124 g protein, and 835 mL free water. Consider adding free water flush to maintain hydration as per MD orders and adjust PRN.     2. Continue to monitor propofol levels and adjust TF recs PRN  3. Keep on aspiration precautions - keep head of bed >45 degrees when feeding   4. Recommend MVI w/ minerals daily to ensure 100% RDA met   5. Consider adding thiamine 100 mg daily 2/2 poor PO intake/ malnutrition   6. Obtain Mg and phos levels, monitor lytes and hydration replete prn   7. Monitor bowel movements, if no BM for >3 days, consider implementing bowel regimen.   8. Monitor blood glucose, maintain within 140-180 mg/dL   9. Monitor wt daily to track/trend wt changes  10. Confirm goals of care regarding LONG-TERM nutrition support     *RD will continue to monitor TF and adjust nutrition plan prn*  Lissa Wolff, Dietetic Intern Clinical Nutrition BRIEF Note    *61 y/o M with a PMHx of PSS (scleroderma) on cellcept with pulmonary fibrosis on 8L of 02 24/7, HTN, HLD, CAD with 4 stents to the RCA 2018 with Dr Griffith. He is on the lung transplant list at [facility] with Dr Manas Priest (transplant pulmonologist). He was hospitalized in Feb at [facility] with a flare of fibrosis and was in the ICU on inhaled pulmonary vasodialtors. He was being teed up for transplant waiting for immunizations/colonoscopy etc. Over the last three weeks he has had a poor appetite and progressive SOB leaving him unable to walk with a walker more than 10 feet without stopping to catch his breath. He arrived in the ED with severe SOB and hypoxemia placed on BIPAP. Type 1 respiratory failure. Trop and BNP elevation. Admit for acute respiratory failure and pulmonary fibrosis.    *Remains intubated, on pressors. Propofol running @ 13.5 mL/hr (providing ~356 kcal daily), propofol was originally providing ~166 kcals/day, will re-calculate TF recommendations. TF was not running upon visit this morning and yesterday 2/2 no TF pump available. Meeting 0% ENN x past 2 days. Bed scale wt of 163# obtained on 3/31 by RN, ? accuracy. As per previous RD note, wt of 143# obtained on 3/29 w/ trace edema noted. ? wt changes. See below for TF recommendations.     *labs reviewed; 03-31    137  |  104  |  56<H>  ----------------------------<  150<H>  5.3   |  24  |  6.96<H>    Ca    6.9<L>      31 Mar 2023 06:05    TPro  6.5  /  Alb  2.4<L>  /  TBili  2.4<H>  /  DBili  x   /  AST  1404<H>  /  ALT  924<H>  /  AlkPhos  126<H>  03-31    *Elevated BUN/Cr 2/2 renal failure; elevated glucose 2/2 malnutrition / possible thiamine def. / stress / shock     *BM documented: None doc'd; not on bowel regimen.    Diet, NPO with Tube Feed:   Tube Feeding Modality: Orogastric  Glucerna 1.5 El (GLUCERNA1.5)  Total Volume for 24 Hours (mL): 1680  Continuous  Starting Tube Feed Rate {mL per Hour}: 20  Increase Tube Feed Rate by (mL): 10     Every 6 hours  Until Goal Tube Feed Rate (mL per Hour): 70  Tube Feed Duration (in Hours): 24  Tube Feed Start Time: 00:00  Free Water Flush  Free Water Flush Instructions:  Free water flushes per MD (03-29-23 @ 14:57)    ESTIMATED NUTR NEEDS based on 64.8 Kg  0218-4659 Kcal  (30-35 Kcal/Kg)  97.2-129.6 g (1.5-2.0 g/Kg)  4429-2611 mL (30-35 mL/Kg)    RECOMMENDATIONS:  1. Initiate Glucerna 1.5 + 3 pckts Prosource TF starting @ 20 cc/hr until goal rate of 55 cc/hr met (total volume = 1100 mL). Will provide ~2126 kcal (including 356 kcal from propofol), 124 g protein, and 835 mL free water. Consider adding free water flush to maintain hydration as per MD orders and adjust PRN.     2. Continue to monitor propofol levels and adjust TF recs PRN  3. Keep on aspiration precautions - keep head of bed >45 degrees when feeding   4. Recommend MVI w/ minerals daily to ensure 100% RDA met   5. Consider adding thiamine 100 mg daily 2/2 poor PO intake/ malnutrition   6. Obtain Mg and phos levels, monitor lytes and hydration replete prn   7. Monitor bowel movements, if no BM for >3 days, consider implementing bowel regimen.   8. Monitor blood glucose, maintain within 140-180 mg/dL   9. Monitor wt daily to track/trend wt changes  10. Confirm goals of care regarding LONG-TERM nutrition support     *RD will continue to monitor TF and adjust nutrition plan prn*  Lissa Wolff, Dietetic Intern  Maryanne Bhatt, MS, RDN, [phone]

## 2023-03-31 NOTE — PROGRESS NOTE ADULT - SUBJECTIVE AND OBJECTIVE BOX
NEPHROLOGY INTERVAL HPI/OVERNIGHT EVENTS:    Date of Service: 23 @ 17:28    3/31--Remains on vent.  FIO2 40% now.  Remains on Norepi and Vasopressin.    HPI:  61 yo man with PMHX of Scleroderma treated with Cellcept and pulmonary fibrosis on home O2 8 L.  Hx of CAD ( stents x 4 to RCA)  Admission at [facility] in Feb due to resp distress and treated in ICU with pulmonary vasodilators.  R cath done with severe pulmonary HTN and was to have evaluation done for TP.  Started on Tyvaso several days ago.  Presented with 3 week hx fo progressive SOB, poor appetite and worsening LACEY walking short distance.  Admitted early 3/28 to CCU and placed on Bipap , given IV lasix,  and Zosyn/Azithromax.  CTA negative for PE.  Was awaiting transfer to [facility].  on 3/29--resp status further deteriorate--Intubated  and started on pressors.  Late yesterday, resuscitated post cardiac arrest.  Now noted with acute worsening of renal function.    PMHX and PSHX.  --Pulmonary fibrosis  --Scleroderma  --CAD ( stent x 4)    Home Medications:   * Patient Currently Takes Medications as of 31-Oct-2018 10:21 documented in Structured Notes  · 	Protonix 40 mg oral delayed release tablet: 1 tab(s) orally once a day   · 	Toprol-XL 25 mg oral tablet, extended release: 1 tab(s) orally once a day   · 	clopidogrel 75 mg oral tablet: 1 tab(s) orally once a day  · 	aspirin 81 mg oral tablet, chewable: 1 tab(s) orally once a day  · 	atorvastatin 40 mg oral tablet: 1 tab(s) orally once a day (at bedtime)  · 	quinapril 10 mg oral tablet: 1 tab(s) orally once a dayFAMILY HISTORY:      MEDICATIONS  (STANDING):  artificial tears (preservative free) Ophthalmic Solution 1 Drop(s) Both EYES four times a day  aspirin  chewable 81 milliGRAM(s) Oral daily  atorvastatin 20 milliGRAM(s) Oral at bedtime  atovaquone  Suspension 750 milliGRAM(s) Oral every 12 hours  azithromycin  IVPB 500 milliGRAM(s) IV Intermittent every 24 hours  caspofungin IVPB 50 milliGRAM(s) IV Intermittent every 24 hours  chlorhexidine 0.12% Liquid 15 milliLiter(s) Oral Mucosa every 12 hours  chlorhexidine 4% Liquid 1 Application(s) Topical <User Schedule>  fentaNYL   Infusion. 0.5 MICROgram(s)/kG/Hr (3.52 mL/Hr) IV Continuous <Continuous>  heparin   Injectable 5000 Unit(s) SubCutaneous every 12 hours  meropenem  IVPB 500 milliGRAM(s) IV Intermittent every 24 hours  mycophenolate mofetil Suspension 1000 milliGRAM(s) Oral two times a day  norepinephrine Infusion 0.05 MICROgram(s)/kG/Min (3.3 mL/Hr) IV Continuous <Continuous>  pantoprazole   Suspension 40 milliGRAM(s) Oral daily  propofol Infusion 20 MICROgram(s)/kG/Min (8.45 mL/Hr) IV Continuous <Continuous>  vasopressin Infusion 0.04 Unit(s)/Min (6 mL/Hr) IV Continuous <Continuous>    MEDICATIONS  (PRN):    Vital Signs Last 24 Hrs  T(C): 37.1 (31 Mar 2023 15:00), Max: 38.5 (30 Mar 2023 18:30)  T(F): 98.8 (31 Mar 2023 15:00), Max: 101.3 (30 Mar 2023 18:30)  HR: 79 (31 Mar 2023 15:01) (78 - 98)  BP: --  BP(mean): --  RR: --  SpO2: 93% (31 Mar 2023 15:01) (92% - 100%)    Parameters below as of 31 Mar 2023 14:00  Patient On (Oxygen Delivery Method): ventilator    O2 Concentration (%): 40  Daily     Daily Weight in k.9 (31 Mar 2023 05:22)     @ 07:  -   @ 07:00  --------------------------------------------------------  IN: 3055.3 mL / OUT: 150 mL / NET: 2905.3 mL     @ 07:01  -   @ 17:28  --------------------------------------------------------  IN: 873 mL / OUT: 35 mL / NET: 838 mL    PHYSICAL EXAM:  GENERAL: On vent  CHEST/LUNG: diffuse rhonchi  HEART: S1S2 tachy  ABDOMEN: soft  EXTREMITIES: no edema  SKIN:     LABS:                        11.1   17.40 )-----------( 230      ( 31 Mar 2023 06:05 )             35.7         137  |  104  |  56<H>  ----------------------------<  150<H>  5.3   |  24  |  6.96<H>    Ca    6.9<L>      31 Mar 2023 06:05    TPro  6.5  /  Alb  2.4<L>  /  TBili  2.4<H>  /  DBili  x   /  AST  1404<H>  /  ALT  924<H>  /  AlkPhos  126<H>            ABG - ( 30 Mar 2023 06:20 )  pH, Arterial: 7.29  pH, Blood: x     /  pCO2: 56    /  pO2: 325   / HCO3: 27    / Base Excess: -0.7  /  SaO2: 100                   RADIOLOGY & ADDITIONAL TESTS:

## 2023-03-31 NOTE — PROVIDER CONTACT NOTE (CHANGE IN STATUS NOTIFICATION) - ACTION/TREATMENT ORDERED:
DINESH Villalobos made aware; STAT labs ordered (BMP, Mag and Phos); Order to change Levophed to Phenylephrine; PRN Fentanyl added for pain since Fentanyl gtt off since 1500. Will continue to monitor.

## 2023-03-31 NOTE — PROGRESS NOTE ADULT - ASSESSMENT
Patient with CAD, Pulmonary Fibrosis, Sarcoidosis  was on transplant list, now with worsening hypoxia  likely progression of fibrosis  now intubated, for hypoxic respiratory failure  culture sputum no growth to date fevers, will ask ID to see and expand coverage  shock, s/p arrest ? related to rv dysfunction from pulmonary HTN, or possible sepsis  tube feeds  now acute renal failure, with hyperkalemia, may need to consider dialysis, no emergent need  will discuss with nephology  d/w patient transplant pulmonary, given renal failure, unlikely to be candidate for transplant  will not be transferred at this time   Patient with CAD, Pulmonary Fibrosis, Sarcoidosis  was on transplant list, now with worsening hypoxia  likely progression of fibrosis  now intubated, for hypoxic respiratory failure  culture sputum no growth to date fevers, inc procalcitonin abx changed to Mepron, Meropenem and Caspofungin  shock, s/p arrest ? related to rv dysfunction from pulmonary HTN, or possible sepsis  tube feeds  now acute renal failure, with hyperkalemia, may need to consider dialysis, no emergent need  will discuss with nephology  d/w patient transplant pulmonary, given renal failure, unlikely to be candidate for transplant  will not be transferred at this time

## 2023-03-31 NOTE — PROVIDER CONTACT NOTE (EICU) - BACKGROUND
62M with pmxh scleroderma on cellcept with pulmonary fibrosis (on 8-10L home O2) awaiting lung txp, severe pHTN came in for three weeks of worsening SOB, increasing O2 reqs/hypoxemia and severe LACEY. Patient's course complicated by respiratory failure requiring intubation.  Patient noted to have cardiac arrest x 5 minutes, likely in setting of metabolic derangements. Now on pressors and developing worsening renal failure.     Case discussed with bedside PA with regards to persistent fevers despite antibiotics.     He is with decreasing oxygen requirements but persistent shock state, fevers and worsening leukocytosis.
VSS  labs reviewed  leukocytosis  elevated troponin  elevated BNP

## 2023-04-01 NOTE — PROGRESS NOTE ADULT - SUBJECTIVE AND OBJECTIVE BOX
NEPHROLOGY INTERVAL HPI/OVERNIGHT EVENTS:    Date of Service: 23 @ 10:41    --Remains on vent.  Pressor requirement decreasing.  Oligoanuric,  Awaiting tx to [facility].  3/31--Remains on vent.  FIO2 40% now.  Remains on Norepi and Vasopressin.    HPI:  63 yo man with PMHX of Scleroderma treated with Cellcept and pulmonary fibrosis on home O2 8 L.  Hx of CAD ( stents x 4 to RCA)  Admission at [facility] in Feb due to resp distress and treated in ICU with pulmonary vasodilators.  R cath done with severe pulmonary HTN and was to have evaluation done for TP.  Started on Tyvaso several days ago.  Presented with 3 week hx fo progressive SOB, poor appetite and worsening LACEY walking short distance.  Admitted early 3/28 to CCU and placed on Bipap , given IV lasix,  and Zosyn/Azithromax.  CTA negative for PE.  Was awaiting transfer to [facility].  on 3/29--resp status further deteriorate--Intubated  and started on pressors.  Late yesterday, resuscitated post cardiac arrest.  Now noted with acute worsening of renal function.    PMHX and PSHX.  --Pulmonary fibrosis  --Scleroderma  --CAD ( stent x 4)    Home Medications:   * Patient Currently Takes Medications as of 31-Oct-2018 10:21 documented in Structured Notes  · 	Protonix 40 mg oral delayed release tablet: 1 tab(s) orally once a day   · 	Toprol-XL 25 mg oral tablet, extended release: 1 tab(s) orally once a day   · 	clopidogrel 75 mg oral tablet: 1 tab(s) orally once a day  · 	aspirin 81 mg oral tablet, chewable: 1 tab(s) orally once a day  · 	atorvastatin 40 mg oral tablet: 1 tab(s) orally once a day (at bedtime)  · 	quinapril 10 mg oral tablet: 1 tab(s) orally once a dayFAMILY HISTORY:    MEDICATIONS  (STANDING):  artificial tears (preservative free) Ophthalmic Solution 1 Drop(s) Both EYES four times a day  aspirin  chewable 81 milliGRAM(s) Oral daily  atorvastatin 20 milliGRAM(s) Oral at bedtime  atovaquone  Suspension 750 milliGRAM(s) Oral every 12 hours  caspofungin IVPB 50 milliGRAM(s) IV Intermittent every 24 hours  chlorhexidine 0.12% Liquid 15 milliLiter(s) Oral Mucosa every 12 hours  chlorhexidine 4% Liquid 1 Application(s) Topical <User Schedule>  fentaNYL   Infusion. 0.5 MICROgram(s)/kG/Hr (3.52 mL/Hr) IV Continuous <Continuous>  heparin   Injectable 5000 Unit(s) SubCutaneous every 12 hours  meropenem  IVPB 500 milliGRAM(s) IV Intermittent every 24 hours  mycophenolate mofetil Suspension 1000 milliGRAM(s) Oral two times a day  norepinephrine Infusion 0.05 MICROgram(s)/kG/Min (3.3 mL/Hr) IV Continuous <Continuous>  pantoprazole   Suspension 40 milliGRAM(s) Oral daily  phenylephrine    Infusion 0.1 MICROgram(s)/kG/Min (1.32 mL/Hr) IV Continuous <Continuous>  propofol Infusion 20 MICROgram(s)/kG/Min (8.45 mL/Hr) IV Continuous <Continuous>  vasopressin Infusion 0.04 Unit(s)/Min (6 mL/Hr) IV Continuous <Continuous>    MEDICATIONS  (PRN):    Vital Signs Last 24 Hrs  T(C): 36.3 (2023 08:08), Max: 37.8 (31 Mar 2023 11:00)  T(F): 97.3 (2023 08:08), Max: 100 (31 Mar 2023 11:00)  HR: 81 (2023 09:00) (69 - 108)  BP: --  BP(mean): --  RR: 32 (2023 09:00) (32 - 33)  SpO2: 94% (2023 09:00) (92% - 99%)    Parameters below as of 2023 07:00  Patient On (Oxygen Delivery Method): ventilator    O2 Concentration (%): 50  Daily     Daily Weight in k.6 (2023 05:00)     @ 07:01  -  - @ 07:00  --------------------------------------------------------  IN: 2980 mL / OUT: 70 mL / NET: 2910 mL    PHYSICAL EXAM:  GENERAL: On vent, awake  CHEST/LUNG: scattered rhocnhi  HEART: S1S2 RRR  ABDOMEN: soft  EXTREMITIES:trace edema  SKIN:     LABS:                        10.0   14.02 )-----------( 204      ( 2023 06:00 )             31.1         136  |  101  |  66<H>  ----------------------------<  119<H>  5.0   |  22  |  8.51<H>    Ca    7.2<L>      2023 06:00  Phos  7.7       Mg     2.4         TPro  6.4  /  Alb  2.4<L>  /  TBili  1.7<H>  /  DBili  x   /  AST  748<H>  /  ALT  648<H>  /  AlkPhos  133<H>          Magnesium, Serum: 2.4 mg/dL ( @ 19:15)  Phosphorus Level, Serum: 7.7 mg/dL ( @ 19:15)    ABG - ( 2023 05:42 )  pH, Arterial: 7.31  pH, Blood: x     /  pCO2: 38    /  pO2: 97    / HCO3: 19    / Base Excess: -6.6  /  SaO2: 98                    RADIOLOGY & ADDITIONAL TESTS:

## 2023-04-01 NOTE — PROVIDER CONTACT NOTE (EICU) - SITUATION
vent settings order updated in EMR - as per current vent settings .
62M hx PSS (scleroderma) on cellcept with pulmonary fibrosis on 8L of 02 24/7, HTN HLD  CAD with 4 stents to the RCA 2018 with Dr Griffith.  He is on the lung transplant list at [facility] with Dr Manas Priest (transplant pulmonologist).  He was hospitalized in Feb at [facility] with a flare of fibrosis and was in the ICU on inhaled pulmonary vasodilators.

## 2023-04-01 NOTE — PROVIDER CONTACT NOTE (EICU) - ACTION/TREATMENT ORDERED:
FiO2 increased from 40 to 50% ( /24/50 + 5PEEP) FiO2 increased from 40 to 50% ( /24/50 + 5PEEP)  SBT order placed

## 2023-04-01 NOTE — PROGRESS NOTE ADULT - SUBJECTIVE AND OBJECTIVE BOX
PCP:    REQUESTING PHYSICIAN:    REASON FOR CONSULT:    CHIEF COMPLAINT:    HPI:  HPI:  62 year old man with a history of scleroderma, pulmonary fibrosis with chronic respiratory failure (supplemental O2 dependent; listed on lung-transplant list at [place]), severe pulmonary hypertension, CAD s/p RCA stents (2018), HTN, HLD admitted with acute/chronic respiratory failure (required mechanical ventilation) with hospitalization complicated by cardiac arrest preceded by severe bradycardia (3/29/23).    3/29/23: Pt intubated and sedated.  3/30/23:  Events past 24 hours noted and discussed with CCU team; patient is sedated on vent.  3/31/23: Pt sedated on pressors. Family at bedside  23: Sedated pressors being tapered. Labs reviewed.    PAST MEDICAL & SURGICAL HISTORY:  HTN (hypertension)      Pulmonary fibrosis      Scleroderma          SOCIAL HISTORY:    FAMILY HISTORY:      ALLERGIES:  Allergies    No Known Allergies    Intolerances        MEDICATIONS:    MEDICATIONS  (STANDING):  artificial tears (preservative free) Ophthalmic Solution 1 Drop(s) Both EYES four times a day  aspirin  chewable 81 milliGRAM(s) Oral daily  atorvastatin 20 milliGRAM(s) Oral at bedtime  atovaquone  Suspension 750 milliGRAM(s) Oral every 12 hours  caspofungin IVPB 50 milliGRAM(s) IV Intermittent every 24 hours  chlorhexidine 0.12% Liquid 15 milliLiter(s) Oral Mucosa every 12 hours  chlorhexidine 4% Liquid 1 Application(s) Topical <User Schedule>  heparin   Injectable 5000 Unit(s) SubCutaneous every 12 hours  meropenem  IVPB 500 milliGRAM(s) IV Intermittent every 24 hours  mycophenolate mofetil Suspension 1000 milliGRAM(s) Oral two times a day  norepinephrine Infusion 0.05 MICROgram(s)/kG/Min (3.3 mL/Hr) IV Continuous <Continuous>  pantoprazole   Suspension 40 milliGRAM(s) Oral daily  phenylephrine    Infusion 0.1 MICROgram(s)/kG/Min (1.32 mL/Hr) IV Continuous <Continuous>  propofol Infusion 20 MICROgram(s)/kG/Min (8.45 mL/Hr) IV Continuous <Continuous>  vasopressin Infusion 0.04 Unit(s)/Min (6 mL/Hr) IV Continuous <Continuous>    MEDICATIONS  (PRN):  LORazepam   Injectable 2 milliGRAM(s) IV Push every 2 hours PRN Agitation      REVIEW OF SYSTEMS:    CONSTITUTIONAL: No weakness, fevers or chills  EYES/ENT: No visual changes;  No vertigo or throat pain   NECK: No pain or stiffness  RESPIRATORY: No cough, wheezing, hemoptysis; No shortness of breath  CARDIOVASCULAR: No chest pain or palpitations  GASTROINTESTINAL: No abdominal or epigastric pain. No nausea, vomiting, or hematemesis; No diarrhea or constipation. No melena or hematochezia.  GENITOURINARY: No dysuria, frequency or hematuria  NEUROLOGICAL: No numbness or weakness  SKIN: No itching, burning, rashes, or lesions   All other review of systems is negative unless indicated above    Vital Signs Last 24 Hrs  T(C): 37.1 (2023 15:00), Max: 37.4 (2023 12:00)  T(F): 98.8 (2023 15:00), Max: 99.3 (2023 12:00)  HR: 79 (2023 15:00) (69 - 108)  BP: --  BP(mean): --  RR: 27 (2023 15:00) (27 - 35)  SpO2: 98% (2023 15:00) (92% - 98%)    Parameters below as of 2023 14:00  Patient On (Oxygen Delivery Method): ventilator    O2 Concentration (%): 50Daily     Daily Weight in k.6 (2023 05:00)I&O's Summary    31 Mar 2023 07:01  -  2023 07:00  --------------------------------------------------------  IN: 2980 mL / OUT: 70 mL / NET: 2910 mL        PHYSICAL EXAM:    Constitutional: NAD, awake and alert, well-developed  HEENT: PERR, EOMI,  No oral cyananosis.  Neck:  supple,  No JVD  Respiratory: Breath sounds are clear bilaterally, No wheezing, rales or rhonchi  Cardiovascular: S1 and S2, regular rate and rhythm, no Murmurs, gallops or rubs  Gastrointestinal: Bowel Sounds present, soft, nontender.   Extremities: No peripheral edema. No clubbing or cyanosis.  Vascular: 2+ peripheral pulses  Neurological: A/O x 3, no focal deficits  Musculoskeletal: no calf tenderness.  Skin: No rashes.      LABS: All Labs Reviewed:                        10.0   14.02 )-----------( 204      ( 2023 06:00 )             31.1                         11.1   17.40 )-----------( 230      ( 31 Mar 2023 06:05 )             35.7                         11.4   21.58 )-----------( 260      ( 30 Mar 2023 06:13 )             37.6     2023 06:00    136    |  101    |  66     ----------------------------<  119    5.0     |  22     |  8.51   31 Mar 2023 19:15    135    |  102    |  60     ----------------------------<  121    5.0     |  23     |  7.79   31 Mar 2023 06:05    137    |  104    |  56     ----------------------------<  150    5.3     |  24     |  6.96     Ca    7.2        2023 06:00  Ca    7.1        31 Mar 2023 19:15  Ca    6.9        31 Mar 2023 06:05  Phos  7.7       31 Mar 2023 19:15  Mg     2.4       31 Mar 2023 19:15    TPro  6.4    /  Alb  2.4    /  TBili  1.7    /  DBili  x      /  AST  748    /  ALT  648    /  AlkPhos  133    2023 06:00  TPro  6.5    /  Alb  2.4    /  TBili  2.4    /  DBili  x      /  AST  1404   /  ALT  924    /  AlkPhos  126    31 Mar 2023 06:05  TPro  6.8    /  Alb  2.5    /  TBili  2.6    /  DBili  x      /  AST  1774   /  ALT  1037   /  AlkPhos  128    31 Mar 2023 00:27      CARDIAC MARKERS ( 31 Mar 2023 00:27 )  x     / x     / 228 U/L / x     / x          Blood Culture: Organism --  Gram Stain Blood -- Gram Stain --  Specimen Source .Blood Blood-Peripheral  Culture-Blood --    Organism --  Gram Stain Blood -- Gram Stain --  Specimen Source .Blood None  Culture-Blood --    Organism --  Gram Stain Blood -- Gram Stain   Few polymorphonuclear leukocytes per low power field  No Squamous epithelial cells per low power field  No organisms seen per oil power field  Specimen Source .Sputum Sputum  Culture-Blood --    Organism --  Gram Stain Blood -- Gram Stain --  Specimen Source .Blood None  Culture-Blood --            RADIOLOGY/EKG:      ECHO/CARDIAC CATHTERIZATION/STRESS TEST:

## 2023-04-01 NOTE — PROGRESS NOTE ADULT - ASSESSMENT
61 yo man with scleroderma, pulmonary fibrosis /severe pulmonary HTN admitted with progressive resp failure.  Now intubated and on pressors due to shock and now post cardiac arrest, septic vs cardiogenic.  --CHRIS post CTA ( unclear hx of CKD, creat 1.47 on admission and on ACEI) and shock. oliguric.    No immediate need for dialysis.    Check repeat labs for K and acidosis.    Continue pressor/vent support    Continue abtx.    D/w Dr Watson.    3/31 SY  --CHRIS post cardiac arrest :  Continues to worsen with Oliguria     No immediate need for dialysis.    Will assess again in am    D/w Dr Watson re : poor pulmonary prognosis.    4/1 SY  --CHRIS post cardiac arrest : continues to worsen with no urine output.    No immediate indication for dialysis.    HD to be considered when indicated with electrolyte disturbances.    Oxygenation acceptable.  --Change enteral feeding to Nepro for now.     D/w Dr Watson

## 2023-04-01 NOTE — PROGRESS NOTE ADULT - ASSESSMENT
62 year old male PMHx Scleroderma ( on Cellcept), Resulting Pulmonary Fibrosis, Was Awaiting Lung Transplant, Oxygen Dependant (8-10L Home O2), Pulmonary HTN.  Pt is followed at [facility] and had recent hospitalization at same 2/23 for IPF  flare.  Was Tx with Pulmonary Vasodilators and was sent home on Treprostinil.  Admitted [id number] with 3 weeks of worsening SOB.     Acute on Chronic Hypoxic Respiratory Failure   Acute Hypercapnic Respiratory Failure   Above Likely from Worsening IPF   +/- PNA   Brief Cardiac Arrest    62 year old male PMHx Scleroderma ( on Cellcept), Resulting Pulmonary Fibrosis, Was Awaiting Lung Transplant, Oxygen Dependant (8-10L Home O2), Pulmonary HTN.  Pt is followed at [facility] and had recent hospitalization at same 2/23 for IPF  flare.  Was Tx with Pulmonary Vasodilators and was sent home on Treprostinil.  Admitted [id number] with 3 weeks of worsening SOB.     Acute on Chronic Hypoxic Respiratory Failure   Acute Hypercapnic Respiratory Failure   Above Likely from Worsening IPF   +/- PNA   Brief Cardiac Arrest   Ischemic Transaminitis   Oliguric CHRIS       Sedation with Diprivan for Vent compliance   CMV with Lung protective setting   Have titrated down Fio2 to 40% - Will cont to titre down to maintain sats > 90%   Normally on 8-10L at home  Off vasopressin  Levophed dose decreasing - continue to titrate to off   Keep MAP > 65   TF on Hold - Episode of emesis on Days - NGT to LCWS revealing dark coffee grnd type out put  Will Increase PPI to BID -  Cont to trend Renal Indicis and UOP   REnal Following - May require HD - No urgent need   Broad spectrum ABX and Antifungals   ID consulted   DVT PPX   GOC ongoing    62 year old male PMHx Scleroderma ( on Cellcept), Resulting Pulmonary Fibrosis, Was Awaiting Lung Transplant, Oxygen Dependant (8-10L Home O2), Pulmonary HTN.  Pt is followed at [facility] and had recent hospitalization at same 2/23 for IPF  flare.  Was Tx with Pulmonary Vasodilators and was sent home on Treprostinil.  Admitted [id number] with 3 weeks of worsening SOB.     Acute on Chronic Hypoxic Respiratory Failure   Acute Hypercapnic Respiratory Failure   Above Likely from Worsening IPF   +/- PNA   Brief Cardiac Arrest   Ischemic Transaminitis   Oliguric CHRIS       Sedation with Diprivan for Vent compliance   CMV with Lung protective setting   Have titrated down Fio2 to 40% - Will cont to titre down to maintain sats > 90%   Normally on 8-10L at home  Cont Cellcept   Off vasopressin  Levophed dose decreasing - continue to titrate to off   Keep MAP > 65   TF on Hold - Episode of emesis on Days - NGT to LCWS revealing dark coffee grnd type out put  Will Increase PPI to BID -  Cont to trend Renal Indicis and UOP   Renal Following - May require HD - No urgent need   Broad spectrum ABX and Antifungals   ID consulted  DVT PPX   GOC ongoing

## 2023-04-01 NOTE — PROGRESS NOTE ADULT - SUBJECTIVE AND OBJECTIVE BOX
Events Overnight: on lower pressor dose now on 0.2 Levophed, creatiine continues to rise, on vent  off sedation nods but     disccoordinated with vent    HPI:      62M hx PSS (scleroderma) on cellcept with pulmonary fibrosis on 8L of 02 24/7, HTN HLD  CAD with 4 stents to the RCA 2018 with Dr Griffith.  He is on the lung transplant list at [facility] with Dr Manas Priest (transplant pulmonologist).  He was hospitalized in Feb at [facility] with a flare of fibrosis and was in the ICU on inhaled pulmonary vasodialtors.    At that time he had a R heart cath and he was told that he has severe pulmonary hypertension.,   He was  being teed up for transplant waiting for immunizations/colonoscopy etc.      Over the last three weeks he has had a poor appetite and progressive SOB leaving him unable to walk with a walker more than 10 feet without stopping to catch his breath.    He arrived in the ED with severe SOB and hypoxemia placed on BIPAP.  Type 1 respiratory failure   Trop and BNP elevation  (28 Mar 2023 02:01)  3/29 am was intubated, had approximately 5 minute cardiac arrest/ now on pressors and developing worsening renal renal failure  creatinine has increased now 8 , on levophed,m vasopressin off  echo, dec rv function and severe pulmonary htn  Fevers sputum, cultures negative       PMH:       as above     MEDICATIONS  (STANDING):  artificial tears (preservative free) Ophthalmic Solution 1 Drop(s) Both EYES four times a day  aspirin  chewable 81 milliGRAM(s) Oral daily  atorvastatin 20 milliGRAM(s) Oral at bedtime  atovaquone  Suspension 750 milliGRAM(s) Oral every 12 hours  azithromycin  IVPB 500 milliGRAM(s) IV Intermittent every 24 hours  caspofungin IVPB 50 milliGRAM(s) IV Intermittent every 24 hours  chlorhexidine 0.12% Liquid 15 milliLiter(s) Oral Mucosa every 12 hours  chlorhexidine 4% Liquid 1 Application(s) Topical <User Schedule>  fentaNYL   Infusion. 0.5 MICROgram(s)/kG/Hr (3.52 mL/Hr) IV Continuous <Continuous>  heparin   Injectable 5000 Unit(s) SubCutaneous every 12 hours  meropenem  IVPB 500 milliGRAM(s) IV Intermittent every 24 hours  mycophenolate mofetil Suspension 1000 milliGRAM(s) Oral two times a day  norepinephrine Infusion 0.05 MICROgram(s)/kG/Min (3.3 mL/Hr) IV Continuous <Continuous>  pantoprazole   Suspension 40 milliGRAM(s) Oral daily  phenylephrine    Infusion 0.1 MICROgram(s)/kG/Min (1.32 mL/Hr) IV Continuous <Continuous>  propofol Infusion 20 MICROgram(s)/kG/Min (8.45 mL/Hr) IV Continuous <Continuous>  vasopressin Infusion 0.04 Unit(s)/Min (6 mL/Hr) IV Continuous <Continuous>    MEDICATIONS  (PRN):  fentaNYL    Injectable 50 MICROGram(s) IV Push every 2 hours PRN Moderate Pain (4 - 6)    ICU Vital Signs Last 24 Hrs  T(C): 36.8 (01 Apr 2023 05:00), Max: 37.8 (31 Mar 2023 10:00)  T(F): 98.2 (01 Apr 2023 05:00), Max: 100 (31 Mar 2023 10:00)  HR: 71 (01 Apr 2023 06:00) (69 - 108)  BP: --  BP(mean): --  ABP: 109/66 (01 Apr 2023 06:00) (81/53 - 127/77)  ABP(mean): 84 (01 Apr 2023 06:00) (64 - 97)  RR: --  SpO2: 97% (01 Apr 2023 06:00) (92% - 100%)    O2 Parameters below as of 31 Mar 2023 18:00  Patient On (Oxygen Delivery Method): ventilator    O2 Concentration (%): 40    Physical Exam    General - ill, thin  Neuro off sedation, nots  HEENT - orally intubated, Himanshu gastric tube  lungs bilateral rhonchi, sputum culture negative   cv rrr  abdomen soft, non tender  extremities warm  skin no rash    Mode: AC/ CMV (Assist Control/ Continuous Mandatory Ventilation)  RR (machine): 24  TV (machine): 450  FiO2: 50  PEEP: 5  ITime: 0.8  MAP: 11  PIP: 27    I&O's Summary    31 Mar 2023 07:01  -  01 Apr 2023 07:00  --------------------------------------------------------  IN: 2980 mL / OUT: 70 mL / NET: 2910 mL                          10.0   14.02 )-----------( 204      ( 01 Apr 2023 06:00 )             31.1     04-01    136  |  101  |  66<H>  ----------------------------<  119<H>  5.0   |  22  |  8.51<H>    Ca    7.2<L>      01 Apr 2023 06:00  Phos  7.7     03-31  Mg     2.4     03-31    TPro  6.4  /  Alb  2.4<L>  /  TBili  1.7<H>  /  DBili  x   /  AST  748<H>  /  ALT  648<H>  /  AlkPhos  133<H>  04-01      CARDIAC MARKERS ( 31 Mar 2023 00:27 )  x     / x     / 228 U/L / x     / x      CARDIAC MARKERS ( 30 Mar 2023 14:15 )  x     / x     / 258 U/L / x     / x        ABG - ( 01 Apr 2023 05:42 )  pH, Arterial: 7.31  pH, Blood: x     /  pCO2: 38    /  pO2: 97    / HCO3: 19    / Base Excess: -6.6  /  SaO2: 98        DVT Prophylaxis:   Heparin subq                                                             Advanced Directives:  Full Code

## 2023-04-01 NOTE — PROGRESS NOTE ADULT - SUBJECTIVE AND OBJECTIVE BOX
Patient is a 62y old  Male who presents with a chief complaint of SOB hypoxemia (01 Apr 2023 15:49)      BRIEF HOSPITAL COURSE: 62 year old male PMHx Scleroderma ( on Cellcept), Resulting Pulmonary Fibrosis, Was Awaiting Lung Transplant, Oxygen Dependant (8-10L Home O2), Pulmonary HTN.  Pt is followed at [facility] and had recent hospitalization at [facility] 2/23 for IPF  flare.  Was Tx with Pulmonary Vasodilators and was sent home on Treprostinil.  Admitted 3/[id number] with 3 weeks of worsening SOB.  Was intubated 4/29/23 after failing Trial of NIV with Bipap.  Course complicated by brief Bradycardia to Cardiac Arrest, Shock state requiring Vasopressor support and worsening CHRIS.    Events last 24 hours: Remains Sedated VDRF, Vasopressin off and Levophed dose decreasing.  Fio2 Down to 40%.    PAST MEDICAL & SURGICAL HISTORY:  HTN (hypertension)  Pulmonary fibrosis  Scleroderma          Review of Systems: Unable to access     Medications:  atovaquone  Suspension 750 milliGRAM(s) Oral every 12 hours  caspofungin IVPB 50 milliGRAM(s) IV Intermittent every 24 hours  meropenem  IVPB 500 milliGRAM(s) IV Intermittent every 24 hours  norepinephrine Infusion 0.05 MICROgram(s)/kG/Min IV Continuous <Continuous>  phenylephrine    Infusion 0.1 MICROgram(s)/kG/Min IV Continuous <Continuous>  LORazepam   Injectable 2 milliGRAM(s) IV Push every 2 hours PRN  propofol Infusion 20 MICROgram(s)/kG/Min IV Continuous <Continuous>  aspirin  chewable 81 milliGRAM(s) Oral daily  heparin   Injectable 5000 Unit(s) SubCutaneous every 12 hours  pantoprazole  Injectable 40 milliGRAM(s) IV Push two times a day  atorvastatin 20 milliGRAM(s) Oral at bedtime  vasopressin Infusion 0.04 Unit(s)/Min IV Continuous <Continuous>  mycophenolate mofetil Suspension 1000 milliGRAM(s) Oral two times a day  artificial tears (preservative free) Ophthalmic Solution 1 Drop(s) Both EYES four times a day  chlorhexidine 0.12% Liquid 15 milliLiter(s) Oral Mucosa every 12 hours  chlorhexidine 4% Liquid 1 Application(s) Topical <User Schedule>        Mode: AC/ CMV (Assist Control/ Continuous Mandatory Ventilation)  RR (machine): 24  TV (machine): 450  FiO2: 40  PEEP: 5  ITime: 0.8  MAP: 11  PIP: 21      ICU Vital Signs Last 24 Hrs  T(C): 36.7 (01 Apr 2023 22:00), Max: 37.4 (01 Apr 2023 12:00)  T(F): 98.1 (01 Apr 2023 22:00), Max: 99.3 (01 Apr 2023 12:00)  HR: 67 (01 Apr 2023 23:20) (67 - 91)  ABP: 97/60 (01 Apr 2023 22:00) (82/54 - 127/77)  ABP(mean): 76 (01 Apr 2023 22:00) (66 - 97)  RR: 32 (01 Apr 2023 22:00) (27 - 35)  SpO2: 98% (01 Apr 2023 23:20) (92% - 100%)    O2 Parameters below as of 01 Apr 2023 18:00  Patient On (Oxygen Delivery Method): ventilator    O2 Concentration (%): 50        ABG - ( 01 Apr 2023 05:42 )  pH, Arterial: 7.31  pH, Blood: x     /  pCO2: 38    /  pO2: 97    / HCO3: 19    / Base Excess: -6.6  /  SaO2: 98                  I&O's Detail    31 Mar 2023 07:01  -  01 Apr 2023 07:00  --------------------------------------------------------  IN:    FentaNYL: 123 mL    Glucerna 1.5: 840 mL    IV PiggyBack: 750 mL    Norepinephrine: 703 mL    Propofol: 415 mL    Vasopressin: 149 mL  Total IN: 2980 mL    OUT:    Indwelling Catheter - Urethral (mL): 70 mL  Total OUT: 70 mL    Total NET: 2910 mL      01 Apr 2023 07:01  -  01 Apr 2023 23:43  --------------------------------------------------------  IN:    IV PiggyBack: 500 mL    Nepro with Carb Steady: 450 mL    Norepinephrine: 167 mL    Propofol: 165 mL  Total IN: 1282 mL    OUT:  Total OUT: 0 mL    Total NET: 1282 mL            LABS:                        10.0   14.02 )-----------( 204      ( 01 Apr 2023 06:00 )             31.1     04-01    136  |  101  |  66<H>  ----------------------------<  119<H>  5.0   |  22  |  8.51<H>    Ca    7.2<L>      01 Apr 2023 06:00  Phos  7.7     03-31  Mg     2.4     03-31    TPro  6.4  /  Alb  2.4<L>  /  TBili  1.7<H>  /  DBili  x   /  AST  748<H>  /  ALT  648<H>  /  AlkPhos  133<H>  04-01      CARDIAC MARKERS ( 31 Mar 2023 00:27 )  x     / x     / 228 U/L / x     / x          CAPILLARY BLOOD GLUCOSE            CULTURES:  Culture Results:   Culture in progress (03-31 @ 11:00)  Culture Results:   No growth to date. (03-30 @ 09:21)  Culture Results:   No growth to date. (03-30 @ 09:20)  Culture Results:   No growth at 48 hours (03-29 @ 16:59)  Culture Results:   No growth to date. (03-28 @ 00:10)  Culture Results:   No growth to date. (03-28 @ 00:10)  Rapid RVP Result: NotDetec (03-27 @ 23:00)      Physical Examination:    General: Sedated RASS -2, CAM unable to access     HEENT: Pupils equal, reactive to light.  Symmetric. No JVD.  ET / NGT     PULM: Coarse Rhonchi predominate anterior fields bilaterally, no significant sputum production    CVS: Regular rate and rhythm, no murmurs, rubs, or gallops    ABD: Soft, nondistended, nontender, normoactive bowel sounds, no masses    EXT: No edema, nontender    SKIN: Warm and well perfused, no rashes noted.        RADIOLOGY:   rad< from: Xray Chest 1 View-PORTABLE IMMEDIATE (Xray Chest 1 View-PORTABLE IMMEDIATE .) (03.29.23 @ 18:33) >    ACC: [id number] EXAM:  XR CHEST PORTABLE IMMED 1V   ORDERED BY: RALPH PRATER     PROCEDURE DATE:  03/29/2023          INTERPRETATION:  Portable chest radiograph    CLINICAL INFORMATION:   Short of breath.    TECHNIQUE:  Portable  AP view of the chest was obtained.    COMPARISON: 3/29/2023 chest x-ray available for review.    FINDINGS: ET tube tip above tracheal bifurcation.  NG tube tip beyond GE junction.  LEFT IJ catheter tip in SVC.      There is cardiomegaly, vascular congestion and perihilar interstitial   infiltrates without gross effusion.  Trachea midline. No hilar mass.  Visualized osseous structures are intact.        IMPRESSION: Catheters and tubes in place. An  Cardiomegaly.  Bilateral perihilar/bibasilar diffuse airspace disease.    < end of copied text >      CRITICAL CARE TIME SPENT: 50 minutes   (Assessing presenting problems of acute illness, which pose high probability of life threatening deterioration or end organ damage/dysfunction, as well as medical decision making including initiating plan of care, reviewing data, reviewing radiologic exams, discussing with multidisciplinary team,  discussing goals of care with patient/family, and writing this note.  Non-inclusive of procedures performed)

## 2023-04-01 NOTE — PROGRESS NOTE ADULT - PROBLEM SELECTOR PLAN 2
Resuscitated cardiac arrest - preceded by bradycardic and likely related to respiratory failure; now hypotensive and requiring vasopressors. Attempts to wean continue.   Currently in SR with normal rate.    * Case discussed with Family at bedside. Transfer to [place] when stable

## 2023-04-01 NOTE — PROGRESS NOTE ADULT - ASSESSMENT
Patient with CAD, Pulmonary Fibrosis, Sarcoidosis  was on Lung transplant list, now with worsening hypoxia  likely progression of fibrosis  was intubated on 3/29, for hypoxic respiratory failure  culture sputum no growth to date fevers, inc procalcitonin abx changed to Mepron, Meropenem and Caspofungin  shock possible septic , s/p arrest ? related to rv dysfunction from pulmonary HTN, or possible sepsis  pressors decreasing,   tube feeds  now acute renal failure, will discuss [facility] nephrology timing of dialysis  d/w patient transplant pulmonologist, given renal failure, not candidate for transplant  will not be transferred at this time  Patient full COde

## 2023-04-02 NOTE — PROVIDER CONTACT NOTE (EICU) - ACTION/TREATMENT ORDERED:
EMR vent order updated to reflect current vent settings as per requirement ;  [id number]/ 24/ 35 + 5 PEEP

## 2023-04-02 NOTE — PROGRESS NOTE ADULT - SUBJECTIVE AND OBJECTIVE BOX
Events Overnight: afebrile, on 0.08 of Levophed, on vent, worsening renal failure    HPI:  62M hx PSS (scleroderma) on cellcept with pulmonary fibrosis on 8L of 02 24/7, HTN HLD  CAD with 4 stents to the RCA 2018 with Dr Griffith.  He is on the lung transplant list at [facility] with Dr Manas Priest (transplant pulmonologist).  He was hospitalized in Feb at [facility] with a flare of fibrosis and was in the ICU on inhaled pulmonary vasodialtors.    At that time he had a R heart cath and he was told that he has severe pulmonary hypertension.,    Over   three weeks PTA he   had a poor appetite and progressive SOB leaving him unable to walk with a walker more than 10 feet without stopping to catch his breath.    He arrived in the ED with severe SOB and hypoxemia placed on BIPAP.  Type 1 respiratory failure   Trop and BNP elevation  (28 Mar 2023 02:01)  3/29 am was intubated, had approximately 5 minute cardiac arrest/ now on pressors and developing worsening renal renal failure  creatinine has increased now 10, on levophed,m vasopressin off  echo, dec rv function and severe pulmonary htn  had Fevers sputum, cultures negative, Fungitell negative    PMH:        as above     ROS cant obtain     MEDICATIONS  (STANDING):  artificial tears (preservative free) Ophthalmic Solution 1 Drop(s) Both EYES four times a day  aspirin  chewable 81 milliGRAM(s) Oral daily  atorvastatin 20 milliGRAM(s) Oral at bedtime  atovaquone  Suspension 750 milliGRAM(s) Oral every 12 hours  caspofungin IVPB 50 milliGRAM(s) IV Intermittent every 24 hours  chlorhexidine 0.12% Liquid 15 milliLiter(s) Oral Mucosa every 12 hours  chlorhexidine 4% Liquid 1 Application(s) Topical <User Schedule>  heparin   Injectable 5000 Unit(s) SubCutaneous every 12 hours  meropenem  IVPB 500 milliGRAM(s) IV Intermittent every 24 hours  mycophenolate mofetil Suspension 1000 milliGRAM(s) Oral two times a day  norepinephrine Infusion 0.05 MICROgram(s)/kG/Min (3.3 mL/Hr) IV Continuous <Continuous>  pantoprazole  Injectable 40 milliGRAM(s) IV Push two times a day  phenylephrine    Infusion 0.1 MICROgram(s)/kG/Min (1.32 mL/Hr) IV Continuous <Continuous>  propofol Infusion 20 MICROgram(s)/kG/Min (8.45 mL/Hr) IV Continuous <Continuous>  vasopressin Infusion 0.04 Unit(s)/Min (6 mL/Hr) IV Continuous <Continuous>    MEDICATIONS  (PRN):  LORazepam   Injectable 2 milliGRAM(s) IV Push every 2 hours PRN Agitation    ICU Vital Signs Last 24 Hrs  T(C): 36.6 (02 Apr 2023 06:00), Max: 37.4 (01 Apr 2023 12:00)  T(F): 97.9 (02 Apr 2023 06:00), Max: 99.3 (01 Apr 2023 12:00)  HR: 75 (02 Apr 2023 06:00) (66 - 91)  BP: --  BP(mean): --  ABP: 104/63 (02 Apr 2023 06:00) (82/54 - 114/64)  ABP(mean): 79 (02 Apr 2023 06:00) (66 - 84)  RR: 39 (02 Apr 2023 06:00) (27 - 39)  SpO2: 96% (02 Apr 2023 06:00) (92% - 100%)    O2 Parameters below as of 01 Apr 2023 18:00  Patient On (Oxygen Delivery Method): ventilator    O2 Concentration (%): 50        Mode: AC/ CMV (Assist Control/ Continuous Mandatory Ventilation)  RR (machine): 24  TV (machine): 450  FiO2: 40  PEEP: 5  ITime: 0.8  MAP: 11  PIP: 23      I&O's Summary    01 Apr 2023 07:01  -  02 Apr 2023 07:00  --------------------------------------------------------  IN: 1282 mL / OUT: 0 mL / NET: 1282 mL    Physical Exam    General - ill, thin  Neuro off sedation, nods  HEENT - orally intubated, Himanshu gastric tube  lungs bilateral rhonchi, sputum culture negative   cv rrr  abdomen -  soft, non tender  extremities - warmcontracted fingers  skin no rash                        9.4    13.49 )-----------( 188      ( 02 Apr 2023 05:40 )             29.0       04-02    135  |  101  |  83<H>  ----------------------------<  100<H>  4.8   |  20<L>  |  10.10<H>    Ca    7.3<L>      02 Apr 2023 05:40  Phos  8.9     04-02  Mg     2.7     04-02    TPro  x   /  Alb  2.4<L>  /  TBili  x   /  DBili  x   /  AST  x   /  ALT  x   /  AlkPhos  x   04-02    ABG - ( 02 Apr 2023 05:40 )  pH, Arterial: 7.36  pH, Blood: x     /  pCO2: 37    /  pO2: 94    / HCO3: 21    / Base Excess: -4.0  /  SaO2: 100       DVT Prophylaxis:   Heparin subq                                                              Advanced Directives: Full Code

## 2023-04-02 NOTE — PROGRESS NOTE ADULT - ASSESSMENT
61 yo man with scleroderma, pulmonary fibrosis /severe pulmonary HTN admitted with progressive resp failure.  Now intubated and on pressors due to shock and now post cardiac arrest, septic vs cardiogenic.  --CHRIS post CTA ( unclear hx of CKD, creat 1.47 on admission and on ACEI) and shock. oliguric.    No immediate need for dialysis.    Check repeat labs for K and acidosis.    Continue pressor/vent support    Continue abtx.    D/w Dr Watson.    3/31 SY  --CHRIS post cardiac arrest :  Continues to worsen with Oliguria     No immediate need for dialysis.    Will assess again in am    D/w Dr Watson re : poor pulmonary prognosis.    4/1 SY  --CHRIS post cardiac arrest : continues to worsen with no urine output.    No immediate indication for dialysis.    HD to be considered when indicated with electrolyte disturbances.    Oxygenation acceptable.  --Change enteral feeding to Nepro for now.     D/w Dr Watson    4/2 SY  --CHRIS post cardiac arrest : continues to worsen with no urine output.     Continue to assess for indications for dialysis.     No urgent need for dialysis today as electrolytes acceptable.     Would allow more time for BP to stabilize.  --Oxygenation acceptable.  --ID : on Meropenem and Caspofungin  --Scleroderma : continue MMF.  --Severe pulmonary fibrosis and HTN : Not a TP candidate at this time.

## 2023-04-02 NOTE — PROGRESS NOTE ADULT - ASSESSMENT
Patient with CAD, Pulmonary Fibrosis, Sarcoidosis    1. Acute on chronic respiratory failure  2. CHRIS acute on chronic, likely secondary to ATN  3. Shock,possible septic, or related to arrest, increased procal, sputum culture negative    was on Lung transplant list, had worsening hypoxia  likely progression of fibrosis  was intubated on 3/29, for hypoxic respiratory failure  culture sputum no growth to date fevers, inc procalcitonin abx changed to Mepron, Meropenem and Caspofungin  Fungitell negative, will d/w ID d/c antifungal coverage  shock possible septic , s/p arrest ? related to rv dysfunction from pulmonary HTN, or possible sepsis  pressors decreasing was on 0.8 of Levophed, now on 0.08   had increased ngt residual overnight,   now acute renal failure, will discuss [facility] nephrology timing of dialysis possible today, creatinine 10  d/w patient transplant pulmonologist, given renal failure, not candidate for transplant at this time     Patient full COde

## 2023-04-02 NOTE — PROGRESS NOTE ADULT - SUBJECTIVE AND OBJECTIVE BOX
NEPHROLOGY INTERVAL HPI/OVERNIGHT EVENTS:    Date of Service: 23 @ 09:30    --On vent,   O2 sat stable.  Remains oligoanuric.  Remains on lower dose of Norepi and Vasopressin  --Remains on vent.  Pressor requirement decreasing.  Oligoanuric,  Awaiting tx to [facility].  3/31--Remains on vent.  FIO2 40% now.  Remains on Norepi and Vasopressin.    HPI:  61 yo man with PMHX of Scleroderma treated with Cellcept and pulmonary fibrosis on home O2 8 L.  Hx of CAD ( stents x 4 to RCA)  Admission at [facility] in Feb due to resp distress and treated in ICU with pulmonary vasodilators.  R cath done with severe pulmonary HTN and was to have evaluation done for TP.  Started on Tyvaso several days ago.  Presented with 3 week hx fo progressive SOB, poor appetite and worsening LACEY walking short distance.  Admitted early 3/28 to CCU and placed on Bipap , given IV lasix,  and Zosyn/Azithromax.  CTA negative for PE.  Was awaiting transfer to [facility].  on 3/29--resp status further deteriorate--Intubated  and started on pressors.  Late yesterday, resuscitated post cardiac arrest.  Now noted with acute worsening of renal function.    PMHX and PSHX.  --Pulmonary fibrosis  --Scleroderma  --CAD ( stent x 4)    Home Medications:   * Patient Currently Takes Medications as of 31-Oct-2018 10:21 documented in Structured Notes  · 	Protonix 40 mg oral delayed release tablet: 1 tab(s) orally once a day   · 	Toprol-XL 25 mg oral tablet, extended release: 1 tab(s) orally once a day   · 	clopidogrel 75 mg oral tablet: 1 tab(s) orally once a day  · 	aspirin 81 mg oral tablet, chewable: 1 tab(s) orally once a day  · 	atorvastatin 40 mg oral tablet: 1 tab(s) orally once a day (at bedtime)  · 	quinapril 10 mg oral tablet: 1 tab(s) orally once a dayFAMILY HISTORY:    MEDICATIONS  (STANDING):  artificial tears (preservative free) Ophthalmic Solution 1 Drop(s) Both EYES four times a day  aspirin  chewable 81 milliGRAM(s) Oral daily  atorvastatin 20 milliGRAM(s) Oral at bedtime  atovaquone  Suspension 750 milliGRAM(s) Oral every 12 hours  caspofungin IVPB 50 milliGRAM(s) IV Intermittent every 24 hours  chlorhexidine 0.12% Liquid 15 milliLiter(s) Oral Mucosa every 12 hours  chlorhexidine 4% Liquid 1 Application(s) Topical <User Schedule>  heparin   Injectable 5000 Unit(s) SubCutaneous every 12 hours  meropenem  IVPB 500 milliGRAM(s) IV Intermittent every 24 hours  mycophenolate mofetil Suspension 1000 milliGRAM(s) Oral two times a day  norepinephrine Infusion 0.05 MICROgram(s)/kG/Min (3.3 mL/Hr) IV Continuous <Continuous>  pantoprazole  Injectable 40 milliGRAM(s) IV Push two times a day  propofol Infusion 20 MICROgram(s)/kG/Min (8.45 mL/Hr) IV Continuous <Continuous>  vasopressin Infusion 0.04 Unit(s)/Min (6 mL/Hr) IV Continuous <Continuous>    MEDICATIONS  (PRN):  LORazepam   Injectable 2 milliGRAM(s) IV Push every 2 hours PRN Agitation    Vital Signs Last 24 Hrs  T(C): 36.2 (2023 09:00), Max: 37.4 (2023 12:00)  T(F): 97.2 (2023 09:00), Max: 99.3 (2023 12:00)  HR: 67 (2023 09:00) (66 - 91)  BP: --  BP(mean): --  RR: 35 (2023 09:00) (27 - 39)  SpO2: 100% (2023 09:00) (92% - 100%)    Parameters below as of 2023 07:00  Patient On (Oxygen Delivery Method): ventilator    O2 Concentration (%): 40  Daily     Daily Weight in k.6 (2023 06:00)    - @ 07:01  -  -02 @ 07:00  --------------------------------------------------------  IN: 1282 mL / OUT: 0 mL / NET: 1282 mL    PHYSICAL EXAM:  GENERAL: On vent  CHEST/LUNG: fair air entry,  decreased rhonchi  HEART: S1S2 RRR  ABDOMEN: soft  EXTREMITIES: 1+ edema  SKIN:     LABS:                        9.4    13.49 )-----------( 188      ( 2023 05:40 )             29.0     04-02    135  |  101  |  83<H>  ----------------------------<  100<H>  4.8   |  20<L>  |  10.10<H>    Ca    7.3<L>      2023 05:40  Phos  8.9     -  Mg     2.7     -    TPro  x   /  Alb  2.4<L>  /  TBili  x   /  DBili  x   /  AST  x   /  ALT  x   /  AlkPhos  x   -    Phosphorus Level, Serum: 8.9 mg/dL ( @ 05:40)  Magnesium, Serum: 2.7 mg/dL ( @ 05:40)    ABG - ( 2023 05:40 )  pH, Arterial: 7.36  pH, Blood: x     /  pCO2: 37    /  pO2: 94    / HCO3: 21    / Base Excess: -4.0  /  SaO2: 100                   RADIOLOGY & ADDITIONAL TESTS:

## 2023-04-02 NOTE — PROGRESS NOTE ADULT - SUBJECTIVE AND OBJECTIVE BOX
PCP:    REQUESTING PHYSICIAN:    REASON FOR CONSULT:    CHIEF COMPLAINT:    HPI:  HPI:  62 year old man with a history of scleroderma, pulmonary fibrosis with chronic respiratory failure (supplemental O2 dependent; listed on lung-transplant list at [facility]), severe pulmonary hypertension, CAD s/p RCA stents (2018), HTN, HLD admitted with acute/chronic respiratory failure (required mechanical ventilation) with hospitalization complicated by cardiac arrest preceded by severe bradycardia (3/29/23).    3/29/23: Pt intubated and sedated.  3/30/23:  Events past 24 hours noted and discussed with CCU team; patient is sedated on vent.  3/31/23: Pt sedated on pressors. Family at bedside  23: Sedated pressors being tapered. Labs reviewed.  23: Pressors weaning. Arousable    PAST MEDICAL & SURGICAL HISTORY:  HTN (hypertension)      Pulmonary fibrosis      Scleroderma          SOCIAL HISTORY:    FAMILY HISTORY:      ALLERGIES:  Allergies    No Known Allergies    Intolerances        MEDICATIONS:    MEDICATIONS  (STANDING):  artificial tears (preservative free) Ophthalmic Solution 1 Drop(s) Both EYES four times a day  aspirin  chewable 81 milliGRAM(s) Oral daily  atorvastatin 20 milliGRAM(s) Oral at bedtime  atovaquone  Suspension 750 milliGRAM(s) Oral every 12 hours  caspofungin IVPB 50 milliGRAM(s) IV Intermittent every 24 hours  chlorhexidine 0.12% Liquid 15 milliLiter(s) Oral Mucosa every 12 hours  chlorhexidine 4% Liquid 1 Application(s) Topical <User Schedule>  heparin   Injectable 5000 Unit(s) SubCutaneous every 12 hours  meropenem  IVPB 500 milliGRAM(s) IV Intermittent every 24 hours  mycophenolate mofetil Suspension 1000 milliGRAM(s) Oral two times a day  norepinephrine Infusion 0.05 MICROgram(s)/kG/Min (3.3 mL/Hr) IV Continuous <Continuous>  pantoprazole  Injectable 40 milliGRAM(s) IV Push two times a day  propofol Infusion 20 MICROgram(s)/kG/Min (8.45 mL/Hr) IV Continuous <Continuous>  vasopressin Infusion 0.04 Unit(s)/Min (6 mL/Hr) IV Continuous <Continuous>    MEDICATIONS  (PRN):  LORazepam   Injectable 2 milliGRAM(s) IV Push every 2 hours PRN Agitation      REVIEW OF SYSTEMS:    CONSTITUTIONAL: No weakness, fevers or chills  EYES/ENT: No visual changes;  No vertigo or throat pain   NECK: No pain or stiffness  RESPIRATORY: No cough, wheezing, hemoptysis; No shortness of breath  CARDIOVASCULAR: No chest pain or palpitations  GASTROINTESTINAL: No abdominal or epigastric pain. No nausea, vomiting, or hematemesis; No diarrhea or constipation. No melena or hematochezia.  GENITOURINARY: No dysuria, frequency or hematuria  NEUROLOGICAL: No numbness or weakness  SKIN: No itching, burning, rashes, or lesions   All other review of systems is negative unless indicated above    Vital Signs Last 24 Hrs  T(C): 36.2 (2023 09:00), Max: 37.4 (2023 12:00)  T(F): 97.2 (2023 09:00), Max: 99.3 (2023 12:00)  HR: 67 (2023 09:00) (66 - 91)  BP: --  BP(mean): --  RR: 35 (2023 09:00) (27 - 39)  SpO2: 100% (2023 09:00) (92% - 100%)    Parameters below as of 2023 07:00  Patient On (Oxygen Delivery Method): ventilator    O2 Concentration (%): 40Daily     Daily Weight in k.6 (2023 06:00)I&O's Summary    2023 07:01  -  2023 07:00  --------------------------------------------------------  IN: 1282 mL / OUT: 0 mL / NET: 1282 mL        PHYSICAL EXAM:    Constitutional: NAD, awake and alert, well-developed  HEENT: PERR, EOMI,  No oral cyananosis.  Neck:  supple,  No JVD  Respiratory: Breath sounds are clear bilaterally, No wheezing, rales or rhonchi  Cardiovascular: S1 and S2, regular rate and rhythm, no Murmurs, gallops or rubs  Gastrointestinal: Bowel Sounds present, soft, nontender.   Extremities: No peripheral edema. No clubbing or cyanosis.  Vascular: 2+ peripheral pulses  Neurological: A/O x 3, no focal deficits  Musculoskeletal: no calf tenderness.  Skin: No rashes.      LABS: All Labs Reviewed:                        9.4    13.49 )-----------( 188      ( 2023 05:40 )             29.0                         10.0   14.02 )-----------( 204      ( 2023 06:00 )             31.1                         11.1   17.40 )-----------( 230      ( 31 Mar 2023 06:05 )             35.7     2023 05:40    135    |  101    |  83     ----------------------------<  100    4.8     |  20     |  10.10  2023 06:00    136    |  101    |  66     ----------------------------<  119    5.0     |  22     |  8.51   31 Mar 2023 19:15    135    |  102    |  60     ----------------------------<  121    5.0     |  23     |  7.79     Ca    7.3        2023 05:40  Ca    7.2        2023 06:00  Ca    7.1        31 Mar 2023 19:15  Phos  8.9       2023 05:40  Phos  7.7       31 Mar 2023 19:15  Mg     2.7       2023 05:40  Mg     2.4       31 Mar 2023 19:15    TPro  x      /  Alb  2.4    /  TBili  x      /  DBili  x      /  AST  x      /  ALT  x      /  AlkPhos  x      2023 05:40  TPro  6.4    /  Alb  2.4    /  TBili  1.7    /  DBili  x      /  AST  748    /  ALT  648    /  AlkPhos  133    2023 06:00  TPro  6.5    /  Alb  2.4    /  TBili  2.4    /  DBili  x      /  AST  1404   /  ALT  924    /  AlkPhos  126    31 Mar 2023 06:05          Blood Culture: Organism --  Gram Stain Blood -- Gram Stain --  Specimen Source .Legionella None  Culture-Blood --    Organism --  Gram Stain Blood -- Gram Stain --  Specimen Source .Blood Blood-Peripheral  Culture-Blood --    Organism --  Gram Stain Blood -- Gram Stain --  Specimen Source .Blood None  Culture-Blood --    Organism --  Gram Stain Blood -- Gram Stain   Few polymorphonuclear leukocytes per low power field  No Squamous epithelial cells per low power field  No organisms seen per oil power field  Specimen Source .Sputum Sputum  Culture-Blood --            RADIOLOGY/EKG:      ECHO/CARDIAC CATHTERIZATION/STRESS TEST:

## 2023-04-02 NOTE — PROGRESS NOTE ADULT - ASSESSMENT
A:    62M  HD # 6  FULL CODE    Here for:    1. Acute hypoxic resp failure 2/2  2. Pulmonary fibrosis  3. Shock, possible sepsis, not present on admission, 2/2   4. PNA  5. Sarcoidosis  6. CHRIS    This patient requires critical care for support of one or more vital organ systems with a high probability of imminent or life threatening deterioration in his/her condition    P:    Analgosedation, daily sedation vacations  HD monitoring, vasopressors for distributive shock 2/2 sepsis, levophed, actively titrating as able  s/p emergent intubation 3/28, maintained on vent, actively weaning and titrating; VAC 24/450/5, f/u ABG, CXR  Broad spectrum abx, f/u Cx's, white count, fever curve  TF's via NGT  VTE ppx PUD ppx  f/u labs, replete lytes PRN  Maintain central line  No s/s active bleeding  s/p Steroids    Dispo: Cont critical care.    TOTAL CRITICAL CARE TIME:  50 minutes (EXCLUSIVE of any non bundled procedures)    Note: This time spent INCLUDES time spent directly as this patient's bedside with evaluation, review of chart including review of laboratory and imaging studies, interpretation of vital signs and cardiac output measurements, any necessary ventilator management, and time spent discussing plan of care with patient and family, including goals of care discussion.

## 2023-04-02 NOTE — PROGRESS NOTE ADULT - SUBJECTIVE AND OBJECTIVE BOX
CCU Progress Note    HPI:    S:    Pt seen and examined  HD # 6  FULL CODE  62M hx PSS (scleroderma) on cellcept with pulmonary fibrosis on 8L of 02 24/7, HTN HLD  CAD with 4 stents to the RCA 2018 with Dr Griffith.    He is on the lung transplant list at [facility] with Dr Manas Priest (transplant pulmonologist).    He was hospitalized in Feb at [facility] with a flare of fibrosis and was in the ICU on inhaled pulmonary vasodialtors.    At that time he had a R heart cath and he was told that he has severe pulmonary hypertension.,   He is being teed up for transplant waiting for immunizations/colonoscopy etc.   He was discharged on Tyvaso (treprostinil) but only started using it a few days ago due to expense and approval.       Over the last three weeks he has had a poor appetite and progressive SOB leaving him unable to walk with a walker more than 10 feet without stopping to catch his breath.  He also noted more hypoxemia on his home pulse ox.  He has a cough with clear phlegm.    He  arrived in the ED with severe SOB and hypoxemia placed on BIPAP.  Type 1 respiratory failure   Trop and BNP elevation     3/29: Intubated on pressors.    4/2: Remains intubated, on pressors. Vent being actively titrated,     ROS: Unable to obtain 2/2 Pt condition (intubated)    Allergies    No Known Allergies    Intolerances        MEDICATIONS  (STANDING):  artificial tears (preservative free) Ophthalmic Solution 1 Drop(s) Both EYES four times a day  aspirin  chewable 81 milliGRAM(s) Oral daily  atorvastatin 20 milliGRAM(s) Oral at bedtime  atovaquone  Suspension 750 milliGRAM(s) Oral every 12 hours  chlorhexidine 0.12% Liquid 15 milliLiter(s) Oral Mucosa every 12 hours  chlorhexidine 4% Liquid 1 Application(s) Topical <User Schedule>  heparin   Injectable 5000 Unit(s) SubCutaneous every 12 hours  meropenem  IVPB 500 milliGRAM(s) IV Intermittent every 24 hours  mycophenolate mofetil Suspension 1000 milliGRAM(s) Oral two times a day  norepinephrine Infusion 0.05 MICROgram(s)/kG/Min (3.3 mL/Hr) IV Continuous <Continuous>  pantoprazole  Injectable 40 milliGRAM(s) IV Push two times a day  propofol Infusion 20 MICROgram(s)/kG/Min (8.45 mL/Hr) IV Continuous <Continuous>  vasopressin Infusion 0.04 Unit(s)/Min (6 mL/Hr) IV Continuous <Continuous>    MEDICATIONS  (PRN):  LORazepam   Injectable 2 milliGRAM(s) IV Push every 2 hours PRN Agitation    Drug Dosing Weight    Height (cm): 177.8 (28 Mar 2023 04:15)  Weight (kg): 70.4 (28 Mar 2023 04:15)  BMI (kg/m2): 22.3 (28 Mar 2023 04:15)  BSA (m2): 1.87 (28 Mar 2023 04:15)    PAST MEDICAL & SURGICAL HISTORY:    HTN (hypertension)  Pulmonary fibrosis  Scleroderma    FAMILY HISTORY:    ROS: See HPI; otherwise, all systems reviewed and negative.    O:    ICU Vital Signs Last 24 Hrs  T(C): 36.7 (02 Apr 2023 15:00), Max: 36.9 (01 Apr 2023 17:00)  T(F): 98.1 (02 Apr 2023 15:00), Max: 98.4 (01 Apr 2023 17:00)  HR: 74 (02 Apr 2023 15:03) (66 - 79)  BP: --  BP(mean): --  ABP: 82/54 (02 Apr 2023 15:00) (82/54 - 114/64)  ABP(mean): 66 (02 Apr 2023 15:00) (66 - 86)  RR: 35 (02 Apr 2023 15:00) (29 - 42)  SpO2: 92% (02 Apr 2023 15:03) (92% - 100%)    O2 Parameters below as of 02 Apr 2023 14:00  Patient On (Oxygen Delivery Method): ventilator    O2 Concentration (%): 35    ABG - ( 02 Apr 2023 05:40 )  pH, Arterial: 7.36  pH, Blood: x     /  pCO2: 37    /  pO2: 94    / HCO3: 21    / Base Excess: -4.0  /  SaO2: 100       I&O's Detail    01 Apr 2023 07:01  -  02 Apr 2023 07:00  --------------------------------------------------------  IN:    IV PiggyBack: 500 mL    Nepro with Carb Steady: 450 mL    Norepinephrine: 167 mL    Propofol: 165 mL  Total IN: 1282 mL    OUT:  Total OUT: 0 mL    Total NET: 1282 mL    Mode: AC/ CMV (Assist Control/ Continuous Mandatory Ventilation)  RR (machine): 24  TV (machine): 450  FiO2: 35  PEEP: 5  ITime: 0.8  MAP: 11  PIP: 23      PE:    Adult M lying in bed  Appears chronically ill  No JVD trachea midline  S1S2+  Coarse BS B/L  Abd soft NTND  No leg swelling/edema noted  Intubated, sedated  + CVC, keny noted  Skin pink and well perfused    LABS:    CBC Full  -  ( 02 Apr 2023 05:40 )  WBC Count : 13.49 K/uL  RBC Count : 3.14 M/uL  Hemoglobin : 9.4 g/dL  Hematocrit : 29.0 %  Platelet Count - Automated : 188 K/uL  Mean Cell Volume : 92.4 fl  Mean Cell Hemoglobin : 29.9 pg  Mean Cell Hemoglobin Concentration : 32.4 gm/dL  Auto Neutrophil # : x  Auto Lymphocyte # : x  Auto Monocyte # : x  Auto Eosinophil # : x  Auto Basophil # : x  Auto Neutrophil % : x  Auto Lymphocyte % : x  Auto Monocyte % : x  Auto Eosinophil % : x  Auto Basophil % : x    04-02    135  |  101  |  83<H>  ----------------------------<  100<H>  4.8   |  20<L>  |  10.10<H>    Ca    7.3<L>      02 Apr 2023 05:40  Phos  8.9     04-02  Mg     2.7     04-02    TPro  x   /  Alb  2.4<L>  /  TBili  x   /  DBili  x   /  AST  x   /  ALT  x   /  AlkPhos  x   04-02        CAPILLARY BLOOD GLUCOSE            LIVER FUNCTIONS - ( 02 Apr 2023 05:40 )  Alb: 2.4 g/dL / Pro: x     / ALK PHOS: x     / ALT: x     / AST: x     / GGT: x

## 2023-04-02 NOTE — PROGRESS NOTE ADULT - PROBLEM SELECTOR PLAN 2
Resuscitated cardiac arrest - preceded by bradycardic and likely related to respiratory failure; now hypotensive and requiring vasopressors. Attempts to wean continue.   Currently in SR with normal rate.  Renal function deteriorating. HD tomorrow   Transfer to [place] when stable

## 2023-04-03 NOTE — PROGRESS NOTE ADULT - ASSESSMENT
62M hx PSS (scleroderma) on cellcept with pulmonary fibrosis on 8L of 02  24/7, HTN HLD  CAD with 4 stents to the RCA 2018 with Dr Griffith. He is on the lung transplant list at [facility] with Dr Manas Priest (transplant pulmonologist). He was hospitalized in Feb at [facility] with a flare of fibrosis and was in the ICU on inhaled pulmonary vasodialtors.    At that time he had a R heart cath and he was told that he has severe pulmonary hypertension.,  He was  being teed up for transplant waiting for immunizations/colonoscopy etc.  He was discharged on Tyvaso (treprostinil) but only started using it a few days ago due to expense and approval. Over the last three weeks he has had a poor appetite and progressive SOB leaving him unable to walk with a walker more than 10 feet without stopping to catch his breath. He arrived in the ED with severe SOB and hypoxemia placed on BIPAP.  Type 1 respiratory failure Trop and BNP elevation. 3/29 am was intubated, had approximately 5 minute cardiac arrest/ now on pressors and developing worsening renal renal failure  creatinine has increased now 6, on high dose pressors echo, dec rv function and severe pulmonary htn, was started on abx, pcp tx, and fungal coverage.     1. Acute respiratory failure. Septic vs cardiogenic shock. Scleroderma. Pulmonary fibrosis with acute flare. Multifocal pneumonia. Immunocompromised host. s/p Cardiac arrest. Shock liver. CHRIS  - imaging and chart reviewed, intubated, on 2 pressors, multiorgan failure   - s/p zosyn 3/28-3/30, now on merrem [id number] renally dose adjusted #5  - on mepron 750mg BID #4 for pcp coverage  - continue with antibiotic coverage   - s/p micafungin x 1 caspofungin 3/31-4/2  - s/p azithromycin 500mg daily atypical coverage #5 --> 4/1 legionella ag (-)  - sputum cx and blood cx 3/28 no growth  - fungitell (-), repeat blood cx from 3/30 no growth  - nephrology f/u noted  - poor prognosis  - monitor temps  - tolerating abx well so far; no side effects noted  - reason for abx use and side effects reviewed with patient  - supportive care  - fu cbc    2. other issues - care per medicine

## 2023-04-03 NOTE — PROGRESS NOTE ADULT - SUBJECTIVE AND OBJECTIVE BOX
HPI:  HPI:  62 year old man with a history of scleroderma, pulmonary fibrosis with chronic respiratory failure (supplemental O2 dependent; listed on lung-transplant list at [facility]), severe pulmonary hypertension, CAD s/p RCA stents (2018), HTN, HLD admitted with acute/chronic respiratory failure (required mechanical ventilation) with hospitalization complicated by cardiac arrest preceded by severe bradycardia (3/29/23).    3/29/23: Pt intubated and sedated.  3/30/23:  Events past 24 hours noted and discussed with CCU team; patient is sedated on vent.  3/31/23: Pt sedated on pressors. Family at bedside  23: Sedated pressors being tapered. Labs reviewed.  23: Pressors weaning. Arousable  4/3/'23: no changes.    MEDICATIONS:  OUTPATIENT  Home Medications:  aspirin 81 mg oral tablet, chewable: 1 tab(s) orally once a day (31 Oct 2018 10:16)  azithromycin 500 mg intravenous injection: 500 milligram(s) intravenous every 24 hours (28 Mar 2023 14:31)  enoxaparin: 40 milligram(s) subcutaneous once a day (28 Mar 2023 14:31)  mycophenolate mofetil 250 mg oral capsule: 4 cap(s) orally 2 times a day (28 Mar 2023 14:31)  piperacillin-tazobactam: 3.375 gram(s) injectable every 8 hours (28 Mar 2023 14:31)  quinapril 10 mg oral tablet: 1 tab(s) orally once a day (28 Oct 2018 22:58)      INPATIENT  MEDICATIONS  (STANDING):  artificial tears (preservative free) Ophthalmic Solution 1 Drop(s) Both EYES four times a day  aspirin  chewable 81 milliGRAM(s) Oral daily  atorvastatin 20 milliGRAM(s) Oral at bedtime  atovaquone  Suspension 750 milliGRAM(s) Oral every 12 hours  chlorhexidine 0.12% Liquid 15 milliLiter(s) Oral Mucosa every 12 hours  chlorhexidine 4% Liquid 1 Application(s) Topical <User Schedule>  heparin   Injectable 5000 Unit(s) SubCutaneous every 12 hours  meropenem  IVPB 500 milliGRAM(s) IV Intermittent every 24 hours  mycophenolate mofetil Suspension 1000 milliGRAM(s) Oral two times a day  norepinephrine Infusion 0.05 MICROgram(s)/kG/Min (3.3 mL/Hr) IV Continuous <Continuous>  pantoprazole  Injectable 40 milliGRAM(s) IV Push two times a day  propofol Infusion 20 MICROgram(s)/kG/Min (8.45 mL/Hr) IV Continuous <Continuous>    MEDICATIONS  (PRN):  LORazepam   Injectable 2 milliGRAM(s) IV Push every 2 hours PRN Agitation          Vital Signs Last 24 Hrs  T(C): 37 (2023 11:00), Max: 37.3 (2023 19:00)  T(F): 98.6 (2023 11:00), Max: 99.1 (2023 19:00)  HR: 75 (:00) (67 - 79)  BP: --  BP(mean): --  RR: 34 (2023 11:00) (27 - 40)  SpO2: 96% (2023 11:00) (93% - 100%)    Parameters below as of 2023 08:00  Patient On (Oxygen Delivery Method): ventilator    O2 Concentration (%): 40Daily     Daily Weight in k.6 (2023 05:00)I&O's Summary    2023 07:01  -  2023 07:00  --------------------------------------------------------  IN: 690 mL / OUT: 400 mL / NET: 290 mL        I&O's Detail    2023 07:01  -  2023 07:00  --------------------------------------------------------  IN:    Nepro with Carb Steady: 280 mL    Norepinephrine: 142.1 mL    Propofol: 267.9 mL  Total IN: 690 mL    OUT:    Nasogastric/Oral tube (mL): 400 mL  Total OUT: 400 mL    Total NET: 290 mL          I&O's Summary    2023 07:01  -  2023 07:00  --------------------------------------------------------  IN: 690 mL / OUT: 400 mL / NET: 290 mL        PHYSICAL EXAM:    Constitutional: intubated sedated.  HEENT: PERR, EOMI,  No oral cyananosis.  Neck:  supple,  No JVD  Respiratory: Breath sounds are clear bilaterally, No wheezing, rales or rhonchi  Cardiovascular: S1 and S2, regular rate and rhythm, no Murmurs, gallops or rubs  Gastrointestinal: Bowel Sounds present, soft, nontender.   Extremities: trace peripheral edema. No clubbing or cyanosis.  Vascular: 2+ peripheral pulses  Musculoskeletal: no calf tenderness.  Skin: No rashes.        ===============================  ===============================  LABS:                         9.2    13.58 )-----------( 186      ( 2023 06:30 )             28.7                         9.4    13.49 )-----------( 188      ( 2023 05:40 )             29.0                         10.0   14.02 )-----------( 204      ( 2023 06:00 )             31.1     2023 06:30    134    |  99     |  93     ----------------------------<  96     5.3     |  18     |  11.70  2023 05:40    135    |  101    |  83     ----------------------------<  100    4.8     |  20     |  10.10  2023 06:00    136    |  101    |  66     ----------------------------<  119    5.0     |  22     |  8.51     Ca    7.7        2023 06:30  Ca    7.3        2023 05:40  Ca    7.2        2023 06:00  Phos  10.6      2023 06:30  Phos  8.9       2023 05:40  Phos  7.7       31 Mar 2023 19:15  Mg     2.7       2023 05:40  Mg     2.4       31 Mar 2023 19:15    TPro  x      /  Alb  2.6    /  TBili  x      /  DBili  x      /  AST  x      /  ALT  x      /  AlkPhos  x      2023 06:30  TPro  x      /  Alb  2.4    /  TBili  x      /  DBili  x      /  AST  x      /  ALT  x      /  AlkPhos  x      2023 05:40  TPro  6.4    /  Alb  2.4    /  TBili  1.7    /  DBili  x      /  AST  748    /  ALT  648    /  AlkPhos  133    2023 06:00      ===============================  ===============================  CARDIAC BIOMARKERS:  BNP  Serum Pro-Brain Natriuretic Peptide: 435 pg/mL *H* [0 - 125] (10-28-18 @ 16:16)      TROPONIN  Troponin I, High Sensitivity Result: 477.06 ng/L *H* (23 @ 06:17)  Troponin I, High Sensitivity Result: 441.04 ng/L *H* (23 @ 00:10)  Troponin I, Serum: 39.700 ng/mL *H* [0.015 - 0.045] (10-29-18 @ 06:51)  Troponin I, Serum: 7.860 ng/mL *H* [0.015 - 0.045] (10-28-18 @ 23:22)  Troponin I, Serum: 1.590 ng/mL *H* [0.015 - 0.045] (10-28-18 @ 19:47)  Troponin I, Serum: 0.255 ng/mL *H* [0.015 - 0.045] (10-28-18 @ 16:16)      ===============================  ECG:      Diagnosis Line Normal sinus rhythm  T wave abnormality, consider anterior ischemia  Abnormal ECG  When compared with ECG of 30-OCT-2018 07:24,  Vent. rate has increased BY  33 BPM  Questionable change in QRS axis  T wave inversion now evident in Anterior leads  Confirmed by DIANE CULVER, SOERIC PINEDA ([id number]) on 3/29/2023 4:48:13 PM    ===============================  RADIOLOGY:  < from: CT Angio Chest PE Protocol w/ IV Cont (23 @ 00:21) >  ACC: [id number] EXAM:  CT ANGIO CHEST PULM ART [facility]   ORDERED BY: ROME RUFFIN     PROCEDURE DATE:  2023          INTERPRETATION:  CLINICAL INFORMATION: Shortness of breath. History of   scleroderma.    COMPARISON: 10/28/2018    CONTRAST/COMPLICATIONS:  IV Contrast: Omnipaque 350  90 cc administered   10 cc discarded  Oral Contrast: NONE  Complications: None reported at time of study completion    PROCEDURE:  CT Angiography of the Chest.  Sagittal and coronal reformats were performed as well as 3D (MIP)   reconstructions.    FINDINGS:    LUNGS AND AIRWAYS: Patent central airways.  Revisualized chronic   interstitial disease including hazy bilateral ground glass opacities,   reticulation and worsened bronchiectasis bilaterally. Large bulla in the   left upper lobe.  PLEURA: No pleural effusion.  MEDIASTINUM AND MALLORY: No lymphadenopathy.  VESSELS: Aortic and coronary artery atherosclerosis. Enlarged main   pulmonary artery, unchanged, can be seen with pulmonary arterial   hypertension. No pulmonary embolism.  HEART: Cardiomegaly. Small pericardial effusion.  CHEST WALL AND LOWER NECK: Bilateral mediastinal and hilar adenopathy   increased since prior exam including extensive adenopathy in the   prevascular space.  VISUALIZED UPPER ABDOMEN: Right renal hypodensity too small to   characterize. Colonic diverticulosis.  BONES: Degenerative changes. Dextroscoliosis of the thoracic spine.   Chronic right rib deformities.    IMPRESSION:  No pulmonary embolism.  Chronic interstitial disease with worsened bronchiectasis.  New mediastinal and hilar adenopathy, which is indeterminate but could be   related to scleroderma and interstitial disease. Continued follow-up is   recommended.        --- End of Report ---            TYE CULVER; Attending Radiologist  This document has been electronically signed. Mar 28 2023  1:01AM    < end of copied text >    ===============================  ECHO:  Outpatient Echo : EF 55-60%, borderline pulmonary hypertension.  normal RV size & function.    ===============================    Julian Rose M.D.  Cardiology, [facility]  Cell: [phone]  Offices:   [phone] ([facility] Office)  [phone] ([facility] Office)

## 2023-04-03 NOTE — PROGRESS NOTE ADULT - ASSESSMENT
Impression - 62M hx PSS (scleroderma) on cellcept with pulmonary fibrosis on 8L of 02 24/7, HTN HLD  CAD with 4 stents to the RCA 2018 with Dr Griffith.  He is on the lung transplant list at [facility] with Dr Manas Priest (transplant pulmonologist).  He was hospitalized in Feb at [facility] with a flare of fibrosis and was in the ICU on inhaled pulmonary vasodialtors.    At that time he had a R heart cath and he was told that he has severe pulmonary hypertension.,    Over three weeks PTA he had a poor appetite and progressive SOB leaving him unable to walk with a walker more than 10 feet without stopping to catch his breath.    He arrived in the ED with severe SOB and hypoxemia placed on BIPAP. Type 1 respiratory failure Trop and BNP elevation  (28 Mar 2023 02:01)  3/29 am was intubated, had approximately 5 minute cardiac arrest/ now on pressors and developing worsening renal renal failure  creatinine has increased now 10, on levophed,m vasopressin off  echo, dec rv function and severe pulmonary htn  had Fevers sputum, cultures negative, Fungitell negative  Patient remains on full vent support, able to wean down to 35% FIO2, vasoactive support, and propofol sedation.     Acute on chronic RF  CHRIS on CKD, CTN up-trending   Shock Cardiogenic vs infectious     Neuro - Propofol gtt, titrate to goal RASS 0/-1  CV - Actively titrating levo gtt for MAP>65, will continue to wean as tolerated, TTE - EF 55-60%, severely dilated RV w/diffuse hypokinesis   Pulm - Remains on AC, maintain 4-6cc/kg of IBW, down to 35% FIO2, full vent bundle, trend abg, Cellcept and Mepron BID, patient currently not a candidate for Lung transplant, ongoing GOC w/family  GI - TF held 2/2 to high residual, PPI BID   Renal - Trend BUN/CTN, up-trending CTN, no emergent need for HD - discussed with Nephro and following, trend lytes, strict I/Os  Endocrine - Keep Euglycemic w/-180  Heme - DVT PPx w/UFH, SCD, slight downtrend in Hgb, monitor closely for s/s of active bleeding   ID - On BID Mepron, standing Meropenem, legionella/MRSA negative, BCx NGT, leukocytosis lateral, remains afebrile x 24 hours

## 2023-04-03 NOTE — PROGRESS NOTE ADULT - SUBJECTIVE AND OBJECTIVE BOX
NEPHROLOGY INTERVAL HPI/OVERNIGHT EVENTS:  23 @ 09:33      HPI:  63 yo man with PMHX of Scleroderma treated with Cellcept and pulmonary fibrosis on home O2 8 L.  Hx of CAD ( stents x 4 to RCA)  Admission at [facility] in Feb due to resp distress and treated in ICU with pulmonary vasodilators.  R cath done with severe pulmonary HTN and was to have evaluation done for TP.  Started on Tyvaso several days ago.  Presented with 3 week hx fo progressive SOB, poor appetite and worsening LACEY walking short distance.  Admitted early 3/28 to CCU and placed on Bipap , given IV lasix,  and Zosyn/Azithromax.  CTA negative for PE.  [place] [facility]--resp status further deteriorate--Intubated  and started on pressors.  Late yesterday, resuscitated post cardiac arrest.  Now noted with acute worsening of renal function.    PMHX and PSHX.  --Pulmonary fibrosis  --Scleroderma  --CAD ( stent x 4)    MEDICATIONS  (STANDING):  artificial tears (preservative free) Ophthalmic Solution 1 Drop(s) Both EYES four times a day  aspirin  chewable 81 milliGRAM(s) Oral daily  atorvastatin 20 milliGRAM(s) Oral at bedtime  atovaquone  Suspension 750 milliGRAM(s) Oral every 12 hours  chlorhexidine 0.12% Liquid 15 milliLiter(s) Oral Mucosa every 12 hours  chlorhexidine 4% Liquid 1 Application(s) Topical <User Schedule>  heparin   Injectable 5000 Unit(s) SubCutaneous every 12 hours  meropenem  IVPB 500 milliGRAM(s) IV Intermittent every 24 hours  mycophenolate mofetil Suspension 1000 milliGRAM(s) Oral two times a day  norepinephrine Infusion 0.05 MICROgram(s)/kG/Min (3.3 mL/Hr) IV Continuous <Continuous>  pantoprazole  Injectable 40 milliGRAM(s) IV Push two times a day  propofol Infusion 20 MICROgram(s)/kG/Min (8.45 mL/Hr) IV Continuous <Continuous>    MEDICATIONS  (PRN):  LORazepam   Injectable 2 milliGRAM(s) IV Push every 2 hours PRN Agitation      Allergies    No Known Allergies    Intolerances        I&O's Detail    2023 07:01  -  2023 07:00  --------------------------------------------------------  IN:    Nepro with Carb Steady: 280 mL    Norepinephrine: 142.1 mL    Propofol: 267.9 mL  Total IN: 690 mL    OUT:    Nasogastric/Oral tube (mL): 400 mL  Total OUT: 400 mL    Total NET: 290 mL          .    Patient was seen and evaluated on dialysis.   Patient is tolerating the procedure well.   Continue dialysis:   Dialyzer:          QB:        QD:   Goal UF ___ over ___ Hours       Vital Signs Last 24 Hrs  T(C): 36.5 (2023 08:00), Max: 37.3 (2023 19:00)  T(F): 97.7 (2023 08:00), Max: 99.1 (2023 19:00)  HR: 70 (2023 08:00) (66 - 79)  BP: --  BP(mean): --  RR: 33 (2023 08:00) (27 - 42)  SpO2: 95% (2023 08:00) (92% - 100%)    Parameters below as of 2023 08:00  Patient On (Oxygen Delivery Method): ventilator    O2 Concentration (%): 40  Daily     Daily Weight in k.6 (2023 05:00)    PHYSICAL EXAM:  General: alert. awake Ox3  HEENT: MMM  CV: s1s2 rrr  LUNGS: B/L CTA  EXT: no edema    LABS:                        9.2    13.58 )-----------( 186      ( 2023 06:30 )             28.7     04-03    134<L>  |  99  |  93<H>  ----------------------------<  96  5.3   |  18<L>  |  11.70<H>    Ca    7.7<L>      2023 06:30  Phos  10.6     04-03  Mg     2.7     04-02    TPro  x   /  Alb  2.6<L>  /  TBili  x   /  DBili  x   /  AST  x   /  ALT  x   /  AlkPhos  x   04-03        Phosphorus Level, Serum: 10.6 mg/dL ( @ 06:30)    ABG - ( 2023 05:40 )  pH, Arterial: 7.36  pH, Blood: x     /  pCO2: 37    /  pO2: 94    / HCO3: 21    / Base Excess: -4.0  /  SaO2: 100                NEPHROLOGY INTERVAL HPI/OVERNIGHT EVENTS:  23 @ 09:33    4/3 still anuric, dec pressors requirements on levo 0.12 only  on, propofol and TF on hold with no fw flushes  HPI:  61 yo man with PMHX of Scleroderma treated with Cellcept and pulmonary fibrosis on home O2 8 L.  Hx of CAD ( stents x 4 to RCA)  Admission at [facility] in Feb due to resp distress and treated in ICU with pulmonary vasodilators.  R cath done with severe pulmonary HTN and was to have evaluation done for TP.  Started on Tyvaso several days ago.  Presented with 3 week hx fo progressive SOB, poor appetite and worsening LACEY walking short distance.  Admitted early 3/28 to CCU and placed on Bipap , given IV lasix,  and Zosyn/Azithromax.  CTA negative for PE.  [place] [facility] [place]--resp status further deteriorate--Intubated  and started on pressors.  Late yesterday, resuscitated post cardiac arrest.  Now noted with acute worsening of renal function.    PMHX and PSHX.  --Pulmonary fibrosis  --Scleroderma  --CAD ( stent x 4)    MEDICATIONS  (STANDING):  artificial tears (preservative free) Ophthalmic Solution 1 Drop(s) Both EYES four times a day  aspirin  chewable 81 milliGRAM(s) Oral daily  atorvastatin 20 milliGRAM(s) Oral at bedtime  atovaquone  Suspension 750 milliGRAM(s) Oral every 12 hours  chlorhexidine 0.12% Liquid 15 milliLiter(s) Oral Mucosa every 12 hours  chlorhexidine 4% Liquid 1 Application(s) Topical <User Schedule>  heparin   Injectable 5000 Unit(s) SubCutaneous every 12 hours  meropenem  IVPB 500 milliGRAM(s) IV Intermittent every 24 hours  mycophenolate mofetil Suspension 1000 milliGRAM(s) Oral two times a day  norepinephrine Infusion 0.05 MICROgram(s)/kG/Min (3.3 mL/Hr) IV Continuous <Continuous>  pantoprazole  Injectable 40 milliGRAM(s) IV Push two times a day  propofol Infusion 20 MICROgram(s)/kG/Min (8.45 mL/Hr) IV Continuous <Continuous>    MEDICATIONS  (PRN):  LORazepam   Injectable 2 milliGRAM(s) IV Push every 2 hours PRN Agitation      Allergies    No Known Allergies    Intolerances        I&O's Detail    2023 07:01  -  2023 07:00  --------------------------------------------------------  IN:    Nepro with Carb Steady: 280 mL    Norepinephrine: 142.1 mL    Propofol: 267.9 mL  Total IN: 690 mL    OUT:    Nasogastric/Oral tube (mL): 400 mL  Total OUT: 400 mL    Total NET: 290 mL          Vital Signs Last 24 Hrs  T(C): 36.5 (2023 08:00), Max: 37.3 (2023 19:00)  T(F): 97.7 (2023 08:00), Max: 99.1 (2023 19:00)  HR: 70 (2023 08:00) (66 - 79)  BP: --  BP(mean): --  RR: 33 (2023 08:00) (27 - 42)  SpO2: 95% (2023 08:00) (92% - 100%)    Parameters below as of 2023 08:00  Patient On (Oxygen Delivery Method): ventilator    O2 Concentration (%): 40  Daily     Daily Weight in k.6 (2023 05:00)    PHYSICAL EXAM:  General:sedated  HEENT: orally intubated   CV: s1s2 rrr  LUNGS: B/L CTA  EXT: no edema    LABS:                        9.2    13.58 )-----------( 186      ( 2023 06:30 )             28.7     04-03    134<L>  |  99  |  93<H>  ----------------------------<  96  5.3   |  18<L>  |  11.70<H>    Ca    7.7<L>      2023 06:30  Phos  10.6     04-03  Mg     2.7     04-02    TPro  x   /  Alb  2.6<L>  /  TBili  x   /  DBili  x   /  AST  x   /  ALT  x   /  AlkPhos  x   04-03        Phosphorus Level, Serum: 10.6 mg/dL ( @ 06:30)    ABG - ( 2023 05:40 )  pH, Arterial: 7.36  pH, Blood: x     /  pCO2: 37    /  pO2: 94    / HCO3: 21    / Base Excess: -4.0  /  SaO2: 100

## 2023-04-03 NOTE — PROGRESS NOTE ADULT - PROBLEM SELECTOR PLAN 2
Resuscitated cardiac arrest - preceded by bradycardic and likely related to respiratory failure; now hypotensive and requiring vasopressors. Attempts to wean continue.   Currently in SR with normal rate.  Renal function deteriorating. HD tomorrow   Transfer to [place] when stable Resuscitated cardiac arrest - preceded by bradycardic and likely related to respiratory failure; now hypotensive and requiring vasopressors cont. to wean as tolerated.   Renal function deteriorating.  Renal note reviewed - no HD today but anticipated in near future.   Transfer to [place] when stable  Currently in SR with normal rate.

## 2023-04-03 NOTE — PROGRESS NOTE ADULT - ASSESSMENT
61 yo man with scleroderma, pulmonary fibrosis /severe pulmonary HTN admitted with progressive resp failure.  Now intubated and on pressors due to shock and now post cardiac arrest, septic vs cardiogenic.  --CHRIS post CTA ( unclear hx of CKD, creat 1.47 on admission and on ACEI) and shock. oliguric.    No immediate need for dialysis.    Check repeat labs for K and acidosis.    Continue pressor/vent support    Continue abtx.    D/w Dr Watson.   63 yo man with scleroderma, pulmonary fibrosis /severe pulmonary HTN admitted with progressive resp failure.  Now intubated and on pressors due to shock and now post cardiac arrest, septic vs cardiogenic.  --CHRIS post CTA ( unclear hx of CKD, creat 1.47 on admission and on ACEI) and shock. oliguric.    No immediate need for dialysis.    Check repeat labs for K and acidosis.    Continue pressor/vent support    Continue abtx.    D/w Dr Watson.    4/3 MK   - CHRIS with component of VIET and ATN     no indication for HD today    no FW flushes with TF    Hep panel in anticipation of possible HD   dw rn and wife updated

## 2023-04-03 NOTE — PROGRESS NOTE ADULT - SUBJECTIVE AND OBJECTIVE BOX
Patient is a 62y old  Male who presents with a chief complaint of SOB hypoxemia (03 Apr 2023 09:32)      BRIEF HOSPITAL COURSE: 62M hx PSS (scleroderma) on cellcept with pulmonary fibrosis on 8L of 02 24/7, HTN HLD  CAD with 4 stents to the RCA 2018 with Dr Griffith.  He is on the lung transplant list at [facility] with Dr Manas Priest (transplant pulmonologist).  He was hospitalized in Feb at [facility] with a flare of fibrosis and was in the ICU on inhaled pulmonary vasodialtors.    At that time he had a R heart cath and he was told that he has severe pulmonary hypertension.,    Over   three weeks PTA he had a poor appetite and progressive SOB leaving him unable to walk with a walker more than 10 feet without stopping to catch his breath.    He arrived in the ED with severe SOB and hypoxemia placed on BIPAP. Type 1 respiratory failure Trop and BNP elevation  (28 Mar 2023 02:01)  3/29 am was intubated, had approximately 5 minute cardiac arrest/ now on pressors and developing worsening renal renal failure  creatinine has increased now 10, on levophed,m vasopressin off  echo, dec rv function and severe pulmonary htn  had Fevers sputum, cultures negative, Fungitell negative    Events last 24 hours: Patient remains on full vent support, able to wean down to 35% FIO2, vasoactive support, and propofol sedation.     Unable to perform ROS 2/2 intubated / sedated     PAST MEDICAL & SURGICAL HISTORY:  HTN (hypertension)      Pulmonary fibrosis      Scleroderma            Medications:  atovaquone  Suspension 750 milliGRAM(s) Oral every 12 hours  meropenem  IVPB 500 milliGRAM(s) IV Intermittent every 24 hours    norepinephrine Infusion 0.05 MICROgram(s)/kG/Min IV Continuous <Continuous>      LORazepam   Injectable 2 milliGRAM(s) IV Push every 2 hours PRN  propofol Infusion 20 MICROgram(s)/kG/Min IV Continuous <Continuous>      aspirin  chewable 81 milliGRAM(s) Oral daily  heparin   Injectable 5000 Unit(s) SubCutaneous every 12 hours    pantoprazole  Injectable 40 milliGRAM(s) IV Push two times a day      atorvastatin 20 milliGRAM(s) Oral at bedtime      mycophenolate mofetil Suspension 1000 milliGRAM(s) Oral two times a day    artificial tears (preservative free) Ophthalmic Solution 1 Drop(s) Both EYES four times a day  chlorhexidine 0.12% Liquid 15 milliLiter(s) Oral Mucosa every 12 hours  chlorhexidine 4% Liquid 1 Application(s) Topical <User Schedule>        Mode: AC/ CMV (Assist Control/ Continuous Mandatory Ventilation)  RR (machine): 24  TV (machine): 450  FiO2: 40  PEEP: 5  ITime: 0.8  MAP: 20  PIP: 26      ICU Vital Signs Last 24 Hrs  T(C): 36.8 (03 Apr 2023 10:00), Max: 37.3 (02 Apr 2023 19:00)  T(F): 98.2 (03 Apr 2023 10:00), Max: 99.1 (02 Apr 2023 19:00)  HR: 72 (03 Apr 2023 10:00) (66 - 79)  BP: --  BP(mean): --  ABP: 107/66 (03 Apr 2023 10:00) (82/53 - 123/74)  ABP(mean): 81 (03 Apr 2023 10:00) (65 - 94)  RR: 30 (03 Apr 2023 10:00) (27 - 40)  SpO2: 95% (03 Apr 2023 10:00) (92% - 100%)    O2 Parameters below as of 03 Apr 2023 08:00  Patient On (Oxygen Delivery Method): ventilator    O2 Concentration (%): 40        ABG - ( 02 Apr 2023 05:40 )  pH, Arterial: 7.36  pH, Blood: x     /  pCO2: 37    /  pO2: 94    / HCO3: 21    / Base Excess: -4.0  /  SaO2: 100                 I&O's Detail    02 Apr 2023 07:01  -  03 Apr 2023 07:00  --------------------------------------------------------  IN:    Nepro with Carb Steady: 280 mL    Norepinephrine: 142.1 mL    Propofol: 267.9 mL  Total IN: 690 mL    OUT:    Nasogastric/Oral tube (mL): 400 mL  Total OUT: 400 mL    Total NET: 290 mL            LABS:                        9.2    13.58 )-----------( 186      ( 03 Apr 2023 06:30 )             28.7     04-03    134<L>  |  99  |  93<H>  ----------------------------<  96  5.3   |  18<L>  |  11.70<H>    Ca    7.7<L>      03 Apr 2023 06:30  Phos  10.6     04-03  Mg     2.7     04-02    TPro  x   /  Alb  2.6<L>  /  TBili  x   /  DBili  x   /  AST  x   /  ALT  x   /  AlkPhos  x   04-03          CAPILLARY BLOOD GLUCOSE            CULTURES:  Culture Results:   No Legionella species isolated to date (03-31 @ 11:00)  Culture Results:   No growth to date. (03-30 @ 09:21)  Culture Results:   No growth to date. (03-30 @ 09:20)  Culture Results:   No growth at 48 hours (03-29 @ 16:59)  Culture Results:   No Growth Final (03-28 @ 00:10)  Culture Results:   No Growth Final (03-28 @ 00:10)  Rapid RVP Result: NotDetec (03-27 @ 23:00)      Physical Examination:    General: Intubated / sedated       HEENT: Pupils equal, reactive to light.  Symmetric.    PULM: Coarse breath sounds B/L    NECK: Supple, no lymphadenopathy, trachea midline    CVS: S1, S2, no murmurs, rubs, or gallops    ABD: Soft, nondistended, nontender, normoactive bowel sounds, no masses    EXT: No edema,     SKIN: Warm and well perfused    NEURO: Sedated     DEVICES:     RADIOLOGY:     Critical Care time: 35 mins assessing presenting problems of acute illness that poses high probability of life threatening deterioration or end organ damage/dysfunction.  Medical decision making including initiating plan of care, reviewing data, reviewing radiology, direct patient bedside evaluation and interpretation of vital signs, any necessary ventilator management, discussion with multidisciplinary team, discussing goals of care with patient/family, all non inclusive of procedures

## 2023-04-03 NOTE — PROGRESS NOTE ADULT - SUBJECTIVE AND OBJECTIVE BOX
Date of service: 04-03-23 @ 12:31    pt seen and examined  on ventilatory support  on pressors  oliguric  no fevers    ROS: unable to obtain d/t medical condition    MEDICATIONS  (STANDING):  artificial tears (preservative free) Ophthalmic Solution 1 Drop(s) Both EYES four times a day  aspirin  chewable 81 milliGRAM(s) Oral daily  atorvastatin 20 milliGRAM(s) Oral at bedtime  atovaquone  Suspension 750 milliGRAM(s) Oral every 12 hours  chlorhexidine 0.12% Liquid 15 milliLiter(s) Oral Mucosa every 12 hours  chlorhexidine 4% Liquid 1 Application(s) Topical <User Schedule>  heparin   Injectable 5000 Unit(s) SubCutaneous every 12 hours  meropenem  IVPB 500 milliGRAM(s) IV Intermittent every 24 hours  mycophenolate mofetil Suspension 1000 milliGRAM(s) Oral two times a day  norepinephrine Infusion 0.05 MICROgram(s)/kG/Min (3.3 mL/Hr) IV Continuous <Continuous>  pantoprazole  Injectable 40 milliGRAM(s) IV Push two times a day  propofol Infusion 20 MICROgram(s)/kG/Min (8.45 mL/Hr) IV Continuous <Continuous>    Vital Signs Last 24 Hrs  T(C): 37 (03 Apr 2023 11:00), Max: 37.3 (02 Apr 2023 19:00)  T(F): 98.6 (03 Apr 2023 11:00), Max: 99.1 (02 Apr 2023 19:00)  HR: 75 (03 Apr 2023 11:00) (67 - 79)  BP: --  BP(mean): --  RR: 34 (03 Apr 2023 11:00) (27 - 40)  SpO2: 96% (03 Apr 2023 11:00) (92% - 100%)    Parameters below as of 03 Apr 2023 08:00  Patient On (Oxygen Delivery Method): ventilator    O2 Concentration (%): 40      PE:  Constitutional: NAD, sedated/intubated   HEENT: NC/AT, EOMI, PERRLA, conjunctivae clear; ears and nose atraumatic; pharynx benign  Neck: supple; thyroid not palpable  Back: no tenderness  Respiratory: decreased breath sounds, rales   Cardiovascular: S1S2 regular, no murmurs  Abdomen: soft, not tender, not distended, positive BS; liver and spleen WNL  Genitourinary: no suprapubic tenderness  Lymphatic: no LN palpable  Musculoskeletal: no muscle tenderness, no joint swelling or tenderness  Extremities: no pedal edema  Neurological/ Psychiatric:  moving all extremities  Skin: no rashes; no palpable lesions    Labs: all available labs reviewed                                   9.2    13.58 )-----------( 186      ( 03 Apr 2023 06:30 )             28.7     04-03    134<L>  |  99  |  93<H>  ----------------------------<  96  5.3   |  18<L>  |  11.70<H>    Ca    7.7<L>      03 Apr 2023 06:30  Phos  10.6     04-03  Mg     2.7     04-02    TPro  x   /  Alb  2.6<L>  /  TBili  x   /  DBili  x   /  AST  x   /  ALT  x   /  AlkPhos  x   04-03           Cultures:   Culture - Blood (03.30.23 @ 09:21)   Specimen Source: .Blood   Blood-Peripheral  Culture Results:   No growth to date.  Culture - Blood (03.30.23 @ 09:20)   Specimen Source: .Blood None  Culture Results:   No growth to date.  Culture - Sputum . (03.29.23 @ 16:59)   Gram Stain:   Few polymorphonuclear leukocytes per low power field   No Squamous epithelial cells per low power field   No organisms seen per oil power field  Specimen Source: .Sputum Sputum  Culture Results:   No growth at 48 hours    Radiology: all available radiological tests reviewed    ACC: [id number] EXAM:  XR CHEST PORTABLE IMMED 1V   ORDERED BY: RALPH PRATER     PROCEDURE DATE:  03/29/2023          INTERPRETATION:  Portable chest radiograph    CLINICAL INFORMATION:   Short of breath.    TECHNIQUE:  Portable  AP view of the chest was obtained.    COMPARISON: 3/29/2023 chest x-ray available for review.    FINDINGS: ET tube tip above tracheal bifurcation.  NG tube tip beyond GE junction.  LEFT IJ catheter tip in SVC.      There is cardiomegaly, vascular congestion and perihilar interstitial   infiltrates without gross effusion.  Trachea midline. No hilar mass.  Visualized osseous structures are intact.        IMPRESSION: Catheters and tubes in place. An  Cardiomegaly.  Bilateral perihilar/bibasilar diffuse airspace disease.        ACC: [id number] EXAM:  CT ANGIO CHEST PULM [facility]   ORDERED BY: ROME RUFFIN     PROCEDURE DATE:  03/28/2023          INTERPRETATION:  CLINICAL INFORMATION: Shortness of breath. History of   scleroderma.    COMPARISON: 10/28/2018    CONTRAST/COMPLICATIONS:  IV Contrast: Omnipaque 350  90 cc administered   10 cc discarded  Oral Contrast: NONE  Complications: None reported at time of study completion    PROCEDURE:  CT Angiography of the Chest.  Sagittal and coronal reformats were performed as well as 3D (MIP)   reconstructions.    FINDINGS:    LUNGS AND AIRWAYS: Patent central airways.  Revisualized chronic   interstitial disease including hazy bilateral ground glass opacities,   reticulation and worsened bronchiectasis bilaterally. Large bulla in the   left upper lobe.  PLEURA: No pleural effusion.  MEDIASTINUM AND MALLORY: No lymphadenopathy.  VESSELS: Aortic and coronary artery atherosclerosis. Enlarged main   pulmonary artery, unchanged, can be seen with pulmonary arterial   hypertension. No pulmonary embolism.  HEART: Cardiomegaly. Small pericardial effusion.  CHEST WALL AND LOWER NECK: Bilateral mediastinal and hilar adenopathy   increased since prior exam including extensive adenopathy in the   prevascular space.  VISUALIZED UPPER ABDOMEN: Right renal hypodensity too small to   characterize. Colonic diverticulosis.  BONES: Degenerative changes. Dextroscoliosis of the thoracic spine.   Chronic right rib deformities.    IMPRESSION:  No pulmonary embolism.  Chronic interstitial disease with worsened bronchiectasis.  New mediastinal and hilar adenopathy, which is indeterminate but could be   related to scleroderma and interstitial disease. Continued follow-up is   recommended.        --- End of Report ---    < end of copied text >    Advanced directives addressed: full resuscitation

## 2023-04-04 NOTE — PROCEDURE NOTE - NSINFORMCONSENT_GEN_A_CORE
Benefits, risks, and possible complications of procedure explained to patient/caregiver who verbalized understanding and gave verbal consent.
Benefits, risks, and possible complications of procedure explained to patient/caregiver who verbalized understanding and gave verbal consent.
This was an emergent procedure.

## 2023-04-04 NOTE — PROCEDURE NOTE - NSPOSTPRCRAD_GEN_A_CORE
central line located in the/central line located in the superior vena cava/no pneumothorax/post-procedure radiography performed
central line located in the superior vena cava/post-procedure radiography performed
central line located in the/central line located in the superior vena cava/no pneumothorax/post procedure radiography not performed/post-procedure radiography performed

## 2023-04-04 NOTE — PROGRESS NOTE ADULT - ASSESSMENT
62M hx PSS (scleroderma) on cellcept with pulmonary fibrosis on 8L of 02  24/7, HTN HLD  CAD with 4 stents to the RCA 2018 with Dr Griffith. He is on the lung transplant list at [facility] with Dr Manas Priest (transplant pulmonologist). He was hospitalized in Feb at [facility] with a flare of fibrosis and was in the ICU on inhaled pulmonary vasodialtors.    At that time he had a R heart cath and he was told that he has severe pulmonary hypertension.,  He was  being teed up for transplant waiting for immunizations/colonoscopy etc.  He was discharged on Tyvaso (treprostinil) but only started using it a few days ago due to expense and approval. Over the last three weeks he has had a poor appetite and progressive SOB leaving him unable to walk with a walker more than 10 feet without stopping to catch his breath. He arrived in the ED with severe SOB and hypoxemia placed on BIPAP.  Type 1 respiratory failure Trop and BNP elevation. 3/29 am was intubated, had approximately 5 minute cardiac arrest/ now on pressors and developing worsening renal renal failure  creatinine has increased now 6, on high dose pressors echo, dec rv function and severe pulmonary htn, was started on abx, pcp tx, and fungal coverage.     1. Acute respiratory failure. Septic vs cardiogenic shock. Scleroderma. Pulmonary fibrosis with acute flare. Multifocal pneumonia. Immunocompromised host. s/p Cardiac arrest. Shock liver. CHRIS  - imaging and chart reviewed, multiorgan failure, now to start HD   - s/p zosyn 3/28-3/30, now on merrem [id number] renally dose adjusted #6  - on mepron 750mg BID #5 for pcp coverage  - continue with antibiotic coverage   - s/p micafungin x 1 caspofungin 3/31-4/2  - s/p azithromycin 500mg daily atypical coverage #5 --> 4/1 legionella ag (-)  - sputum cx and blood cx 3/28 no growth  - fungitell (-), repeat blood cx from 3/30 no growth  - nephrology f/u noted  - poor prognosis  - monitor temps  - tolerating abx well so far; no side effects noted  - reason for abx use and side effects reviewed with patient  - supportive care  - fu cbc    2. other issues - care per medicine

## 2023-04-04 NOTE — PROCEDURE NOTE - NSPROCDETAILS_GEN_ALL_CORE
guidewire recovered/lumen(s) aspirated and flushed/sterile dressing applied/sterile technique, catheter placed/ultrasound guidance with use of sterile gel and probe cove
patient pre-oxygenated, tube inserted, placement confirmed
guidewire recovered/lumen(s) aspirated and flushed/sterile dressing applied/sterile technique, catheter placed/ultrasound guidance with use of sterile gel and probe cove
location identified, draped/prepped, sterile technique used, needle inserted/introduced/positive blood return obtained via catheter/connected to a pressurized flush line/sutured in place/Seldinger technique/all materials/supplies accounted for at end of procedure
guidewire recovered/lumen(s) aspirated and flushed/sterile dressing applied/sterile technique, catheter placed/ultrasound guidance with use of sterile gel and probe cove

## 2023-04-04 NOTE — PROGRESS NOTE ADULT - PROBLEM SELECTOR PLAN 2
Resuscitated cardiac arrest - preceded by bradycardic and likely related to respiratory failure; now hypotensive and requiring vasopressors cont. to wean as tolerated.   Renal function deteriorating.  Renal note reviewed - no HD today but anticipated in near future.   Transfer to [place] when stable  Currently in SR with normal rate.

## 2023-04-04 NOTE — PROGRESS NOTE ADULT - SUBJECTIVE AND OBJECTIVE BOX
NEPHROLOGY INTERVAL HPI/OVERNIGHT EVENTS:  23 @ 09:31    4/ remains anuric with inc sob and worsening renal parameters   4/3 still anuric, dec pressors requirements on levo 0.12 only  on, propofol and TF on hold with no fw flushes  HPI:  63 yo man with PMHX of Scleroderma treated with Cellcept and pulmonary fibrosis on home O2 8 L.  Hx of CAD ( stents x 4 to RCA)  Admission at [facility] in Feb due to resp distress and treated in ICU with pulmonary vasodilators.  R cath done with severe pulmonary HTN and was to have evaluation done for TP.  Started on Tyvaso several days ago.  Presented with 3 week hx fo progressive SOB, poor appetite and worsening LACEY walking short distance.  Admitted early 3/28 to CCU and placed on Bipap , given IV lasix,  and Zosyn/Azithromax.  CTA negative for PE.  [place] [facility] [place]--resp status further deteriorate--Intubated  and started on pressors.  Late yesterday, resuscitated post cardiac arrest.  Now noted with acute worsening of renal function.    PMHX and PSHX.  --Pulmonary fibrosis  --Scleroderma  --CAD ( stent x 4)    MEDICATIONS  (STANDING):  artificial tears (preservative free) Ophthalmic Solution 1 Drop(s) Both EYES four times a day  aspirin  chewable 81 milliGRAM(s) Oral daily  atorvastatin 20 milliGRAM(s) Oral at bedtime  atovaquone  Suspension 750 milliGRAM(s) Oral every 12 hours  chlorhexidine 0.12% Liquid 15 milliLiter(s) Oral Mucosa every 12 hours  chlorhexidine 4% Liquid 1 Application(s) Topical <User Schedule>  heparin   Injectable 5000 Unit(s) SubCutaneous every 12 hours  meropenem  IVPB 500 milliGRAM(s) IV Intermittent every 24 hours  mycophenolate mofetil Suspension 1000 milliGRAM(s) Oral two times a day  norepinephrine Infusion 0.05 MICROgram(s)/kG/Min (3.3 mL/Hr) IV Continuous <Continuous>  pantoprazole  Injectable 40 milliGRAM(s) IV Push two times a day  propofol Infusion 20 MICROgram(s)/kG/Min (8.45 mL/Hr) IV Continuous <Continuous>  sodium bicarbonate  Infusion 0.266 mEq/kG/Hr (125 mL/Hr) IV Continuous <Continuous>  sodium zirconium cyclosilicate 10 Gram(s) Oral every 8 hours    MEDICATIONS  (PRN):  LORazepam   Injectable 2 milliGRAM(s) IV Push every 2 hours PRN Agitation      Allergies    No Known Allergies    Intolerances        I&O's Detail    2023 07:01  -  2023 07:00  --------------------------------------------------------  IN:    Free Water: 100 mL    IV PiggyBack: 50 mL    Norepinephrine: 257.7 mL    Propofol: 322.7 mL  Total IN: 730.4 mL    OUT:    Intermittent Catheterization - Urethral (mL): 325 mL    Nasogastric/Oral tube (mL): 600 mL    Nepro with Carb Steady: 0 mL  Total OUT: 925 mL    Total NET: -194.6 mL        Vital Signs Last 24 Hrs  T(C): 37.7 (2023 05:17), Max: 38.1 (2023 21:58)  T(F): 99.9 (2023 05:17), Max: 100.6 (2023 21:58)  HR: 81 (2023 06:00) (72 - 86)  BP: --  BP(mean): --  RR: 37 (2023 06:00) (30 - 41)  SpO2: 100% (2023 06:00) (95% - 100%)    Parameters below as of 2023 06:00  Patient On (Oxygen Delivery Method): ventilator    O2 Concentration (%): 40  Daily     Daily Weight in k.2 (2023 05:17)    PHYSICAL EXAM:  General: sedated  HEENT: orally intubated   CV: s1s2 rrr  LUNGS: B/L CTA  EXT: no edema    LABS:                        8.9    16.41 )-----------( 214      ( 2023 06:30 )             29.3     04-04    130<L>  |  95<L>  |  115<H>  ----------------------------<  97  6.6<HH>   |  16<L>  |  13.40<H>    Ca    7.7<L>      2023 06:30  Phos  X-NORESULT       Mg     3.2         TPro  7.1  /  Alb  2.6<L>  /  TBili  1.2  /  DBili  x   /  AST  398<H>  /  ALT  346<H>  /  AlkPhos  221<H>          Magnesium, Serum: 3.2 mg/dL ( @ 06:30)  Phosphorus Level, Serum: X-NORESULT mg/dL ( @ 06:30)    ABG - ( 2023 06:35 )  pH, Arterial: 7.25  pH, Blood: x     /  pCO2: 32    /  pO2: 80    / HCO3: 14    / Base Excess: -12.0 /  SaO2: 97

## 2023-04-04 NOTE — PROGRESS NOTE ADULT - ASSESSMENT
A:    62M  HD # 8  FULL CODE    Here for:    1. Acute hypoxic resp failure 2/2  2. Pulmonary fibrosis  3. Shock, possible sepsis, not present on admission, 2/2   4. PNA  5. Sarcoidosis  6. CHRIS    This patient requires critical care for support of one or more vital organ systems with a high probability of imminent or life threatening deterioration in his/her condition    P:    Analgosedation, daily sedation vacations  HD monitoring, vasopressors for distributive shock 2/2 sepsis, levophed, actively titrating as able  s/p emergent intubation 3/28, maintained on vent, actively weaning and titrating; VAC 24/450/5, f/u ABG, CXR  Metabolic acidosis today 2/2 azotemia/CHRIS  Broad spectrum abx, f/u Cx's, white count, fever curve  TF's via NGT  VTE ppx PUD ppx  f/u labs, replete lytes PRN  Maintain central line  No s/s active bleeding  s/p Steroids  Worsening CHRIS, now acidotic causing overbreathing, anuric/hypervolemic, and hyperkalemia; give hyperK+ cocktail, discuss need for urgent RRT with renal (Dr Jensen), place HD catheter    Dispo: Cont critical care.    TOTAL CRITICAL CARE TIME: 60 minutes (EXCLUSIVE of any non bundled procedures)    Note: This time spent INCLUDES time spent directly as this patient's bedside with evaluation, review of chart including review of laboratory and imaging studies, interpretation of vital signs and cardiac output measurements, any necessary ventilator management, and time spent discussing plan of care with patient and family, including goals of care discussion.

## 2023-04-04 NOTE — PROGRESS NOTE ADULT - SUBJECTIVE AND OBJECTIVE BOX
Date of service: 04-04-23 @ 11:29    pt seen and examined  on ventilatory support  on pressors  anuric, acidotic, hyperkalemic - plan for emergent HD  febrile overnight to 100.6    ROS: unable to obtain d/t medical condition      MEDICATIONS  (STANDING):  artificial tears (preservative free) Ophthalmic Solution 1 Drop(s) Both EYES four times a day  aspirin  chewable 81 milliGRAM(s) Oral daily  atorvastatin 20 milliGRAM(s) Oral at bedtime  atovaquone  Suspension 750 milliGRAM(s) Oral every 12 hours  chlorhexidine 0.12% Liquid 15 milliLiter(s) Oral Mucosa every 12 hours  chlorhexidine 4% Liquid 1 Application(s) Topical <User Schedule>  heparin   Injectable 5000 Unit(s) SubCutaneous every 12 hours  meropenem  IVPB 500 milliGRAM(s) IV Intermittent every 24 hours  mycophenolate mofetil Suspension 1000 milliGRAM(s) Oral two times a day  norepinephrine Infusion 0.05 MICROgram(s)/kG/Min (3.3 mL/Hr) IV Continuous <Continuous>  pantoprazole  Injectable 40 milliGRAM(s) IV Push two times a day  propofol Infusion 20 MICROgram(s)/kG/Min (8.45 mL/Hr) IV Continuous <Continuous>  sodium bicarbonate  Infusion 0.266 mEq/kG/Hr (125 mL/Hr) IV Continuous <Continuous>  sodium zirconium cyclosilicate 10 Gram(s) Oral every 8 hours    Vital Signs Last 24 Hrs  T(C): 37.7 (04 Apr 2023 05:17), Max: 38.1 (03 Apr 2023 21:58)  T(F): 99.9 (04 Apr 2023 05:17), Max: 100.6 (03 Apr 2023 21:58)  HR: 90 (04 Apr 2023 10:00) (74 - 90)  BP: --  BP(mean): --  RR: 35 (04 Apr 2023 10:00) (31 - 41)  SpO2: 100% (04 Apr 2023 10:00) (100% - 100%)    Parameters below as of 04 Apr 2023 10:00  Patient On (Oxygen Delivery Method): ventilator    O2 Concentration (%): 40    PE:  Constitutional: NAD, sedated/intubated   HEENT: NC/AT, EOMI, PERRLA, conjunctivae clear; ears and nose atraumatic; pharynx benign  Neck: supple; thyroid not palpable  Back: no tenderness  Respiratory: decreased breath sounds, rales   Cardiovascular: S1S2 regular, no murmurs  Abdomen: soft, not tender, not distended, positive BS; liver and spleen WNL  Genitourinary: no suprapubic tenderness  Lymphatic: no LN palpable  Musculoskeletal: no muscle tenderness, no joint swelling or tenderness  Extremities: no pedal edema  Neurological/ Psychiatric:  moving all extremities  Skin: no rashes; no palpable lesions    Labs: all available labs reviewed                                   8.9    16.41 )-----------( 214      ( 04 Apr 2023 06:30 )             29.3     04-04    130<L>  |  95<L>  |  115<H>  ----------------------------<  97  6.6<HH>   |  16<L>  |  13.40<H>    Ca    7.7<L>      04 Apr 2023 06:30  Phos  X-NORESULT     04-04  Mg     3.2     04-04    TPro  7.1  /  Alb  2.6<L>  /  TBili  1.2  /  DBili  x   /  AST  398<H>  /  ALT  346<H>  /  AlkPhos  221<H>  04-04      Cultures:   Culture - Blood (03.30.23 @ 09:21)   Specimen Source: .Blood   Blood-Peripheral  Culture Results:   No growth to date.  Culture - Blood (03.30.23 @ 09:20)   Specimen Source: .Blood None  Culture Results:   No growth to date.  Culture - Sputum . (03.29.23 @ 16:59)   Gram Stain:   Few polymorphonuclear leukocytes per low power field   No Squamous epithelial cells per low power field   No organisms seen per oil power field  Specimen Source: .Sputum Sputum  Culture Results:   No growth at 48 hours    Radiology: all available radiological tests reviewed    ACC: [id number] EXAM:  XR CHEST PORTABLE IMMED 1V   ORDERED BY: RALPH PRATER     PROCEDURE DATE:  03/29/2023          INTERPRETATION:  Portable chest radiograph    CLINICAL INFORMATION:   Short of breath.    TECHNIQUE:  Portable  AP view of the chest was obtained.    COMPARISON: 3/29/2023 chest x-ray available for review.    FINDINGS: ET tube tip above tracheal bifurcation.  NG tube tip beyond GE junction.  LEFT IJ catheter tip in SVC.      There is cardiomegaly, vascular congestion and perihilar interstitial   infiltrates without gross effusion.  Trachea midline. No hilar mass.  Visualized osseous structures are intact.        IMPRESSION: Catheters and tubes in place. An  Cardiomegaly.  Bilateral perihilar/bibasilar diffuse airspace disease.        ACC: [id number] EXAM:  CT ANGIO CHEST PULM [facility]   ORDERED BY: ROME RUFFIN     PROCEDURE DATE:  03/28/2023          INTERPRETATION:  CLINICAL INFORMATION: Shortness of breath. History of   scleroderma.    COMPARISON: 10/28/2018    CONTRAST/COMPLICATIONS:  IV Contrast: Omnipaque 350  90 cc administered   10 cc discarded  Oral Contrast: NONE  Complications: None reported at time of study completion    PROCEDURE:  CT Angiography of the Chest.  Sagittal and coronal reformats were performed as well as 3D (MIP)   reconstructions.    FINDINGS:    LUNGS AND AIRWAYS: Patent central airways.  Revisualized chronic   interstitial disease including hazy bilateral ground glass opacities,   reticulation and worsened bronchiectasis bilaterally. Large bulla in the   left upper lobe.  PLEURA: No pleural effusion.  MEDIASTINUM AND MALLORY: No lymphadenopathy.  VESSELS: Aortic and coronary artery atherosclerosis. Enlarged main   pulmonary artery, unchanged, can be seen with pulmonary arterial   hypertension. No pulmonary embolism.  HEART: Cardiomegaly. Small pericardial effusion.  CHEST WALL AND LOWER NECK: Bilateral mediastinal and hilar adenopathy   increased since prior exam including extensive adenopathy in the   prevascular space.  VISUALIZED UPPER ABDOMEN: Right renal hypodensity too small to   characterize. Colonic diverticulosis.  BONES: Degenerative changes. Dextroscoliosis of the thoracic spine.   Chronic right rib deformities.    IMPRESSION:  No pulmonary embolism.  Chronic interstitial disease with worsened bronchiectasis.  New mediastinal and hilar adenopathy, which is indeterminate but could be   related to scleroderma and interstitial disease. Continued follow-up is   recommended.        --- End of Report ---    < end of copied text >    Advanced directives addressed: full resuscitation

## 2023-04-04 NOTE — PROCEDURE NOTE - NSINDICATIONS_GEN_A_CORE
dialysis/CRRT
critical patient/monitoring purposes
dialysis/CRRT
respiratory distress
critical illness/emergency venous access

## 2023-04-04 NOTE — PROGRESS NOTE ADULT - ASSESSMENT
61 yo man with scleroderma, pulmonary fibrosis /severe pulmonary HTN admitted with progressive resp failure.  Now intubated and on pressors due to shock and now post cardiac arrest, septic vs cardiogenic.  --CHRIS post CTA ( unclear hx of CKD, creat 1.47 on admission and on ACEI) and shock. oliguric.    No immediate need for dialysis.    Check repeat labs for K and acidosis.    Continue pressor/vent support    Continue abtx.    D/w Dr Watson.    4/3 MK   - CHRIS with component of IVET and ATN     no indication for HD today    no FW flushes with TF    Hep panel in anticipation of possible HD   dw rn and wife updated     4/4 MK   - CHRIS with worsening acidosis and hyperkalemia andhyponatremia     dw wife need for HD, consent obtained     TDC by intensivist team     she is concerned about long term need for dialysis if he is in the future deemed not to be a     lung transplant patient   dw wife

## 2023-04-04 NOTE — CHART NOTE - NSCHARTNOTEFT_GEN_A_CORE
consent obtained and in chart  TDC placed by CCM team   pt seen and tolerating hd   f180 1k bfr 250 uf goal 1   pressors and spa for bp support   next hd in am

## 2023-04-04 NOTE — PROCEDURE NOTE - NSPROCNAME_GEN_A_CORE
Arterial Puncture/Cannulation
Central Line Insertion
Tracheal Intubation
Central Line Insertion
Central Line Insertion

## 2023-04-04 NOTE — PROGRESS NOTE ADULT - SUBJECTIVE AND OBJECTIVE BOX
HPI:  HPI:  62 year old man with a history of scleroderma, pulmonary fibrosis with chronic respiratory failure (supplemental O2 dependent; listed on lung-transplant list at [facility]), severe pulmonary hypertension, CAD s/p RCA stents (2018), HTN, HLD admitted with acute/chronic respiratory failure (required mechanical ventilation) with hospitalization complicated by cardiac arrest preceded by severe bradycardia (3/29/23).    3/29/23: Pt intubated and sedated.  3/30/23:  Events past 24 hours noted and discussed with CCU team; patient is sedated on vent.  3/31/23: Pt sedated on pressors. Family at bedside  4/1/23: Sedated pressors being tapered. Labs reviewed.  4/2/23: Pressors weaning. Arousable  4/3/'23: no changes.  4/4/23;  intubated , sedated , on pressors,  echo showed normal LVEF ; severe pulmonary hypertension     MEDICATIONS:  OUTPATIENT  Home Medications:  aspirin 81 mg oral tablet, chewable: 1 tab(s) orally once a day (31 Oct 2018 10:16)  azithromycin 500 mg intravenous injection: 500 milligram(s) intravenous every 24 hours (28 Mar 2023 14:31)  enoxaparin: 40 milligram(s) subcutaneous once a day (28 Mar 2023 14:31)  mycophenolate mofetil 250 mg oral capsule: 4 cap(s) orally 2 times a day (28 Mar 2023 14:31)  piperacillin-tazobactam: 3.375 gram(s) injectable every 8 hours (28 Mar 2023 14:31)  quinapril 10 mg oral tablet: 1 tab(s) orally once a day (28 Oct 2018 22:58)    MEDICATIONS  (STANDING):  artificial tears (preservative free) Ophthalmic Solution 1 Drop(s) Both EYES four times a day  aspirin  chewable 81 milliGRAM(s) Oral daily  atorvastatin 20 milliGRAM(s) Oral at bedtime  atovaquone  Suspension 750 milliGRAM(s) Oral every 12 hours  chlorhexidine 0.12% Liquid 15 milliLiter(s) Oral Mucosa every 12 hours  chlorhexidine 4% Liquid 1 Application(s) Topical <User Schedule>  heparin   Injectable 5000 Unit(s) SubCutaneous every 12 hours  meropenem  IVPB 500 milliGRAM(s) IV Intermittent every 24 hours  mycophenolate mofetil Suspension 1000 milliGRAM(s) Oral two times a day  norepinephrine Infusion 0.05 MICROgram(s)/kG/Min (3.3 mL/Hr) IV Continuous <Continuous>  pantoprazole  Injectable 40 milliGRAM(s) IV Push two times a day  propofol Infusion 20 MICROgram(s)/kG/Min (8.45 mL/Hr) IV Continuous <Continuous>    MEDICATIONS  (PRN):  LORazepam   Injectable 2 milliGRAM(s) IV Push every 2 hours PRN Agitation      Vital Signs Last 24 Hrs  T(C): 37.7 (04 Apr 2023 05:17), Max: 38.1 (03 Apr 2023 21:58)  T(F): 99.9 (04 Apr 2023 05:17), Max: 100.6 (03 Apr 2023 21:58)  HR: 81 (04 Apr 2023 06:00) (70 - 86)  BP: --  BP(mean): --  RR: 37 (04 Apr 2023 06:00) (30 - 41)  SpO2: 100% (04 Apr 2023 06:00) (95% - 100%)    Parameters below as of 04 Apr 2023 06:00  Patient On (Oxygen Delivery Method): ventilator    O2 Concentration (%): 40    I&O's Summary    03 Apr 2023 07:01  -  04 Apr 2023 07:00  --------------------------------------------------------  IN: 730.4 mL / OUT: 925 mL / NET: -194.6 mL                PHYSICAL EXAM:    Constitutional: intubated sedated.  HEENT: PERR, EOMI,  No oral cyananosis.  Neck:  supple,  No JVD  Respiratory: Breath sounds are clear bilaterally, scattered rhonchi   Cardiovascular: S1 and S2, regular rate and rhythm, no Murmurs, gallops or rubs  Gastrointestinal: Bowel Sounds present, soft, nontender.   Extremities: trace peripheral edema. No clubbing or cyanosis.  Vascular: 2+ peripheral pulses  Musculoskeletal: no calf tenderness.  Skin: No rashes.        ===============================  ===============================  LABS:                           8.9    16.41 )-----------( 214      ( 04 Apr 2023 06:30 )             29.3     04-04    130<L>  |  95<L>  |  115<H>  ----------------------------<  97  6.6<HH>   |  16<L>  |  13.40<H>    Ca    7.7<L>      04 Apr 2023 06:30  Phos  X-NORESULT     04-04  Mg     3.2     04-04    TPro  7.1  /  Alb  2.6<L>  /  TBili  1.2  /  DBili  x   /  AST  398<H>  /  ALT  346<H>  /  AlkPhos  221<H>  04-04        LIVER FUNCTIONS - ( 04 Apr 2023 06:30 )  Alb: 2.6 g/dL / Pro: 7.1 gm/dL / ALK PHOS: 221 U/L / ALT: 346 U/L / AST: 398 U/L / GGT: x                 Culture Results:   No Legionella species isolated to date (03-31-23 @ 11:00)      ===============================  ECG:      Diagnosis Line Normal sinus rhythm  T wave abnormality, consider anterior ischemia  Abnormal ECG  When compared with ECG of 30-OCT-2018 07:24,  Vent. rate has increased BY  33 BPM  Questionable change in QRS axis  T wave inversion now evident in Anterior leads  Confirmed by DIANE CULVER, SO PINEDA ([id number]) on 3/29/2023 4:48:13 PM    ===============================  RADIOLOGY:  < from: CT Angio Chest PE Protocol w/ IV Cont (03.28.23 @ 00:21) >  ACC: [id number] EXAM:  CT ANGIO CHEST PULM [facility]   ORDERED BY: ROME RUFFIN     PROCEDURE DATE:  03/28/2023          INTERPRETATION:  CLINICAL INFORMATION: Shortness of breath. History of   scleroderma.    COMPARISON: 10/28/2018    CONTRAST/COMPLICATIONS:  IV Contrast: Omnipaque 350  90 cc administered   10 cc discarded  Oral Contrast: NONE  Complications: None reported at time of study completion    PROCEDURE:  CT Angiography of the Chest.  Sagittal and coronal reformats were performed as well as 3D (MIP)   reconstructions.    FINDINGS:    LUNGS AND AIRWAYS: Patent central airways.  Revisualized chronic   interstitial disease including hazy bilateral ground glass opacities,   reticulation and worsened bronchiectasis bilaterally. Large bulla in the   left upper lobe.  PLEURA: No pleural effusion.  MEDIASTINUM AND MALLORY: No lymphadenopathy.  VESSELS: Aortic and coronary artery atherosclerosis. Enlarged main   pulmonary artery, unchanged, can be seen with pulmonary arterial   hypertension. No pulmonary embolism.  HEART: Cardiomegaly. Small pericardial effusion.  CHEST WALL AND LOWER NECK: Bilateral mediastinal and hilar adenopathy   increased since prior exam including extensive adenopathy in the   prevascular space.  VISUALIZED UPPER ABDOMEN: Right renal hypodensity too small to   characterize. Colonic diverticulosis.  BONES: Degenerative changes. Dextroscoliosis of the thoracic spine.   Chronic right rib deformities.    IMPRESSION:  No pulmonary embolism.  Chronic interstitial disease with worsened bronchiectasis.  New mediastinal and hilar adenopathy, which is indeterminate but could be   related to scleroderma and interstitial disease. Continued follow-up is   recommended.        --- End of Report ---            TYE CULVER; Attending Radiologist  This document has been electronically signed. Mar 28 2023  1:01AM    < end of copied text >    ===============================  ECHO:  Outpatient Echo Jun '22: EF 55-60%, borderline pulmonary hypertension.  normal RV size & function.    ===============================  < from: TTE Echo Complete w/o Contrast w/ Doppler (03.30.23 @ 07:51) >     The mitral valve leaflets appear thickened.   Mild to Moderate mitral regurgitation is present.   The aortic valve is well visualized, appears calcified. Valve opening   seems to be normal.   Mild (1+) aortic regurgitation is present.   The tricuspid valve leaflets appear mildly thickened and/or calcified,   but   open well.   Moderate (2+) tricuspid valve regurgitation is present.   Severe pulmonary hypertension.   Pulmonic valve not well seen.   Mild to moderate pulmonic valvular regurgitation is present.   Normal appearing left atrium.   The left ventricle is normal in size, wall thickness, wallmotion and   contractility.   Estimated left ventricular ejection fraction is 55-60 %.   The right atrium appears dilated.   The right ventricle is severely dilated.   Mild to moderate, diffuse hypokinesis of the right ventricle is present.     Signature    < end of copied text >      Monitor sinus rhythm

## 2023-04-04 NOTE — PROGRESS NOTE ADULT - SUBJECTIVE AND OBJECTIVE BOX
CCU Progress Note    HPI:    S:    Pt seen and examined  HD # 8  FULL CODE  62M hx PSS (scleroderma) on cellcept with pulmonary fibrosis on 8L of 02 24/7, HTN HLD  CAD with 4 stents to the RCA 2018 with Dr Griffith.    He is on the lung transplant list at [facility] with Dr Manas Priest (transplant pulmonologist).    He was hospitalized in Feb at [facility] with a flare of fibrosis and was in the ICU on inhaled pulmonary vasodialtors.    At that time he had a R heart cath and he was told that he has severe pulmonary hypertension.,   He is being teed up for transplant waiting for immunizations/colonoscopy etc.   He was discharged on Tyvaso (treprostinil) but only started using it a few days ago due to expense and approval.       Over the last three weeks he has had a poor appetite and progressive SOB leaving him unable to walk with a walker more than 10 feet without stopping to catch his breath.  He also noted more hypoxemia on his home pulse ox.  He has a cough with clear phlegm.    He  arrived in the ED with severe SOB and hypoxemia placed on BIPAP.  Type 1 respiratory failure   Trop and BNP elevation     3/29: Intubated on pressors.    4/2: Remains intubated, on pressors. Vent being actively titrated.  4/4: Remains intubated. Remains on pressors. Worsening renal function today with indications for emergent RRT.      ROS: Unable to obtain 2/2 Pt condition (intubated)    MEDICATIONS  (STANDING):    artificial tears (preservative free) Ophthalmic Solution 1 Drop(s) Both EYES four times a day  aspirin  chewable 81 milliGRAM(s) Oral daily  atorvastatin 20 milliGRAM(s) Oral at bedtime  atovaquone  Suspension 750 milliGRAM(s) Oral every 12 hours  chlorhexidine 0.12% Liquid 15 milliLiter(s) Oral Mucosa every 12 hours  chlorhexidine 4% Liquid 1 Application(s) Topical <User Schedule>  heparin   Injectable 5000 Unit(s) SubCutaneous every 12 hours  meropenem  IVPB 500 milliGRAM(s) IV Intermittent every 24 hours  mycophenolate mofetil Suspension 1000 milliGRAM(s) Oral two times a day  norepinephrine Infusion 0.05 MICROgram(s)/kG/Min (3.3 mL/Hr) IV Continuous <Continuous>  pantoprazole  Injectable 40 milliGRAM(s) IV Push two times a day  propofol Infusion 20 MICROgram(s)/kG/Min (8.45 mL/Hr) IV Continuous <Continuous>  sodium bicarbonate  Infusion 0.266 mEq/kG/Hr (125 mL/Hr) IV Continuous <Continuous>  sodium zirconium cyclosilicate 10 Gram(s) Oral every 8 hours    MEDICATIONS  (PRN):  LORazepam   Injectable 2 milliGRAM(s) IV Push every 2 hours PRN Agitation    Drug Dosing Weight    Height (cm): 177.8 (28 Mar 2023 04:15)  Weight (kg): 70.4 (28 Mar 2023 04:15)  BMI (kg/m2): 22.3 (28 Mar 2023 04:15)  BSA (m2): 1.87 (28 Mar 2023 04:15)    PAST MEDICAL & SURGICAL HISTORY:    HTN (hypertersion)  Pulmonary fibrosis  Scleroderma    FAMILY HISTORY:    ROS: See HPI; otherwise, all systems reviewed and negative.    O:    ICU Vital Signs Last 24 Hrs  T(C): 37.7 (04 Apr 2023 05:17), Max: 38.1 (03 Apr 2023 21:58)  T(F): 99.9 (04 Apr 2023 05:17), Max: 100.6 (03 Apr 2023 21:58)  HR: 90 (04 Apr 2023 10:00) (74 - 90)  BP: --  BP(mean): --  ABP: 98/57 (04 Apr 2023 10:00) (82/51 - 111/66)  ABP(mean): 72 (04 Apr 2023 10:00) (63 - 85)  RR: 35 (04 Apr 2023 10:00) (31 - 41)  SpO2: 100% (04 Apr 2023 10:00) (96% - 100%)    O2 Parameters below as of 04 Apr 2023 10:00  Patient On (Oxygen Delivery Method): ventilator    O2 Concentration (%): 40    ABG - ( 04 Apr 2023 06:35 )  pH, Arterial: 7.25  pH, Blood: x     /  pCO2: 32    /  pO2: 80    / HCO3: 14    / Base Excess: -12.0 /  SaO2: 97        I&O's Detail    03 Apr 2023 07:01  -  04 Apr 2023 07:00  --------------------------------------------------------  IN:    Free Water: 100 mL    IV PiggyBack: 50 mL    Norepinephrine: 257.7 mL    Propofol: 322.7 mL  Total IN: 730.4 mL    OUT:    Intermittent Catheterization - Urethral (mL): 325 mL    Nasogastric/Oral tube (mL): 600 mL    Nepro with Carb Steady: 0 mL  Total OUT: 925 mL    Total NET: -194.6 mL    Mode: AC/ CMV (Assist Control/ Continuous Mandatory Ventilation)  RR (machine): 24  TV (machine): 450  FiO2: 40  PEEP: 5  ITime: 0.8  MAP: 16  PIP: 32    PE:    Adult M lying in bed  Appears chronically ill  No JVD trachea midline  S1S2+  Coarse BS B/L  Abd soft NTND  + anasarca  Intubated, sedated  + CVC, keny noted  Skin pink and well perfused    LABS:    CBC Full  -  ( 04 Apr 2023 06:30 )  WBC Count : 16.41 K/uL  RBC Count : 3.09 M/uL  Hemoglobin : 8.9 g/dL  Hematocrit : 29.3 %  Platelet Count - Automated : 214 K/uL  Mean Cell Volume : 94.8 fl  Mean Cell Hemoglobin : 28.8 pg  Mean Cell Hemoglobin Concentration : 30.4 gm/dL  Auto Neutrophil # : x  Auto Lymphocyte # : x  Auto Monocyte # : x  Auto Eosinophil # : x  Auto Basophil # : x  Auto Neutrophil % : x  Auto Lymphocyte % : x  Auto Monocyte % : x  Auto Eosinophil % : x  Auto Basophil % : x    04-04    130<L>  |  95<L>  |  115<H>  ----------------------------<  97  6.6<HH>   |  16<L>  |  13.40<H>    Ca    7.7<L>      04 Apr 2023 06:30  Phos  X-NORESULT     04-04  Mg     3.2     04-04    TPro  7.1  /  Alb  2.6<L>  /  TBili  1.2  /  DBili  x   /  AST  398<H>  /  ALT  346<H>  /  AlkPhos  221<H>  04-04        CAPILLARY BLOOD GLUCOSE            LIVER FUNCTIONS - ( 04 Apr 2023 06:30 )  Alb: 2.6 g/dL / Pro: 7.1 gm/dL / ALK PHOS: 221 U/L / ALT: 346 U/L / AST: 398 U/L / GGT: x

## 2023-04-05 NOTE — PROGRESS NOTE ADULT - ASSESSMENT
62M with PSS (scleroderma) on cellcept with pulmonary fibrosis on 8L home O2, HTN, HLD, CAD s/p 4 SINDY to RCA, recent hospitalized at [facility] in 02/23 iso fibrosis flare requiring inhaled pulmonary vasodilators in the ICU, discharged on treprostinil and was listed for lung transplantation at [facility]. Over the 3 weeks prior to presentation patient experienced progressive SOB and hypoxemia at home. Patient failed BiPAP and was intubated iso Acute Hypoxic Respiratory Failure with course complicated by CHRIS requiring initiation of emergent RRT on 4/4.    Acute on Chronic Hypoxic Respiratory Failure iso pulmonary fibrosis  Septic Shock iso PNA  Sarcoidosis  CHRIS with worsening metabolic acidosis (resolved) and hyperkalemia requiring initiation of emergent RRT    Plan:  NEURO: Sedation with propofol for vent synchrony and comfort.  CARDIAC: Actively titrating levophed to maintain goal MAP >65. TTE w/ EF 55-60%, severely dilated RV w/ diffuse hypokinesis.   RESPIRATORY: Remains on full vent support. Actively titrating and weaning settings to maintain SaO2 >90%, or pH >7.25. Daily SAT/SBT if clinical condition warrants. Continue cellcept and mepron. Deemed currently not a candidate for lung transplant per [facility].  GI: TFs running. IV PPI for stress ulcer ppx.  : Metabolic acidosis resolved, discontinued bicarb gtt. Continue to trend Cr, HD to be repeated in the AM. Nephrology following. Goal K~4, Mg~2, P~3.  ENDO: No active issues. Goal -180.  ID: Afebrile, uptrending WBC to 16k. Continue empiric Meropenem and prophylactic Mepron for PCP coverage. All pending pan cxs NGTD. Legionella negative. ID following.  HEME: SQH for DVT ppx.    Dispo: Continue ICU Level management of acute hypoxic respiratory failure and septic shock. [facility] deemed not a current candidate for lung transplantation, no need to transfer patient to [facility].

## 2023-04-05 NOTE — PROGRESS NOTE ADULT - PROBLEM SELECTOR PLAN 2
Resuscitated cardiac arrest - preceded by bradycardic and likely related to respiratory failure; now hypotensive and requiring vasopressors cont. to wean as tolerated.   Renal function deteriorating.  HD planned.

## 2023-04-05 NOTE — PROGRESS NOTE ADULT - ASSESSMENT
63 yo man with scleroderma, pulmonary fibrosis /severe pulmonary HTN admitted with progressive resp failure.  Now intubated and on pressors due to shock and now post cardiac arrest, septic vs cardiogenic.  --CHRIS post CTA ( unclear hx of CKD, creat 1.47 on admission and on ACEI) and shock. oliguric.    No immediate need for dialysis.    Check repeat labs for K and acidosis.    Continue pressor/vent support    Continue abtx.    D/w Dr Watson.    3/31 SY  --CHRIS post cardiac arrest :  Continues to worsen with Oliguria     No immediate need for dialysis.    Will assess again in am    D/w Dr Watson re : poor pulmonary prognosis.    4/1 SY  --CHRIS post cardiac arrest : continues to worsen with no urine output.    No immediate indication for dialysis.    HD to be considered when indicated with electrolyte disturbances.    Oxygenation acceptable.  --Change enteral feeding to Nepro for now.     D/w Dr Watson    4/2 SY  --CHRIS post cardiac arrest : continues to worsen with no urine output.     Continue to assess for indications for dialysis.     No urgent need for dialysis today as electrolytes acceptable.     Would allow more time for BP to stabilize.  --Oxygenation acceptable.  --ID : on Meropenem and Caspofungin  --Scleroderma : continue MMF.  --Severe pulmonary fibrosis and HTN : Not a TP candidate at this time.    4/3 MK   - CHRIS with component of VIET and ATN     no indication for HD today    no FW flushes with TF    Hep panel in anticipation of possible HD   dw rn and wife updated     4/4 MK   - CHRIS with worsening acidosis and hyperkalemia andhyponatremia     dw wife need for HD, consent obtained     TDC by intensivist team     she is concerned about long term need for dialysis if he is in the future deemed not to be a     lung transplant patient   dw wife     4/5 SY  --CHRIS, with worsening metabolic derangement.      1st HD yesterday.    Second HD today with goal of 3 KG UF.  --BP in 100's on Norepi.  --Resp : pulmonary fibrosis : continue vent support.  --Scleroderma : continue MMF.

## 2023-04-05 NOTE — PROGRESS NOTE ADULT - ASSESSMENT
62M hx PSS (scleroderma) on cellcept with pulmonary fibrosis on 8L of 02  24/7, HTN HLD  CAD with 4 stents to the RCA 2018 with Dr Griffith. He is on the lung transplant list at [facility] with Dr Manas Priest (transplant pulmonologist). He was hospitalized in Feb at [facility] with a flare of fibrosis and was in the ICU on inhaled pulmonary vasodialtors. At that time he had a R heart cath and he was told that he has severe pulmonary hypertension., He was  being teed up for transplant waiting for immunizations/colonoscopy etc. He was discharged on Tyvaso (treprostinil) but only started using it a few days ago due to expense and approval. Over the last three weeks he has had a poor appetite and progressive SOB leaving him unable to walk with a walker more than 10 feet without stopping to catch his breath. He arrived in the ED with severe SOB and hypoxemia placed on BIPAP.  Type 1 respiratory failure Trop and BNP elevation. 3/29 am was intubated, had approximately 5 minute cardiac arrest/ now on pressors and developing worsening renal renal failure creatinine has increased now 6, on high dose pressors echo, dec rv function and severe pulmonary htn, was started on abx, pcp tx, and fungal coverage.     1. Acute respiratory failure. Septic vs cardiogenic shock. Scleroderma. Pulmonary fibrosis with acute flare. Multifocal pneumonia. Immunocompromised host. s/p Cardiac arrest. Shock liver. CHRIS on HD  - imaging and chart reviewed, multiorgan failure, started on HD 4/4  - s/p zosyn 3/28-3/30, now on merrem [id number] renally dose adjusted #7  - on mepron 750mg BID #6 for pcp coverage  - continue with antibiotic coverage   - sputum cx and blood cx 3/28 no growth  - fungitell (-), repeat blood cx from 3/30 no growth  - nephrology f/u noted  - tolerating abx well so far; no side effects noted  - reason for abx use and side effects reviewed with patient  - s/p micafungin x 1 caspofungin 3/31-4/2  - s/p azithromycin 500mg daily atypical coverage #5 --> 4/1 legionella ag (-)  - supportive care  - fu cbc    2. other issues - care per medicine

## 2023-04-05 NOTE — CHART NOTE - NSCHARTNOTEFT_GEN_A_CORE
Clinical Nutrition Follow Up Note:    * 61 y/o M with a PMHx of PSS (scleroderma) on cellcept with pulmonary fibrosis on 8L of 02 24/7, HTN, HLD, CAD with 4 stents to the RCA 2018 with Dr Griffith. He is on the lung transplant list at [facility] with Dr Manas Priest (transplant pulmonologist). He was hospitalized in Feb at [facility] with a flare of fibrosis and was in the ICU on inhaled pulmonary vasodialtors. He was being teed up for transplant waiting for immunizations/colonoscopy etc. Over the last three weeks he has had a poor appetite and progressive SOB leaving him unable to walk with a walker more than 10 feet without stopping to catch his breath. He arrived in the ED with severe SOB and hypoxemia placed on BIPAP. Type 1 respiratory failure. Trop and BNP elevation. Admit for acute respiratory failure and pulmonary fibrosis.    *Current status:    *Labs Reviewed:  04-05    135  |  95<L>  |  83<H>  ----------------------------<  104<H>  3.9   |  23  |  11.20<H>    Ca    7.4<L>      05 Apr 2023 07:10  Phos  10.8     04-05  Mg     3.2     04-04    TPro  x   /  Alb  2.4<L>  /  TBili  x   /  DBili  x   /  AST  x   /  ALT  x   /  AlkPhos  x   04-05      BMI: BMI (kg/m2): 22.3 (03-28-23 @ 04:15)  HbA1c:   Glucose:   BP: 114/65 (04-05-23 @ 10:24) (97/63 - 115/65)  Lipid Panel:     *pertinent meds:      *I and O's:    04-04-23 @ 07:01  -  04-05-23 @ 07:00  --------------------------------------------------------  IN:    IV PiggyBack: 200 mL    Norepinephrine: 259 mL    Propofol: 352 mL    Sodium Bicarbonate: 1028 mL  Total IN: 1839 mL    OUT:    Nasogastric/Oral tube (mL): 255 mL  Total OUT: 255 mL    Total NET: 1584 mL      04-05-23 @ 07:01  -  04-05-23 @ 10:56  --------------------------------------------------------  IN:    Other (mL): 500 mL  Total IN: 500 mL    OUT:    Other (mL): 2500 mL  Total OUT: 2500 mL    Total NET: -2000 mL          *(+) BM on   ; pt on bowel regimen.    *aziza score of  : PU documented.   edema documented.    *PO/TF intake, meeting ~ % of estimated nutr needs.    *Malnutrition dx:        Diet, NPO with Tube Feed:   Tube Feeding Modality: Orogastric  Nepro with Carb Steady (NEPRORTH)  Total Volume for 24 Hours (mL): 1080  Continuous  Starting Tube Feed Rate {mL per Hour}: 20     Every 8 hours  Until Goal Tube Feed Rate (mL per Hour): 45  Tube Feed Duration (in Hours): 24  Tube Feed Start Time: 13:00 (04-02-23 @ 12:43) [Active]  Diet, NPO with Tube Feed:   Tube Feeding Modality: Orogastric  Glucerna 1.5 El (GLUCERNA1.5)  Total Volume for 24 Hours (mL): 1320  Continuous  Starting Tube Feed Rate {mL per Hour}: 20  Increase Tube Feed Rate by (mL): 10     Every 4 hours  Until Goal Tube Feed Rate (mL per Hour): 55  Tube Feed Duration (in Hours): 24  Tube Feed Start Time: 00:00  Free Water Flush  Free Water Flush Instructions:  Free water flushes of 30cc/hr (provides 720cc/day); monitor and adjust free water flushes per MD  No Carb Prosource TF     Qty per Day:  3 (03-31-23 @ 11:04) [Pending Verification By Attending]            Estimated Needs: Based on Kg   Calories:  Kcal ( Kcal/Kg)  Protein:  g ( g/Kg)  Fluids:   mL ( mL/Kg)    *Wt Hx:    Height (cm): 177.8 (03-28-23 @ 04:15)  Weight (kg): 70.4 (03-28-23 @ 04:15)  BMI (kg/m2): 22.3 (03-28-23 @ 04:15)  BSA (m2): 1.87 (03-28-23 @ 04:15)    Recommendations:          *will continue to monitor and follow up with adjustments to treatment plan prn*    Maryanne Bhatt MS, RDN, [phone] Clinical Nutrition Follow Up Note:    * 63 y/o M with a PMHx of PSS (scleroderma) on cellcept with pulmonary fibrosis on 8L of 02 24/7, HTN, HLD, CAD with 4 stents to the RCA 2018 with Dr Griffith. He is on the lung transplant list at [facility] with Dr Manas Priest (transplant pulmonologist). He was hospitalized in Feb at [facility] with a flare of fibrosis and was in the ICU on inhaled pulmonary vasodialtors. He was being teed up for transplant waiting for immunizations/colonoscopy etc. Over the last three weeks he has had a poor appetite and progressive SOB leaving him unable to walk with a walker more than 10 feet without stopping to catch his breath. He arrived in the ED with severe SOB and hypoxemia placed on BIPAP. Type 1 respiratory failure. Trop and BNP elevation. Admit for acute respiratory failure and pulmonary fibrosis.    *Current status: TF held 2/2 emesis; NGT placed to LWS (4/1). TF changed to Nepro 2/2 worsening renal function (4/2). CHRIS with worsening acidosis and hyperkalemia and hyponatremia; HD initiated (4/4).    *Labs Reviewed: BUN/Crea elevated. Phos 10.8 elevated - on HD  04-05    135  |  95<L>  |  83<H>  ----------------------------<  104<H>  3.9   |  23  |  11.20<H>    Ca    7.4<L>      05 Apr 2023 07:10  Phos  10.8     04-05  Mg     3.2     04-04    TPro  x   /  Alb  2.4<L>  /  TBili  x   /  DBili  x   /  AST  x   /  ALT  x   /  AlkPhos  x   04-05      BMI: BMI (kg/m2): 22.3 (03-28-23 @ 04:15)  HbA1c:   Glucose:   BP: 114/65 (04-05-23 @ 10:24) (97/63 - 115/65)  Lipid Panel:     *pertinent meds: Calcium Gluconate, Humulin (5 units), Sodium Bicarb, Lipitor, Mepron, Cellcept, Protonix, Lokelma, Ativan, Levo, Propofol (14.7 mL/hr), D50    *I and O's:    04-04-23 @ 07:01  -  04-05-23 @ 07:00  --------------------------------------------------------  IN:    IV PiggyBack: 200 mL    Norepinephrine: 259 mL    Propofol: 352 mL    Sodium Bicarbonate: 1028 mL  Total IN: 1839 mL    OUT:    Nasogastric/Oral tube (mL): 255 mL  Total OUT: 255 mL    Total NET: 1584 mL      04-05-23 @ 07:01  -  04-05-23 @ 10:56  --------------------------------------------------------  IN:    Other (mL): 500 mL  Total IN: 500 mL    OUT:    Other (mL): 2500 mL  Total OUT: 2500 mL    Total NET: -2000 mL    *(+) BM on 4/5 - fecal incontinence ; pt not on bowel regimen.    *aziza score of 9 : No PUs documented. +3 generalized edema documented.    *TF intake, meeting ~ 0% of estimated nutr needs.    *Malnutrition dx: Pt meets criteria for moderate protein-calorie malnutrition in the context of chronic illness r/t inability to meet increased nutrient needs 2/2 acute respiratory failure on pulmonary fibrosis AEB moderate-severe muscle/fat wasting    Diet, NPO with Tube Feed:   Tube Feeding Modality: Orogastric  Nepro with Carb Steady (NEPRORTH)  Total Volume for 24 Hours (mL): 1080  Continuous  Starting Tube Feed Rate {mL per Hour}: 20     Every 8 hours  Until Goal Tube Feed Rate (mL per Hour): 45  Tube Feed Duration (in Hours): 24  Tube Feed Start Time: 13:00 (04-02-23 @ 12:43) [Active]  Diet, NPO with Tube Feed:   Tube Feeding Modality: Orogastric  Glucerna 1.5 El (GLUCERNA1.5)  Total Volume for 24 Hours (mL): 1320  Continuous  Starting Tube Feed Rate {mL per Hour}: 20  Increase Tube Feed Rate by (mL): 10     Every 4 hours  Until Goal Tube Feed Rate (mL per Hour): 55  Tube Feed Duration (in Hours): 24  Tube Feed Start Time: 00:00  Free Water Flush  Free Water Flush Instructions:  Free water flushes of 30cc/hr (provides 720cc/day); monitor and adjust free water flushes per MD  No Carb Prosource TF     Qty per Day:  3 (03-31-23 @ 11:04) [Pending Verification By Attending]      Estimated Needs: Based on 64.8 Kg   Calories: 5653-3912 Kcal (30-35 Kcal/Kg)  Protein:  g (1.5-2 g/Kg)  Fluids: 1000 mL + Urine Output    *Wt Hx:  (3/29) 143#, bedscale wt taken by RD  (3/31) 163#, bedscale wt taken by RN  (4/5) 166#, bedscale wt taken by RD    Height (cm): 177.8 (03-28-23 @ 04:15)  Weight (kg): 70.4 (03-28-23 @ 04:15)  BMI (kg/m2): 22.3 (03-28-23 @ 04:15)  BSA (m2): 1.87 (03-28-23 @ 04:15)    Recommendations:          *will continue to monitor and follow up with adjustments to treatment plan prn*    Maryanne Bhatt MS, RDN, [phone] Clinical Nutrition Follow Up Note:    * 61 y/o M with a PMHx of PSS (scleroderma) on cellcept with pulmonary fibrosis on 8L of 02 24/7, HTN, HLD, CAD with 4 stents to the RCA 2018 with Dr Griffith. He is on the lung transplant list at [facility] with Dr Manas Priest (transplant pulmonologist). He was hospitalized in Feb at [facility] with a flare of fibrosis and was in the ICU on inhaled pulmonary vasodialtors. He was being teed up for transplant waiting for immunizations/colonoscopy etc. Over the last three weeks he has had a poor appetite and progressive SOB leaving him unable to walk with a walker more than 10 feet without stopping to catch his breath. He arrived in the ED with severe SOB and hypoxemia placed on BIPAP. Type 1 respiratory failure. Trop and BNP elevation. Admit for acute respiratory failure and pulmonary fibrosis.    *Hx: TF held 2/2 emesis; NGT placed to LWS (4/1). TF changed to Nepro 2/2 worsening renal function (4/2). CHRIS with worsening acidosis and hyperkalemia and hyponatremia; HD initiated (4/4). Deemed currently not a lung transplant candidate by [facility].     *Current status: Pt receiving HD at time of visit. TF has been off since Monday 2/2 emesis and OGT placed to LWS. Propofol running at 14.7 mL/hr (provides 388 kcals/24 hours). Recommend to initiate TF (Nepro) as soon as medically feasible. If pt does not tolerate Nepro, can consider switching TF formula to vital; both Vital High Protein and NEPRO are low in potassium and phos; both also provide estimated nutr needs in volume <1 Liter; the benefit of Vital over Nepro is that it is more easily absorbed/digested. Bedscale wt taken at time of visit - 166#; +3 generalized edema likely skewing wt. Weight has been stable since last assessment. Please see additional recommendations below.     *Labs Reviewed: BUN/Crea elevated. Phos 10.8 elevated - on HD  04-05    135  |  95<L>  |  83<H>  ----------------------------<  104<H>  3.9   |  23  |  11.20<H>    Ca    7.4<L>      05 Apr 2023 07:10  Phos  10.8     04-05  Mg     3.2     04-04    TPro  x   /  Alb  2.4<L>  /  TBili  x   /  DBili  x   /  AST  x   /  ALT  x   /  AlkPhos  x   04-05      BMI: BMI (kg/m2): 22.3 (03-28-23 @ 04:15)  HbA1c:   Glucose:   BP: 114/65 (04-05-23 @ 10:24) (97/63 - 115/65)  Lipid Panel:     *pertinent meds: Calcium Gluconate, Humulin (5 units), Sodium Bicarb, Lipitor, Mepron, Cellcept, Protonix, Lokelma, Ativan, Levo, Propofol (14.7 mL/hr), D50    *I and O's:    04-04-23 @ 07:01  -  04-05-23 @ 07:00  --------------------------------------------------------  IN:    IV PiggyBack: 200 mL    Norepinephrine: 259 mL    Propofol: 352 mL    Sodium Bicarbonate: 1028 mL  Total IN: 1839 mL    OUT:    Nasogastric/Oral tube (mL): 255 mL  Total OUT: 255 mL    Total NET: 1584 mL      04-05-23 @ 07:01  -  04-05-23 @ 10:56  --------------------------------------------------------  IN:    Other (mL): 500 mL  Total IN: 500 mL    OUT:    Other (mL): 2500 mL  Total OUT: 2500 mL    Total NET: -2000 mL    *(+) BM on 4/5 - fecal incontinence ; pt not on bowel regimen.    *aziza score of 9 : No PUs documented. +3 generalized edema documented.    *TF intake, meeting ~ 0% of estimated nutr needs.    *Malnutrition dx: Pt meets criteria for moderate protein-calorie malnutrition in the context of chronic illness r/t inability to meet increased nutrient needs 2/2 acute respiratory failure on pulmonary fibrosis AEB moderate-severe muscle/fat wasting    Diet, NPO with Tube Feed:   Tube Feeding Modality: Orogastric  Nepro with Carb Steady (NEPRORTH)  Total Volume for 24 Hours (mL): 1080  Continuous  Starting Tube Feed Rate {mL per Hour}: 20     Every 8 hours  Until Goal Tube Feed Rate (mL per Hour): 45  Tube Feed Duration (in Hours): 24  Tube Feed Start Time: 13:00 (04-02-23 @ 12:43) [Active]  Diet, NPO with Tube Feed:   Tube Feeding Modality: Orogastric  Glucerna 1.5 El (GLUCERNA1.5)  Total Volume for 24 Hours (mL): 1320  Continuous  Starting Tube Feed Rate {mL per Hour}: 20  Increase Tube Feed Rate by (mL): 10     Every 4 hours  Until Goal Tube Feed Rate (mL per Hour): 55  Tube Feed Duration (in Hours): 24  Tube Feed Start Time: 00:00  Free Water Flush  Free Water Flush Instructions:  Free water flushes of 30cc/hr (provides 720cc/day); monitor and adjust free water flushes per MD  No Carb Prosource TF     Qty per Day:  3 (03-31-23 @ 11:04) [Pending Verification By Attending]      Estimated Needs: Based on 64.8 Kg   Calories: 0214-2261 Kcal (30-35 Kcal/Kg)  Protein:  g (1.5-2 g/Kg)  Fluids: 1000 mL + Urine Output    *Wt Hx:  (3/29) 143#, bedscale wt taken by RD  (3/31) 163#, bedscale wt taken by RN  (4/5) 166#, bedscale wt taken by RD    Height (cm): 177.8 (03-28-23 @ 04:15)  Weight (kg): 70.4 (03-28-23 @ 04:15)  BMI (kg/m2): 22.3 (03-28-23 @ 04:15)  BSA (m2): 1.87 (03-28-23 @ 04:15)    Recommendations:  1) Initiate Nepro 1.5 @ 20 cc/hr, increase by 10 cc/hr q6 hours until goal rate of 50cc/hr met with 2 packets of Prosource TF. Will provide ~ 2308 kcal (including propofol), 114 g protein, and 727 mL free water.   2) Continue to monitor propofol levels and adjust TF recs PRN  3) Keep on aspiration precautions - keep head of bed >45 degrees when feeding   4) Recommend Nephrovite daily to ensure 100% RDA met   5) Consider adding thiamine 100 mg daily 2/2 poor PO intake/ malnutrition   6) Obtain Mg and phos levels, monitor lytes and hydration replete prn   7) Monitor bowel movements, if no BM for >3 days, consider implementing bowel regimen.   8)  Monitor blood glucose, maintain within 140-180 mg/dL   9) Monitor wt daily to track/trend wt changes  10) If pt does not tolerate Nepro, recommend to switch TF to Vital 1.2 with a goal of 80 cc/hr.  11) Obtain TG level   12) Confirm goals of care regarding LONG-TERM nutrition support    *will continue to monitor and follow up with adjustments to treatment plan prn*    Maryanne Bhatt MS, RDN, [phone]

## 2023-04-05 NOTE — PROGRESS NOTE ADULT - SUBJECTIVE AND OBJECTIVE BOX
PCP:    REQUESTING PHYSICIAN:    REASON FOR CONSULT:    CHIEF COMPLAINT:    HPI:  62M hx PSS (scleroderma) on cellcept with pulmonary fibrosis on 8L of , HTN HLD  CAD with 4 stents to the RCA  with Dr Griffith.    He is on the lung transplant list at [facility] with Dr Manas Priest (transplant pulmonologist).    He was hospitalized in Feb at [facility] with a flare of fibrosis and was in the ICU on inhaled pulmonary vasodilaltors.    At that time he had a R heart cath and he was told that he has severe pulmonary hypertension.,   He is being teed up for transplant waiting for immunizations/colonoscopy etc.   He was discharged on Tyvaso (treprostinil) but only started using it a few days ago due to expense and approval.       Over the last three weeks he has had a poor appetite and progressive SOB leaving him unable to walk with a walker more than 10 feet without stopping to catch his breath.  He also noted more hypoxemia on his home pulse ox.  He has a cough with clear phlegm.    He  arrived in the ED with severe SOB and hypoxemia placed on BIPAP.  Type 1 respiratory failure   Trop and BNP elevation  (28 Mar 2023 02:01)      PAST MEDICAL & SURGICAL HISTORY:  HTN (hypertension)      Pulmonary fibrosis      Scleroderma          SOCIAL HISTORY:    FAMILY HISTORY:      ALLERGIES:  Allergies    No Known Allergies    Intolerances        MEDICATIONS:    MEDICATIONS  (STANDING):  artificial tears (preservative free) Ophthalmic Solution 1 Drop(s) Both EYES four times a day  aspirin  chewable 81 milliGRAM(s) Oral daily  atorvastatin 20 milliGRAM(s) Oral at bedtime  atovaquone  Suspension 750 milliGRAM(s) Oral every 12 hours  chlorhexidine 0.12% Liquid 15 milliLiter(s) Oral Mucosa every 12 hours  chlorhexidine 4% Liquid 1 Application(s) Topical <User Schedule>  chlorhexidine 4% Liquid 1 Application(s) Topical <User Schedule>  heparin   Injectable 5000 Unit(s) SubCutaneous every 12 hours  meropenem  IVPB 500 milliGRAM(s) IV Intermittent every 24 hours  methylPREDNISolone sodium succinate Injectable 40 milliGRAM(s) IV Push every 8 hours  mycophenolate mofetil Suspension 1000 milliGRAM(s) Oral two times a day  norepinephrine Infusion 0.05 MICROgram(s)/kG/Min (3.3 mL/Hr) IV Continuous <Continuous>  pantoprazole  Injectable 40 milliGRAM(s) IV Push two times a day  propofol Infusion 20 MICROgram(s)/kG/Min (8.45 mL/Hr) IV Continuous <Continuous>    MEDICATIONS  (PRN):  LORazepam   Injectable 2 milliGRAM(s) IV Push every 2 hours PRN Agitation  sodium chloride 0.9% lock flush 10 milliLiter(s) IV Push every 1 hour PRN Pre/post blood products, medications, blood draw, and to maintain line patency      REVIEW OF SYSTEMS:    CONSTITUTIONAL: No weakness, fevers or chills  EYES/ENT: No visual changes;  No vertigo or throat pain   NECK: No pain or stiffness  RESPIRATORY: No cough, wheezing, hemoptysis; No shortness of breath  CARDIOVASCULAR: No chest pain or palpitations  GASTROINTESTINAL: No abdominal or epigastric pain. No nausea, vomiting, or hematemesis; No diarrhea or constipation. No melena or hematochezia.  GENITOURINARY: No dysuria, frequency or hematuria  NEUROLOGICAL: No numbness or weakness  SKIN: No itching, burning, rashes, or lesions   All other review of systems is negative unless indicated above    Vital Signs Last 24 Hrs  T(C): 37.3 (2023 10:24), Max: 37.8 (2023 12:00)  T(F): 99.2 (2023 10:24), Max: 100.1 (2023 12:00)  HR: 89 (2023 10:24) (77 - 89)  BP: 114/65 (2023 10:24) (97/63 - 115/65)  BP(mean): 84 (2023 10:24) (77 - 86)  RR: 35 (2023 10:24) (23 - 37)  SpO2: 93% (2023 10:24) (90% - 100%)    Parameters below as of 2023 10:24  Patient On (Oxygen Delivery Method): ventilator    O2 Concentration (%): 45Daily     Daily Weight in k.2 (2023 06:00)I&O's Summary    2023 07:01  -  2023 07:00  --------------------------------------------------------  IN: 1839 mL / OUT: 255 mL / NET: 1584 mL    2023 07:01  -  2023 11:00  --------------------------------------------------------  IN: 500 mL / OUT: 2500 mL / NET: -2000 mL        PHYSICAL EXAM:    Constitutional: NAD, awake and alert, well-developed  HEENT: PERR, EOMI,  No oral cyananosis.  Neck:  supple,  No JVD  Respiratory: Breath sounds are clear bilaterally, No wheezing, rales or rhonchi  Cardiovascular: S1 and S2, regular rate and rhythm, no Murmurs, gallops or rubs  Gastrointestinal: Bowel Sounds present, soft, nontender.   Extremities: No peripheral edema. No clubbing or cyanosis.  Vascular: 2+ peripheral pulses  Neurological: A/O x 3, no focal deficits  Musculoskeletal: no calf tenderness.  Skin: No rashes.      LABS: All Labs Reviewed:                        8.4    17.15 )-----------( 230      ( 2023 07:10 )             26.1                         8.9    16.41 )-----------( 214      ( 2023 06:30 )             29.3                         9.2    13.58 )-----------( 186      ( 2023 06:30 )             28.7     2023 07:10    135    |  95     |  83     ----------------------------<  104    3.9     |  23     |  11.20  2023 17:30    133    |  96     |  75     ----------------------------<  147    4.2     |  23     |  9.84   2023 06:30    130    |  95     |  115    ----------------------------<  97     6.6     |  16     |  13.40    Ca    7.4        2023 07:10  Ca    7.7        2023 17:30  Ca    7.7        2023 06:30  Phos  10.8      2023 07:10  Phos  X-NORESULT     2023 06:30  Phos  10.6      2023 06:30  Mg     3.2       2023 06:30    TPro  x      /  Alb  2.4    /  TBili  x      /  DBili  x      /  AST  x      /  ALT  x      /  AlkPhos  x      2023 07:10  TPro  7.1    /  Alb  2.6    /  TBili  1.2    /  DBili  x      /  AST  398    /  ALT  346    /  AlkPhos  221    2023 06:30  TPro  x      /  Alb  2.6    /  TBili  x      /  DBili  x      /  AST  x      /  ALT  x      /  AlkPhos  x      2023 06:30          Blood Culture:         RADIOLOGY/EKG:      ECHO/CARDIAC CATHTERIZATION/STRESS TEST:

## 2023-04-05 NOTE — PROGRESS NOTE ADULT - SUBJECTIVE AND OBJECTIVE BOX
Date of service: 04-05-23 @ 13:57    pt seen and examined  on pressors  on ventilatory support  being dialyzed x 2 now  afebrile      ROS: unable to obtain d/t medical condition    MEDICATIONS  (STANDING):  artificial tears (preservative free) Ophthalmic Solution 1 Drop(s) Both EYES four times a day  aspirin  chewable 81 milliGRAM(s) Oral daily  atorvastatin 20 milliGRAM(s) Oral at bedtime  atovaquone  Suspension 750 milliGRAM(s) Oral every 12 hours  chlorhexidine 0.12% Liquid 15 milliLiter(s) Oral Mucosa every 12 hours  chlorhexidine 4% Liquid 1 Application(s) Topical <User Schedule>  chlorhexidine 4% Liquid 1 Application(s) Topical <User Schedule>  heparin   Injectable 5000 Unit(s) SubCutaneous every 12 hours  meropenem  IVPB 500 milliGRAM(s) IV Intermittent every 24 hours  methylPREDNISolone sodium succinate Injectable 40 milliGRAM(s) IV Push every 8 hours  mycophenolate mofetil Suspension 1000 milliGRAM(s) Oral two times a day  norepinephrine Infusion 0.05 MICROgram(s)/kG/Min (3.3 mL/Hr) IV Continuous <Continuous>  pantoprazole  Injectable 40 milliGRAM(s) IV Push two times a day  propofol Infusion 20 MICROgram(s)/kG/Min (8.45 mL/Hr) IV Continuous <Continuous>    MEDICATIONS  (PRN):  acetaminophen   Oral Liquid .. 650 milliGRAM(s) Oral every 8 hours PRN Temp greater or equal to 38C (100.4F)  LORazepam   Injectable 2 milliGRAM(s) IV Push every 2 hours PRN Agitation  sodium chloride 0.9% lock flush 10 milliLiter(s) IV Push every 1 hour PRN Pre/post blood products, medications, blood draw, and to maintain line patency      Vital Signs Last 24 Hrs  T(C): 38.2 (05 Apr 2023 16:37), Max: 38.2 (05 Apr 2023 16:37)  T(F): 100.8 (05 Apr 2023 16:37), Max: 100.8 (05 Apr 2023 16:37)  HR: 106 (05 Apr 2023 19:00) (77 - 106)  BP: 114/65 (05 Apr 2023 10:24) (102/62 - 115/65)  BP(mean): 84 (05 Apr 2023 10:24) (78 - 85)  RR: 44 (05 Apr 2023 19:00) (23 - 44)  SpO2: 92% (05 Apr 2023 19:00) (89% - 98%)    Parameters below as of 05 Apr 2023 19:00  Patient On (Oxygen Delivery Method): ventilator    O2 Concentration (%): 45        PE:  Constitutional: NAD, sedated/intubated   HEENT: NC/AT, EOMI, PERRLA, conjunctivae clear; ears and nose atraumatic; pharynx benign  Neck: supple; thyroid not palpable  Back: no tenderness  Respiratory: decreased breath sounds, rales   Cardiovascular: S1S2 regular, no murmurs  Abdomen: soft, not tender, not distended, positive BS; liver and spleen WNL  Genitourinary: no suprapubic tenderness  Lymphatic: no LN palpable  Musculoskeletal: no muscle tenderness, no joint swelling or tenderness  Extremities: no pedal edema  Neurological/ Psychiatric:  moving all extremities  Skin: no rashes; no palpable lesions    Labs: all available labs reviewed                                              8.4    17.15 )-----------( 230      ( 05 Apr 2023 07:10 )             26.1     04-05    135  |  95<L>  |  83<H>  ----------------------------<  104<H>  3.9   |  23  |  11.20<H>    Ca    7.4<L>      05 Apr 2023 07:10  Phos  10.8     04-05  Mg     3.2     04-04    TPro  x   /  Alb  2.4<L>  /  TBili  x   /  DBili  x   /  AST  x   /  ALT  x   /  AlkPhos  x   04-05      Cultures:   Culture - Blood (03.30.23 @ 09:21)   Specimen Source: .Blood   Blood-Peripheral  Culture Results:   No growth to date.  Culture - Blood (03.30.23 @ 09:20)   Specimen Source: .Blood None  Culture Results:   No growth to date.  Culture - Sputum . (03.29.23 @ 16:59)   Gram Stain:   Few polymorphonuclear leukocytes per low power field   No Squamous epithelial cells per low power field   No organisms seen per oil power field  Specimen Source: .Sputum Sputum  Culture Results:   No growth at 48 hours    Radiology: all available radiological tests reviewed    ACC: [id number] EXAM:  XR CHEST PORTABLE IMMED 1V   ORDERED BY: RALPH PRATER     PROCEDURE DATE:  03/29/2023          INTERPRETATION:  Portable chest radiograph    CLINICAL INFORMATION:   Short of breath.    TECHNIQUE:  Portable  AP view of the chest was obtained.    COMPARISON: 3/29/2023 chest x-ray available for review.    FINDINGS: ET tube tip above tracheal bifurcation.  NG tube tip beyond GE junction.  LEFT IJ catheter tip in SVC.      There is cardiomegaly, vascular congestion and perihilar interstitial   infiltrates without gross effusion.  Trachea midline. No hilar mass.  Visualized osseous structures are intact.        IMPRESSION: Catheters and tubes in place. An  Cardiomegaly.  Bilateral perihilar/bibasilar diffuse airspace disease.        ACC: [id number] EXAM:  CT ANGIO CHEST PULM [facility]   ORDERED BY: ROME RUFFIN     PROCEDURE DATE:  03/28/2023          INTERPRETATION:  CLINICAL INFORMATION: Shortness of breath. History of   scleroderma.    COMPARISON: 10/28/2018    CONTRAST/COMPLICATIONS:  IV Contrast: Omnipaque 350  90 cc administered   10 cc discarded  Oral Contrast: NONE  Complications: None reported at time of study completion    PROCEDURE:  CT Angiography of the Chest.  Sagittal and coronal reformats were performed as well as 3D (MIP)   reconstructions.    FINDINGS:    LUNGS AND AIRWAYS: Patent central airways.  Revisualized chronic   interstitial disease including hazy bilateral ground glass opacities,   reticulation and worsened bronchiectasis bilaterally. Large bulla in the   left upper lobe.  PLEURA: No pleural effusion.  MEDIASTINUM AND MALLORY: No lymphadenopathy.  VESSELS: Aortic and coronary artery atherosclerosis. Enlarged main   pulmonary artery, unchanged, can be seen with pulmonary arterial   hypertension. No pulmonary embolism.  HEART: Cardiomegaly. Small pericardial effusion.  CHEST WALL AND LOWER NECK: Bilateral mediastinal and hilar adenopathy   increased since prior exam including extensive adenopathy in the   prevascular space.  VISUALIZED UPPER ABDOMEN: Right renal hypodensity too small to   characterize. Colonic diverticulosis.  BONES: Degenerative changes. Dextroscoliosis of the thoracic spine.   Chronic right rib deformities.    IMPRESSION:  No pulmonary embolism.  Chronic interstitial disease with worsened bronchiectasis.  New mediastinal and hilar adenopathy, which is indeterminate but could be   related to scleroderma and interstitial disease. Continued follow-up is   recommended.        --- End of Report ---    < end of copied text >    Advanced directives addressed: full resuscitation

## 2023-04-05 NOTE — PROGRESS NOTE ADULT - SUBJECTIVE AND OBJECTIVE BOX
Patient is a 62y old  Male who presents with a chief complaint of SOB hypoxemia (04 Apr 2023 11:27)      BRIEF HOSPITAL COURSE: 62M with PSS (scleroderma) on cellcept with pulmonary fibrosis on 8L home O2, HTN, HLD, CAD s/p 4 SINDY to RCA, recent hospitalized at [facility] in 02/23 iso fibrosis flare requiring inhaled pulmonary vasodilators in the ICU, discharged on treprostinil and was listed for lung transplantation at [facility]. Over the 3 weeks prior to presentation patient experienced progressive SOB and hypoxemia at home. Patient failed BiPAP and was intubated iso Acute Hypoxic Respiratory Failure with course complicated by CHRIS requiring initiation of emergent RRT on 4/4.    Events last 24 hours: Remains intubated and on levophed. Patient was started on emergent RRT for progressively worsening CHRIS. Discontinued bicarb gtt overnight for resolution of metabolic acidosis. Deemed currently not a lung transplant candidate by [facility].      PAST MEDICAL & SURGICAL HISTORY:  HTN (hypertension)  Pulmonary fibrosis  Scleroderma          Review of Systems: Intubated and sedated, unable to participate.      Medications:  atovaquone  Suspension 750 milliGRAM(s) Oral every 12 hours  meropenem  IVPB 500 milliGRAM(s) IV Intermittent every 24 hours    norepinephrine Infusion 0.05 MICROgram(s)/kG/Min IV Continuous <Continuous>      LORazepam   Injectable 2 milliGRAM(s) IV Push every 2 hours PRN  propofol Infusion 20 MICROgram(s)/kG/Min IV Continuous <Continuous>      aspirin  chewable 81 milliGRAM(s) Oral daily  heparin   Injectable 5000 Unit(s) SubCutaneous every 12 hours    pantoprazole  Injectable 40 milliGRAM(s) IV Push two times a day      atorvastatin 20 milliGRAM(s) Oral at bedtime    sodium chloride 0.9% lock flush 10 milliLiter(s) IV Push every 1 hour PRN    mycophenolate mofetil Suspension 1000 milliGRAM(s) Oral two times a day    artificial tears (preservative free) Ophthalmic Solution 1 Drop(s) Both EYES four times a day  chlorhexidine 0.12% Liquid 15 milliLiter(s) Oral Mucosa every 12 hours  chlorhexidine 4% Liquid 1 Application(s) Topical <User Schedule>  chlorhexidine 4% Liquid 1 Application(s) Topical <User Schedule>        Mode: AC/ CMV (Assist Control/ Continuous Mandatory Ventilation)  RR (machine): 24  TV (machine): 450  FiO2: 40  PEEP: 5  ITime: 0.8  MAP: 7  PIP: 19      ICU Vital Signs Last 24 Hrs  T(C): 36.7 (04 Apr 2023 21:53), Max: 38 (04 Apr 2023 08:35)  T(F): 98 (04 Apr 2023 21:53), Max: 100.4 (04 Apr 2023 08:35)  HR: 80 (05 Apr 2023 01:20) (78 - 90)  BP: 107/66 (04 Apr 2023 15:50) (97/63 - 107/66)  BP(mean): 84 (04 Apr 2023 15:50) (77 - 86)  ABP: 94/57 (04 Apr 2023 23:00) (87/55 - 112/67)  ABP(mean): 72 (04 Apr 2023 23:00) (67 - 87)  RR: 29 (04 Apr 2023 23:00) (29 - 40)  SpO2: 92% (05 Apr 2023 01:20) (90% - 100%)    O2 Parameters below as of 04 Apr 2023 19:00  Patient On (Oxygen Delivery Method): ventilator            ABG - ( 04 Apr 2023 06:35 )  pH, Arterial: 7.25  pH, Blood: x     /  pCO2: 32    /  pO2: 80    / HCO3: 14    / Base Excess: -12.0 /  SaO2: 97                  I&O's Detail    03 Apr 2023 07:01  -  04 Apr 2023 07:00  --------------------------------------------------------  IN:    Free Water: 100 mL    IV PiggyBack: 50 mL    Norepinephrine: 257.7 mL    Propofol: 322.7 mL  Total IN: 730.4 mL    OUT:    Intermittent Catheterization - Urethral (mL): 325 mL    Nasogastric/Oral tube (mL): 600 mL    Nepro with Carb Steady: 0 mL  Total OUT: 925 mL    Total NET: -194.6 mL      04 Apr 2023 07:01  -  05 Apr 2023 02:54  --------------------------------------------------------  IN:    IV PiggyBack: 100 mL    Norepinephrine: 150 mL    Propofol: 176 mL    Sodium Bicarbonate: 1028 mL  Total IN: 1454 mL    OUT:    Nasogastric/Oral tube (mL): 180 mL  Total OUT: 180 mL    Total NET: 1274 mL            LABS:                        8.9    16.41 )-----------( 214      ( 04 Apr 2023 06:30 )             29.3     04-04    133<L>  |  96  |  75<H>  ----------------------------<  147<H>  4.2   |  23  |  9.84<H>    Ca    7.7<L>      04 Apr 2023 17:30  Phos  X-NORESULT     04-04  Mg     3.2     04-04    TPro  7.1  /  Alb  2.6<L>  /  TBili  1.2  /  DBili  x   /  AST  398<H>  /  ALT  346<H>  /  AlkPhos  221<H>  04-04          CAPILLARY BLOOD GLUCOSE            CULTURES:  Culture Results:   No Legionella species isolated to date (03-31-23 @ 11:00)  Culture Results:   No Growth Final (03-30-23 @ 09:21)  Culture Results:   No Growth Final (03-30-23 @ 09:20)  Culture Results:   No growth at 48 hours (03-29-23 @ 16:59)      Physical Examination:    General: No acute distress. Intubated and sedated. Chronically ill appearing    HEENT: Pupils equal, reactive to light.  Symmetric.    PULM: Breathing comfortably on vent, coarse breath sounds bilaterally, no significant sputum production    CVS: Regular rate and rhythm, no murmurs, rubs, or gallops    ABD: BS+ Soft, nondistended, nontender, no masses, +anasarca    EXT: No edema, nontender    SKIN: Warm and well perfused, no rashes noted.    RADIOLOGY:   < from: Xray Chest 1 View- PORTABLE-Urgent (Xray Chest 1 View- PORTABLE-Urgent .) (04.04.23 @ 11:54) >  INTERPRETATION:  AP chest on erect on April 4, 2022 at 11:48 AM. Patient   had insertion of large right jugular line.    Heart magnified by technique.    Endotracheal tube, nasogastric tube, and left jugular line remain.    There is persistent moderate interstitial CHF similar to April 3.    Mild left base effusion and April 3 improved.    Present film shows a large right jugular line inserted in good position.    IMPRESSION: Persistent CHF. Improved left effusion. Large right jugular   line inserted.    < end of copied text >      CRITICAL CARE TIME SPENT: 60 mins of time have been spent evaluating, reviewing all available lab and radiologic material, reviewing the EMR and treating this critically ill patient, who has complex medical problems and life threatening organ dysfunction. This also included speaking with the staff, consultants, and family.

## 2023-04-05 NOTE — PROGRESS NOTE ADULT - NS PANP COMMENT GEN_ALL_CORE FT
62M with PSS (scleroderma) on cellcept with pulmonary fibrosis on 8L home O2, HTN, HLD, CAD s/p 4 SINDY to RCA, recent hospitalized at [facility] in 02/23 iso fibrosis flare requiring inhaled pulmonary vasodilators in the ICU, discharged on treprostinil and was listed for lung transplantation at [facility]. Patient now admitted to CCU for likely septic shock 2/2 to pneumonia, and complicated with CHRIS needing dialysis.  At this time, patient not a candidate for transplant per [facility].  Concern for relative adrenal insufficency from septic shock,  started on solumedrol, and also can treat concurrent ILD exacerbation.  Patient is on HD, and s/p 2L removed 4/5, likely can help with potential fluid overload in the setting of severe Pulm HTN.  Will continue to wean down levophed.  Will send another sputum culture.  Will send UA and urine culture.  Continue antibiotic.  Prognosis guarded at this time.

## 2023-04-05 NOTE — PROGRESS NOTE ADULT - SUBJECTIVE AND OBJECTIVE BOX
NEPHROLOGY INTERVAL HPI/OVERNIGHT EVENTS:    Date of Service: 23 @ 07:51    --Remains on vent.  On Norepi.  Second HD in progress.   remains anuric with inc sob and worsening renal parameters   4/3 still anuric, dec pressors requirements on levo 0.12 only  on, propofol and TF on hold with no fw flushes    HPI:  63 yo man with PMHX of Scleroderma treated with Cellcept and pulmonary fibrosis on home O2 8 L.  Hx of CAD ( stents x 4 to RCA)  Admission at [facility] in Feb due to resp distress and treated in ICU with pulmonary vasodilators.  R cath done with severe pulmonary HTN and was to have evaluation done for TP.  Started on Tyvaso several days ago.  Presented with 3 week hx fo progressive SOB, poor appetite and worsening LACEY walking short distance.  Admitted early 3/28 to CCU and placed on Bipap , given IV lasix,  and Zosyn/Azithromax.  CTA negative for PE.  [place] [facility]--resp status further deteriorate--Intubated  and started on pressors.  Late yesterday, resuscitated post cardiac arrest.  Now noted with acute worsening of renal function.    PMHX and PSHX.  --Pulmonary fibrosis  --Scleroderma  --CAD ( stent x 4)    Home Medications:   * Patient Currently Takes Medications as of 31-Oct-2018 10:21 documented in Structured Notes  · 	Protonix 40 mg oral delayed release tablet: 1 tab(s) orally once a day   · 	Toprol-XL 25 mg oral tablet, extended release: 1 tab(s) orally once a day   · 	clopidogrel 75 mg oral tablet: 1 tab(s) orally once a day  · 	aspirin 81 mg oral tablet, chewable: 1 tab(s) orally once a day  · 	atorvastatin 40 mg oral tablet: 1 tab(s) orally once a day (at bedtime)  · 	quinapril 10 mg oral tablet: 1 tab(s) orally once a dayFAMILY HISTORY:    MEDICATIONS  (STANDING):  artificial tears (preservative free) Ophthalmic Solution 1 Drop(s) Both EYES four times a day  aspirin  chewable 81 milliGRAM(s) Oral daily  atorvastatin 20 milliGRAM(s) Oral at bedtime  atovaquone  Suspension 750 milliGRAM(s) Oral every 12 hours  chlorhexidine 0.12% Liquid 15 milliLiter(s) Oral Mucosa every 12 hours  chlorhexidine 4% Liquid 1 Application(s) Topical <User Schedule>  chlorhexidine 4% Liquid 1 Application(s) Topical <User Schedule>  heparin   Injectable 5000 Unit(s) SubCutaneous every 12 hours  meropenem  IVPB 500 milliGRAM(s) IV Intermittent every 24 hours  mycophenolate mofetil Suspension 1000 milliGRAM(s) Oral two times a day  norepinephrine Infusion 0.05 MICROgram(s)/kG/Min (3.3 mL/Hr) IV Continuous <Continuous>  pantoprazole  Injectable 40 milliGRAM(s) IV Push two times a day  propofol Infusion 20 MICROgram(s)/kG/Min (8.45 mL/Hr) IV Continuous <Continuous>    MEDICATIONS  (PRN):  LORazepam   Injectable 2 milliGRAM(s) IV Push every 2 hours PRN Agitation  sodium chloride 0.9% lock flush 10 milliLiter(s) IV Push every 1 hour PRN Pre/post blood products, medications, blood draw, and to maintain line patency    Vital Signs Last 24 Hrs  T(C): 37.7 (2023 07:05), Max: 38 (2023 08:35)  T(F): 99.8 (2023 07:05), Max: 100.4 (2023 08:35)  HR: 83 (2023 07:18) (77 - 90)  BP: 115/65 (2023 07:18) (97/63 - 115/65)  BP(mean): 85 (2023 07:18) (77 - 86)  RR: 34 (2023 07:18) (23 - 40)  SpO2: 91% (2023 07:18) (90% - 100%)    Parameters below as of 2023 19:00  Patient On (Oxygen Delivery Method): ventilator      Daily Weight in k.2 (2023 06:00)     @ 07:01  -   @ 07:00  --------------------------------------------------------  IN: 1839 mL / OUT: 255 mL / NET: 1584 mL    PHYSICAL EXAM:  GENERAL: on vent   CHEST/LUNG: fair air entry  HEART: S1S2 tachy  ABDOMEN: soft  EXTREMITIES: 1-2+ edema  SKIN:     LABS:                        8.4    17.15 )-----------( 230      ( 2023 07:10 )             26.1     04-04    133<L>  |  96  |  75<H>  ----------------------------<  147<H>  4.2   |  23  |  9.84<H>    Ca    7.7<L>      2023 17:30  Phos  X-NORESULT     04-  Mg     3.2     -    TPro  7.1  /  Alb  2.6<L>  /  TBili  1.2  /  DBili  x   /  AST  398<H>  /  ALT  346<H>  /  AlkPhos  221<H>            ABG - ( 2023 06:35 )  pH, Arterial: 7.25  pH, Blood: x     /  pCO2: 32    /  pO2: 80    / HCO3: 14    / Base Excess: -12.0 /  SaO2: 97                    RADIOLOGY & ADDITIONAL TESTS:

## 2023-04-06 NOTE — PROGRESS NOTE ADULT - SUBJECTIVE AND OBJECTIVE BOX
HPI:  HPI:  62 year old man with a history of scleroderma, pulmonary fibrosis with chronic respiratory failure (supplemental O2 dependent; listed on lung-transplant list at [facility]), severe pulmonary hypertension, CAD s/p RCA stents (2018), HTN, HLD admitted with acute/chronic respiratory failure (required mechanical ventilation) with hospitalization complicated by cardiac arrest preceded by severe bradycardia (3/29/23).    3/29/23: Pt intubated and sedated.  3/30/23:  Events past 24 hours noted and discussed with CCU team; patient is sedated on vent.  3/31/23: Pt sedated on pressors. Family at bedside  4/1/23: Sedated pressors being tapered. Labs reviewed.  4/2/23: Pressors weaning. Arousable  4/3/'23: no changes.  4/4/23;  intubated , sedated , on pressors,  echo showed normal LVEF ; severe pulmonary hypertension   4/6/23 Patient is intubated , on sedation , low dose pressors on dialysis , patient was given tyvaso, did not take due to insurance issue  , febrile episodes     MEDICATIONS:  OUTPATIENT  Home Medications:  aspirin 81 mg oral tablet, chewable: 1 tab(s) orally once a day (31 Oct 2018 10:16)  azithromycin 500 mg intravenous injection: 500 milligram(s) intravenous every 24 hours (28 Mar 2023 14:31)  enoxaparin: 40 milligram(s) subcutaneous once a day (28 Mar 2023 14:31)  mycophenolate mofetil 250 mg oral capsule: 4 cap(s) orally 2 times a day (28 Mar 2023 14:31)  piperacillin-tazobactam: 3.375 gram(s) injectable every 8 hours (28 Mar 2023 14:31)  quinapril 10 mg oral tablet: 1 tab(s) orally once a day (28 Oct 2018 22:58)      MEDICATIONS  (STANDING):  artificial tears (preservative free) Ophthalmic Solution 1 Drop(s) Both EYES four times a day  aspirin  chewable 81 milliGRAM(s) Oral daily  atorvastatin 20 milliGRAM(s) Oral at bedtime  atovaquone  Suspension 750 milliGRAM(s) Oral every 12 hours  chlorhexidine 0.12% Liquid 15 milliLiter(s) Oral Mucosa every 12 hours  chlorhexidine 4% Liquid 1 Application(s) Topical <User Schedule>  chlorhexidine 4% Liquid 1 Application(s) Topical <User Schedule>  heparin   Injectable 5000 Unit(s) SubCutaneous every 12 hours  meropenem  IVPB 500 milliGRAM(s) IV Intermittent every 24 hours  methylPREDNISolone sodium succinate Injectable 40 milliGRAM(s) IV Push every 8 hours  midodrine 10 milliGRAM(s) Oral every 8 hours  mycophenolate mofetil Suspension 1000 milliGRAM(s) Oral two times a day  norepinephrine Infusion 0.05 MICROgram(s)/kG/Min (3.3 mL/Hr) IV Continuous <Continuous>  pantoprazole  Injectable 40 milliGRAM(s) IV Push two times a day  propofol Infusion 20 MICROgram(s)/kG/Min (8.45 mL/Hr) IV Continuous <Continuous>    MEDICATIONS  (PRN):  acetaminophen   Oral Liquid .. 650 milliGRAM(s) Oral every 8 hours PRN Temp greater or equal to 38C (100.4F)  albumin human 25% IVPB 50 milliLiter(s) IV Intermittent every 1 hour PRN support b/p  LORazepam   Injectable 2 milliGRAM(s) IV Push every 2 hours PRN Agitation  sodium chloride 0.9% lock flush 10 milliLiter(s) IV Push every 1 hour PRN Pre/post blood products, medications, blood draw, and to maintain line patency      Vital Signs Last 24 Hrs  T(C): 37.3 (06 Apr 2023 10:40), Max: 38.3 (05 Apr 2023 22:00)  T(F): 99.2 (06 Apr 2023 10:40), Max: 101 (05 Apr 2023 22:00)  HR: 85 (06 Apr 2023 12:25) (81 - 107)  BP: --  BP(mean): --  RR: 32 (06 Apr 2023 12:25) (19 - 44)  SpO2: 95% (06 Apr 2023 12:25) (91% - 100%)    Parameters below as of 06 Apr 2023 12:25  Patient On (Oxygen Delivery Method): ventilator    O2 Concentration (%): 45    I&O's Summary    05 Apr 2023 07:01  -  06 Apr 2023 07:00  --------------------------------------------------------  IN: 822 mL / OUT: 2900 mL / NET: -2078 mL                  PHYSICAL EXAM:    Constitutional: intubated sedated.  HEENT: PERR, EOMI,  No oral cyananosis.  Neck:  supple,  No JVD  Respiratory: Breath sounds are clear bilaterally, scattered rhonchi   Cardiovascular: S1 and S2, regular rate and rhythm, no Murmurs, gallops or rubs  Gastrointestinal: Bowel Sounds present, soft, nontender.   Extremities: trace peripheral edema. No clubbing or cyanosis.  Vascular: 2+ peripheral pulses  Musculoskeletal: no calf tenderness.  Skin: No rashes.        ===============================  ===============================  LABS:                           8.9    16.41 )-----------( 214      ( 04 Apr 2023 06:30 )             29.3     04-04    130<L>  |  95<L>  |  115<H>  ----------------------------<  97  6.6<HH>   |  16<L>  |  13.40<H>    Ca    7.7<L>      04 Apr 2023 06:30  Phos  X-NORESULT     04-04  Mg     3.2     04-04    TPro  7.1  /  Alb  2.6<L>  /  TBili  1.2  /  DBili  x   /  AST  398<H>  /  ALT  346<H>  /  AlkPhos  221<H>  04-04        LIVER FUNCTIONS - ( 04 Apr 2023 06:30 )  Alb: 2.6 g/dL / Pro: 7.1 gm/dL / ALK PHOS: 221 U/L / ALT: 346 U/L / AST: 398 U/L / GGT: x                 Culture Results:   No Legionella species isolated to date (03-31-23 @ 11:00)      ===============================  ECG:      Diagnosis Line Normal sinus rhythm  T wave abnormality, consider anterior ischemia  Abnormal ECG  When compared with ECG of 30-OCT-2018 07:24,  Vent. rate has increased BY  33 BPM  Questionable change in QRS axis  T wave inversion now evident in Anterior leads  Confirmed by DIANE CULVER, SO PINEDA ([id number]) on 3/29/2023 4:48:13 PM    ===============================  RADIOLOGY:  < from: CT Angio Chest PE Protocol w/ IV Cont (03.28.23 @ 00:21) >  ACC: [id number] EXAM:  CT ANGIO CHEST PULM ART [facility]   ORDERED BY: ROME RUFFIN     PROCEDURE DATE:  03/28/2023          INTERPRETATION:  CLINICAL INFORMATION: Shortness of breath. History of   scleroderma.    COMPARISON: 10/28/2018    CONTRAST/COMPLICATIONS:  IV Contrast: Omnipaque 350  90 cc administered   10 cc discarded  Oral Contrast: NONE  Complications: None reported at time of study completion    PROCEDURE:  CT Angiography of the Chest.  Sagittal and coronal reformats were performed as well as 3D (MIP)   reconstructions.    FINDINGS:    LUNGS AND AIRWAYS: Patent central airways.  Revisualized chronic   interstitial disease including hazy bilateral ground glass opacities,   reticulation and worsened bronchiectasis bilaterally. Large bulla in the   left upper lobe.  PLEURA: No pleural effusion.  MEDIASTINUM AND MALLORY: No lymphadenopathy.  VESSELS: Aortic and coronary artery atherosclerosis. Enlarged main   pulmonary artery, unchanged, can be seen with pulmonary arterial   hypertension. No pulmonary embolism.  HEART: Cardiomegaly. Small pericardial effusion.  CHEST WALL AND LOWER NECK: Bilateral mediastinal and hilar adenopathy   increased since prior exam including extensive adenopathy in the   prevascular space.  VISUALIZED UPPER ABDOMEN: Right renal hypodensity too small to   characterize. Colonic diverticulosis.  BONES: Degenerative changes. Dextroscoliosis of the thoracic spine.   Chronic right rib deformities.    IMPRESSION:  No pulmonary embolism.  Chronic interstitial disease with worsened bronchiectasis.  New mediastinal and hilar adenopathy, which is indeterminate but could be   related to scleroderma and interstitial disease. Continued follow-up is   recommended.        --- End of Report ---            TYE CULVER; Attending Radiologist  This document has been electronically signed. Mar 28 2023  1:01AM    < end of copied text >    ===============================  ECHO:  Outpatient Echo Jun '22: EF 55-60%, borderline pulmonary hypertension.  normal RV size & function.    ===============================  < from: TTE Echo Complete w/o Contrast w/ Doppler (03.30.23 @ 07:51) >     The mitral valve leaflets appear thickened.   Mild to Moderate mitral regurgitation is present.   The aortic valve is well visualized, appears calcified. Valve opening   seems to be normal.   Mild (1+) aortic regurgitation is present.   The tricuspid valve leaflets appear mildly thickened and/or calcified,   but   open well.   Moderate (2+) tricuspid valve regurgitation is present.   Severe pulmonary hypertension.   Pulmonic valve not well seen.   Mild to moderate pulmonic valvular regurgitation is present.   Normal appearing left atrium.   The left ventricle is normal in size, wall thickness, wallmotion and   contractility.   Estimated left ventricular ejection fraction is 55-60 %.   The right atrium appears dilated.   The right ventricle is severely dilated.   Mild to moderate, diffuse hypokinesis of the right ventricle is present.     Signature    < end of copied text >      Monitor sinus rhythm

## 2023-04-06 NOTE — PROGRESS NOTE ADULT - SUBJECTIVE AND OBJECTIVE BOX
NEPHROLOGY INTERVAL HPI/OVERNIGHT EVENTS:    Date of Service: 23 @ 11:14    --ON vent,  Remains on Norepi.   BP in 100's.  NO enteral feeding  --Remains on vent.  On Norepi.  Second HD in progress.   remains anuric with inc sob and worsening renal parameters   4/3 still anuric, dec pressors requirements on levo 0.12 only  on, propofol and TF on hold with no fw flushes    HPI:  63 yo man with PMHX of Scleroderma treated with Cellcept and pulmonary fibrosis on home O2 8 L.  Hx of CAD ( stents x 4 to RCA)  Admission at [facility] in Feb due to resp distress and treated in ICU with pulmonary vasodilators.  R cath done with severe pulmonary HTN and was to have evaluation done for TP.  Started on Tyvaso several days ago.  Presented with 3 week hx fo progressive SOB, poor appetite and worsening LACEY walking short distance.  Admitted early 3/28 to CCU and placed on Bipap , given IV lasix,  and Zosyn/Azithromax.  CTA negative for PE.  Was awaiting transfer to [facility].  on 3/29--resp status further deteriorate--Intubated  and started on pressors.  Late yesterday, resuscitated post cardiac arrest.  Now noted with acute worsening of renal function.    PMHX and PSHX.  --Pulmonary fibrosis  --Scleroderma  --CAD ( stent x 4)    MEDICATIONS  (STANDING):  artificial tears (preservative free) Ophthalmic Solution 1 Drop(s) Both EYES four times a day  aspirin  chewable 81 milliGRAM(s) Oral daily  atorvastatin 20 milliGRAM(s) Oral at bedtime  atovaquone  Suspension 750 milliGRAM(s) Oral every 12 hours  chlorhexidine 0.12% Liquid 15 milliLiter(s) Oral Mucosa every 12 hours  chlorhexidine 4% Liquid 1 Application(s) Topical <User Schedule>  chlorhexidine 4% Liquid 1 Application(s) Topical <User Schedule>  heparin   Injectable 5000 Unit(s) SubCutaneous every 12 hours  methylPREDNISolone sodium succinate Injectable 40 milliGRAM(s) IV Push every 8 hours  midodrine 10 milliGRAM(s) Oral every 8 hours  mycophenolate mofetil Suspension 1000 milliGRAM(s) Oral two times a day  norepinephrine Infusion 0.05 MICROgram(s)/kG/Min (3.3 mL/Hr) IV Continuous <Continuous>  pantoprazole  Injectable 40 milliGRAM(s) IV Push two times a day  propofol Infusion 20 MICROgram(s)/kG/Min (8.45 mL/Hr) IV Continuous <Continuous>    MEDICATIONS  (PRN):  acetaminophen   Oral Liquid .. 650 milliGRAM(s) Oral every 8 hours PRN Temp greater or equal to 38C (100.4F)  albumin human 25% IVPB 50 milliLiter(s) IV Intermittent every 1 hour PRN support b/p  LORazepam   Injectable 2 milliGRAM(s) IV Push every 2 hours PRN Agitation  sodium chloride 0.9% lock flush 10 milliLiter(s) IV Push every 1 hour PRN Pre/post blood products, medications, blood draw, and to maintain line patency    Vital Signs Last 24 Hrs  T(C): 37.1 (2023 06:18), Max: 38.3 (2023 22:00)  T(F): 98.8 (2023 06:18), Max: 101 (2023 22:00)  HR: 85 (2023 10:52) (81 - 107)  BP: --  BP(mean): --  RR: 19 (2023 10:52) (19 - 44)  SpO2: 93% (2023 10:52) (91% - 99%)    Parameters below as of 2023 10:52  Patient On (Oxygen Delivery Method): ventilator    O2 Concentration (%): 45  Daily     Daily Weight in k.8 (2023 06:18)    04-05 @ 07:01  -  - @ 07:00  --------------------------------------------------------  IN: 822 mL / OUT: 2900 mL / NET: -2078 mL    PHYSICAL EXAM:  GENERAL: On vent  CHEST/LUNG: fair air entry  HEART: S1S2 RRR  ABDOMEN: soft  EXTREMITIES: decreased edema  SKIN:     LABS:                        8.7    13.83 )-----------( 274      ( 2023 06:05 )             27.2     04-06    133<L>  |  98  |  74<H>  ----------------------------<  139<H>  4.4   |  20<L>  |  9.90<H>    Ca    7.8<L>      2023 06:05  Phos  11.0       Mg     2.7         TPro  6.7  /  Alb  2.4<L>  /  TBili  1.0  /  DBili  x   /  AST  413<H>  /  ALT  272<H>  /  AlkPhos  196<H>        Urinalysis Basic - ( 2023 18:40 )    Color: Yellow / Appearance: Clear / S.015 / pH: x  Gluc: x / Ketone: Negative  / Bili: Negative / Urobili: Negative   Blood: x / Protein: 100 / Nitrite: Negative   Leuk Esterase: Trace / RBC: 3-5 /HPF / WBC 3-5 /HPF   Sq Epi: x / Non Sq Epi: x / Bacteria: Occasional      Magnesium, Serum: 2.7 mg/dL ( @ 06:05)  Phosphorus Level, Serum: 11.0 mg/dL ( @ 06:05)          RADIOLOGY & ADDITIONAL TESTS:

## 2023-04-06 NOTE — PROGRESS NOTE ADULT - SUBJECTIVE AND OBJECTIVE BOX
Patient is a 62y old  Male who presents with a chief complaint of SOB hypoxemia (04 Apr 2023 11:27)      BRIEF HOSPITAL COURSE: 62M with PSS (scleroderma) on cellcept with pulmonary fibrosis on 8L home O2, HTN, HLD, CAD s/p 4 SINDY to RCA, recent hospitalized at [facility] in 02/23 iso fibrosis flare requiring inhaled pulmonary vasodilators in the ICU, discharged on treprostinil and was listed for lung transplantation at [facility]. Over the 3 weeks prior to presentation patient experienced progressive SOB and hypoxemia at home. Patient failed BiPAP and was intubated iso Acute Hypoxic Respiratory Failure with course complicated by CHRIS requiring initiation of emergent RRT on 4/4.    Events last 24 hours: Remains intubated and on levophed. Patient was started on emergent RRT for progressively worsening CHRIS. Discontinued bicarb gtt overnight for resolution of metabolic acidosis. Deemed currently not a lung transplant candidate by [facility].    4/5 - stress dose steroid started (hydrocort unavailable, changed to solumedrol)  4/6 - patient continue on HD, failed SAT, not waking up despite off propofol.  Started midodrine .  start tube feed with nepro .  Failed SBT due to tacyhpnea and hypoxia.     PAST MEDICAL & SURGICAL HISTORY:  HTN (hypertension)  Pulmonary fibrosis  Scleroderma          Review of Systems: Intubated and sedated, unable to participate.      Medications:  atovaquone  Suspension 750 milliGRAM(s) Oral every 12 hours  meropenem  IVPB 500 milliGRAM(s) IV Intermittent every 24 hours    norepinephrine Infusion 0.05 MICROgram(s)/kG/Min IV Continuous <Continuous>      LORazepam   Injectable 2 milliGRAM(s) IV Push every 2 hours PRN  propofol Infusion 20 MICROgram(s)/kG/Min IV Continuous <Continuous>      aspirin  chewable 81 milliGRAM(s) Oral daily  heparin   Injectable 5000 Unit(s) SubCutaneous every 12 hours    pantoprazole  Injectable 40 milliGRAM(s) IV Push two times a day      atorvastatin 20 milliGRAM(s) Oral at bedtime    sodium chloride 0.9% lock flush 10 milliLiter(s) IV Push every 1 hour PRN    mycophenolate mofetil Suspension 1000 milliGRAM(s) Oral two times a day    artificial tears (preservative free) Ophthalmic Solution 1 Drop(s) Both EYES four times a day  chlorhexidine 0.12% Liquid 15 milliLiter(s) Oral Mucosa every 12 hours  chlorhexidine 4% Liquid 1 Application(s) Topical <User Schedule>  chlorhexidine 4% Liquid 1 Application(s) Topical <User Schedule>        Mode: AC/ CMV (Assist Control/ Continuous Mandatory Ventilation)  RR (machine): 24  TV (machine): 450  FiO2: 40  PEEP: 5  ITime: 0.8  MAP: 7  PIP: 19      ICU Vital Signs Last 24 Hrs  T(C): 37.3 (06 Apr 2023 10:40), Max: 38.3 (05 Apr 2023 22:00)  T(F): 99.2 (06 Apr 2023 10:40), Max: 101 (05 Apr 2023 22:00)  HR: 87 (06 Apr 2023 12:10) (81 - 107)  BP: --  BP(mean): --  ABP: 103/65 (06 Apr 2023 12:10) (88/59 - 175/101)  ABP(mean): 81 (06 Apr 2023 12:10) (70 - 133)  RR: 35 (06 Apr 2023 12:10) (19 - 44)  SpO2: 95% (06 Apr 2023 12:10) (91% - 100%)    O2 Parameters below as of 06 Apr 2023 12:10  Patient On (Oxygen Delivery Method): ventilator    O2 Concentration (%): 45          ABG - ( 04 Apr 2023 06:35 )  pH, Arterial: 7.25  pH, Blood: x     /  pCO2: 32    /  pO2: 80    / HCO3: 14    / Base Excess: -12.0 /  SaO2: 97                  I&O's Detail    03 Apr 2023 07:01  -  04 Apr 2023 07:00  --------------------------------------------------------  IN:    Free Water: 100 mL    IV PiggyBack: 50 mL    Norepinephrine: 257.7 mL    Propofol: 322.7 mL  Total IN: 730.4 mL    OUT:    Intermittent Catheterization - Urethral (mL): 325 mL    Nasogastric/Oral tube (mL): 600 mL    Nepro with Carb Steady: 0 mL  Total OUT: 925 mL    Total NET: -194.6 mL      04 Apr 2023 07:01  -  05 Apr 2023 02:54  --------------------------------------------------------  IN:    IV PiggyBack: 100 mL    Norepinephrine: 150 mL    Propofol: 176 mL    Sodium Bicarbonate: 1028 mL  Total IN: 1454 mL    OUT:    Nasogastric/Oral tube (mL): 180 mL  Total OUT: 180 mL    Total NET: 1274 mL            LABS:                        8.9    16.41 )-----------( 214      ( 04 Apr 2023 06:30 )             29.3     04-04    133<L>  |  96  |  75<H>  ----------------------------<  147<H>  4.2   |  23  |  9.84<H>    Ca    7.7<L>      04 Apr 2023 17:30  Phos  X-NORESULT     04-04  Mg     3.2     04-04    TPro  7.1  /  Alb  2.6<L>  /  TBili  1.2  /  DBili  x   /  AST  398<H>  /  ALT  346<H>  /  AlkPhos  221<H>  04-04          CAPILLARY BLOOD GLUCOSE            CULTURES:  Culture Results:   No Legionella species isolated to date (03-31-23 @ 11:00)  Culture Results:   No Growth Final (03-30-23 @ 09:21)  Culture Results:   No Growth Final (03-30-23 @ 09:20)  Culture Results:   No growth at 48 hours (03-29-23 @ 16:59)      Physical Examination:    General: Intubated and sedated. Chronically ill appearing    HEENT: Pupils equal, reactive to light.  Symmetric.    PULM: coarse breath sounds bilaterally, no significant sputum production    CVS: Regular rate and rhythm, no murmurs, rubs, or gallops    ABD: BS+ Soft, nondistended, nontender, no masses, +anasarca    EXT: No edema, nontender    SKIN: Warm and well perfused, no rashes noted.      RADIOLOGY:   < from: Xray Chest 1 View- PORTABLE-Urgent (Xray Chest 1 View- PORTABLE-Urgent .) (04.04.23 @ 11:54) >  INTERPRETATION:  AP chest on erect on April 4, 2022 at 11:48 AM. Patient   had insertion of large right jugular line.    Heart magnified by technique.    Endotracheal tube, nasogastric tube, and left jugular line remain.    There is persistent moderate interstitial CHF similar to April 3.    Mild left base effusion and April 3 improved.    Present film shows a large right jugular line inserted in good position.    IMPRESSION: Persistent CHF. Improved left effusion. Large right jugular   line inserted.    < end of copied text >      [facility] 3/30      Summary     The mitral valve leaflets appear thickened.   Mild to Moderate mitral regurgitation is present.   The aortic valve is well visualized, appears calcified. Valve opening   seems to be normal.   Mild (1+) aortic regurgitation is present.   The tricuspid valve leaflets appear mildly thickened and/or calcified,   but   open well.   Moderate (2+) tricuspid valve regurgitation is present.   Severe pulmonary hypertension.   Pulmonic valve not well seen.   Mild to moderate pulmonic valvular regurgitation is present.   Normal appearing left atrium.   The left ventricle is normal in size, wall thickness, wall motion and   contractility.   Estimated left ventricular ejection fraction is 55-60 %.   The right atrium appears dilated.   The right ventricle is severely dilated.   Mild to moderate, diffuse hypokinesis of the right ventricle is present.

## 2023-04-06 NOTE — PROGRESS NOTE ADULT - CRITICAL CARE ATTENDING COMMENT
greater than 50% of time spent reviewing labs, notes, orders and radiographs, coordinating care  discussed with nursing, consultants  actively titrating pressors, critically ill.

## 2023-04-06 NOTE — PROGRESS NOTE ADULT - ASSESSMENT
62M hx PSS (scleroderma) on cellcept with pulmonary fibrosis on 8L of 02  24/7, HTN HLD  CAD with 4 stents to the RCA 2018 with Dr Griffith. He is on the lung transplant list at [facility] with Dr Manas Priest (transplant pulmonologist). He was hospitalized in Feb at [facility] with a flare of fibrosis and was in the ICU on inhaled pulmonary vasodialtors. At that time he had a R heart cath and he was told that he has severe pulmonary hypertension., He was  being teed up for transplant waiting for immunizations/colonoscopy etc. He was discharged on Tyvaso (treprostinil) but only started using it a few days ago due to expense and approval. Over the last three weeks he has had a poor appetite and progressive SOB leaving him unable to walk with a walker more than 10 feet without stopping to catch his breath. He arrived in the ED with severe SOB and hypoxemia placed on BIPAP.  Type 1 respiratory failure Trop and BNP elevation. 3/29 am was intubated, had approximately 5 minute cardiac arrest/ now on pressors and developing worsening renal renal failure creatinine has increased now 6, on high dose pressors echo, dec rv function and severe pulmonary htn, was started on abx, pcp tx, and fungal coverage.     1. Acute respiratory failure. Septic vs cardiogenic shock. Scleroderma. Pulmonary fibrosis with acute flare. Multifocal pneumonia. Immunocompromised host. s/p Cardiac arrest. Shock liver. CHRIS on HD  - imaging and chart reviewed, multiorgan failure, started on HD 4/4  - s/p zosyn 3/28-3/30, now on merrem [id number] renally dose adjusted #8, complete 8 days   - on mepron 750mg BID #7/21 for pcp coverage  - continue with antibiotic coverage   - sputum cx and blood cx 3/28 no growth  - fungitell (-), repeat blood cx from 3/30 no growth  - nephrology f/u noted  - tolerating abx well so far; no side effects noted  - reason for abx use and side effects reviewed with patient  - s/p micafungin x 1 caspofungin 3/31-4/2  - s/p azithromycin 500mg daily atypical coverage #5 --> 4/1 legionella ag (-)  - supportive care  - fu cbc    2. other issues - care per medicine

## 2023-04-06 NOTE — PROGRESS NOTE ADULT - ASSESSMENT
61 yo man with scleroderma, pulmonary fibrosis /severe pulmonary HTN admitted with progressive resp failure.  Now intubated and on pressors due to shock and now post cardiac arrest, septic vs cardiogenic.  --CHRIS post CTA ( unclear hx of CKD, creat 1.47 on admission and on ACEI) and shock. oliguric.    No immediate need for dialysis.    Check repeat labs for K and acidosis.    Continue pressor/vent support    Continue abtx.    D/w Dr Watson.    3/31 SY  --CHRIS post cardiac arrest :  Continues to worsen with Oliguria     No immediate need for dialysis.    Will assess again in am    D/w Dr Watson re : poor pulmonary prognosis.    4/1 SY  --CHRIS post cardiac arrest : continues to worsen with no urine output.    No immediate indication for dialysis.    HD to be considered when indicated with electrolyte disturbances.    Oxygenation acceptable.  --Change enteral feeding to Nepro for now.     D/w Dr Watson    4/2 SY  --CHRIS post cardiac arrest : continues to worsen with no urine output.     Continue to assess for indications for dialysis.     No urgent need for dialysis today as electrolytes acceptable.     Would allow more time for BP to stabilize.  --Oxygenation acceptable.  --ID : on Meropenem and Caspofungin  --Scleroderma : continue MMF.  --Severe pulmonary fibrosis and HTN : Not a TP candidate at this time.    4/3 MK   - CHRIS with component of VIET and ATN     no indication for HD today    no FW flushes with TF    Hep panel in anticipation of possible HD   dw rn and wife updated     4/4 MK   - CHRIS with worsening acidosis and hyperkalemia andhyponatremia     dw wife need for HD, consent obtained     TDC by intensivist team     she is concerned about long term need for dialysis if he is in the future deemed not to be a     lung transplant patient   dw wife     4/5 SY  --CHRIS, with worsening metabolic derangement.      1st HD yesterday.    Second HD today with goal of 3 KG UF.  --BP in 100's on Norepi.  --Resp : pulmonary fibrosis : continue vent support.  --Scleroderma : continue MMF.    4/6 SY  --CHRIS : 3rd HD today.  aim for ~ 2 kg UF.   SPA and on pressor.  --Continue Pressor.  --Hyperphosphatemia : start binder once feeding started.  --Resp : pulmonary fibrosis  : Now lung TP consideration on hold  Continue vent support  --Scleroderma : continue MMF.

## 2023-04-06 NOTE — PROGRESS NOTE ADULT - ASSESSMENT
62M with PSS (scleroderma) on cellcept with advance pulmonary fibrosis on 8L home O2, HTN, HLD, CAD s/p 4 SINDY to RCA, recent hospitalized at [facility] in 02/23 iso fibrosis flare requiring inhaled pulmonary vasodilators in the ICU, discharged on treprostinil and was listed for lung transplantation at [facility]. Patient now admitted to CCU for likely septic shock 2/2 to pneumonia, and complicated with CHRIS needing dialysis. Intubated on 3/29 for AHRF, complicated with 5 min cardiac arrest, started on pressor.  At this time, patient not a candidate for transplant per [facility].  Concern for relative adrenal insufficency from septic shock,  started on solumedrol, and also can treat concurrent ILD exacerbation.  Patient started HD on 4/4, , likely can help with potential fluid overload in the setting of severe Pulm HTN.      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  propofol resumed for vent synchrony.    CARDIAC: brief chriss arrest 3/29.  Actively titrating levophed to maintain goal MAP >65. TTE w/ EF 55-60%, severely dilated RV w/ diffuse hypokinesis. ordered f/u echo 4/6,  stress dose steroid started 4/5.    RESPIRATORY: Daily SAT/SBT.  Continue cellcept and mepron. Deemed currently not a candidate for lung transplant per [facility] - Dr Manas Priest (transplant pulmonologist) continue steroid for possible ILD exacerbation  ID: s/p capso 3/31-4/2.  on mepro for pcp coverage, on teri for pneumonia.  urinary legionella negative, blood culture negative, and sputum culture pending. will sepnd pcp sputum   GI: IV PPI for stress ulcer ppx. Will re-start trickle feed with nepro 4/6   : on HD, managed per renal.  will start phos binder after patient tolerating tube feed    ENDO: No active issues. Goal -180.  HEME: SQH for DVT ppx.     62M with PSS (scleroderma) on cellcept with advance pulmonary fibrosis on 8L home O2, HTN, HLD, CAD s/p 4 SINDY to RCA, recent hospitalized at [facility] in 02/23 iso fibrosis flare requiring inhaled pulmonary vasodilators in the ICU, discharged on treprostinil and was listed for lung transplantation at [facility]. Patient now admitted to CCU for likely septic shock 2/2 to pneumonia, and complicated with CHRIS needing dialysis. Intubated on 3/29 for AHRF, complicated with 5 min cardiac arrest, started on pressor.  At this time, patient not a candidate for transplant per [facility].  Concern for relative adrenal insufficency from septic shock,  started on solumedrol, and also can treat concurrent ILD exacerbation.  Patient started HD on 4/4, , likely can help with potential fluid overload in the setting of severe Pulm HTN.      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  propofol resumed for vent synchrony.    CARDIAC: brief chriss arrest 3/29.  Actively titrating levophed to maintain goal MAP >65. TTE w/ EF 55-60%, severely dilated RV w/ diffuse hypokinesis. ordered f/u echo 4/6,  stress dose steroid started 4/5.  midodrine started 4/6 to help unload pressor.   RESPIRATORY: Daily SAT/SBT.  Continue cellcept and mepron. Deemed currently not a candidate for lung transplant per [facility] - Dr Manas Priest (transplant pulmonologist) continue steroid for possible ILD exacerbation  ID: s/p capso 3/31-4/2.  on mepro for pcp coverage, on teri for pneumonia.  urinary legionella negative, blood culture negative, and sputum culture pending. will sepnd pcp sputum   GI: IV PPI for stress ulcer ppx. Will re-start trickle feed with nepro 4/6   : on HD, managed per renal.  will start phos binder after patient tolerating tube feed    ENDO: No active issues. Goal -180.  HEME: SQH for DVT ppx.     62M with PSS (scleroderma) on cellcept with advance pulmonary fibrosis on 8L home O2, HTN, HLD, CAD s/p 4 SINDY to RCA, recent hospitalized at [facility] in 02/23 iso fibrosis flare requiring inhaled pulmonary vasodilators in the ICU, discharged on treprostinil and was listed for lung transplantation at [facility]. Patient now admitted to CCU for likely septic shock 2/2 to pneumonia, and complicated with CHRIS needing dialysis. Intubated on 3/29 for AHRF, complicated with 5 min cardiac arrest, started on pressor.  At this time, patient not a candidate for transplant per [facility].  Concern for relative adrenal insufficency from septic shock,  started on solumedrol, and also can treat concurrent ILD exacerbation.  Patient started HD on 4/4, , likely can help with potential fluid overload in the setting of severe Pulm HTN.      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  propofol resumed for vent synchrony.    CARDIAC: brief chriss arrest 3/29.  Actively titrating levophed to maintain goal MAP >65. TTE w/ EF 55-60%, severely dilated RV w/ diffuse hypokinesis. ordered f/u echo 4/6,  stress dose steroid started 4/5.  midodrine started 4/6 to help unload pressor.   RESPIRATORY: Daily SAT/SBT.  Continue cellcept and mepron. Deemed currently not a candidate for lung transplant per [facility] - Dr Manas Priest (transplant pulmonologist) continue steroid for possible ILD exacerbation  ID: s/p capso 3/31-4/2.  on mepro for pcp coverage, on teri for pneumonia.  urinary legionella negative, blood culture negative, and sputum culture pending. will sepnd pcp sputum   GI: IV PPI for stress ulcer ppx. Will re-start trickle feed with nepro 4/6 , transaminitis, likely shock liver, improving. pending CT abd and pel   : on HD, managed per renal.  will start phos binder after patient tolerating tube feed    ENDO: No active issues. Goal -180.  HEME: SQH for DVT ppx.

## 2023-04-06 NOTE — PROGRESS NOTE ADULT - PROBLEM SELECTOR PLAN 2
Resuscitated cardiac arrest - preceded by bradycardic and likely related to respiratory failure; now hypotensive and requiring vasopressors cont. to wean as tolerated.   Renal function deteriorating.  Renal note reviewed - no HD today but anticipated in near future.   Transfer to [place] when stable  Currently in SR with normal rate.

## 2023-04-06 NOTE — PROGRESS NOTE ADULT - SUBJECTIVE AND OBJECTIVE BOX
Date of service: 04-06-23 @ 15:51    pt seen and examined  on pressors on ventilatory support failed SBT  on HD  febrile to 101    ROS: unable to obtain d/t medical condition      MEDICATIONS  (STANDING):  artificial tears (preservative free) Ophthalmic Solution 1 Drop(s) Both EYES four times a day  aspirin  chewable 81 milliGRAM(s) Oral daily  atorvastatin 20 milliGRAM(s) Oral at bedtime  atovaquone  Suspension 750 milliGRAM(s) Oral every 12 hours  chlorhexidine 0.12% Liquid 15 milliLiter(s) Oral Mucosa every 12 hours  chlorhexidine 4% Liquid 1 Application(s) Topical <User Schedule>  chlorhexidine 4% Liquid 1 Application(s) Topical <User Schedule>  heparin   Injectable 5000 Unit(s) SubCutaneous every 12 hours  meropenem  IVPB 500 milliGRAM(s) IV Intermittent every 24 hours  methylPREDNISolone sodium succinate Injectable 40 milliGRAM(s) IV Push every 8 hours  midodrine 10 milliGRAM(s) Oral every 8 hours  mycophenolate mofetil Suspension 1000 milliGRAM(s) Oral two times a day  norepinephrine Infusion 0.05 MICROgram(s)/kG/Min (3.3 mL/Hr) IV Continuous <Continuous>  pantoprazole  Injectable 40 milliGRAM(s) IV Push two times a day  propofol Infusion 20 MICROgram(s)/kG/Min (8.45 mL/Hr) IV Continuous <Continuous>    Vital Signs Last 24 Hrs  T(C): 37.2 (06 Apr 2023 14:30), Max: 38.3 (05 Apr 2023 22:00)  T(F): 99 (06 Apr 2023 14:30), Max: 101 (05 Apr 2023 22:00)  HR: 87 (06 Apr 2023 14:30) (81 - 107)  BP: --  BP(mean): --  RR: 32 (06 Apr 2023 14:30) (19 - 44)  SpO2: 94% (06 Apr 2023 14:30) (92% - 100%)    Parameters below as of 06 Apr 2023 14:30  Patient On (Oxygen Delivery Method): ventilator    O2 Concentration (%): 45        PE:  Constitutional: NAD, sedated/intubated   HEENT: NC/AT, EOMI, PERRLA, conjunctivae clear; ears and nose atraumatic; pharynx benign  Neck: supple; thyroid not palpable  Back: no tenderness  Respiratory: decreased breath sounds, rales   Cardiovascular: S1S2 regular, no murmurs  Abdomen: soft, not tender, not distended, positive BS; liver and spleen WNL  Genitourinary: no suprapubic tenderness  Lymphatic: no LN palpable  Musculoskeletal: no muscle tenderness, no joint swelling or tenderness  Extremities: no pedal edema  Neurological/ Psychiatric:  moving all extremities  Skin: no rashes; no palpable lesions    Labs: all available labs reviewed                                   8.7    13.83 )-----------( 274      ( 06 Apr 2023 06:05 )             27.2     04-06    133<L>  |  98  |  74<H>  ----------------------------<  139<H>  4.4   |  20<L>  |  9.90<H>    Ca    7.8<L>      06 Apr 2023 06:05  Phos  11.0     04-06  Mg     2.7     04-06    TPro  6.7  /  Alb  2.4<L>  /  TBili  1.0  /  DBili  x   /  AST  413<H>  /  ALT  272<H>  /  AlkPhos  196<H>  04-06      Cultures:   Culture - Blood (03.30.23 @ 09:21)   Specimen Source: .Blood   Blood-Peripheral  Culture Results:   No growth to date.  Culture - Blood (03.30.23 @ 09:20)   Specimen Source: .Blood None  Culture Results:   No growth to date.  Culture - Sputum . (03.29.23 @ 16:59)   Gram Stain:   Few polymorphonuclear leukocytes per low power field   No Squamous epithelial cells per low power field   No organisms seen per oil power field  Specimen Source: .Sputum Sputum  Culture Results:   No growth at 48 hours    Radiology: all available radiological tests reviewed    ACC: [id number] EXAM:  XR CHEST PORTABLE IMMED 1V   ORDERED BY: RALPH PRATER     PROCEDURE DATE:  03/29/2023          INTERPRETATION:  Portable chest radiograph    CLINICAL INFORMATION:   Short of breath.    TECHNIQUE:  Portable  AP view of the chest was obtained.    COMPARISON: 3/29/2023 chest x-ray available for review.    FINDINGS: ET tube tip above tracheal bifurcation.  NG tube tip beyond GE junction.  LEFT IJ catheter tip in SVC.      There is cardiomegaly, vascular congestion and perihilar interstitial   infiltrates without gross effusion.  Trachea midline. No hilar mass.  Visualized osseous structures are intact.        IMPRESSION: Catheters and tubes in place. An  Cardiomegaly.  Bilateral perihilar/bibasilar diffuse airspace disease.        ACC: [id number] EXAM:  CT ANGIO CHEST PULM [facility]   ORDERED BY: ROME RUFFIN     PROCEDURE DATE:  03/28/2023          INTERPRETATION:  CLINICAL INFORMATION: Shortness of breath. History of   scleroderma.    COMPARISON: 10/28/2018    CONTRAST/COMPLICATIONS:  IV Contrast: Omnipaque 350  90 cc administered   10 cc discarded  Oral Contrast: NONE  Complications: None reported at time of study completion    PROCEDURE:  CT Angiography of the Chest.  Sagittal and coronal reformats were performed as well as 3D (MIP)   reconstructions.    FINDINGS:    LUNGS AND AIRWAYS: Patent central airways.  Revisualized chronic   interstitial disease including hazy bilateral ground glass opacities,   reticulation and worsened bronchiectasis bilaterally. Large bulla in the   left upper lobe.  PLEURA: No pleural effusion.  MEDIASTINUM AND MALLORY: No lymphadenopathy.  VESSELS: Aortic and coronary artery atherosclerosis. Enlarged main   pulmonary artery, unchanged, can be seen with pulmonary arterial   hypertension. No pulmonary embolism.  HEART: Cardiomegaly. Small pericardial effusion.  CHEST WALL AND LOWER NECK: Bilateral mediastinal and hilar adenopathy   increased since prior exam including extensive adenopathy in the   prevascular space.  VISUALIZED UPPER ABDOMEN: Right renal hypodensity too small to   characterize. Colonic diverticulosis.  BONES: Degenerative changes. Dextroscoliosis of the thoracic spine.   Chronic right rib deformities.    IMPRESSION:  No pulmonary embolism.  Chronic interstitial disease with worsened bronchiectasis.  New mediastinal and hilar adenopathy, which is indeterminate but could be   related to scleroderma and interstitial disease. Continued follow-up is   recommended.        --- End of Report ---    < end of copied text >    Advanced directives addressed: full resuscitation

## 2023-04-06 NOTE — PROGRESS NOTE ADULT - SUBJECTIVE AND OBJECTIVE BOX
62M hx PSS (scleroderma) on cellcept with pulmonary fibrosis on 8L of 02  24/7, HTN HLD  CAD with 4 stents to the RCA 2018 with Dr Griffith.    He is on the lung transplant list at [facility] with Dr Manas Priest (transplant pulmonologist).    He was hospitalized in Feb at [facility] with a flare of fibrosis and was in the ICU on inhaled pulmonary vasodialtors.    At that time he had a R heart cath and he was told that he has severe pulmonary hypertension.,   He is being teed up for transplant waiting for immunizations/colonoscopy etc.   He was discharged on Tyvaso (treprostinil) but only started using it a few days ago due to expense and approval.       Over the last three weeks he has had a poor appetite and progressive SOB leaving him unable to walk with a walker more than 10 feet without stopping to catch his breath.  He also noted more hypoxemia on his home pulse ox.  He has a cough with clear phlegm.    He  arrived in the ED with severe SOB and hypoxemia placed on BIPAP.  Type 1 respiratory failure   Trop and BNP elevation       Brief arrest > intubated  CHRIS now on HD  HD unstable on Nor-epi titrating to MAP 65 at this point distributive hypotension from propofol    Poorly responsive while off sedation.      Neuro Did not awake off sedation  head CT negative will discuss EEG   COR  on low dose nor epi added midodrine for tone  Normal EF on echo severe pulm HTN   Pulm   Just titrated fi02 down to 35% maintaining sat   Added medrol for ILD flare.  Cellcept for PSS.    GI feeds advanced to nepro  Renal s/p HD today   still fluid overloaded on exam  Anuric will ongoing HD  Heme  no issues  SQ heparin DVT {   ID  All cultures EDUARD/Fungitell negative    Meropenum added but is empiric  at this point     PX is poor but continue all measures for now.  Wife is hopeful.

## 2023-04-07 NOTE — PROGRESS NOTE ADULT - SUBJECTIVE AND OBJECTIVE BOX
HPI:  62 year old man with a history of scleroderma, pulmonary fibrosis with chronic respiratory failure (supplemental O2 dependent; listed on lung-transplant list at [place]), severe pulmonary hypertension, CAD s/p RCA stents (2018), HTN, HLD admitted with acute/chronic respiratory failure (required mechanical ventilation) with hospitalization complicated by cardiac arrest preceded by severe bradycardia (3/29/23).    3/29/23: Pt intubated and sedated.  3/30/23:  Events past 24 hours noted and discussed with CCU team; patient is sedated on vent.  3/31/23: Pt sedated on pressors. Family at bedside  4/1/23: Sedated pressors being tapered. Labs reviewed.  4/2/23: Pressors weaning. Arousable  4/3/'23: no changes.  4/4/23;  intubated , sedated , on pressors,  echo showed normal LVEF ; severe pulmonary hypertension   4/6/23 Patient is intubated , on sedation , low dose pressors on dialysis , patient was given tyvaso, did not take due to insurance issue  , febrile episodes   4/7/'23: on sedation, intubated    MEDICATIONS:  OUTPATIENT  Home Medications:  aspirin 81 mg oral tablet, chewable: 1 tab(s) orally once a day (31 Oct 2018 10:16)  azithromycin 500 mg intravenous injection: 500 milligram(s) intravenous every 24 hours (28 Mar 2023 14:31)  enoxaparin: 40 milligram(s) subcutaneous once a day (28 Mar 2023 14:31)  mycophenolate mofetil 250 mg oral capsule: 4 cap(s) orally 2 times a day (28 Mar 2023 14:31)  piperacillin-tazobactam: 3.375 gram(s) injectable every 8 hours (28 Mar 2023 14:31)  quinapril 10 mg oral tablet: 1 tab(s) orally once a day (28 Oct 2018 22:58)      INPATIENT  MEDICATIONS  (STANDING):  artificial tears (preservative free) Ophthalmic Solution 1 Drop(s) Both EYES four times a day  aspirin  chewable 81 milliGRAM(s) Oral daily  atorvastatin 20 milliGRAM(s) Oral at bedtime  atovaquone  Suspension 750 milliGRAM(s) Oral every 12 hours  chlorhexidine 0.12% Liquid 15 milliLiter(s) Oral Mucosa every 12 hours  chlorhexidine 4% Liquid 1 Application(s) Topical <User Schedule>  chlorhexidine 4% Liquid 1 Application(s) Topical <User Schedule>  heparin   Injectable 5000 Unit(s) SubCutaneous every 12 hours  lactulose Syrup 20 Gram(s) Oral every 6 hours  meropenem  IVPB 500 milliGRAM(s) IV Intermittent every 24 hours  methylPREDNISolone sodium succinate Injectable 40 milliGRAM(s) IV Push every 8 hours  midodrine 10 milliGRAM(s) Oral every 8 hours  mycophenolate mofetil Suspension 1000 milliGRAM(s) Oral two times a day  norepinephrine Infusion 0.05 MICROgram(s)/kG/Min (3.3 mL/Hr) IV Continuous <Continuous>  pantoprazole  Injectable 40 milliGRAM(s) IV Push two times a day  propofol Infusion 20 MICROgram(s)/kG/Min (8.45 mL/Hr) IV Continuous <Continuous>    MEDICATIONS  (PRN):  acetaminophen   Oral Liquid .. 650 milliGRAM(s) Oral every 8 hours PRN Temp greater or equal to 38C (100.4F)  LORazepam   Injectable 2 milliGRAM(s) IV Push every 2 hours PRN Agitation  sodium chloride 0.9% lock flush 10 milliLiter(s) IV Push every 1 hour PRN Pre/post blood products, medications, blood draw, and to maintain line patency          Vital Signs Last 24 Hrs  T(C): 36.4 (07 Apr 2023 08:35), Max: 36.5 (06 Apr 2023 21:41)  T(F): 97.6 (07 Apr 2023 08:35), Max: 97.7 (06 Apr 2023 21:41)  HR: 86 (07 Apr 2023 15:00) (77 - 94)  BP: --  BP(mean): --  RR: 34 (07 Apr 2023 15:00) (29 - 41)  SpO2: 92% (07 Apr 2023 15:00) (89% - 100%)    Daily     Daily I&O's Summary    06 Apr 2023 07:01  -  07 Apr 2023 07:00  --------------------------------------------------------  IN: 877 mL / OUT: 2000 mL / NET: -1123 mL        I&O's Detail    06 Apr 2023 07:01  -  07 Apr 2023 07:00  --------------------------------------------------------  IN:    Free Water: 100 mL    IV PiggyBack: 100 mL    Nepro with Carb Steady: 190 mL    Norepinephrine: 125 mL    Propofol: 362 mL  Total IN: 877 mL    OUT:    Other (mL): 2000 mL  Total OUT: 2000 mL    Total NET: -1123 mL          I&O's Summary    06 Apr 2023 07:01  -  07 Apr 2023 07:00  --------------------------------------------------------  IN: 877 mL / OUT: 2000 mL / NET: -1123 mL        PHYSICAL EXAM:    Constitutional: intubated sedated.  HEENT: PERR, EOMI,  No oral cyananosis.  Neck:  supple,  No JVD  Respiratory: Breath sounds are clear bilaterally, scattered rhonchi   Cardiovascular: S1 and S2, regular rate and rhythm, no Murmurs, gallops or rubs  Gastrointestinal: Bowel Sounds present, soft, nontender.   Extremities: trace peripheral edema. No clubbing or cyanosis.  Vascular: 2+ peripheral pulses  Musculoskeletal: no calf tenderness.  Skin: No rashes.      ===============================  ===============================  LABS:                         8.1    12.31 )-----------( 307      ( 07 Apr 2023 05:30 )             24.3                         8.7    13.83 )-----------( 274      ( 06 Apr 2023 06:05 )             27.2                         8.4    17.15 )-----------( 230      ( 05 Apr 2023 07:10 )             26.1     07 Apr 2023 05:30    137    |  100    |  58     ----------------------------<  156    3.2     |  23     |  6.57   06 Apr 2023 06:05    133    |  98     |  74     ----------------------------<  139    4.4     |  20     |  9.90   05 Apr 2023 20:35    134    |  97     |  60     ----------------------------<  114    5.0     |  21     |  9.14     Ca    8.1        07 Apr 2023 05:30  Ca    7.8        06 Apr 2023 06:05  Ca    8.2        05 Apr 2023 20:35  Phos  6.8       07 Apr 2023 05:30  Phos  11.0      06 Apr 2023 06:05  Phos  10.8      05 Apr 2023 07:10  Mg     2.4       07 Apr 2023 05:30  Mg     2.7       06 Apr 2023 06:05    TPro  6.7    /  Alb  2.9    /  TBili  1.0    /  DBili  x      /  AST  249    /  ALT  202    /  AlkPhos  164    07 Apr 2023 05:30  TPro  6.7    /  Alb  2.4    /  TBili  1.0    /  DBili  x      /  AST  413    /  ALT  272    /  AlkPhos  196    06 Apr 2023 06:05  TPro  x      /  Alb  2.4    /  TBili  x      /  DBili  x      /  AST  x      /  ALT  x      /  AlkPhos  x      05 Apr 2023 07:10      ===============================  ===============================  CARDIAC BIOMARKERS:  BNP  Serum Pro-Brain Natriuretic Peptide: 435 pg/mL *H* [0 - 125] (10-28-18 @ 16:16)      TROPONIN  Troponin I, High Sensitivity Result: 477.06 ng/L *H* (03-28-23 @ 06:17)  Troponin I, High Sensitivity Result: 441.04 ng/L *H* (03-28-23 @ 00:10)  Troponin I, Serum: 39.700 ng/mL *H* [0.015 - 0.045] (10-29-18 @ 06:51)  Troponin I, Serum: 7.860 ng/mL *H* [0.015 - 0.045] (10-28-18 @ 23:22)  Troponin I, Serum: 1.590 ng/mL *H* [0.015 - 0.045] (10-28-18 @ 19:47)  Troponin I, Serum: 0.255 ng/mL *H* [0.015 - 0.045] (10-28-18 @ 16:16)          ===============================  ECG:      Diagnosis Line Normal sinus rhythm  T wave abnormality, consider anterior ischemia  Abnormal ECG  When compared with ECG of 30-OCT-2018 07:24,  Vent. rate has increased BY  33 BPM  Questionable change in QRS axis  T wave inversion now evident in Anterior leads  Confirmed by DIANE CULVER, SOERIC PINEDA ([id number]) on 3/29/2023 4:48:13 PM    ===============================  RADIOLOGY:  < from: CT Angio Chest PE Protocol w/ IV Cont (03.28.23 @ 00:21) >  ACC: [id number] EXAM:  CT ANGIO CHEST PULM [facility]   ORDERED BY: ROME RUFFIN     PROCEDURE DATE:  03/28/2023          INTERPRETATION:  CLINICAL INFORMATION: Shortness of breath. History of   scleroderma.    COMPARISON: 10/28/2018    CONTRAST/COMPLICATIONS:  IV Contrast: Omnipaque 350  90 cc administered   10 cc discarded  Oral Contrast: NONE  Complications: None reported at time of study completion    PROCEDURE:  CT Angiography of the Chest.  Sagittal and coronal reformats were performed as well as 3D (MIP)   reconstructions.    FINDINGS:    LUNGS AND AIRWAYS: Patent central airways.  Revisualized chronic   interstitial disease including hazy bilateral ground glass opacities,   reticulation and worsened bronchiectasis bilaterally. Large bulla in the   left upper lobe.  PLEURA: No pleural effusion.  MEDIASTINUM AND MALLORY: No lymphadenopathy.  VESSELS: Aortic and coronary artery atherosclerosis. Enlarged main   pulmonary artery, unchanged, can be seen with pulmonary arterial   hypertension. No pulmonary embolism.  HEART: Cardiomegaly. Small pericardial effusion.  CHEST WALL AND LOWER NECK: Bilateral mediastinal and hilar adenopathy   increased since prior exam including extensive adenopathy in the   prevascular space.  VISUALIZED UPPER ABDOMEN: Right renal hypodensity too small to   characterize. Colonic diverticulosis.  BONES: Degenerative changes. Dextroscoliosis of the thoracic spine.   Chronic right rib deformities.    IMPRESSION:  No pulmonary embolism.  Chronic interstitial disease with worsened bronchiectasis.  New mediastinal and hilar adenopathy, which is indeterminate but could be   related to scleroderma and interstitial disease. Continued follow-up is   recommended.        --- End of Report ---            TYE CULVER; Attending Radiologist  This document has been electronically signed. Mar 28 2023  1:01AM    < end of copied text >    ===============================  ECHO:  Outpatient Echo Jun '22: EF 55-60%, borderline pulmonary hypertension.  normal RV size & function.    ===============================  < from: TTE Echo Complete w/o Contrast w/ Doppler (03.30.23 @ 07:51) >     The mitral valve leaflets appear thickened.   Mild to Moderate mitral regurgitation is present.   The aortic valve is well visualized, appears calcified. Valve opening   seems to be normal.   Mild (1+) aortic regurgitation is present.   The tricuspid valve leaflets appear mildly thickened and/or calcified,   but   open well.   Moderate (2+) tricuspid valve regurgitation is present.   Severe pulmonary hypertension.   Pulmonic valve not well seen.   Mild to moderate pulmonic valvular regurgitation is present.   Normal appearing left atrium.   The left ventricle is normal in size, wall thickness, wallmotion and   contractility.   Estimated left ventricular ejection fraction is 55-60 %.   The right atrium appears dilated.   The right ventricle is severely dilated.   Mild to moderate, diffuse hypokinesis of the right ventricle is present.     Signature    < end of copied text >      Monitor sinus rhythm         ===============================    Julian Rose M.D.  Cardiology, [facility]  Cell:   Offices:    ([facility] Office)  [phone] ([facility] Office)

## 2023-04-07 NOTE — PROGRESS NOTE ADULT - ASSESSMENT
63 yo man with scleroderma, pulmonary fibrosis /severe pulmonary HTN admitted with progressive resp failure.  Now intubated and on pressors due to shock and now post cardiac arrest, septic vs cardiogenic.  --CHRIS post CTA ( unclear hx of CKD, creat 1.47 on admission and on ACEI) and shock. oliguric.    No immediate need for dialysis.    Check repeat labs for K and acidosis.    Continue pressor/vent support    Continue abtx.    D/w Dr Watson.    3/31 SY  --CHRIS post cardiac arrest :  Continues to worsen with Oliguria     No immediate need for dialysis.    Will assess again in am    D/w Dr Watson re : poor pulmonary prognosis.    4/1 SY  --CHRIS post cardiac arrest : continues to worsen with no urine output.    No immediate indication for dialysis.    HD to be considered when indicated with electrolyte disturbances.    Oxygenation acceptable.  --Change enteral feeding to Nepro for now.     D/w Dr Watson    4/2 SY  --CHRIS post cardiac arrest : continues to worsen with no urine output.     Continue to assess for indications for dialysis.     No urgent need for dialysis today as electrolytes acceptable.     Would allow more time for BP to stabilize.  --Oxygenation acceptable.  --ID : on Meropenem and Caspofungin  --Scleroderma : continue MMF.  --Severe pulmonary fibrosis and HTN : Not a TP candidate at this time.    4/3 MK   - CHRIS with component of VIET and ATN     no indication for HD today    no FW flushes with TF    Hep panel in anticipation of possible HD   dw rn and wife updated     4/4 MK   - CHRIS with worsening acidosis and hyperkalemia andhyponatremia     dw wife need for HD, consent obtained     TDC by intensivist team     she is concerned about long term need for dialysis if he is in the future deemed not to be a     lung transplant patient   dw wife     4/5 SY  --CHRIS, with worsening metabolic derangement.      1st HD yesterday.    Second HD today with goal of 3 KG UF.  --BP in 100's on Norepi.  --Resp : pulmonary fibrosis : continue vent support.  --Scleroderma : continue MMF.    4/6 SY  --CHRIS : 3rd HD today.  aim for ~ 2 kg UF.   SPA and on pressor.  --Continue Pressor.  --Hyperphosphatemia : start binder once feeding started.  --Resp : pulmonary fibrosis  : Now lung TP consideration on hold  Continue vent support  --Scleroderma : continue MMF.    4/7   Above noted   pt s/p HD x 3  Labs stable  Replace K

## 2023-04-07 NOTE — EEG REPORT - NS EEG TEXT BOX
[facility]  COMPREHENSIVE EPILEPSY CENTER  REPORT OF ROUTINE EEG WITH VIDEO    [facility]: [address] , [place] [address], Phone [phone]  [place] Office: [address], [address], [place] [address] Phone [phone]    [facility]: [address], [place] [address], Phone [phone]  [place] Office: [address], [place] [address], Phone [phone]    Patient Name: ANT SILVERMAN    Age: 62 year  : 1960  Patient ID: -, MRN #: [id number], Location: CCU-  Referring Physician: LESLI  EEG #:     Study Date: 2023     Technical Information:					  On Instrument: [id number]  Placement and Labeling of Electrodes:  The EEG was performed utilizing 20 channels referential EEG connections (coronal over temporal over parasagittal montage) using all standard 10-20 electrode placements with EKG.  Recording was at a sampling rate of 256 samples per second per channel.  Time synchronized digital video recording was done simultaneously with EEG recording.  A low light infrared camera was used for low light recording.  Carlos and seizure detection algorithms were utilized.    History:  This is a RT  study performed in the bedside.  State of the Patient: intubated, comatose, sedated, Propofol 50mcg, paused for EEG.  HV - Not performed due to the Covid protocol  PS -  Performed  VIDEO - Not on due to machine not equipped.    This is a  62  years old  Male  with history of:  SOB, Hypoxemia.  REASON for the test: Encephalopathy.  Handedness: No info.    Pertinent Medication  PROPOFOL    NOREPINEPHRINE    LORAZEPAM    ATORVASTATIN      Study Interpretation:  Findings: The background was continuous and reactive.   No posterior dominant rhythm seen.  Background predominantly consisted of theta, delta and faster activities.    Focal Slowing:   None were present.    Sleep Background:  Drowsiness and stage II sleep transients were not recorded.    Other Non-Epileptiform Findings:  None were present.    Interictal Epileptiform Activity:   None were present.    Events:  No event or seizure recorded.    Activation Procedures:   Hyperventilation was not performed.    Photic stimulation was performed and did not elicit any abnormalities.      Artifacts:  Intermittent myogenic and movement artifacts were noted.    EEG Summary / Classification:  Abnormal EEG   - Moderate generalized slowing.    EEG Impression / Clinical Correlate:  Abnormal EEG study.  Moderate nonspecific diffuse or multifocal cerebral dysfunction.   No epileptiform pattern or seizure seen.    Werner Rutherford MD  Attending Physician, [facility] [place]

## 2023-04-07 NOTE — CONSULT NOTE ADULT - ASSESSMENT
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 c/w current management  --Bowel Regimen - on lactulose standing.  Suggest daily dulcolax as needed  --Acute on Chronic Respiratory Failure 2/2 scleroderma - supportive care.  on propofol for vent compliance.  steroids. Consider switching to precedex?  vent setting per primary.  if family decide to withdraw care then pt would require opioid infusion.  given ESRD, suggest either fentanyl or Dilaudid infusion to start 30 minutes prior to the planned withdrawal of care.  titrate up to comfort once withdrawn.  maintain Scleroderma/ILD medications.  ABx per ID.   --Debility/Weakness - fall precautions  --Acute Encephalopathy - hypoxic vs infections + drug induced.  treatment of possible infections. use sedation at lowest possible dose.  Consider switching to precedex.    --esrd - HD per nephrology     Psychological and Psychiatric Aspects of Care:   --Grief/Bereavement: emotional support provided  --Hx of psychiatric dx: none  -Pastoral Care Available PRN     Social Aspects of Care  -Palliative SW are available as needed    Cultural Aspects  -Primary Language: English    Goals of Care:  see above    Prognosis -- very poor    Ethical and Legal Aspects: NA  Capacity- pt does not have capacity 2/2 sedation.     HCP/Surrogate: wife - darren jones [phone]    Code Status- FC  MOLST- n/a  Dispo Plan- pt unlikely to survive hospitalization    Discussed With: nursing

## 2023-04-07 NOTE — PROGRESS NOTE ADULT - PROBLEM SELECTOR PLAN 2
Resuscitated cardiac arrest - preceded by bradycardic and likely related to respiratory failure; now hypotensive and requiring vasopressors cont. to wean as tolerated.

## 2023-04-07 NOTE — PROGRESS NOTE ADULT - ASSESSMENT
62M hx PSS (scleroderma) on cellcept with pulmonary fibrosis on 8L of 02  24/7, HTN HLD  CAD with 4 stents to the RCA 2018 with Dr Griffith. He is on the lung transplant list at [facility] with Dr Manas Priest (transplant pulmonologist). He was hospitalized in Feb at [facility] with a flare of fibrosis and was in the ICU on inhaled pulmonary vasodialtors. At that time he had a R heart cath and he was told that he has severe pulmonary hypertension., He was  being teed up for transplant waiting for immunizations/colonoscopy etc. He was discharged on Tyvaso (treprostinil) but only started using it a few days ago due to expense and approval. Over the last three weeks he has had a poor appetite and progressive SOB leaving him unable to walk with a walker more than 10 feet without stopping to catch his breath. He arrived in the ED with severe SOB and hypoxemia placed on BIPAP.  Type 1 respiratory failure Trop and BNP elevation. 3/29 am was intubated, had approximately 5 minute cardiac arrest/ now on pressors and developing worsening renal renal failure creatinine has increased now 6, on high dose pressors echo, dec rv function and severe pulmonary htn, was started on abx, pcp tx, and fungal coverage.     1. Acute respiratory failure. Septic vs cardiogenic shock. Scleroderma. Pulmonary fibrosis with acute flare. Multifocal pneumonia. Immunocompromised host. s/p Cardiac arrest. CHRIS on HD  - imaging and chart reviewed, multiorgan failure, started on HD 4/4  - s/p zosyn 3/28-3/30, now on merrem [id number] renally dose adjusted #9   - on mepron 750mg BID #8/21 for pcp coverage  - continue with antibiotic coverage   - sputum cx and blood cx 3/28 no growth  - fungitell (-), repeat blood cx from 3/30 no growth  - nephrology f/u noted  - tolerating abx well so far; no side effects noted  - reason for abx use and side effects reviewed with patient  - s/p micafungin x 1 caspofungin 3/31-4/2  - s/p azithromycin 500mg daily atypical coverage #5 --> 4/1 legionella ag (-)  - supportive care  - fu cbc    2. other issues - care per medicine

## 2023-04-07 NOTE — CONSULT NOTE ADULT - CONVERSATION DETAILS
wife and son are aware that pt's condition has been deteriorating and wife now tells me that he has been taken off the transplant list a [facility].  she is sollen and expresses that she understands palliative care has been called because "things are not going well". wife tells me that she does not believe her  would wnat to live with tracheostomy/peg, and unfortunately, this is becoming the required next steps if we are to try to keep Mr. Ibrahim alive.  I spoke with them about DNR which I recommend).  explained that DNR does not place other restrictions or limitations on care.  Working on the assumption that Mr. Ibrahim would not want trach/peg, then the remaining option is to compassionately withdraw care.  explained to family that we do not have a deadline for this decision at this time, but rather something that we would do if/when they are ready (within reason).  family needed time to speak about their options, including the DNR order.  After speaking with wife and son together, I was approached pt pt's son.  we spoke further about his father's care.  he tells me that he and his mother might not be at the same level of acceptance and timing to withdraw care.  He wants to make sure all options have been exhausted before making that decision.  He does not disagree that his father would not want to be kept alive with artifical support long term.  he was specifically having trouble coming to terms wth the idea of placing a DNR order. 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  it is not a gentle process, and given his fathers' very poor respiratory status, the likely norman of survival is extremely poor.   I advised for him to speak openly and honestly with his mother, and myself and the palliative team would follow up with the two of them on Monday.

## 2023-04-07 NOTE — PROGRESS NOTE ADULT - SUBJECTIVE AND OBJECTIVE BOX
Date of service: 04-06-23 @ 15:51    pt seen and examined  on pressors on ventilatory support   no fevers  encephalopathic    ROS: unable to obtain d/t medical condition      MEDICATIONS  (STANDING):  artificial tears (preservative free) Ophthalmic Solution 1 Drop(s) Both EYES four times a day  aspirin  chewable 81 milliGRAM(s) Oral daily  atorvastatin 20 milliGRAM(s) Oral at bedtime  atovaquone  Suspension 750 milliGRAM(s) Oral every 12 hours  chlorhexidine 0.12% Liquid 15 milliLiter(s) Oral Mucosa every 12 hours  chlorhexidine 4% Liquid 1 Application(s) Topical <User Schedule>  chlorhexidine 4% Liquid 1 Application(s) Topical <User Schedule>  heparin   Injectable 5000 Unit(s) SubCutaneous every 12 hours  meropenem  IVPB 500 milliGRAM(s) IV Intermittent every 24 hours  methylPREDNISolone sodium succinate Injectable 40 milliGRAM(s) IV Push every 8 hours  midodrine 10 milliGRAM(s) Oral every 8 hours  mycophenolate mofetil Suspension 1000 milliGRAM(s) Oral two times a day  norepinephrine Infusion 0.05 MICROgram(s)/kG/Min (3.3 mL/Hr) IV Continuous <Continuous>  pantoprazole  Injectable 40 milliGRAM(s) IV Push two times a day  propofol Infusion 20 MICROgram(s)/kG/Min (8.45 mL/Hr) IV Continuous <Continuous>    Vital Signs Last 24 Hrs  T(C): 37.2 (06 Apr 2023 14:30), Max: 38.3 (05 Apr 2023 22:00)  T(F): 99 (06 Apr 2023 14:30), Max: 101 (05 Apr 2023 22:00)  HR: 87 (06 Apr 2023 14:30) (81 - 107)  BP: --  BP(mean): --  RR: 32 (06 Apr 2023 14:30) (19 - 44)  SpO2: 94% (06 Apr 2023 14:30) (92% - 100%)    Parameters below as of 06 Apr 2023 14:30  Patient On (Oxygen Delivery Method): ventilator    O2 Concentration (%): 45      PE:  Constitutional: NAD, sedated/intubated   HEENT: NC/AT, EOMI, PERRLA, conjunctivae clear; ears and nose atraumatic; pharynx benign  Neck: supple; thyroid not palpable  Back: no tenderness  Respiratory: decreased breath sounds, rales   Cardiovascular: S1S2 regular, no murmurs  Abdomen: soft, not tender, not distended, positive BS; liver and spleen WNL  Genitourinary: no suprapubic tenderness  Lymphatic: no LN palpable  Musculoskeletal: no muscle tenderness, no joint swelling or tenderness  Extremities: no pedal edema  Neurological/ Psychiatric:  moving all extremities  Skin: no rashes; no palpable lesions    Labs: all available labs reviewed                                              8.1    12.31 )-----------( 307      ( 07 Apr 2023 05:30 )             24.3     04-07    137  |  100  |  58<H>  ----------------------------<  156<H>  3.2<L>   |  23  |  6.57<H>    Ca    8.1<L>      07 Apr 2023 05:30  Phos  6.8     04-07  Mg     2.4     04-07    TPro  6.7  /  Alb  2.9<L>  /  TBili  1.0  /  DBili  x   /  AST  249<H>  /  ALT  202<H>  /  AlkPhos  164<H>  04-07        Cultures:   Culture - Blood (03.30.23 @ 09:21)   Specimen Source: .Blood   Blood-Peripheral  Culture Results:   No growth to date.  Culture - Blood (03.30.23 @ 09:20)   Specimen Source: .Blood None  Culture Results:   No growth to date.  Culture - Sputum . (03.29.23 @ 16:59)   Gram Stain:   Few polymorphonuclear leukocytes per low power field   No Squamous epithelial cells per low power field   No organisms seen per oil power field  Specimen Source: .Sputum Sputum  Culture Results:   No growth at 48 hours    Radiology: all available radiological tests reviewed    ACC: [id number] EXAM:  XR CHEST PORTABLE IMMED 1V   ORDERED BY: RALPH PRATER     PROCEDURE DATE:  03/29/2023          INTERPRETATION:  Portable chest radiograph    CLINICAL INFORMATION:   Short of breath.    TECHNIQUE:  Portable  AP view of the chest was obtained.    COMPARISON: 3/29/2023 chest x-ray available for review.    FINDINGS: ET tube tip above tracheal bifurcation.  NG tube tip beyond GE junction.  LEFT IJ catheter tip in SVC.      There is cardiomegaly, vascular congestion and perihilar interstitial   infiltrates without gross effusion.  Trachea midline. No hilar mass.  Visualized osseous structures are intact.        IMPRESSION: Catheters and tubes in place. An  Cardiomegaly.  Bilateral perihilar/bibasilar diffuse airspace disease.        ACC: [id number] EXAM:  CT ANGIO CHEST PULM [facility]   ORDERED BY: ROME RUFFIN     PROCEDURE DATE:  03/28/2023          INTERPRETATION:  CLINICAL INFORMATION: Shortness of breath. History of   scleroderma.    COMPARISON: 10/28/2018    CONTRAST/COMPLICATIONS:  IV Contrast: Omnipaque 350  90 cc administered   10 cc discarded  Oral Contrast: NONE  Complications: None reported at time of study completion    PROCEDURE:  CT Angiography of the Chest.  Sagittal and coronal reformats were performed as well as 3D (MIP)   reconstructions.    FINDINGS:    LUNGS AND AIRWAYS: Patent central airways.  Revisualized chronic   interstitial disease including hazy bilateral ground glass opacities,   reticulation and worsened bronchiectasis bilaterally. Large bulla in the   left upper lobe.  PLEURA: No pleural effusion.  MEDIASTINUM AND MALLORY: No lymphadenopathy.  VESSELS: Aortic and coronary artery atherosclerosis. Enlarged main   pulmonary artery, unchanged, can be seen with pulmonary arterial   hypertension. No pulmonary embolism.  HEART: Cardiomegaly. Small pericardial effusion.  CHEST WALL AND LOWER NECK: Bilateral mediastinal and hilar adenopathy   increased since prior exam including extensive adenopathy in the   prevascular space.  VISUALIZED UPPER ABDOMEN: Right renal hypodensity too small to   characterize. Colonic diverticulosis.  BONES: Degenerative changes. Dextroscoliosis of the thoracic spine.   Chronic right rib deformities.    IMPRESSION:  No pulmonary embolism.  Chronic interstitial disease with worsened bronchiectasis.  New mediastinal and hilar adenopathy, which is indeterminate but could be   related to scleroderma and interstitial disease. Continued follow-up is   recommended.        --- End of Report ---    < end of copied text >    Advanced directives addressed: full resuscitation Date of service: 04-07-23 @ 13:37    pt seen and examined  on pressors on ventilatory support   no fevers  encephalopathic    ROS: unable to obtain d/t medical condition      MEDICATIONS  (STANDING):  artificial tears (preservative free) Ophthalmic Solution 1 Drop(s) Both EYES four times a day  aspirin  chewable 81 milliGRAM(s) Oral daily  atorvastatin 20 milliGRAM(s) Oral at bedtime  atovaquone  Suspension 750 milliGRAM(s) Oral every 12 hours  chlorhexidine 0.12% Liquid 15 milliLiter(s) Oral Mucosa every 12 hours  chlorhexidine 4% Liquid 1 Application(s) Topical <User Schedule>  chlorhexidine 4% Liquid 1 Application(s) Topical <User Schedule>  heparin   Injectable 5000 Unit(s) SubCutaneous every 12 hours  meropenem  IVPB 500 milliGRAM(s) IV Intermittent every 24 hours  methylPREDNISolone sodium succinate Injectable 40 milliGRAM(s) IV Push every 8 hours  midodrine 10 milliGRAM(s) Oral every 8 hours  mycophenolate mofetil Suspension 1000 milliGRAM(s) Oral two times a day  norepinephrine Infusion 0.05 MICROgram(s)/kG/Min (3.3 mL/Hr) IV Continuous <Continuous>  pantoprazole  Injectable 40 milliGRAM(s) IV Push two times a day  propofol Infusion 20 MICROgram(s)/kG/Min (8.45 mL/Hr) IV Continuous <Continuous>    T(C): 36.5 (06 Apr 2023 21:41), Max: 37.7 (06 Apr 2023 18:00)  T(F): 97.7 (06 Apr 2023 21:41), Max: 99.9 (06 Apr 2023 18:00)  HR: 84 (07 Apr 2023 09:15) (77 - 92)  BP: --  BP(mean): --  ABP: 154/80 (07 Apr 2023 09:15) (83/56 - 175/101)  ABP(mean): 109 (07 Apr 2023 09:15) (67 - 133)  RR: 36 (07 Apr 2023 09:15) (19 - 41)  SpO2: 100% (07 Apr 2023 09:15) (89% - 100%)      PE:  Constitutional: NAD, sedated/intubated   HEENT: NC/AT, EOMI, PERRLA, conjunctivae clear; ears and nose atraumatic; pharynx benign  Neck: supple; thyroid not palpable  Back: no tenderness  Respiratory: decreased breath sounds, rales   Cardiovascular: S1S2 regular, no murmurs  Abdomen: soft, not tender, not distended, positive BS; liver and spleen WNL  Genitourinary: no suprapubic tenderness  Lymphatic: no LN palpable  Musculoskeletal: no muscle tenderness, no joint swelling or tenderness  Extremities: no pedal edema  Neurological/ Psychiatric:  moving all extremities  Skin: no rashes; no palpable lesions    Labs: all available labs reviewed                                              8.1    12.31 )-----------( 307      ( 07 Apr 2023 05:30 )             24.3     04-07    137  |  100  |  58<H>  ----------------------------<  156<H>  3.2<L>   |  23  |  6.57<H>    Ca    8.1<L>      07 Apr 2023 05:30  Phos  6.8     04-07  Mg     2.4     04-07    TPro  6.7  /  Alb  2.9<L>  /  TBili  1.0  /  DBili  x   /  AST  249<H>  /  ALT  202<H>  /  AlkPhos  164<H>  04-07        Cultures:   Culture - Blood (03.30.23 @ 09:21)   Specimen Source: .Blood   Blood-Peripheral  Culture Results:   No growth to date.  Culture - Blood (03.30.23 @ 09:20)   Specimen Source: .Blood None  Culture Results:   No growth to date.  Culture - Sputum . (03.29.23 @ 16:59)   Gram Stain:   Few polymorphonuclear leukocytes per low power field   No Squamous epithelial cells per low power field   No organisms seen per oil power field  Specimen Source: .Sputum Sputum  Culture Results:   No growth at 48 hours    Radiology: all available radiological tests reviewed    ACC: [id number] EXAM:  XR CHEST PORTABLE IMMED 1V   ORDERED BY: RALPH PRATER     PROCEDURE DATE:  03/29/2023          INTERPRETATION:  Portable chest radiograph    CLINICAL INFORMATION:   Short of breath.    TECHNIQUE:  Portable  AP view of the chest was obtained.    COMPARISON: 3/29/2023 chest x-ray available for review.    FINDINGS: ET tube tip above tracheal bifurcation.  NG tube tip beyond GE junction.  LEFT IJ catheter tip in SVC.      There is cardiomegaly, vascular congestion and perihilar interstitial   infiltrates without gross effusion.  Trachea midline. No hilar mass.  Visualized osseous structures are intact.        IMPRESSION: Catheters and tubes in place. An  Cardiomegaly.  Bilateral perihilar/bibasilar diffuse airspace disease.        ACC: [id number] EXAM:  CT ANGIO CHEST PULM [facility]   ORDERED BY: ROME RUFFIN     PROCEDURE DATE:  03/28/2023          INTERPRETATION:  CLINICAL INFORMATION: Shortness of breath. History of   scleroderma.    COMPARISON: 10/28/2018    CONTRAST/COMPLICATIONS:  IV Contrast: Omnipaque 350  90 cc administered   10 cc discarded  Oral Contrast: NONE  Complications: None reported at time of study completion    PROCEDURE:  CT Angiography of the Chest.  Sagittal and coronal reformats were performed as well as 3D (MIP)   reconstructions.    FINDINGS:    LUNGS AND AIRWAYS: Patent central airways.  Revisualized chronic   interstitial disease including hazy bilateral ground glass opacities,   reticulation and worsened bronchiectasis bilaterally. Large bulla in the   left upper lobe.  PLEURA: No pleural effusion.  MEDIASTINUM AND MALLORY: No lymphadenopathy.  VESSELS: Aortic and coronary artery atherosclerosis. Enlarged main   pulmonary artery, unchanged, can be seen with pulmonary arterial   hypertension. No pulmonary embolism.  HEART: Cardiomegaly. Small pericardial effusion.  CHEST WALL AND LOWER NECK: Bilateral mediastinal and hilar adenopathy   increased since prior exam including extensive adenopathy in the   prevascular space.  VISUALIZED UPPER ABDOMEN: Right renal hypodensity too small to   characterize. Colonic diverticulosis.  BONES: Degenerative changes. Dextroscoliosis of the thoracic spine.   Chronic right rib deformities.    IMPRESSION:  No pulmonary embolism.  Chronic interstitial disease with worsened bronchiectasis.  New mediastinal and hilar adenopathy, which is indeterminate but could be   related to scleroderma and interstitial disease. Continued follow-up is   recommended.        --- End of Report ---    < end of copied text >    Advanced directives addressed: full resuscitation

## 2023-04-07 NOTE — PROGRESS NOTE ADULT - SUBJECTIVE AND OBJECTIVE BOX
NEPHROLOGY INTERVAL HPI/OVERNIGHT EVENTS:    Date of Service: 23 @ 11:14  - s/p HD x 3 days, labs stabilized intubated on pressors  --ON vent,  Remains on Norepi.   BP in 100's.  NO enteral feeding  --Remains on vent.  On Norepi.  Second HD in progress.   remains anuric with inc sob and worsening renal parameters   4/3 still anuric, dec pressors requirements on levo 0.12 only  on, propofol and TF on hold with no fw flushes    HPI:  63 yo man with PMHX of Scleroderma treated with Cellcept and pulmonary fibrosis on home O2 8 L.  Hx of CAD ( stents x 4 to RCA)  Admission at [facility] in Feb due to resp distress and treated in ICU with pulmonary vasodilators.  R cath done with severe pulmonary HTN and was to have evaluation done for TP.  Started on Tyvaso several days ago.  Presented with 3 week hx fo progressive SOB, poor appetite and worsening LACEY walking short distance.  Admitted early 3/28 to CCU and placed on Bipap , given IV lasix,  and Zosyn/Azithromax.  CTA negative for PE.  Was awaiting transfer to [facility] [place].  on 3/29--resp status further deteriorate--Intubated  and started on pressors.  Late yesterday, resuscitated post cardiac arrest.  Now noted with acute worsening of renal function.    PMHX and PSHX.  --Pulmonary fibrosis  --Scleroderma  --CAD ( stent x 4)    MEDICATIONS  (STANDING):  artificial tears (preservative free) Ophthalmic Solution 1 Drop(s) Both EYES four times a day  aspirin  chewable 81 milliGRAM(s) Oral daily  atorvastatin 20 milliGRAM(s) Oral at bedtime  atovaquone  Suspension 750 milliGRAM(s) Oral every 12 hours  chlorhexidine 0.12% Liquid 15 milliLiter(s) Oral Mucosa every 12 hours  chlorhexidine 4% Liquid 1 Application(s) Topical <User Schedule>  chlorhexidine 4% Liquid 1 Application(s) Topical <User Schedule>  heparin   Injectable 5000 Unit(s) SubCutaneous every 12 hours  lactulose Syrup 20 Gram(s) Oral every 6 hours  meropenem  IVPB 500 milliGRAM(s) IV Intermittent every 24 hours  methylPREDNISolone sodium succinate Injectable 40 milliGRAM(s) IV Push every 8 hours  midodrine 10 milliGRAM(s) Oral every 8 hours  mycophenolate mofetil Suspension 1000 milliGRAM(s) Oral two times a day  norepinephrine Infusion 0.05 MICROgram(s)/kG/Min (3.3 mL/Hr) IV Continuous <Continuous>  pantoprazole  Injectable 40 milliGRAM(s) IV Push two times a day  propofol Infusion 20 MICROgram(s)/kG/Min (8.45 mL/Hr) IV Continuous <Continuous>    MEDICATIONS  (PRN):  acetaminophen   Oral Liquid .. 650 milliGRAM(s) Oral every 8 hours PRN Temp greater or equal to 38C (100.4F)  LORazepam   Injectable 2 milliGRAM(s) IV Push every 2 hours PRN Agitation  sodium chloride 0.9% lock flush 10 milliLiter(s) IV Push every 1 hour PRN Pre/post blood products, medications, blood draw, and to maintain line patency    Vital Signs Last 24 Hrs  T(C): 36.8 (2023 18:05), Max: 36.8 (2023 18:05)  T(F): 98.3 (2023 18:05), Max: 98.3 (2023 18:05)  HR: 77 (2023 18:05) (77 - 94)  BP: --  BP(mean): --  RR: 32 (2023 18:05) (29 - 41)  SpO2: 100% (2023 18:05) (89% - 100%)    I&O's Detail    2023 07:01  -  2023 07:00  --------------------------------------------------------  IN:    Free Water: 100 mL    IV PiggyBack: 100 mL    Nepro with Carb Steady: 190 mL    Norepinephrine: 125 mL    Propofol: 362 mL  Total IN: 877 mL    OUT:    Other (mL): 2000 mL  Total OUT: 2000 mL    Total NET: -1123 mL      2023 07:01  -  2023 19:18  --------------------------------------------------------  IN:    Nepro with Carb Steady: 112 mL    Norepinephrine: 19 mL    Propofol: 240 mL  Total IN: 371 mL    OUT:  Total OUT: 0 mL    Total NET: 371 mL      GENERAL: On vent  CHEST/LUNG: fair air entry  HEART: S1S2 RRR  ABDOMEN: soft  EXTREMITIES: decreased edema  SKIN:     LABS:                          8.1    12.31 )-----------( 307      ( 2023 05:30 )             24.3                           8.7    13.83 )-----------( 274      ( 2023 06:05 )             27.2     04-07    137  |  100  |  58<H>  ----------------------------<  156<H>  3.2<L>   |  23  |  6.57<H>    Ca    8.1<L>      2023 05:30  Phos  6.8     04-07  Mg     2.4     04-07    TPro  6.7  /  Alb  2.9<L>  /  TBili  1.0  /  DBili  x   /  AST  249<H>  /  ALT  202<H>  /  AlkPhos  164<H>  04-07      04-06    133<L>  |  98  |  74<H>  ----------------------------<  139<H>  4.4   |  20<L>  |  9.90<H>    Ca    7.8<L>      2023 06:05  Phos  11.0     04-06  Mg     2.7     04-06    TPro  6.7  /  Alb  2.4<L>  /  TBili  1.0  /  DBili  x   /  AST  413<H>  /  ALT  272<H>  /  AlkPhos  196<H>        Urinalysis Basic - ( 2023 18:40 )    Color: Yellow / Appearance: Clear / S.015 / pH: x  Gluc: x / Ketone: Negative  / Bili: Negative / Urobili: Negative   Blood: x / Protein: 100 / Nitrite: Negative   Leuk Esterase: Trace / RBC: 3-5 /HPF / WBC 3-5 /HPF   Sq Epi: x / Non Sq Epi: x / Bacteria: Occasional      Magnesium, Serum: 2.7 mg/dL (-06 @ 06:05)  Phosphorus Level, Serum: 11.0 mg/dL ( @ 06:05)          RADIOLOGY & ADDITIONAL TESTS:

## 2023-04-07 NOTE — CONSULT NOTE ADULT - SUBJECTIVE AND OBJECTIVE BOX
HPI: Per Notes:  62M hx PSS (scleroderma) on cellcept with pulmonary fibrosis on 8L of 02 24/7, HTN HLD  CAD with 4 stents to the RCA 2018 with Dr Griffith. He is on the lung transplant list at [facility] with Dr Manas Priest (transplant pulmonologist). He was hospitalized in Feb at [facility] with a flare of fibrosis and was in the ICU on inhaled pulmonary vasodialtors.   At that time he had a R heart cath and he was told that he has severe pulmonary hypertension.,   He is being teed up for transplant waiting for immunizations/colonoscopy etc.  He was discharged on Tyvaso (treprostinil) but only started using it a few days ago due to expense and approval.    Over the last three weeks he has had a poor appetite and progressive SOB leaving him unable to walk with a walker more than 10 feet without stopping to catch his breath.  He also noted more hypoxemia on his home pulse ox.  He has a cough with clear phlegm. He  arrived in the ED with severe SOB and hypoxemia placed on BIPAP.  Type 1 respiratory failure   Trop and BNP elevation     seen at bedside, pt is not awake or alert.  he is on sedation with propofol and pressor (levophed).  Pt is not responsive.  wife and son are at bedside.  wife and son are aware that pt's condition has been deteriorating and wife now tells me that he has been taken off the transplant list a [facility].  she is sollen and expresses that she understands palliative care has been called because "things are not going well". wife tells me that she does not believe her  would wnat to live with tracheostomy/peg, and unfortunately, this is becoming the required next steps if we are to try to keep Mr. Ibrahim alive.  I spoke with them about DNR which I recommend).  explained that DNR does not place other restrictions or limitations on care.  Working on the assumption that Mr. Ibrahim would not want trach/peg, then the remaining option is to compassionately withdraw care.  explained to family that we do not have a deadline for this decision at this time, but rather something that we would do if/when they are ready (within reason).  family needed time to speak about their options, including the DNR order.  After speaking with wife and son together, I was approached pt pt's son.  we spoke further about his father's care.  he tells me that he and his mother might not be at the same level of acceptance and timing to withdraw care.  He wants to make sure all options have been exhausted before making that decision.  He does not disagree that his father would not want to be kept alive with artifical support long term.  he was specifically having trouble coming to terms wth the idea of placing a DNR order. 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  it is not a gentle process, and given his fathers' very poor respiratory status, the likely norman of survival is extremely poor.   I advised for him to speak openly and honestly with his mother, and myself and the palliative team would follow up with the two of them on Monday.     PAIN: ( )Yes   ( )No  -- UTO 2/2 clinical condition  Level:  Location:  Intensity:    /10  Quality:  Aggravating Factors:  Alleviating Factors:  Radiation:  Duration/Timing:  Impact on ADLs:    DYSPNEA: ( ) Yes  ( ) No  -- UTO 2/2 clinical condition  Level:    PAST MEDICAL & SURGICAL HISTORY:  HTN (hypertension)      Pulmonary fibrosis      Scleroderma          SOCIAL HX:    Hx opiate tolerance ( )YES  (x)NO    Baseline ADLs  (Prior to Admission)  ( ) Independent   ( )Dependent     (x) With Assistance    FAMILY HISTORY:      Review of Systems:  Unable to obtain/Limited due to: sedation/intubation      PHYSICAL EXAM:    Vital Signs Last 24 Hrs  T(C): 36.4 (07 Apr 2023 08:35), Max: 37.7 (06 Apr 2023 18:00)  T(F): 97.6 (07 Apr 2023 08:35), Max: 99.9 (06 Apr 2023 18:00)  HR: 86 (07 Apr 2023 15:00) (77 - 94)  BP: --  BP(mean): --  RR: 34 (07 Apr 2023 15:00) (29 - 41)  SpO2: 92% (07 Apr 2023 15:00) (89% - 100%)        - GENERAL: sedated (propofol) No acute distress.   - EYES: EOMI. Anicteric.   - HENT: Moist mucous membranes. OGT  - LUNGS: diminished to auscultation bilaterally. No accessory muscle use. intubated  - CARDIOVASCULAR: Regular rate and rhythm. No murmur. No JVD. No edema.   - ABDOMEN: Soft, non-tender and non-distended. No palpable masses. Normal bowel sounds   - EXTREMITIES: No edema. Non-tender.    - SKIN: Warm, no rashes or lesions easily visible.   - PSYCHIATRIC: sedated.       LABS:                        8.1    12.31 )-----------( 307      ( 07 Apr 2023 05:30 )             24.3     04-07    137  |  100  |  58<H>  ----------------------------<  156<H>  3.2<L>   |  23  |  6.57<H>    Ca    8.1<L>      07 Apr 2023 05:30  Phos  6.8     04-07  Mg     2.4     04-07    TPro  6.7  /  Alb  2.9<L>  /  TBili  1.0  /  DBili  x   /  AST  249<H>  /  ALT  202<H>  /  AlkPhos  164<H>  04-07      Albumin: Albumin, Serum: 2.9 g/dL (04-07 @ 05:30)      Allergies    No Known Allergies    Intolerances      MEDICATIONS  (STANDING):  artificial tears (preservative free) Ophthalmic Solution 1 Drop(s) Both EYES four times a day  aspirin  chewable 81 milliGRAM(s) Oral daily  atorvastatin 20 milliGRAM(s) Oral at bedtime  atovaquone  Suspension 750 milliGRAM(s) Oral every 12 hours  chlorhexidine 0.12% Liquid 15 milliLiter(s) Oral Mucosa every 12 hours  chlorhexidine 4% Liquid 1 Application(s) Topical <User Schedule>  chlorhexidine 4% Liquid 1 Application(s) Topical <User Schedule>  heparin   Injectable 5000 Unit(s) SubCutaneous every 12 hours  lactulose Syrup 20 Gram(s) Oral every 6 hours  meropenem  IVPB 500 milliGRAM(s) IV Intermittent every 24 hours  methylPREDNISolone sodium succinate Injectable 40 milliGRAM(s) IV Push every 8 hours  midodrine 10 milliGRAM(s) Oral every 8 hours  mycophenolate mofetil Suspension 1000 milliGRAM(s) Oral two times a day  norepinephrine Infusion 0.05 MICROgram(s)/kG/Min (3.3 mL/Hr) IV Continuous <Continuous>  pantoprazole  Injectable 40 milliGRAM(s) IV Push two times a day  propofol Infusion 20 MICROgram(s)/kG/Min (8.45 mL/Hr) IV Continuous <Continuous>    MEDICATIONS  (PRN):  acetaminophen   Oral Liquid .. 650 milliGRAM(s) Oral every 8 hours PRN Temp greater or equal to 38C (100.4F)  LORazepam   Injectable 2 milliGRAM(s) IV Push every 2 hours PRN Agitation  sodium chloride 0.9% lock flush 10 milliLiter(s) IV Push every 1 hour PRN Pre/post blood products, medications, blood draw, and to maintain line patency      RADIOLOGY/ADDITIONAL STUDIES:

## 2023-04-07 NOTE — PROGRESS NOTE ADULT - SUBJECTIVE AND OBJECTIVE BOX
ICU Progress Note    HPI:    HPI:    S:    Pt seen and examined  HD # 11  FULL CODE  62M hx PSS (scleroderma) on cellcept with pulmonary fibrosis on 8L of , HTN HLD  CAD with 4 stents to the RCA  with Dr Griffith.    He is on the lung transplant list at [facility] with Dr Manas Priest (transplant pulmonologist).    He was hospitalized in Feb at [facility] with a flare of fibrosis and was in the ICU on inhaled pulmonary vasodialtors.    At that time he had a R heart cath and he was told that he has severe pulmonary hypertension.,   He is being teed up for transplant waiting for immunizations/colonoscopy etc.   He was discharged on Tyvaso (treprostinil) but only started using it a few days ago due to expense and approval.       Over the last three weeks he has had a poor appetite and progressive SOB leaving him unable to walk with a walker more than 10 feet without stopping to catch his breath.  He also noted more hypoxemia on his home pulse ox.  He has a cough with clear phlegm.    He  arrived in the ED with severe SOB and hypoxemia placed on BIPAP.  Type 1 respiratory failure   Trop and BNP elevation     3/29: Intubated on pressors.    : Remains intubated, on pressors. Vent being actively titrated.  : Remains intubated. Remains on pressors. Worsening renal function today with indications for emergent RRT.      : Remains intubated on pressors. Has received 3 days straight of HD prior to today. Remains encephalopathic     ROS: Unable to obtain 2/2 Pt condition (intubated)    Allergies    No Known Allergies    Intolerances    MEDICATIONS  (STANDING):    artificial tears (preservative free) Ophthalmic Solution 1 Drop(s) Both EYES four times a day  aspirin  chewable 81 milliGRAM(s) Oral daily  atorvastatin 20 milliGRAM(s) Oral at bedtime  atovaquone  Suspension 750 milliGRAM(s) Oral every 12 hours  chlorhexidine 0.12% Liquid 15 milliLiter(s) Oral Mucosa every 12 hours  chlorhexidine 4% Liquid 1 Application(s) Topical <User Schedule>  chlorhexidine 4% Liquid 1 Application(s) Topical <User Schedule>  heparin   Injectable 5000 Unit(s) SubCutaneous every 12 hours  lactulose Syrup 20 Gram(s) Oral every 6 hours  meropenem  IVPB 500 milliGRAM(s) IV Intermittent every 24 hours  methylPREDNISolone sodium succinate Injectable 40 milliGRAM(s) IV Push every 8 hours  midodrine 10 milliGRAM(s) Oral every 8 hours  mycophenolate mofetil Suspension 1000 milliGRAM(s) Oral two times a day  norepinephrine Infusion 0.05 MICROgram(s)/kG/Min (3.3 mL/Hr) IV Continuous <Continuous>  pantoprazole  Injectable 40 milliGRAM(s) IV Push two times a day  potassium chloride   Powder 40 milliEquivalent(s) Oral once  propofol Infusion 20 MICROgram(s)/kG/Min (8.45 mL/Hr) IV Continuous <Continuous>    MEDICATIONS  (PRN):  acetaminophen   Oral Liquid .. 650 milliGRAM(s) Oral every 8 hours PRN Temp greater or equal to 38C (100.4F)  LORazepam   Injectable 2 milliGRAM(s) IV Push every 2 hours PRN Agitation  sodium chloride 0.9% lock flush 10 milliLiter(s) IV Push every 1 hour PRN Pre/post blood products, medications, blood draw, and to maintain line patency    Drug Dosing Weight    Height (cm): 177.8 (28 Mar 2023 04:15)  Weight (kg): 70.4 (28 Mar 2023 04:15)  BMI (kg/m2): 22.3 (28 Mar 2023 04:15)  BSA (m2): 1.87 (28 Mar 2023 04:15)    PAST MEDICAL & SURGICAL HISTORY:    HTN (hypertension)  Pulmonary fibrosis  Scleroderma    FAMILY HISTORY:    ROS: See HPI; otherwise, all systems reviewed and negative.    O:    ICU Vital Signs Last 24 Hrs  T(C): 36.5 (2023 21:41), Max: 37.7 (2023 18:00)  T(F): 97.7 (2023 21:41), Max: 99.9 (2023 18:00)  HR: 84 (2023 09:15) (77 - 92)  BP: --  BP(mean): --  ABP: 154/80 (2023 09:15) (83/56 - 175/101)  ABP(mean): 109 (2023 09:15) (67 - 133)  RR: 36 (2023 09:15) (19 - 41)  SpO2: 100% (2023 09:15) (89% - 100%)    O2 Parameters below as of 2023 14:30  Patient On (Oxygen Delivery Method): ventilator    O2 Concentration (%): 45    I&O's Detail    2023 07:01  -  2023 07:00  --------------------------------------------------------  IN:    Free Water: 100 mL    IV PiggyBack: 100 mL    Nepro with Carb Steady: 190 mL    Norepinephrine: 125 mL    Propofol: 362 mL  Total IN: 877 mL    OUT:    Other (mL): 2000 mL  Total OUT: 2000 mL    Total NET: -1123 mL    Mode: AC/ CMV (Assist Control/ Continuous Mandatory Ventilation)  RR (machine): 24  TV (machine): 450  FiO2: 35  PEEP: 5  ITime: 0.6  MAP: 8  PIP: 14    PE:    Adult M lying in bed  Appears chronically ill  No JVD trachea midline  + NGT in place  S1S2+  Coarse BS B/L  Abd soft NTND  + anasarca  Intubated, sedated  + CVC, keny noted  Skin pink and well perfused    LABS:    CBC Full  -  ( 2023 05:30 )  WBC Count : 12.31 K/uL  RBC Count : 2.69 M/uL  Hemoglobin : 8.1 g/dL  Hematocrit : 24.3 %  Platelet Count - Automated : 307 K/uL  Mean Cell Volume : 90.3 fl  Mean Cell Hemoglobin : 30.1 pg  Mean Cell Hemoglobin Concentration : 33.3 gm/dL  Auto Neutrophil # : x  Auto Lymphocyte # : x  Auto Monocyte # : x  Auto Eosinophil # : x  Auto Basophil # : x  Auto Neutrophil % : x  Auto Lymphocyte % : x  Auto Monocyte % : x  Auto Eosinophil % : x  Auto Basophil % : x    04    137  |  100  |  58<H>  ----------------------------<  156<H>  3.2<L>   |  23  |  6.57<H>    Ca    8.1<L>      2023 05:30  Phos  6.8     04-  Mg     2.4     -    TPro  6.7  /  Alb  2.9<L>  /  TBili  1.0  /  DBili  x   /  AST  249<H>  /  ALT  202<H>  /  AlkPhos  164<H>        Urinalysis Basic - ( 2023 18:40 )    Color: Yellow / Appearance: Clear / S.015 / pH: x  Gluc: x / Ketone: Negative  / Bili: Negative / Urobili: Negative   Blood: x / Protein: 100 / Nitrite: Negative   Leuk Esterase: Trace / RBC: 3-5 /HPF / WBC 3-5 /HPF   Sq Epi: x / Non Sq Epi: x / Bacteria: Occasional      CAPILLARY BLOOD GLUCOSE            LIVER FUNCTIONS - ( 2023 05:30 )  Alb: 2.9 g/dL / Pro: 6.7 gm/dL / ALK PHOS: 164 U/L / ALT: 202 U/L / AST: 249 U/L / GGT: x

## 2023-04-07 NOTE — PROGRESS NOTE ADULT - ASSESSMENT
A:    62M  HD # 11  FULL CODE    Here for:    1. Acute hypoxic resp failure 2/2  2. Pulmonary fibrosis  3. Shock, possible sepsis, not present on admission, 2/2   4. PNA  5. Sarcoidosis  6. CHRIS    This patient requires critical care for support of one or more vital organ systems with a high probability of imminent or life threatening deterioration in his/her condition    P:    Analgosedation, daily sedation vacations  HD monitoring, vasopressors for distributive shock 2/2 sepsis, levophed, actively titrating as able  s/p emergent intubation 3/28, maintained on vent, actively weaning and titrating; VAC 24/450/.35, f/u ABG, CXR  Metabolic acidosis 2/2 azotemia/CHRIS  Broad spectrum abx, f/u Cx's, white count, fever curve  TF's via NGT  VTE ppx PUD ppx  f/u labs, replete lytes PRN  Maintain central line  No s/s active bleeding  On steroids for ILD  s/p HD catheter, RRT x 3 days; will require further, remains anuric  NH3 lvl elevated; start lactulose today, f/u lvl in A,  Order EEG given [id number] encephalopathy; doubt seizures or NCSE, suspect more multifocal from azotemia, sepsis    Dispo: Cont critical care.    TOTAL CRITICAL CARE TIME: 60 minutes (EXCLUSIVE of any non bundled procedures)    Note: This time spent INCLUDES time spent directly as this patient's bedside with evaluation, review of chart including review of laboratory and imaging studies, interpretation of vital signs and cardiac output measurements, any necessary ventilator management, and time spent discussing plan of care with patient and family, including goals of care discussion.

## 2023-04-08 NOTE — PROGRESS NOTE ADULT - SUBJECTIVE AND OBJECTIVE BOX
62M hx PSS (scleroderma) on cellcept with pulmonary fibrosis on 8L of 02  24/7, HTN HLD  CAD with 4 stents to the RCA 2018 with Dr Griffith.    He is on the lung transplant list at [facility] with Dr Manas Priest (transplant pulmonologist).    He was hospitalized in Feb at [facility] with a flare of fibrosis and was in the ICU on inhaled pulmonary vasodialtors.    At that time he had a R heart cath and he was told that he has severe pulmonary hypertension.,   He is being teed up for transplant waiting for immunizations/colonoscopy etc.   He was discharged on Tyvaso (treprostinil) but only started using it a few days ago due to expense and approval.       Over the last three weeks he has had a poor appetite and progressive SOB leaving him unable to walk with a walker more than 10 feet without stopping to catch his breath.  He also noted more hypoxemia on his home pulse ox.  He has a cough with clear phlegm.    He  arrived in the ED with severe SOB and hypoxemia placed on BIPAP.  Type 1 respiratory failure   Trop and BNP elevation       Brief arrest > intubated  CHRIS now on HD  HD unstable on Nor-epi titrating to MAP 65 at this point distributive hypotension from propofol    Poorly responsive while off sedation.  Rapid shallow breathing.  Would not tolerate extuabtion and MS would interfere with that too.        Neuro Did not awake off sedation  head CT negative will discuss EEG   COR  on low dose nor epi added midodrine for tone  Normal EF on echo severe pulm HTN   add midodrine  Pulm   Required  fi02 increase to 40% maintaining sat   Added medrol for ILD flare.  Cellcept for PSS.    GI feeds advanced to nepro  Renal CHRIS  Anuric will ongoing HD  Heme  no issues  SQ heparin DVT   ID  All cultures EDUARD/Fungitell negative    Meropenum/mepron  added but are empiric at this point.     PX is poor but continue all measures for now.     Palliative care working with the family.       62M hx PSS (scleroderma) on cellcept with pulmonary fibrosis on 8L of 02  24/7, HTN HLD  CAD with 4 stents to the RCA 2018 with Dr Griffith.    He is on the lung transplant list at [facility] with Dr Manas Priest (transplant pulmonologist).    He was hospitalized in Feb at [facility] with a flare of fibrosis and was in the ICU on inhaled pulmonary vasodialtors.    At that time he had a R heart cath and he was told that he has severe pulmonary hypertension.,   He is being teed up for transplant waiting for immunizations/colonoscopy etc.   He was discharged on Tyvaso (treprostinil) but only started using it a few days ago due to expense and approval.       Over the last three weeks he has had a poor appetite and progressive SOB leaving him unable to walk with a walker more than 10 feet without stopping to catch his breath.  He also noted more hypoxemia on his home pulse ox.  He has a cough with clear phlegm.    He  arrived in the ED with severe SOB and hypoxemia placed on BIPAP.  Type 1 respiratory failure   Trop and BNP elevation       Brief arrest > intubated  CHRIS now on HD  HD unstable on Nor-epi titrating to MAP 65 at this point distributive hypotension from propofol    Poorly responsive while off sedation.  Rapid shallow breathing.  Would not tolerate extuabtion and MS would interfere with that too.        Neuro Did not awake off sedation  head CT negative will discuss EEG   COR  on low dose nor epi added midodrine for tone  Normal EF on echo severe pulm HTN   add midodrine  Pulm   Required  fi02 increase to 40% maintaining sat   Added medrol for ILD flare.  Cellcept for PSS.    GI feeds advanced to nepro  Renal HCRIS  Anuric will ongoing HD  low K+ replaced   Heme  no issues  SQ heparin DVT   ID  All cultures EDUARD/Fungitell negative    Meropenum/mepron  added but are empiric at this point.     PX is poor but continue all measures for now.     Palliative care working with the family.      CCT [id number]

## 2023-04-08 NOTE — PROGRESS NOTE ADULT - ASSESSMENT
61 yo man with scleroderma, pulmonary fibrosis /severe pulmonary HTN admitted with progressive resp failure.  Now intubated and on pressors due to shock and now post cardiac arrest, septic vs cardiogenic.  --CHRIS post CTA ( unclear hx of CKD, creat 1.47 on admission and on ACEI) and shock. oliguric.    No immediate need for dialysis.    Check repeat labs for K and acidosis.    Continue pressor/vent support    Continue abtx.    D/w Dr Watson.    3/31 SY  --CHRIS post cardiac arrest :  Continues to worsen with Oliguria     No immediate need for dialysis.    Will assess again in am    D/w Dr Watson re : poor pulmonary prognosis.    4/1 SY  --CHRIS post cardiac arrest : continues to worsen with no urine output.    No immediate indication for dialysis.    HD to be considered when indicated with electrolyte disturbances.    Oxygenation acceptable.  --Change enteral feeding to Nepro for now.     D/w Dr Watson    4/2 SY  --CHRIS post cardiac arrest : continues to worsen with no urine output.     Continue to assess for indications for dialysis.     No urgent need for dialysis today as electrolytes acceptable.     Would allow more time for BP to stabilize.  --Oxygenation acceptable.  --ID : on Meropenem and Caspofungin  --Scleroderma : continue MMF.  --Severe pulmonary fibrosis and HTN : Not a TP candidate at this time.    4/3 MK   - CHRIS with component of VIET and ATN     no indication for HD today    no FW flushes with TF    Hep panel in anticipation of possible HD   dw rn and wife updated     4/4 MK   - CHRIS with worsening acidosis and hyperkalemia andhyponatremia     dw wife need for HD, consent obtained     TDC by intensivist team     she is concerned about long term need for dialysis if he is in the future deemed not to be a     lung transplant patient   dw wife     4/5 SY  --CHRIS, with worsening metabolic derangement.      1st HD yesterday.    Second HD today with goal of 3 KG UF.  --BP in 100's on Norepi.  --Resp : pulmonary fibrosis : continue vent support.  --Scleroderma : continue MMF.    4/6 SY  --CHRIS : 3rd HD today.  aim for ~ 2 kg UF.   SPA and on pressor.  --Continue Pressor.  --Hyperphosphatemia : start binder once feeding started.  --Resp : pulmonary fibrosis  : Now lung TP consideration on hold  Continue vent support  --Scleroderma : continue MMF.    4/7   Above noted   pt s/p HD x 3  Labs stable  Replace K    4/8  seen on HD   tolerated well  Palliative working w Family  Transfusion decision as per Intensivist team

## 2023-04-08 NOTE — PROGRESS NOTE ADULT - SUBJECTIVE AND OBJECTIVE BOX
NEPHROLOGY INTERVAL HPI/OVERNIGHT EVENTS:    Date of Service: 23 @ 11:14  - seen on HD, tolerating well, intubated  - s/p HD x 3 days, labs stabilized intubated on pressors  --ON vent,  Remains on Norepi.   BP in 100's.  NO enteral feeding  --Remains on vent.  On Norepi.  Second HD in progress.   remains anuric with inc sob and worsening renal parameters   4/3 still anuric, dec pressors requirements on levo 0.12 only  on, propofol and TF on hold with no fw flushes    HPI:  63 yo man with PMHX of Scleroderma treated with Cellcept and pulmonary fibrosis on home O2 8 L.  Hx of CAD ( stents x 4 to RCA)  Admission at [facility] in Feb due to resp distress and treated in ICU with pulmonary vasodilators.  R cath done with severe pulmonary HTN and was to have evaluation done for TP.  Started on Tyvaso several days ago.  Presented with 3 week hx fo progressive SOB, poor appetite and worsening LACEY walking short distance.  Admitted early 3/28 to CCU and placed on Bipap , given IV lasix,  and Zosyn/Azithromax.  CTA negative for PE.  Was awaiting transfer to [facility].  on 3/29--resp status further deteriorate--Intubated  and started on pressors.  Late yesterday, resuscitated post cardiac arrest.  Now noted with acute worsening of renal function.    PMHX and PSHX.  --Pulmonary fibrosis  --Scleroderma  --CAD ( stent x 4)    MEDICATIONS  (STANDING):  artificial tears (preservative free) Ophthalmic Solution 1 Drop(s) Both EYES four times a day  aspirin  chewable 81 milliGRAM(s) Oral daily  atorvastatin 20 milliGRAM(s) Oral at bedtime  atovaquone  Suspension 750 milliGRAM(s) Oral every 12 hours  chlorhexidine 0.12% Liquid 15 milliLiter(s) Oral Mucosa every 12 hours  chlorhexidine 4% Liquid 1 Application(s) Topical <User Schedule>  chlorhexidine 4% Liquid 1 Application(s) Topical <User Schedule>  heparin   Injectable 5000 Unit(s) SubCutaneous every 12 hours  lactulose Syrup 20 Gram(s) Oral every 6 hours  meropenem  IVPB 500 milliGRAM(s) IV Intermittent every 24 hours  methylPREDNISolone sodium succinate Injectable 40 milliGRAM(s) IV Push every 8 hours  midodrine 10 milliGRAM(s) Oral every 8 hours  mycophenolate mofetil Suspension 1000 milliGRAM(s) Oral two times a day  norepinephrine Infusion 0.05 MICROgram(s)/kG/Min (3.3 mL/Hr) IV Continuous <Continuous>  pantoprazole  Injectable 40 milliGRAM(s) IV Push two times a day  propofol Infusion 20 MICROgram(s)/kG/Min (8.45 mL/Hr) IV Continuous <Continuous>    MEDICATIONS  (PRN):  acetaminophen   Oral Liquid .. 650 milliGRAM(s) Oral every 8 hours PRN Temp greater or equal to 38C (100.4F)  LORazepam   Injectable 2 milliGRAM(s) IV Push every 2 hours PRN Agitation  sodium chloride 0.9% lock flush 10 milliLiter(s) IV Push every 1 hour PRN Pre/post blood products, medications, blood draw, and to maintain line patency    Vital Signs Last 24 Hrs  T(C): 37.4 (2023 08:01), Max: 37.4 (2023 08:01)  T(F): 99.4 (2023 08:01), Max: 99.4 (2023 08:01)  HR: 77 (2023 11:41) (70 - 93)  BP: --  BP(mean): --  RR: 19 (2023 11:41) (19 - 35)  SpO2: 96% (2023 11:41) (92% - 100%)        GENERAL: On vent  CHEST/LUNG: fair air entry  HEART: S1S2 RRR  ABDOMEN: soft  EXTREMITIES: decreased edema  SKIN:     LABS:                          7.7    16.11 )-----------( 338      ( 2023 06:10 )             24.0                             8.1    12.31 )-----------( 307      ( 2023 05:30 )             24.3                           8.7    13.83 )-----------( 274      ( 2023 06:05 )             27.2     04-08    137  |  102  |  91<H>  ----------------------------<  124<H>  3.9   |  23  |  8.54<H>    Ca    7.9<L>      2023 06:10  Phos  8.9     04-08  Mg     2.7     04-08    TPro  6.7  /  Alb  2.9<L>  /  TBili  1.0  /  DBili  x   /  AST  249<H>  /  ALT  202<H>  /  AlkPhos  164<H>      137  |  100  |  58<H>  ----------------------------<  156<H>  3.2<L>   |  23  |  6.57<H>    Ca    8.1<L>      2023 05:30  Phos  6.8     04-07  Mg     2.4         TPro  6.7  /  Alb  2.9<L>  /  TBili  1.0  /  DBili  x   /  AST  249<H>  /  ALT  202<H>  /  AlkPhos  164<H>      133<L>  |  98  |  74<H>  ----------------------------<  139<H>  4.4   |  20<L>  |  9.90<H>    Ca    7.8<L>      2023 06:05  Phos  11.0     04-06  Mg     2.7     -    TPro  6.7  /  Alb  2.4<L>  /  TBili  1.0  /  DBili  x   /  AST  413<H>  /  ALT  272<H>  /  AlkPhos  196<H>        Urinalysis Basic - ( 2023 18:40 )    Color: Yellow / Appearance: Clear / S.015 / pH: x  Gluc: x / Ketone: Negative  / Bili: Negative / Urobili: Negative   Blood: x / Protein: 100 / Nitrite: Negative   Leuk Esterase: Trace / RBC: 3-5 /HPF / WBC 3-5 /HPF   Sq Epi: x / Non Sq Epi: x / Bacteria: Occasional      Magnesium, Serum: 2.7 mg/dL ( @ 06:05)  Phosphorus Level, Serum: 11.0 mg/dL ( @ 06:05)          RADIOLOGY & ADDITIONAL TESTS:

## 2023-04-08 NOTE — PROGRESS NOTE ADULT - SUBJECTIVE AND OBJECTIVE BOX
HPI:  62 year old man with a history of scleroderma, pulmonary fibrosis with chronic respiratory failure (supplemental O2 dependent; listed on lung-transplant list at [facility]), severe pulmonary hypertension, CAD s/p RCA stents (), HTN, HLD admitted with acute/chronic respiratory failure (required mechanical ventilation) with hospitalization complicated by cardiac arrest preceded by severe bradycardia (3/29/23).    3/29/23: Pt intubated and sedated.  3/30/23:  Events past 24 hours noted and discussed with CCU team; patient is sedated on vent.  3/31/23: Pt sedated on pressors. Family at bedside  23: Sedated pressors being tapered. Labs reviewed.  23: Pressors weaning. Arousable  4/3/'23: no changes.  23;  intubated , sedated , on pressors,  echo showed normal LVEF ; severe pulmonary hypertension   23 Patient is intubated , on sedation , low dose pressors on dialysis , patient was given tyvaso, did not take due to insurance issue  , febrile episodes   : on sedation, intubated  : no changes overnight.    MEDICATIONS:  OUTPATIENT  Home Medications:  aspirin 81 mg oral tablet, chewable: 1 tab(s) orally once a day (31 Oct 2018 10:16)  azithromycin 500 mg intravenous injection: 500 milligram(s) intravenous every 24 hours (28 Mar 2023 14:31)  enoxaparin: 40 milligram(s) subcutaneous once a day (28 Mar 2023 14:31)  mycophenolate mofetil 250 mg oral capsule: 4 cap(s) orally 2 times a day (28 Mar 2023 14:31)  piperacillin-tazobactam: 3.375 gram(s) injectable every 8 hours (28 Mar 2023 14:31)  quinapril 10 mg oral tablet: 1 tab(s) orally once a day (28 Oct 2018 22:58)      INPATIENT  MEDICATIONS  (STANDING):  artificial tears (preservative free) Ophthalmic Solution 1 Drop(s) Both EYES four times a day  aspirin  chewable 81 milliGRAM(s) Oral daily  atorvastatin 20 milliGRAM(s) Oral at bedtime  atovaquone  Suspension 750 milliGRAM(s) Oral every 12 hours  chlorhexidine 0.12% Liquid 15 milliLiter(s) Oral Mucosa every 12 hours  chlorhexidine 4% Liquid 1 Application(s) Topical <User Schedule>  chlorhexidine 4% Liquid 1 Application(s) Topical <User Schedule>  heparin   Injectable 5000 Unit(s) SubCutaneous every 12 hours  meropenem  IVPB 500 milliGRAM(s) IV Intermittent every 24 hours  methylPREDNISolone sodium succinate Injectable 40 milliGRAM(s) IV Push every 8 hours  midodrine 10 milliGRAM(s) Oral every 8 hours  mycophenolate mofetil Suspension 1000 milliGRAM(s) Oral two times a day  norepinephrine Infusion 0.05 MICROgram(s)/kG/Min (3.3 mL/Hr) IV Continuous <Continuous>  pantoprazole  Injectable 40 milliGRAM(s) IV Push two times a day  propofol Infusion 20 MICROgram(s)/kG/Min (8.45 mL/Hr) IV Continuous <Continuous>    MEDICATIONS  (PRN):  acetaminophen   Oral Liquid .. 650 milliGRAM(s) Oral every 8 hours PRN Temp greater or equal to 38C (100.4F)  LORazepam   Injectable 2 milliGRAM(s) IV Push every 2 hours PRN Agitation  sodium chloride 0.9% lock flush 10 milliLiter(s) IV Push every 1 hour PRN Pre/post blood products, medications, blood draw, and to maintain line patency          Vital Signs Last 24 Hrs  T(C): 37.4 (2023 08:01), Max: 37.4 (2023 08:01)  T(F): 99.4 (2023 08:01), Max: 99.4 (2023 08:01)  HR: 77 (2023 11:41) (70 - 93)  BP: --  BP(mean): --  RR: 19 (2023 11:41) (19 - 35)  SpO2: 96% (2023 11:41) (93% - 100%)    Daily     Daily Weight in k (2023 05:00)I&O's Summary    2023 07:01  -  2023 07:00  --------------------------------------------------------  IN: 999 mL / OUT: 0 mL / NET: 999 mL    2023 07:  -  2023 15:37  --------------------------------------------------------  IN: 0 mL / OUT: 1500 mL / NET: -1500 mL        I&O's Detail    2023 07:  -  2023 07:00  --------------------------------------------------------  IN:    Free Water: 200 mL    Nepro with Carb Steady: 272 mL    Norepinephrine: 35 mL    Propofol: 492 mL  Total IN: 999 mL    OUT:  Total OUT: 0 mL    Total NET: 999 mL      2023 07:  -  2023 15:37  --------------------------------------------------------  IN:  Total IN: 0 mL    OUT:    Other (mL): 1500 mL  Total OUT: 1500 mL    Total NET: -1500 mL          I&O's Summary    2023 07:01  -  2023 07:00  --------------------------------------------------------  IN: 999 mL / OUT: 0 mL / NET: 999 mL    2023 07:  -  2023 15:37  --------------------------------------------------------  IN: 0 mL / OUT: 1500 mL / NET: -1500 mL          PHYSICAL EXAM:    Constitutional: intubated sedated.  HEENT: PERR, EOMI,  No oral cyananosis.  Neck:  supple,  No JVD  Respiratory: Breath sounds are clear bilaterally, scattered rhonchi   Cardiovascular: S1 and S2, regular rate and rhythm, no Murmurs, gallops or rubs  Gastrointestinal: Bowel Sounds present, soft, nontender.   Extremities: trace peripheral edema. No clubbing or cyanosis.  Vascular: 2+ peripheral pulses  Musculoskeletal: no calf tenderness.  Skin: No rashes.    ===============================  ===============================  LABS:                         7.7    16.11 )-----------( 338      ( 2023 06:10 )             24.0                         8.1    12.31 )-----------( 307      ( 2023 05:30 )             24.3                         8.7    13.83 )-----------( 274      ( 2023 06:05 )             27.2     2023 06:10    137    |  102    |  91     ----------------------------<  124    3.9     |  23     |  8.54   2023 05:30    137    |  100    |  58     ----------------------------<  156    3.2     |  23     |  6.57   2023 06:05    133    |  98     |  74     ----------------------------<  139    4.4     |  20     |  9.90     Ca    7.9        2023 06:10  Ca    8.1        2023 05:30  Ca    7.8        2023 06:05  Phos  8.9       2023 06:10  Phos  6.8       2023 05:30  Phos  11.0      2023 06:05  Mg     2.7       2023 06:10  Mg     2.4       2023 05:30  Mg     2.7       2023 06:05    TPro  6.7    /  Alb  2.9    /  TBili  1.0    /  DBili  x      /  AST  249    /  ALT  202    /  AlkPhos  164    2023 05:30  TPro  6.7    /  Alb  2.4    /  TBili  1.0    /  DBili  x      /  AST  413    /  ALT  272    /  AlkPhos  196    2023 06:05      ===============================  ===============================  CARDIAC BIOMARKERS:  BNP  Serum Pro-Brain Natriuretic Peptide: 435 pg/mL *H* [0 - 125] (10-28-18 @ 16:16)      TROPONIN  Troponin I, High Sensitivity Result: 477.06 ng/L *H* (23 @ 06:17)  Troponin I, High Sensitivity Result: 441.04 ng/L *H* (23 @ 00:10)  Troponin I, Serum: 39.700 ng/mL *H* [0.015 - 0.045] (10-29-18 @ 06:51)  Troponin I, Serum: 7.860 ng/mL *H* [0.015 - 0.045] (10-28-18 @ 23:22)  Troponin I, Serum: 1.590 ng/mL *H* [0.015 - 0.045] (10-28-18 @ 19:47)  Troponin I, Serum: 0.255 ng/mL *H* [0.015 - 0.045] (10-28-18 @ 16:16)      ===============================  ECG:      Diagnosis Line Normal sinus rhythm  T wave abnormality, consider anterior ischemia  Abnormal ECG  When compared with ECG of 30-OCT-2018 07:24,  Vent. rate has increased BY  33 BPM  Questionable change in QRS axis  T wave inversion now evident in Anterior leads  Confirmed by DIANE CULVER, SO PINEDA ([id number]) on 3/29/2023 4:48:13 PM    ===============================  RADIOLOGY:  < from: CT Angio Chest PE Protocol w/ IV Cont (23 @ 00:21) >  ACC: [id number] EXAM:  CT ANGIO CHEST PULM [facility]   ORDERED BY: ROME RUFFIN     PROCEDURE DATE:  2023          INTERPRETATION:  CLINICAL INFORMATION: Shortness of breath. History of   scleroderma.    COMPARISON: 10/28/2018    CONTRAST/COMPLICATIONS:  IV Contrast: Omnipaque 350  90 cc administered   10 cc discarded  Oral Contrast: NONE  Complications: None reported at time of study completion    PROCEDURE:  CT Angiography of the Chest.  Sagittal and coronal reformats were performed as well as 3D (MIP)   reconstructions.    FINDINGS:    LUNGS AND AIRWAYS: Patent central airways.  Revisualized chronic   interstitial disease including hazy bilateral ground glass opacities,   reticulation and worsened bronchiectasis bilaterally. Large bulla in the   left upper lobe.  PLEURA: No pleural effusion.  MEDIASTINUM AND MALLORY: No lymphadenopathy.  VESSELS: Aortic and coronary artery atherosclerosis. Enlarged main   pulmonary artery, unchanged, can be seen with pulmonary arterial   hypertension. No pulmonary embolism.  HEART: Cardiomegaly. Small pericardial effusion.  CHEST WALL AND LOWER NECK: Bilateral mediastinal and hilar adenopathy   increased since prior exam including extensive adenopathy in the   prevascular space.  VISUALIZED UPPER ABDOMEN: Right renal hypodensity too small to   characterize. Colonic diverticulosis.  BONES: Degenerative changes. Dextroscoliosis of the thoracic spine.   Chronic right rib deformities.    IMPRESSION:  No pulmonary embolism.  Chronic interstitial disease with worsened bronchiectasis.  New mediastinal and hilar adenopathy, which is indeterminate but could be   related to scleroderma and interstitial disease. Continued follow-up is   recommended.        --- End of Report ---            TYE CULVER; Attending Radiologist  This document has been electronically signed. Mar 28 2023  1:01AM    < end of copied text >    ===============================  ECHO:  Outpatient Echo : EF 55-60%, borderline pulmonary hypertension.  normal RV size & function.    ===============================  < from: TTE Echo Complete w/o Contrast w/ Doppler (23 @ 07:51) >     The mitral valve leaflets appear thickened.   Mild to Moderate mitral regurgitation is present.   The aortic valve is well visualized, appears calcified. Valve opening   seems to be normal.   Mild (1+) aortic regurgitation is present.   The tricuspid valve leaflets appear mildly thickened and/or calcified,   but   open well.   Moderate (2+) tricuspid valve regurgitation is present.   Severe pulmonary hypertension.   Pulmonic valve not well seen.   Mild to moderate pulmonic valvular regurgitation is present.   Normal appearing left atrium.   The left ventricle is normal in size, wall thickness, wallmotion and   contractility.   Estimated left ventricular ejection fraction is 55-60 %.   The right atrium appears dilated.   The right ventricle is severely dilated.   Mild to moderate, diffuse hypokinesis of the right ventricle is present.     Signature    < end of copied text >      Monitor sinus rhythm         ===============================  Julian Rose M.D.  Cardiology, [facility]  Cell: [phone]  Offices:    ([facility] Office)  [phone] ([facility] Office)

## 2023-04-09 NOTE — PROGRESS NOTE ADULT - ASSESSMENT
A/P: Patient is a 63 yo male with PMH of PSS (scleroderma) on cellcept with pulmonary fibrosis on 8L of 02 24/7, HTN HLD  CAD with 4 stents to the RCA 2018 now with:     1. Acute hypoxic resp failure 2/2  2. Pulmonary fibrosis  3. Shock, possible sepsis, not present on admission, 2/2   4. PNA  5. Sarcoidosis  6. CHRIS now on HD   7. NSTEMI   8. Cardiac arrest, bradycardic    Patient presented in respiratory distress, placed on bipap.   Subsequently had a cardiac arrest likely 2/2 hypoxemia due to ILD and PNA.   Post-arrest patients mental status has not improved, despite holding sedation.   CTH done 4/6 which showed mild chronic microvascular changes without evidence of an acute transcortical infarction or hemorrhage.  EEG done 4/7 which showed moderate nonspecific diffuse or multifocal cerebral dysfunction. No epileptiform pattern or seizure seen.  Again attempted SAT today (held propofol) and patient became desynchronous with ventilator and desaturated to 50% SpO2.   Will likely need an MRI at this point given persistent encephalopathy.   Still on levophed, midodrine started to help wean off.   Echo showed severe pulmonary hypertension, left ventricle is normal in size, wall thickness, wall motion and contractility, with estimated left ventricular ejection fraction is 60-65 %.  All cultures negative thus far, including fungitell. On merrem and mepron for empiric coverage.   Tube feeds on currently.   Last dialyzed on 4/8.   Next planned dialysis likely tomorrow or tuesday.   Monitor Hgb, 7.7 stable from yesterday.     I have spent a total of 45 mins of nonconsecutive critical care time managing this patient.  This included review of relevant history, clinical examination, review of data and images, discussion of treatment with the multidisciplinary team and any consultants involved in this patient’s care as well as family discussion.     I affirm that this patient is critically ill and at high risk for sudden, fatal deterioration due to one or more of the above stated active issues. I managed/supervised life or organ support interventions that required frequent assessment. This time does not include bedside procedures that are documented separately.

## 2023-04-09 NOTE — PROGRESS NOTE ADULT - SUBJECTIVE AND OBJECTIVE BOX
Patient is a 62y old  Male who presents with a chief complaint of SOB hypoxemia (08 Apr 2023 15:37)    BRIEF HOSPITAL COURSE: Patient is a 62M hx PSS (scleroderma) on cellcept with pulmonary fibrosis on 8L of 02 24/7, HTN HLD  CAD with 4 stents to the RCA 2018 with Dr Griffith. He is on the lung transplant list at [facility] with Dr Manas Priest (transplant pulmonologist). He was hospitalized in Feb at [facility] with a flare of fibrosis and was in the ICU on inhaled pulmonary vasodialtors. At that time he had a R heart cath and he was told that he has severe pulmonary hypertension. He is being teed up for transplant waiting for immunizations/colonoscopy etc. He was discharged on Tyvaso (treprostinil) but only started using it a few days ago due to expense and approval. Over the last three weeks he has had a poor appetite and progressive SOB leaving him unable to walk with a walker more than 10 feet without stopping to catch his breath. He also noted more hypoxemia on his home pulse ox.  He has a cough with clear phlegm. He arrived in the ED with severe SOB and hypoxemia placed on BIPAP.  Type 1 respiratory failure +Trop and BNP elevation. Brief cardiac arrest > intubated. CHRIS now on HD. HD unstable on Nor-epi titrating to MAP 65 at this point distributive hypotension from propofol. Poorly responsive while off sedation. Rapid shallow breathing. Would not tolerate extuabtion and MS would interfere with that too.     Events last 24 hours: Attempt SAT again today and failed. Still on levophed.     PAST MEDICAL & SURGICAL HISTORY:  HTN (hypertension)      Pulmonary fibrosis      Scleroderma        Allergies    No Known Allergies    Intolerances      FAMILY HISTORY:      Review of Systems:  unobtainable      Medications:  atovaquone  Suspension 750 milliGRAM(s) Oral every 12 hours  meropenem  IVPB 500 milliGRAM(s) IV Intermittent every 24 hours  midodrine 10 milliGRAM(s) Oral every 8 hours  norepinephrine Infusion 0.05 MICROgram(s)/kG/Min IV Continuous <Continuous>  acetaminophen   Oral Liquid .. 650 milliGRAM(s) Oral every 8 hours PRN  propofol Infusion 20 MICROgram(s)/kG/Min IV Continuous <Continuous>  aspirin  chewable 81 milliGRAM(s) Oral daily  heparin   Injectable 5000 Unit(s) SubCutaneous every 12 hours  pantoprazole  Injectable 40 milliGRAM(s) IV Push two times a day  atorvastatin 20 milliGRAM(s) Oral at bedtime  methylPREDNISolone sodium succinate Injectable 40 milliGRAM(s) IV Push every 8 hours  sodium chloride 0.9% lock flush 10 milliLiter(s) IV Push every 1 hour PRN  mycophenolate mofetil Suspension 1000 milliGRAM(s) Oral two times a day  artificial tears (preservative free) Ophthalmic Solution 1 Drop(s) Both EYES four times a day  chlorhexidine 0.12% Liquid 15 milliLiter(s) Oral Mucosa every 12 hours  chlorhexidine 4% Liquid 1 Application(s) Topical <User Schedule>  chlorhexidine 4% Liquid 1 Application(s) Topical <User Schedule>    Mode: AC/ CMV (Assist Control/ Continuous Mandatory Ventilation)  RR (machine): 24  TV (machine): 450  FiO2: 40  PS: 5  PIP: 25      ICU Vital Signs Last 24 Hrs  T(C): 37 (09 Apr 2023 05:00), Max: 37 (09 Apr 2023 05:00)  T(F): 98.6 (09 Apr 2023 05:00), Max: 98.6 (09 Apr 2023 05:00)  HR: 70 (09 Apr 2023 14:00) (60 - 76)  ABP: 113/62 (09 Apr 2023 14:00) (85/50 - 123/65)  ABP(mean): 82 (09 Apr 2023 14:00) (64 - 88)  RR: 29 (09 Apr 2023 14:00) (22 - 34)  SpO2: 100% (09 Apr 2023 13:00) (97% - 100%)    O2 Parameters below as of 09 Apr 2023 06:00  Patient On (Oxygen Delivery Method): ventilator    O2 Concentration (%): 40      Vital Signs Last 24 Hrs  T(C): 37 (09 Apr 2023 05:00), Max: 37 (09 Apr 2023 05:00)  T(F): 98.6 (09 Apr 2023 05:00), Max: 98.6 (09 Apr 2023 05:00)  HR: 70 (09 Apr 2023 14:00) (60 - 76)  RR: 29 (09 Apr 2023 14:00) (22 - 34)  SpO2: 100% (09 Apr 2023 13:00) (97% - 100%)    Parameters below as of 09 Apr 2023 06:00  Patient On (Oxygen Delivery Method): ventilator    O2 Concentration (%): 40        I&O's Detail    08 Apr 2023 07:01  -  09 Apr 2023 07:00  --------------------------------------------------------  IN:    Free Water: 60 mL    IV PiggyBack: 50 mL    Nepro with Carb Steady: 440 mL    Norepinephrine: 27.5 mL    Propofol: 442.2 mL  Total IN: 1019.7 mL    OUT:    Other (mL): 1500 mL  Total OUT: 1500 mL    Total NET: -480.3 mL            LABS:                        7.7    19.07 )-----------( 344      ( 09 Apr 2023 09:15 )             23.7     04-09    136  |  102  |  73<H>  ----------------------------<  139<H>  3.8   |  25  |  6.31<H>    Ca    8.2<L>      09 Apr 2023 09:15  Phos  7.5     04-09  Mg     2.5     04-09    TPro  6.2  /  Alb  2.8<L>  /  TBili  0.8  /  DBili  x   /  AST  80<H>  /  ALT  138<H>  /  AlkPhos  148<H>  04-09          CAPILLARY BLOOD GLUCOSE            CULTURES:  Culture Results:   No growth (04-05 @ 18:40)  Culture Results:   No growth at 48 hours (04-05 @ 14:00)      Physical Examination:    General: On propofol.     HEENT: Pupils equal, reactive to light.    PULM: +rhochi bilaterally     CVS: Regular rate and rhythm, no murmurs, rubs, or gallops    ABD: NGT in palce, nded, nontender, normoactive bowel sounds, no masses    EXT: 2+ pitting edema, nontender    SKIN: Warm and well perfused, right axillary keny n place

## 2023-04-09 NOTE — PROGRESS NOTE ADULT - SUBJECTIVE AND OBJECTIVE BOX
HPI:  62 year old man with a history of scleroderma, pulmonary fibrosis with chronic respiratory failure (supplemental O2 dependent; listed on lung-transplant list at [place]), severe pulmonary hypertension, CAD s/p RCA stents (), HTN, HLD admitted with acute/chronic respiratory failure (required mechanical ventilation) with hospitalization complicated by cardiac arrest preceded by severe bradycardia (3/29/23).    3/29/23: Pt intubated and sedated.  3/30/23:  Events past 24 hours noted and discussed with CCU team; patient is sedated on vent.  3/31/23: Pt sedated on pressors. Family at bedside  23: Sedated pressors being tapered. Labs reviewed.  23: Pressors weaning. Arousable  4/3/'23: no changes.  23;  intubated , sedated , on pressors,  echo showed normal LVEF ; severe pulmonary hypertension   23 Patient is intubated , on sedation , low dose pressors on dialysis , patient was given tyvaso, did not take due to insurance issue  , febrile episodes   : on sedation, intubated  : no changes overnight.  : intubated, sedated.    MEDICATIONS:  OUTPATIENT  Home Medications:  aspirin 81 mg oral tablet, chewable: 1 tab(s) orally once a day (31 Oct 2018 10:16)  azithromycin 500 mg intravenous injection: 500 milligram(s) intravenous every 24 hours (28 Mar 2023 14:31)  enoxaparin: 40 milligram(s) subcutaneous once a day (28 Mar 2023 14:31)  mycophenolate mofetil 250 mg oral capsule: 4 cap(s) orally 2 times a day (28 Mar 2023 14:31)  piperacillin-tazobactam: 3.375 gram(s) injectable every 8 hours (28 Mar 2023 14:31)  quinapril 10 mg oral tablet: 1 tab(s) orally once a day (28 Oct 2018 22:58)      INPATIENT  MEDICATIONS  (STANDING):  artificial tears (preservative free) Ophthalmic Solution 1 Drop(s) Both EYES four times a day  aspirin  chewable 81 milliGRAM(s) Oral daily  atorvastatin 20 milliGRAM(s) Oral at bedtime  atovaquone  Suspension 750 milliGRAM(s) Oral every 12 hours  chlorhexidine 0.12% Liquid 15 milliLiter(s) Oral Mucosa every 12 hours  chlorhexidine 4% Liquid 1 Application(s) Topical <User Schedule>  chlorhexidine 4% Liquid 1 Application(s) Topical <User Schedule>  heparin   Injectable 5000 Unit(s) SubCutaneous every 12 hours  meropenem  IVPB 500 milliGRAM(s) IV Intermittent every 24 hours  methylPREDNISolone sodium succinate Injectable 40 milliGRAM(s) IV Push every 8 hours  midodrine 10 milliGRAM(s) Oral every 8 hours  mycophenolate mofetil Suspension 1000 milliGRAM(s) Oral two times a day  norepinephrine Infusion 0.05 MICROgram(s)/kG/Min (3.3 mL/Hr) IV Continuous <Continuous>  pantoprazole  Injectable 40 milliGRAM(s) IV Push two times a day  propofol Infusion 20 MICROgram(s)/kG/Min (8.45 mL/Hr) IV Continuous <Continuous>    MEDICATIONS  (PRN):  acetaminophen   Oral Liquid .. 650 milliGRAM(s) Oral every 8 hours PRN Temp greater or equal to 38C (100.4F)  sodium chloride 0.9% lock flush 10 milliLiter(s) IV Push every 1 hour PRN Pre/post blood products, medications, blood draw, and to maintain line patency          Vital Signs Last 24 Hrs  T(C): 37 (2023 05:00), Max: 37 (2023 05:00)  T(F): 98.6 (2023 05:00), Max: 98.6 (2023 05:00)  HR: 70 (2023 14:00) (60 - 76)  BP: --  BP(mean): --  RR: 29 (2023 14:00) (22 - 34)  SpO2: 100% (2023 13:00) (97% - 100%)    Parameters below as of 2023 06:00  Patient On (Oxygen Delivery Method): ventilator    O2 Concentration (%): 40Daily     Daily Weight in k.2 (2023 05:56)I&O's Summary    2023 07:01  -  2023 07:00  --------------------------------------------------------  IN: 1019.7 mL / OUT: 1500 mL / NET: -480.3 mL        I&O's Detail    2023 07:01  -  2023 07:00  --------------------------------------------------------  IN:    Free Water: 60 mL    IV PiggyBack: 50 mL    Nepro with Carb Steady: 440 mL    Norepinephrine: 27.5 mL    Propofol: 442.2 mL  Total IN: 1019.7 mL    OUT:    Other (mL): 1500 mL  Total OUT: 1500 mL    Total NET: -480.3 mL          I&O's Summary    2023 07:01  -  2023 07:00  --------------------------------------------------------  IN: 1019.7 mL / OUT: 1500 mL / NET: -480.3 mL        PHYSICAL EXAM:    Constitutional: intubated sedated.  HEENT: PERR, EOMI,  No oral cyananosis.  Neck:  supple,  No JVD  Respiratory: Breath sounds are clear bilaterally, scattered rhonchi   Cardiovascular: S1 and S2, regular rate and rhythm, no Murmurs, gallops or rubs  Gastrointestinal: Bowel Sounds present, soft, nontender.   Extremities: trace peripheral edema. No clubbing or cyanosis.  Vascular: 2+ peripheral pulses  Musculoskeletal: no calf tenderness.  Skin: No rashes.    ===============================  ===============================  LABS:                         7.7    19.07 )-----------( 344      ( 2023 09:15 )             23.7                         7.7    16.11 )-----------( 338      ( 2023 06:10 )             24.0                         8.1    12.31 )-----------( 307      ( 2023 05:30 )             24.3     2023 09:15    136    |  102    |  73     ----------------------------<  139    3.8     |  25     |  6.31   2023 06:10    137    |  102    |  91     ----------------------------<  124    3.9     |  23     |  8.54   2023 05:30    137    |  100    |  58     ----------------------------<  156    3.2     |  23     |  6.57     Ca    8.2        2023 09:15  Ca    7.9        2023 06:10  Ca    8.1        2023 05:30  Phos  7.5       2023 09:15  Phos  8.9       2023 06:10  Phos  6.8       2023 05:30  Mg     2.5       2023 09:15  Mg     2.7       2023 06:10  Mg     2.4       2023 05:30    TPro  6.2    /  Alb  2.8    /  TBili  0.8    /  DBili  x      /  AST  80     /  ALT  138    /  AlkPhos  148    2023 09:15  TPro  6.7    /  Alb  2.9    /  TBili  1.0    /  DBili  x      /  AST  249    /  ALT  202    /  AlkPhos  164    2023 05:30        ===============================  ===============================  CARDIAC BIOMARKERS:  BNP  Serum Pro-Brain Natriuretic Peptide: 435 pg/mL *H* [0 - 125] (10-28-18 @ 16:16)      TROPONIN  Troponin I, High Sensitivity Result: 477.06 ng/L *H* (23 @ 06:17)  Troponin I, High Sensitivity Result: 441.04 ng/L *H* (23 @ 00:10)  Troponin I, Serum: 39.700 ng/mL *H* [0.015 - 0.045] (10-29-18 @ 06:51)  Troponin I, Serum: 7.860 ng/mL *H* [0.015 - 0.045] (10-28-18 @ 23:22)  Troponin I, Serum: 1.590 ng/mL *H* [0.015 - 0.045] (10-28-18 @ 19:47)  Troponin I, Serum: 0.255 ng/mL *H* [0.015 - 0.045] (10-28-18 @ 16:16)      ===============================  ECG:      Diagnosis Line Normal sinus rhythm  T wave abnormality, consider anterior ischemia  Abnormal ECG  When compared with ECG of 30-OCT-2018 07:24,  Vent. rate has increased BY  33 BPM  Questionable change in QRS axis  T wave inversion now evident in Anterior leads  Confirmed by DIANE CULVER, SO PINEDA ([id number]) on 3/29/2023 4:48:13 PM    ===============================  RADIOLOGY:  < from: CT Angio Chest PE Protocol w/ IV Cont (23 @ 00:21) >  ACC: [id number] EXAM:  CT ANGIO CHEST PULM [facility]   ORDERED BY: ROME RUFFIN     PROCEDURE DATE:  2023          INTERPRETATION:  CLINICAL INFORMATION: Shortness of breath. History of   scleroderma.    COMPARISON: 10/28/2018    CONTRAST/COMPLICATIONS:  IV Contrast: Omnipaque 350  90 cc administered   10 cc discarded  Oral Contrast: NONE  Complications: None reported at time of study completion    PROCEDURE:  CT Angiography of the Chest.  Sagittal and coronal reformats were performed as well as 3D (MIP)   reconstructions.    FINDINGS:    LUNGS AND AIRWAYS: Patent central airways.  Revisualized chronic   interstitial disease including hazy bilateral ground glass opacities,   reticulation and worsened bronchiectasis bilaterally. Large bulla in the   left upper lobe.  PLEURA: No pleural effusion.  MEDIASTINUM AND MALLORY: No lymphadenopathy.  VESSELS: Aortic and coronary artery atherosclerosis. Enlarged main   pulmonary artery, unchanged, can be seen with pulmonary arterial   hypertension. No pulmonary embolism.  HEART: Cardiomegaly. Small pericardial effusion.  CHEST WALL AND LOWER NECK: Bilateral mediastinal and hilar adenopathy   increased since prior exam including extensive adenopathy in the   prevascular space.  VISUALIZED UPPER ABDOMEN: Right renal hypodensity too small to   characterize. Colonic diverticulosis.  BONES: Degenerative changes. Dextroscoliosis of the thoracic spine.   Chronic right rib deformities.    IMPRESSION:  No pulmonary embolism.  Chronic interstitial disease with worsened bronchiectasis.  New mediastinal and hilar adenopathy, which is indeterminate but could be   related to scleroderma and interstitial disease. Continued follow-up is   recommended.        --- End of Report ---            TYE CULVER; Attending Radiologist  This document has been electronically signed. Mar 28 2023  1:01AM    < end of copied text >    ===============================  ECHO:  Outpatient Echo : EF 55-60%, borderline pulmonary hypertension.  normal RV size & function.    ===============================  < from: TTE Echo Complete w/o Contrast w/ Doppler (23 @ 07:51) >     The mitral valve leaflets appear thickened.   Mild to Moderate mitral regurgitation is present.   The aortic valve is well visualized, appears calcified. Valve opening   seems to be normal.   Mild (1+) aortic regurgitation is present.   The tricuspid valve leaflets appear mildly thickened and/or calcified,   but   open well.   Moderate (2+) tricuspid valve regurgitation is present.   Severe pulmonary hypertension.   Pulmonic valve not well seen.   Mild to moderate pulmonic valvular regurgitation is present.   Normal appearing left atrium.   The left ventricle is normal in size, wall thickness, wallmotion and   contractility.   Estimated left ventricular ejection fraction is 55-60 %.   The right atrium appears dilated.   The right ventricle is severely dilated.   Mild to moderate, diffuse hypokinesis of the right ventricle is present.     Signature    < end of copied text >      Monitor sinus rhythm         ===============================    Julian Rose M.D.  Cardiology, [facility]  Cell: [phone]  Offices:    ([facility] Office)  [phone] ([facility] Office)

## 2023-04-10 NOTE — PROGRESS NOTE ADULT - SUBJECTIVE AND OBJECTIVE BOX
NEPHROLOGY INTERVAL HPI/OVERNIGHT EVENTS:  04-10-23 @ 11:07    4/10 events noted for palliative meeting, wbc rising   - seen on HD, tolerating well, intubated  - s/p HD x 3 days, labs stabilized intubated on pressors  --ON vent,  Remains on Norepi.   BP in 100's.  NO enteral feeding  --Remains on vent.  On Norepi.  Second HD in progress.   remains anuric with inc sob and worsening renal parameters   4/3 still anuric, dec pressors requirements on levo 0.12 only  on, propofol and TF on hold with no fw flushes    HPI:  63 yo man with PMHX of Scleroderma treated with Cellcept and pulmonary fibrosis on home O2 8 L.  Hx of CAD ( stents x 4 to RCA)  Admission at [facility] in Feb due to resp distress and treated in ICU with pulmonary vasodilators.  R cath done with severe pulmonary HTN and was to have evaluation done for TP.  Started on Tyvaso several days ago.  Presented with 3 week hx fo progressive SOB, poor appetite and worsening LACEY walking short distance.  Admitted early 3/28 to CCU and placed on Bipap , given IV lasix,  and Zosyn/Azithromax.  CTA negative for PE.  [place] [facility]--resp status further deteriorate--Intubated  and started on pressors.  Late yesterday, resuscitated post cardiac arrest.  Now noted with acute worsening of renal function.    PMHX and PSHX.  --Pulmonary fibrosis  --Scleroderma  --CAD ( stent x 4)    MEDICATIONS  (STANDING):  artificial tears (preservative free) Ophthalmic Solution 1 Drop(s) Both EYES four times a day  aspirin  chewable 81 milliGRAM(s) Oral daily  atorvastatin 20 milliGRAM(s) Oral at bedtime  atovaquone  Suspension 750 milliGRAM(s) Oral every 12 hours  chlorhexidine 0.12% Liquid 15 milliLiter(s) Oral Mucosa every 12 hours  chlorhexidine 4% Liquid 1 Application(s) Topical <User Schedule>  chlorhexidine 4% Liquid 1 Application(s) Topical <User Schedule>  heparin   Injectable 5000 Unit(s) SubCutaneous every 12 hours  meropenem  IVPB 500 milliGRAM(s) IV Intermittent every 24 hours  methylPREDNISolone sodium succinate Injectable 40 milliGRAM(s) IV Push every 8 hours  midodrine 10 milliGRAM(s) Oral every 8 hours  mycophenolate mofetil Suspension 1000 milliGRAM(s) Oral two times a day  pantoprazole  Injectable 40 milliGRAM(s) IV Push two times a day  propofol Infusion 20 MICROgram(s)/kG/Min (8.45 mL/Hr) IV Continuous <Continuous>    MEDICATIONS  (PRN):  acetaminophen   Oral Liquid .. 650 milliGRAM(s) Oral every 8 hours PRN Temp greater or equal to 38C (100.4F)  sodium chloride 0.9% lock flush 10 milliLiter(s) IV Push every 1 hour PRN Pre/post blood products, medications, blood draw, and to maintain line patency      Allergies    No Known Allergies    Intolerances        I&O's Detail    2023 07:01  -  10 Apr 2023 07:00  --------------------------------------------------------  IN:    IV PiggyBack: 50 mL    Norepinephrine: 12.3 mL    Propofol: 195 mL  Total IN: 257.3 mL    OUT:  Total OUT: 0 mL    Total NET: 257.3 mL        Vital Signs Last 24 Hrs  T(C): 37.3 (10 Apr 2023 06:00), Max: 37.8 (10 Apr 2023 00:57)  T(F): 99.2 (10 Apr 2023 06:00), Max: 100 (10 Apr 2023 00:57)  HR: 89 (10 Apr 2023 10:00) (64 - 108)  BP: --  BP(mean): --  RR: 29 (10 Apr 2023 10:00) (17 - 40)  SpO2: 90% (10 Apr 2023 10:00) (88% - 100%)    Parameters below as of 10 Apr 2023 10:00  Patient On (Oxygen Delivery Method): ventilator    O2 Concentration (%): 50  Daily     Daily Weight in k.4 (10 Apr 2023 06:00)    PHYSICAL EXAM:  General: sedated   HEENT: orally intubated   CV: s1s2 rrr  LUNGS: B/L CTA  EXT: no edema    LABS:                        8.3    30.07 )-----------( 385      ( 10 Apr 2023 08:45 )             25.9     04-10    134<L>  |  99  |  107<H>  ----------------------------<  139<H>  4.5   |  22  |  7.98<H>    Ca    8.4<L>      10 Apr 2023 08:45  Phos  7.5       Mg     2.5         TPro  6.2  /  Alb  2.8<L>  /  TBili  0.8  /  DBili  x   /  AST  80<H>  /  ALT  138<H>  /  AlkPhos  148<H>            ABG - ( 10 Apr 2023 03:50 )  pH, Arterial: 7.30  pH, Blood: x     /  pCO2: 41    /  pO2: 344   / HCO3: 20    / Base Excess: -5.9  /  SaO2: 100

## 2023-04-10 NOTE — PROGRESS NOTE ADULT - ASSESSMENT
62M hx PSS (scleroderma) on cellcept with pulmonary fibrosis on 8L of 02  24/7, HTN HLD  CAD with 4 stents to the RCA 2018 with Dr Griffith. He is on the lung transplant list at [facility] with Dr Manas Priest (transplant pulmonologist). He was hospitalized in Feb at [facility] with a flare of fibrosis and was in the ICU on inhaled pulmonary vasodialtors. At that time he had a R heart cath and he was told that he has severe pulmonary hypertension., He was  being teed up for transplant waiting for immunizations/colonoscopy etc. He was discharged on Tyvaso (treprostinil) but only started using it a few days ago due to expense and approval. Over the last three weeks he has had a poor appetite and progressive SOB leaving him unable to walk with a walker more than 10 feet without stopping to catch his breath. He arrived in the ED with severe SOB and hypoxemia placed on BIPAP.  Type 1 respiratory failure Trop and BNP elevation. 3/29 am was intubated, had approximately 5 minute cardiac arrest/ now on pressors and developing worsening renal renal failure creatinine has increased now 6, on high dose pressors echo, dec rv function and severe pulmonary htn, was started on abx, pcp tx, and fungal coverage.     1. Acute respiratory failure. Septic vs cardiogenic shock. Scleroderma. Pulmonary fibrosis with acute flare. Multifocal pneumonia. Immunocompromised host. s/p Cardiac arrest. CHRIS on HD  - imaging and chart reviewed, multiorgan failure, started on HD 4/4  - has wbc ct 30, started on steroids 4/5 could be d/t this f/u cbc  - s/p zosyn 3/28-3/30, now on merrem [id number] renally dose adjusted #12   - on mepron 750mg BID #11/21 for pcp coverage  - continue with antibiotic coverage   - sputum cx and blood cx 3/28 no growth  - fungitell (-), repeat blood cx from 3/30 no growth  - nephrology f/u noted  - tolerating abx well so far; no side effects noted  - reason for abx use and side effects reviewed with patient  - s/p micafungin x 1 caspofungin 3/31-4/2  - s/p azithromycin 500mg daily atypical coverage #5 --> 4/1 legionella ag (-)  - supportive care  - fu cbc    2. other issues - care per medicine

## 2023-04-10 NOTE — PROGRESS NOTE ADULT - ASSESSMENT
62M hx PSS (scleroderma) on cellcept with pulmonary fibrosis on 8L of 02  24/7, HTN HLD  CAD with 4 stents to the RCA 2018 with Dr Griffith. He is on the lung transplant list at [facility] with Dr Manas Priest (transplant pulmonologist). He was hospitalized in Feb at [facility] with a flare of fibrosis and was in the ICU on inhaled pulmonary vasodialtors. At that time he had a R heart cath and he was told that he has severe pulmonary hypertension., He was  being teed up for transplant waiting for immunizations/colonoscopy etc. He was discharged on Tyvaso (treprostinil) but only started using it a few days ago due to expense and approval. Over the last three weeks he has had a poor appetite and progressive SOB leaving him unable to walk with a walker more than 10 feet without stopping to catch his breath. He arrived in the ED with severe SOB and hypoxemia placed on BIPAP.  Type 1 respiratory failure Trop and BNP elevation. 3/29 am was intubated, had approximately 5 minute cardiac arrest/ now on pressors and developing worsening renal renal failure creatinine has increased now 6, on high dose pressors echo, dec rv function and severe pulmonary htn, was started on abx, pcp tx, and fungal coverage.     1. Acute respiratory failure. Septic vs cardiogenic shock. Scleroderma. Pulmonary fibrosis with acute flare. Multifocal pneumonia. Immunocompromised host. s/p Cardiac arrest. CHRIS on HD  - imaging and chart reviewed, multiorgan failure, started on HD 4/4  - s/p zosyn 3/28-3/30, now on merrem [id number] renally dose adjusted #9   - on mepron 750mg BID #8/21 for pcp coverage  - continue with antibiotic coverage   - sputum cx and blood cx 3/28 no growth  - fungitell (-), repeat blood cx from 3/30 no growth  - nephrology f/u noted  - tolerating abx well so far; no side effects noted  - reason for abx use and side effects reviewed with patient  - s/p micafungin x 1 caspofungin 3/31-4/2  - s/p azithromycin 500mg daily atypical coverage #5 --> 4/1 legionella ag (-)  - supportive care  - fu cbc    2. other issues - care per medicine

## 2023-04-10 NOTE — PROGRESS NOTE ADULT - ASSESSMENT
63 yo man with scleroderma, pulmonary fibrosis /severe pulmonary HTN admitted with progressive resp failure.  Now intubated and on pressors due to shock and now post cardiac arrest, septic vs cardiogenic.  --CHRIS post CTA ( unclear hx of CKD, creat 1.47 on admission and on ACEI) and shock. oliguric.    No immediate need for dialysis.    Check repeat labs for K and acidosis.    Continue pressor/vent support    Continue abtx.    D/w Dr Watson.    3/31 SY  --CHRIS post cardiac arrest :  Continues to worsen with Oliguria     No immediate need for dialysis.    Will assess again in am    D/w Dr Watson re : poor pulmonary prognosis.    4/1 SY  --CHRIS post cardiac arrest : continues to worsen with no urine output.    No immediate indication for dialysis.    HD to be considered when indicated with electrolyte disturbances.    Oxygenation acceptable.  --Change enteral feeding to Nepro for now.     D/w Dr Watson    4/2 SY  --CHRIS post cardiac arrest : continues to worsen with no urine output.     Continue to assess for indications for dialysis.     No urgent need for dialysis today as electrolytes acceptable.     Would allow more time for BP to stabilize.  --Oxygenation acceptable.  --ID : on Meropenem and Caspofungin  --Scleroderma : continue MMF.  --Severe pulmonary fibrosis and HTN : Not a TP candidate at this time.    4/3 MK   - CHRIS with component of VIET and ATN     no indication for HD today    no FW flushes with TF    Hep panel in anticipation of possible HD   dw rn and wife updated     4/4 MK   - CHRIS with worsening acidosis and hyperkalemia andhyponatremia     dw wife need for HD, consent obtained     TDC by intensivist team     she is concerned about long term need for dialysis if he is in the future deemed not to be a     lung transplant patient   dw wife     4/5 SY  --CHRIS, with worsening metabolic derangement.      1st HD yesterday.    Second HD today with goal of 3 KG UF.  --BP in 100's on Norepi.  --Resp : pulmonary fibrosis : continue vent support.  --Scleroderma : continue MMF.    4/6 SY  --CHRIS : 3rd HD today.  aim for ~ 2 kg UF.   SPA and on pressor.  --Continue Pressor.  --Hyperphosphatemia : start binder once feeding started.  --Resp : pulmonary fibrosis  : Now lung TP consideration on hold  Continue vent support  --Scleroderma : continue MMF.    4/7   Above noted   pt s/p HD x 3  Labs stable  Replace K    4/8  seen on HD   tolerated well  Palliative working w Family  Transfusion decision as per Intensivist team    4/10 MK   - CHRIS/ATN for hd on TTS schedule, oliguric   - hypoatremia, correction with hd   - leukocytosis worsening    on meropenem, atovoquone     methylprednisone   - scleroderma on MMF   wilma faulkner

## 2023-04-10 NOTE — PROGRESS NOTE ADULT - ASSESSMENT
A/P: Patient is a 61 yo male with PMH of PSS (scleroderma) on cellcept with pulmonary fibrosis on 8L of 02 24/7, HTN HLD  CAD with 4 stents to the RCA 2018 now with:     1. Acute hypoxic resp failure 2/2  2. Pulmonary fibrosis  3. Shock, possible sepsis, not present on admission, 2/2   4. PNA  5. Sarcoidosis  6. CHRIS now on HD   7. NSTEMI   8. Cardiac arrest, bradycardic        Post-arrest patients mental status has not improved, despite holding sedation.   CTH done 4/6 which showed mild chronic microvascular changes without evidence of an acute transcortical infarction or hemorrhage.  EEG done 4/7 which showed moderate nonspecific diffuse or multifocal cerebral dysfunction. No epileptiform pattern or seizure seen.  Off Pressors  Echo showed severe pulmonary hypertension, left ventricle is normal in size, wall thickness, wall motion and contractility, with estimated left ventricular ejection fraction is 60-65 %.  All cultures negative thus far, including fungitell. On merrem and mepron for empiric coverage.   Tube feed  HD as per Renal      Doing Poorly.  Palliative to meet with the family again today

## 2023-04-10 NOTE — PROGRESS NOTE ADULT - SUBJECTIVE AND OBJECTIVE BOX
HPI:  62 year old man with a history of scleroderma, pulmonary fibrosis with chronic respiratory failure (supplemental O2 dependent; listed on lung-transplant list at [facility]), severe pulmonary hypertension, CAD s/p RCA stents (), HTN, HLD admitted with acute/chronic respiratory failure (required mechanical ventilation) with hospitalization complicated by cardiac arrest preceded by severe bradycardia (3/29/23).    3/29/23: Pt intubated and sedated.  3/30/23:  Events past 24 hours noted and discussed with CCU team; patient is sedated on vent.  3/31/23: Pt sedated on pressors. Family at bedside  23: Sedated pressors being tapered. Labs reviewed.  23: Pressors weaning. Arousable  4/3/'23: no changes.  23;  intubated , sedated , on pressors,  echo showed normal LVEF ; severe pulmonary hypertension   23 Patient is intubated , on sedation , low dose pressors on dialysis , patient was given tyvaso, did not take due to insurance issue  , febrile episodes   : on sedation, intubated  : no changes overnight.  : intubated, sedated.  4/10/'23: no changes.      MEDICATIONS:  OUTPATIENT  Home Medications:  aspirin 81 mg oral tablet, chewable: 1 tab(s) orally once a day (31 Oct 2018 10:16)  azithromycin 500 mg intravenous injection: 500 milligram(s) intravenous every 24 hours (28 Mar 2023 14:31)  enoxaparin: 40 milligram(s) subcutaneous once a day (28 Mar 2023 14:31)  mycophenolate mofetil 250 mg oral capsule: 4 cap(s) orally 2 times a day (28 Mar 2023 14:31)  piperacillin-tazobactam: 3.375 gram(s) injectable every 8 hours (28 Mar 2023 14:31)  quinapril 10 mg oral tablet: 1 tab(s) orally once a day (28 Oct 2018 22:58)      INPATIENT  MEDICATIONS  (STANDING):  artificial tears (preservative free) Ophthalmic Solution 1 Drop(s) Both EYES four times a day  aspirin  chewable 81 milliGRAM(s) Oral daily  atorvastatin 20 milliGRAM(s) Oral at bedtime  atovaquone  Suspension 750 milliGRAM(s) Oral every 12 hours  chlorhexidine 0.12% Liquid 15 milliLiter(s) Oral Mucosa every 12 hours  chlorhexidine 4% Liquid 1 Application(s) Topical <User Schedule>  chlorhexidine 4% Liquid 1 Application(s) Topical <User Schedule>  heparin   Injectable 5000 Unit(s) SubCutaneous every 12 hours  meropenem  IVPB 500 milliGRAM(s) IV Intermittent every 24 hours  methylPREDNISolone sodium succinate Injectable 40 milliGRAM(s) IV Push every 8 hours  midodrine 10 milliGRAM(s) Oral every 8 hours  mycophenolate mofetil Suspension 1000 milliGRAM(s) Oral two times a day  pantoprazole  Injectable 40 milliGRAM(s) IV Push two times a day  propofol Infusion 20 MICROgram(s)/kG/Min (8.45 mL/Hr) IV Continuous <Continuous>    MEDICATIONS  (PRN):  acetaminophen   Oral Liquid .. 650 milliGRAM(s) Oral every 8 hours PRN Temp greater or equal to 38C (100.4F)  sodium chloride 0.9% lock flush 10 milliLiter(s) IV Push every 1 hour PRN Pre/post blood products, medications, blood draw, and to maintain line patency          Vital Signs Last 24 Hrs  T(C): 36.4 (10 Apr 2023 11:00), Max: 37.8 (10 Apr 2023 00:57)  T(F): 97.5 (10 Apr 2023 11:00), Max: 100 (10 Apr 2023 00:57)  HR: 91 (10 Apr 2023 12:28) (64 - 108)  BP: --  BP(mean): --  RR: 33 (10 Apr 2023 12:00) (17 - 40)  SpO2: 90% (10 Apr 2023 12:28) (88% - 100%)    Parameters below as of 10 Apr 2023 10:00  Patient On (Oxygen Delivery Method): ventilator    O2 Concentration (%): 50Daily     Daily Weight in k.4 (10 Apr 2023 06:00)I&O's Summary    2023 07:01  -  10 Apr 2023 07:00  --------------------------------------------------------  IN: 257.3 mL / OUT: 0 mL / NET: 257.3 mL        I&O's Detail    2023 07:01  -  10 Apr 2023 07:00  --------------------------------------------------------  IN:    IV PiggyBack: 50 mL    Norepinephrine: 12.3 mL    Propofol: 195 mL  Total IN: 257.3 mL    OUT:  Total OUT: 0 mL    Total NET: 257.3 mL          I&O's Summary    2023 07:01  -  10 Apr 2023 07:00  --------------------------------------------------------  IN: 257.3 mL / OUT: 0 mL / NET: 257.3 mL      PHYSICAL EXAM:    Constitutional: intubated sedated.  HEENT: PERR, EOMI,  No oral cyananosis.  Neck:  supple,  No JVD  Respiratory: Breath sounds are clear bilaterally, scattered rhonchi   Cardiovascular: S1 and S2, regular rate and rhythm, no Murmurs, gallops or rubs  Gastrointestinal: Bowel Sounds present, soft, nontender.   Extremities: trace peripheral edema. No clubbing or cyanosis.  Vascular: 2+ peripheral pulses  Musculoskeletal: no calf tenderness.  Skin: No rashes.    ===============================  ===============================  LABS:                         8.3    30.07 )-----------( 385      ( 10 Apr 2023 08:45 )             25.9                         7.7    19.07 )-----------( 344      ( 2023 09:15 )             23.7                         7.7    16.11 )-----------( 338      ( 2023 06:10 )             24.0     10 Apr 2023 08:45    134    |  99     |  107    ----------------------------<  139    4.5     |  22     |  7.98   2023 09:15    136    |  102    |  73     ----------------------------<  139    3.8     |  25     |  6.31   2023 06:10    137    |  102    |  91     ----------------------------<  124    3.9     |  23     |  8.54     Ca    8.4        10 Apr 2023 08:45  Ca    8.2        2023 09:15  Ca    7.9        2023 06:10  Phos  7.5       2023 09:15  Phos  8.9       2023 06:10  Mg     2.5       2023 09:15  Mg     2.7       2023 06:10    TPro  6.2    /  Alb  2.8    /  TBili  0.8    /  DBili  x      /  AST  80     /  ALT  138    /  AlkPhos  148    2023 09:15      ===============================  ===============================  CARDIAC BIOMARKERS:  BNP  Serum Pro-Brain Natriuretic Peptide: 435 pg/mL *H* [0 - 125] (10-28-18 @ 16:16)      TROPONIN  Troponin I, High Sensitivity Result: 477.06 ng/L *H* (23 @ 06:17)  Troponin I, High Sensitivity Result: 441.04 ng/L *H* (23 @ 00:10)  Troponin I, Serum: 39.700 ng/mL *H* [0.015 - 0.045] (10-29-18 @ 06:51)  Troponin I, Serum: 7.860 ng/mL *H* [0.015 - 0.045] (10-28-18 @ 23:22)  Troponin I, Serum: 1.590 ng/mL *H* [0.015 - 0.045] (10-28-18 @ 19:47)  Troponin I, Serum: 0.255 ng/mL *H* [0.015 - 0.045] (10-28-18 @ 16:16)      ===============================  ECG:      Diagnosis Line Normal sinus rhythm  T wave abnormality, consider anterior ischemia  Abnormal ECG  When compared with ECG of 30-OCT-2018 07:24,  Vent. rate has increased BY  33 BPM  Questionable change in QRS axis  T wave inversion now evident in Anterior leads  Confirmed by DIANE CULVER, SO PINEDA ([id number]) on 3/29/2023 4:48:13 PM    ===============================  RADIOLOGY:  < from: CT Angio Chest PE Protocol w/ IV Cont (23 @ 00:21) >  ACC: [id number] EXAM:  CT ANGIO CHEST PULM ART [facility]   ORDERED BY: ROME RUFFIN     PROCEDURE DATE:  2023          INTERPRETATION:  CLINICAL INFORMATION: Shortness of breath. History of   scleroderma.    COMPARISON: 10/28/2018    CONTRAST/COMPLICATIONS:  IV Contrast: Omnipaque 350  90 cc administered   10 cc discarded  Oral Contrast: NONE  Complications: None reported at time of study completion    PROCEDURE:  CT Angiography of the Chest.  Sagittal and coronal reformats were performed as well as 3D (MIP)   reconstructions.    FINDINGS:    LUNGS AND AIRWAYS: Patent central airways.  Revisualized chronic   interstitial disease including hazy bilateral ground glass opacities,   reticulation and worsened bronchiectasis bilaterally. Large bulla in the   left upper lobe.  PLEURA: No pleural effusion.  MEDIASTINUM AND MALLORY: No lymphadenopathy.  VESSELS: Aortic and coronary artery atherosclerosis. Enlarged main   pulmonary artery, unchanged, can be seen with pulmonary arterial   hypertension. No pulmonary embolism.  HEART: Cardiomegaly. Small pericardial effusion.  CHEST WALL AND LOWER NECK: Bilateral mediastinal and hilar adenopathy   increased since prior exam including extensive adenopathy in the   prevascular space.  VISUALIZED UPPER ABDOMEN: Right renal hypodensity too small to   characterize. Colonic diverticulosis.  BONES: Degenerative changes. Dextroscoliosis of the thoracic spine.   Chronic right rib deformities.    IMPRESSION:  No pulmonary embolism.  Chronic interstitial disease with worsened bronchiectasis.  New mediastinal and hilar adenopathy, which is indeterminate but could be   related to scleroderma and interstitial disease. Continued follow-up is   recommended.        --- End of Report ---            TYE CULVER; Attending Radiologist  This document has been electronically signed. Mar 28 2023  1:01AM    < end of copied text >    ===============================  ECHO:  Outpatient Echo : EF 55-60%, borderline pulmonary hypertension.  normal RV size & function.    ===============================  < from: TTE Echo Complete w/o Contrast w/ Doppler (23 @ 07:51) >     The mitral valve leaflets appear thickened.   Mild to Moderate mitral regurgitation is present.   The aortic valve is well visualized, appears calcified. Valve opening   seems to be normal.   Mild (1+) aortic regurgitation is present.   The tricuspid valve leaflets appear mildly thickened and/or calcified,   but   open well.   Moderate (2+) tricuspid valve regurgitation is present.   Severe pulmonary hypertension.   Pulmonic valve not well seen.   Mild to moderate pulmonic valvular regurgitation is present.   Normal appearing left atrium.   The left ventricle is normal in size, wall thickness, wallmotion and   contractility.   Estimated left ventricular ejection fraction is 55-60 %.   The right atrium appears dilated.   The right ventricle is severely dilated.   Mild to moderate, diffuse hypokinesis of the right ventricle is present.     Signature    < end of copied text >      Monitor sinus rhythm         ===============================      Julian Rose M.D.  Cardiology, [facility]  Cell: [phone]  Offices:   [phone] ([facility] Office)  [phone] ([facility] Office)

## 2023-04-10 NOTE — PROGRESS NOTE ADULT - SUBJECTIVE AND OBJECTIVE BOX
Date of service: 04-10-23 @ 13:22    pt seen and examined  failed SBT  Tmax 100  encephalopathic    ROS: unable to obtain d/t medical condition      MEDICATIONS  (STANDING):  artificial tears (preservative free) Ophthalmic Solution 1 Drop(s) Both EYES four times a day  aspirin  chewable 81 milliGRAM(s) Oral daily  atorvastatin 20 milliGRAM(s) Oral at bedtime  atovaquone  Suspension 750 milliGRAM(s) Oral every 12 hours  chlorhexidine 0.12% Liquid 15 milliLiter(s) Oral Mucosa every 12 hours  chlorhexidine 4% Liquid 1 Application(s) Topical <User Schedule>  chlorhexidine 4% Liquid 1 Application(s) Topical <User Schedule>  heparin   Injectable 5000 Unit(s) SubCutaneous every 12 hours  meropenem  IVPB 500 milliGRAM(s) IV Intermittent every 24 hours  methylPREDNISolone sodium succinate Injectable 40 milliGRAM(s) IV Push every 8 hours  midodrine 10 milliGRAM(s) Oral every 8 hours  mycophenolate mofetil Suspension 1000 milliGRAM(s) Oral two times a day  pantoprazole  Injectable 40 milliGRAM(s) IV Push two times a day  propofol Infusion 20 MICROgram(s)/kG/Min (8.45 mL/Hr) IV Continuous <Continuous>    Vital Signs Last 24 Hrs  T(C): 36.4 (10 Apr 2023 11:00), Max: 37.8 (10 Apr 2023 00:57)  T(F): 97.5 (10 Apr 2023 11:00), Max: 100 (10 Apr 2023 00:57)  HR: 90 (10 Apr 2023 13:00) (64 - 108)  BP: --  BP(mean): --  RR: 32 (10 Apr 2023 13:00) (17 - 40)  SpO2: 91% (10 Apr 2023 13:00) (88% - 100%)    Parameters below as of 10 Apr 2023 10:00  Patient On (Oxygen Delivery Method): ventilator    O2 Concentration (%): 50      PE:  Constitutional: NAD, sedated/intubated   HEENT: NC/AT, EOMI, PERRLA, conjunctivae clear; ears and nose atraumatic; pharynx benign  Neck: supple; thyroid not palpable  Back: no tenderness  Respiratory: decreased breath sounds, rales   Cardiovascular: S1S2 regular, no murmurs  Abdomen: soft, not tender, not distended, positive BS; liver and spleen WNL  Genitourinary: no suprapubic tenderness  Lymphatic: no LN palpable  Musculoskeletal: no muscle tenderness, no joint swelling or tenderness  Extremities: no pedal edema  Neurological/ Psychiatric:  moving all extremities  Skin: no rashes; no palpable lesions    Labs: all available labs reviewed                        8.3    30.07 )-----------( 385      ( 10 Apr 2023 08:45 )             25.9     04-10    134<L>  |  99  |  107<H>  ----------------------------<  139<H>  4.5   |  22  |  7.98<H>    Ca    8.4<L>      10 Apr 2023 08:45  Phos  7.5     04-09  Mg     2.5     04-09    TPro  6.2  /  Alb  2.8<L>  /  TBili  0.8  /  DBili  x   /  AST  80<H>  /  ALT  138<H>  /  AlkPhos  148<H>  04-09         Cultures:   Culture - Blood (03.30.23 @ 09:21)   Specimen Source: .Blood   Blood-Peripheral  Culture Results:   No growth to date.  Culture - Blood (03.30.23 @ 09:20)   Specimen Source: .Blood None  Culture Results:   No growth to date.  Culture - Sputum . (03.29.23 @ 16:59)   Gram Stain:   Few polymorphonuclear leukocytes per low power field   No Squamous epithelial cells per low power field   No organisms seen per oil power field  Specimen Source: .Sputum Sputum  Culture Results:   No growth at 48 hours    Radiology: all available radiological tests reviewed    ACC: [id number] EXAM:  XR CHEST PORTABLE IMMED 1V   ORDERED BY: RALPH PRATER     PROCEDURE DATE:  03/29/2023          INTERPRETATION:  Portable chest radiograph    CLINICAL INFORMATION:   Short of breath.    TECHNIQUE:  Portable  AP view of the chest was obtained.    COMPARISON: 3/29/2023 chest x-ray available for review.    FINDINGS: ET tube tip above tracheal bifurcation.  NG tube tip beyond GE junction.  LEFT IJ catheter tip in SVC.      There is cardiomegaly, vascular congestion and perihilar interstitial   infiltrates without gross effusion.  Trachea midline. No hilar mass.  Visualized osseous structures are intact.        IMPRESSION: Catheters and tubes in place. An  Cardiomegaly.  Bilateral perihilar/bibasilar diffuse airspace disease.        ACC: [id number] EXAM:  CT ANGIO CHEST PULM [facility]   ORDERED BY: ROME RUFFIN     PROCEDURE DATE:  03/28/2023          INTERPRETATION:  CLINICAL INFORMATION: Shortness of breath. History of   scleroderma.    COMPARISON: 10/28/2018    CONTRAST/COMPLICATIONS:  IV Contrast: Omnipaque 350  90 cc administered   10 cc discarded  Oral Contrast: NONE  Complications: None reported at time of study completion    PROCEDURE:  CT Angiography of the Chest.  Sagittal and coronal reformats were performed as well as 3D (MIP)   reconstructions.    FINDINGS:    LUNGS AND AIRWAYS: Patent central airways.  Revisualized chronic   interstitial disease including hazy bilateral ground glass opacities,   reticulation and worsened bronchiectasis bilaterally. Large bulla in the   left upper lobe.  PLEURA: No pleural effusion.  MEDIASTINUM AND MALLORY: No lymphadenopathy.  VESSELS: Aortic and coronary artery atherosclerosis. Enlarged main   pulmonary artery, unchanged, can be seen with pulmonary arterial   hypertension. No pulmonary embolism.  HEART: Cardiomegaly. Small pericardial effusion.  CHEST WALL AND LOWER NECK: Bilateral mediastinal and hilar adenopathy   increased since prior exam including extensive adenopathy in the   prevascular space.  VISUALIZED UPPER ABDOMEN: Right renal hypodensity too small to   characterize. Colonic diverticulosis.  BONES: Degenerative changes. Dextroscoliosis of the thoracic spine.   Chronic right rib deformities.    IMPRESSION:  No pulmonary embolism.  Chronic interstitial disease with worsened bronchiectasis.  New mediastinal and hilar adenopathy, which is indeterminate but could be   related to scleroderma and interstitial disease. Continued follow-up is   recommended.        --- End of Report ---    < end of copied text >    Advanced directives addressed: full resuscitation

## 2023-04-10 NOTE — PROGRESS NOTE ADULT - ASSESSMENT
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 c/w current management  --Bowel Regimen - on lactulose standing.  Suggest daily dulcolax as needed  --Acute on Chronic Respiratory Failure 2/2 scleroderma - supportive care.  on propofol for vent compliance.  vent setting per primary.  if family decide to withdraw care then pt would require opioid infusion.  given ESRD, suggest either fentanyl or Dilaudid infusion to start 30 minutes prior to the planned withdrawal of care.  titrate up to comfort once withdrawn.  maintain Scleroderma/ILD medications.  ABx per ID.   --Debility/Weakness - fall precautions  --Acute Encephalopathy - hypoxic vs infections + drug induced.  treatment of possible infections. use sedation at lowest possible dose.  Consider switching to precedex.    --esrd - HD per nephrology     Psychological and Psychiatric Aspects of Care:   --Grief/Bereavement: emotional support provided  --Hx of psychiatric dx: none  -Pastoral Care Available PRN     Social Aspects of Care  -Palliative SW are available as needed    Cultural Aspects  -Primary Language: English    Goals of Care:  see above    Prognosis -- very poor    Ethical and Legal Aspects: NA  Capacity- pt does not have capacity 2/2 sedation.     HCP/Surrogate: wife - darren jones [phone]    Code Status- DNR  MOLST- completed 4/10  Dispo Plan- pt unlikely to survive hospitalization    Discussed With: nursing

## 2023-04-10 NOTE — PROGRESS NOTE ADULT - SUBJECTIVE AND OBJECTIVE BOX
Date of service: 04-06-23 @ 15:51    pt seen and examined  on pressors on ventilatory support   no fevers  encephalopathic    ROS: unable to obtain d/t medical condition      MEDICATIONS  (STANDING):  artificial tears (preservative free) Ophthalmic Solution 1 Drop(s) Both EYES four times a day  aspirin  chewable 81 milliGRAM(s) Oral daily  atorvastatin 20 milliGRAM(s) Oral at bedtime  atovaquone  Suspension 750 milliGRAM(s) Oral every 12 hours  chlorhexidine 0.12% Liquid 15 milliLiter(s) Oral Mucosa every 12 hours  chlorhexidine 4% Liquid 1 Application(s) Topical <User Schedule>  chlorhexidine 4% Liquid 1 Application(s) Topical <User Schedule>  heparin   Injectable 5000 Unit(s) SubCutaneous every 12 hours  meropenem  IVPB 500 milliGRAM(s) IV Intermittent every 24 hours  methylPREDNISolone sodium succinate Injectable 40 milliGRAM(s) IV Push every 8 hours  midodrine 10 milliGRAM(s) Oral every 8 hours  mycophenolate mofetil Suspension 1000 milliGRAM(s) Oral two times a day  norepinephrine Infusion 0.05 MICROgram(s)/kG/Min (3.3 mL/Hr) IV Continuous <Continuous>  pantoprazole  Injectable 40 milliGRAM(s) IV Push two times a day  propofol Infusion 20 MICROgram(s)/kG/Min (8.45 mL/Hr) IV Continuous <Continuous>    Vital Signs Last 24 Hrs  T(C): 37.2 (06 Apr 2023 14:30), Max: 38.3 (05 Apr 2023 22:00)  T(F): 99 (06 Apr 2023 14:30), Max: 101 (05 Apr 2023 22:00)  HR: 87 (06 Apr 2023 14:30) (81 - 107)  BP: --  BP(mean): --  RR: 32 (06 Apr 2023 14:30) (19 - 44)  SpO2: 94% (06 Apr 2023 14:30) (92% - 100%)    Parameters below as of 06 Apr 2023 14:30  Patient On (Oxygen Delivery Method): ventilator    O2 Concentration (%): 45      PE:  Constitutional: NAD, sedated/intubated   HEENT: NC/AT, EOMI, PERRLA, conjunctivae clear; ears and nose atraumatic; pharynx benign  Neck: supple; thyroid not palpable  Back: no tenderness  Respiratory: decreased breath sounds, rales   Cardiovascular: S1S2 regular, no murmurs  Abdomen: soft, not tender, not distended, positive BS; liver and spleen WNL  Genitourinary: no suprapubic tenderness  Lymphatic: no LN palpable  Musculoskeletal: no muscle tenderness, no joint swelling or tenderness  Extremities: no pedal edema  Neurological/ Psychiatric:  moving all extremities  Skin: no rashes; no palpable lesions    Labs: all available labs reviewed                                              8.1    12.31 )-----------( 307      ( 07 Apr 2023 05:30 )             24.3     04-07    137  |  100  |  58<H>  ----------------------------<  156<H>  3.2<L>   |  23  |  6.57<H>    Ca    8.1<L>      07 Apr 2023 05:30  Phos  6.8     04-07  Mg     2.4     04-07    TPro  6.7  /  Alb  2.9<L>  /  TBili  1.0  /  DBili  x   /  AST  249<H>  /  ALT  202<H>  /  AlkPhos  164<H>  04-07        Cultures:   Culture - Blood (03.30.23 @ 09:21)   Specimen Source: .Blood   Blood-Peripheral  Culture Results:   No growth to date.  Culture - Blood (03.30.23 @ 09:20)   Specimen Source: .Blood None  Culture Results:   No growth to date.  Culture - Sputum . (03.29.23 @ 16:59)   Gram Stain:   Few polymorphonuclear leukocytes per low power field   No Squamous epithelial cells per low power field   No organisms seen per oil power field  Specimen Source: .Sputum Sputum  Culture Results:   No growth at 48 hours    Radiology: all available radiological tests reviewed    ACC: [id number] EXAM:  XR CHEST PORTABLE IMMED 1V   ORDERED BY: RALPH PRATER     PROCEDURE DATE:  03/29/2023          INTERPRETATION:  Portable chest radiograph    CLINICAL INFORMATION:   Short of breath.    TECHNIQUE:  Portable  AP view of the chest was obtained.    COMPARISON: 3/29/2023 chest x-ray available for review.    FINDINGS: ET tube tip above tracheal bifurcation.  NG tube tip beyond GE junction.  LEFT IJ catheter tip in SVC.      There is cardiomegaly, vascular congestion and perihilar interstitial   infiltrates without gross effusion.  Trachea midline. No hilar mass.  Visualized osseous structures are intact.        IMPRESSION: Catheters and tubes in place. An  Cardiomegaly.  Bilateral perihilar/bibasilar diffuse airspace disease.        ACC: [id number] EXAM:  CT ANGIO CHEST PULM [facility]   ORDERED BY: ROME RUFFIN     PROCEDURE DATE:  03/28/2023          INTERPRETATION:  CLINICAL INFORMATION: Shortness of breath. History of   scleroderma.    COMPARISON: 10/28/2018    CONTRAST/COMPLICATIONS:  IV Contrast: Omnipaque 350  90 cc administered   10 cc discarded  Oral Contrast: NONE  Complications: None reported at time of study completion    PROCEDURE:  CT Angiography of the Chest.  Sagittal and coronal reformats were performed as well as 3D (MIP)   reconstructions.    FINDINGS:    LUNGS AND AIRWAYS: Patent central airways.  Revisualized chronic   interstitial disease including hazy bilateral ground glass opacities,   reticulation and worsened bronchiectasis bilaterally. Large bulla in the   left upper lobe.  PLEURA: No pleural effusion.  MEDIASTINUM AND MALLORY: No lymphadenopathy.  VESSELS: Aortic and coronary artery atherosclerosis. Enlarged main   pulmonary artery, unchanged, can be seen with pulmonary arterial   hypertension. No pulmonary embolism.  HEART: Cardiomegaly. Small pericardial effusion.  CHEST WALL AND LOWER NECK: Bilateral mediastinal and hilar adenopathy   increased since prior exam including extensive adenopathy in the   prevascular space.  VISUALIZED UPPER ABDOMEN: Right renal hypodensity too small to   characterize. Colonic diverticulosis.  BONES: Degenerative changes. Dextroscoliosis of the thoracic spine.   Chronic right rib deformities.    IMPRESSION:  No pulmonary embolism.  Chronic interstitial disease with worsened bronchiectasis.  New mediastinal and hilar adenopathy, which is indeterminate but could be   related to scleroderma and interstitial disease. Continued follow-up is   recommended.        --- End of Report ---    < end of copied text >    Advanced directives addressed: full resuscitation

## 2023-04-10 NOTE — PROGRESS NOTE ADULT - SUBJECTIVE AND OBJECTIVE BOX
Patient is a 62y old  Male who presents with a chief complaint of SOB hypoxemia (08 Apr 2023 15:37)    BRIEF HOSPITAL COURSE: Patient is a 62M hx PSS (scleroderma) on cellcept with pulmonary fibrosis on 8L of 02 24/7, HTN HLD  CAD with 4 stents to the RCA 2018 with Dr Griffith. He is on the lung transplant list at [facility] with Dr Manas Priest (transplant pulmonologist). He was hospitalized in Feb at [facility] with a flare of fibrosis and was in the ICU on inhaled pulmonary vasodialtors. At that time he had a R heart cath and he was told that he has severe pulmonary hypertension. He is being teed up for transplant waiting for immunizations/colonoscopy etc. He was discharged on Tyvaso (treprostinil) but only started using it a few days ago due to expense and approval. Over the last three weeks he has had a poor appetite and progressive SOB leaving him unable to walk with a walker more than 10 feet without stopping to catch his breath. He also noted more hypoxemia on his home pulse ox.  He has a cough with clear phlegm. He arrived in the ED with severe SOB and hypoxemia placed on BIPAP.  Type 1 respiratory failure +Trop and BNP elevation. Brief cardiac arrest > intubated. CHRIS now on HD. HD unstable on Nor-epi titrating to MAP 65 at this point distributive hypotension from propofol. Poorly responsive while off sedation. Rapid shallow breathing. Would not tolerate extuabtion and MS would interfere with that too.     Events last 24 hours: Attempt SAT again today and failed. Still on levophed.     4/10: Still doing poorly.  Vented.  Desaturated over night.  Not responsive        PAST MEDICAL & SURGICAL HISTORY:  HTN (hypertension)      Pulmonary fibrosis      Scleroderma          FAMILY HISTORY:      Social Hx:    Allergies    No Known Allergies    Intolerances            ICU Vital Signs Last 24 Hrs  T(C): 37.3 (10 Apr 2023 06:00), Max: 37.8 (10 Apr 2023 00:57)  T(F): 99.2 (10 Apr 2023 06:00), Max: 100 (10 Apr 2023 00:57)  HR: 89 (10 Apr 2023 10:00) (64 - 108)  BP: --  BP(mean): --  ABP: 108/63 (10 Apr 2023 10:00) (87/57 - 130/71)  ABP(mean): 82 (10 Apr 2023 10:00) (70 - 93)  RR: 29 (10 Apr 2023 10:00) (17 - 40)  SpO2: 90% (10 Apr 2023 10:00) (88% - 100%)    O2 Parameters below as of 10 Apr 2023 10:00  Patient On (Oxygen Delivery Method): ventilator    O2 Concentration (%): 50        Mode: AC/ CMV (Assist Control/ Continuous Mandatory Ventilation)  RR (machine): 24  TV (machine): 450  FiO2: 55  PS: 8  ITime: 0.8  PIP: 49      I&O's Summary    09 Apr 2023 07:01  -  10 Apr 2023 07:00  --------------------------------------------------------  IN: 257.3 mL / OUT: 0 mL / NET: 257.3 mL                              8.3    30.07 )-----------( 385      ( 10 Apr 2023 08:45 )             25.9       04-10    134<L>  |  99  |  107<H>  ----------------------------<  139<H>  4.5   |  22  |  7.98<H>    Ca    8.4<L>      10 Apr 2023 08:45  Phos  7.5     04-09  Mg     2.5     04-09    TPro  6.2  /  Alb  2.8<L>  /  TBili  0.8  /  DBili  x   /  AST  80<H>  /  ALT  138<H>  /  AlkPhos  148<H>  04-09            ABG - ( 10 Apr 2023 03:50 )  pH, Arterial: 7.30  pH, Blood: x     /  pCO2: 41    /  pO2: 344   / HCO3: 20    / Base Excess: -5.9  /  SaO2: 100                     MEDICATIONS  (STANDING):  artificial tears (preservative free) Ophthalmic Solution 1 Drop(s) Both EYES four times a day  aspirin  chewable 81 milliGRAM(s) Oral daily  atorvastatin 20 milliGRAM(s) Oral at bedtime  atovaquone  Suspension 750 milliGRAM(s) Oral every 12 hours  chlorhexidine 0.12% Liquid 15 milliLiter(s) Oral Mucosa every 12 hours  chlorhexidine 4% Liquid 1 Application(s) Topical <User Schedule>  chlorhexidine 4% Liquid 1 Application(s) Topical <User Schedule>  heparin   Injectable 5000 Unit(s) SubCutaneous every 12 hours  meropenem  IVPB 500 milliGRAM(s) IV Intermittent every 24 hours  methylPREDNISolone sodium succinate Injectable 40 milliGRAM(s) IV Push every 8 hours  midodrine 10 milliGRAM(s) Oral every 8 hours  mycophenolate mofetil Suspension 1000 milliGRAM(s) Oral two times a day  pantoprazole  Injectable 40 milliGRAM(s) IV Push two times a day  propofol Infusion 20 MICROgram(s)/kG/Min (8.45 mL/Hr) IV Continuous <Continuous>    MEDICATIONS  (PRN):  acetaminophen   Oral Liquid .. 650 milliGRAM(s) Oral every 8 hours PRN Temp greater or equal to 38C (100.4F)  sodium chloride 0.9% lock flush 10 milliLiter(s) IV Push every 1 hour PRN Pre/post blood products, medications, blood draw, and to maintain line patency      DVT Prophylaxis:    Advanced Directives:  Discussed with:    Visit Information: 30 min    ** Time is exclusive of billed procedures and/or teaching and/or routine family updates.

## 2023-04-10 NOTE — CHART NOTE - NSCHARTNOTEFT_GEN_A_CORE
HPI:  62M hx PSS (scleroderma) on cellcept with pulmonary fibrosis on 8L of 02 24/7, HTN HLD  CAD with 4 stents to the RCA 2018 with Dr Griffith.    He is on the lung transplant list at [facility] with Dr Manas Priest (transplant pulmonologist).    He was hospitalized in Feb at [facility] with a flare of fibrosis and was in the ICU on inhaled pulmonary vasodialtors.    At that time he had a R heart cath and he was told that he has severe pulmonary hypertension.,   He is being teed up for transplant waiting for immunizations/colonoscopy etc.   He was discharged on Tyvaso (treprostinil) but only started using it a few days ago due to expense and approval.       Over the last three weeks he has had a poor appetite and progressive SOB leaving him unable to walk with a walker more than 10 feet without stopping to catch his breath.  He also noted more hypoxemia on his home pulse ox.  He has a cough with clear phlegm.    (28 Mar 2023 02:01)      PERTINENT PMH REVIEWED:  [ X ] YES [ ] NO           Primary Contact:  jen Saenz, health care agent, phone # [phone]    HCP [ X ] Surrogate [   ] Guardian [   ]    Mental Status: Pt does not have capacity   Concerns of Depression [  ] -not identified.  Anxiety [   ] -not identified.  Baseline ADLs (prior to admission):  Independent [ ] moderately [ ] fully   Dependent   [ X ] moderately [ ]fully    Family Meeting attendees: GOC held with Dr. Samson on 4/7    Anticipated Grief: Patient[  ] Family [ X ]    Caregiver [place] Assessed: Yes [ X ] No [  ]    Synagogue: Not Specified    Spiritual Concerns: Not identified,  available for support.    Goals of Care: To be further discussed.    Previous Services: None.    ADVANCE DIRECTIVES:    -HCP [employer] names wife as health care agent  -Full Code.    Anticipated D/C Plan: To be further determined.                     Summary:  This Palliative SW spoke with wifeXavith via telephone this date to follow up and offer support.  Educated wife of palliative SW role and palliative team availability.  Emotional support provided.  Pt. lacks capacity.  Prior to hospitalization, Pt resided at home with wife and required assistance with ADLS.  Wife discussed feeling overwhelmed and stressed.  Wife also discussed sons difficulty coping with decisions needed to be made.  Wifes feelings explored in great details.  Support provided.    GOC held 4/7.  No limits currently set.  HCP on [employer] names wife as health care agent.    Plan to be further determined.  No limits currently set.  Emotional support provided.  Our team to continue to follow.

## 2023-04-10 NOTE — PROGRESS NOTE ADULT - ASSESSMENT
Impression:  1. Acute on chronic hypoxemic respiratory failure  2. Pulmonary fibrosis  3. PNA  6. CHRIS on HD  7. NSTEMI   8. Cardiac arrest with ROSC preceded by bradycardia  9. severe pHTN  10. scleroderma    Plan:  Neuro - Sedation to facilitate safe ventilation, CTH negative for acute pathology    CV -  remains off prressor support as needed to maintain MAP 65           Avoiding fluid challenges          QTC monitoring while on Azithromycin and Hydroxychloroquine.    Pulm -  ARDS-NET 4-6cc/kg IBW TV as able to maintain plateau pressures <30               Prone ventilation consideration as feasible  Pa02/Fi02 < 150 on Fi02 >60% and PEEP at least 5                 Vent bundle Reviewed     GI -  PPI  Enteric feeds as tolerated in tandem with NMB and prone ventilation    Renal - Even to negative fluid balance as tolerated by hemodynamics and renal fx.  Feeds to be provided in lieu of IVF.     Heme -  Pharmacologic DVT PPx  in addition to SCD's    ID - ABX discontinuation based on discussion with ID in conjunction with clinical features, culture data, and judicious procalcitonin monitoring.      Endo -  Aggressive glycemic control to limit FS glucose to < 180mg/dl.       Impression:  1. Acute on chronic hypoxemic respiratory failure  2. Pulmonary fibrosis  3. PNA  6. CHRIS on HD  7. NSTEMI   8. Cardiac arrest with ROSC preceded by bradycardia  9. severe pHTN  10. scleroderma  11. transaminitis    Plan:  Neuro - Sedation to facilitate safe ventilation, CTH negative for acute pathology    CV -  remains off pressor currently, low threshold to reinstitute as needed to maintain MAP> 65          cont midodine to facilitate maintainence off pressors          cont ASA and statin          EF LV function normal. severe pHTN    Pulm -  full vent support with LTV 6-8cc/kg IDW, keeping plts<30, peaks 21 currently             ABG no hypercarbia             actively titrating FiO2 to keep sats>90%, weaned to 80%, will wean further 5-10% [id number] for sats>90%             will add low level PEEP -8 for alveolar recruitment              no significant sputum             Stat CXR without significant changes               full vent bundle in place and reviewed    GI -  PPI  Enteric feeds as tolerated, LFTs cont to downtrend.    Renal - plan for HD in am, ABG with mild acidosis, avoid nephrotoxins, renally adjust all meds, trend BMP, Renal f/u     Heme -  Pharmacologic DVT PPx  in addition to SCD's, no signs of active bleeding, epogen with HD, tx for Hbg<7 or                signs of active bleeding     ID - afebrile, SCx NGTD, pneumocystis PCR negative, legionella negative, RVP negative, BCX NGTD, empiric IV abx as per          ID, abx adjustments based on discussion with ID in conjunction with clinical features and culture data.    Endo -  Aggressive glycemic control to limit FS glucose to < 180mg/dl, BS stable       Impression:  1. Acute on chronic hypoxemic respiratory failure  2. Pulmonary fibrosis  3. PNA  4. CHRIS on HD  5. Cardiac arrest with ROSC preceded by bradycardia  6. severe pHTN  7. scleroderma  8. transaminitis    Plan:  Neuro - Sedation to facilitate safe ventilation, CTH negative for acute pathology    CV -  remains off pressor currently, low threshold to reinstitute as needed to maintain MAP> 65          cont midodine to facilitate maintainence off pressors          cont ASA and statin          EF LV function normal. severe pHTN    Pulm -  full vent support with LTV 6-8cc/kg IDW, keeping plts<30, peaks 21 currently             ABG no hypercarbia             actively titrating FiO2 to keep sats>90%, weaned to 80%, will wean further 5-10% [id number] for sats>90%             will add low level PEEP -8 for alveolar recruitment              no significant sputum             Stat CXR without significant changes               full vent bundle in place and reviewed    GI -  PPI  Enteric feeds as tolerated, LFTs cont to downtrend.    Renal - plan for HD in am, ABG with mild acidosis, avoid nephrotoxins, renally adjust all meds, trend BMP, Renal f/u     Heme -  Pharmacologic DVT PPx  in addition to SCD's, no signs of active bleeding, epogen with HD, tx for Hbg<7 or                signs of active bleeding     ID - afebrile, SCx NGTD, pneumocystis PCR negative, legionella negative, RVP negative, BCX NGTD, empiric IV abx as per          ID, abx adjustments based on discussion with ID in conjunction with clinical features and culture data.    Endo -  Aggressive glycemic control to limit FS glucose to < 180mg/dl, BS stable

## 2023-04-10 NOTE — PROGRESS NOTE ADULT - SUBJECTIVE AND OBJECTIVE BOX
FULL NOTE TO FOLLOW  FAMILY HAVE VERBALLY AGREED TO DNR ONLY.  WHILE FAMILY REALIZE THAT THERE IS UNLIEKLY TO BE A RECOVERY FROM THIS EVENT, THEY ARE AT DIFFERENT STAGES OF ACCEPTANCE.  NO DECISION ON WHEN TO WITHDRAW CARE AT THIS TIME  CONTINUE ALL OTHER MEASURES, INCLUDING PRESSORS (IF NEEDED) AND HD PER NEPHROLOGY.     D/W ICU RN    MZ Subjective:  seen at bedside this morning, pt remains vented but is now off IV pressors (remains on midodrine).  There is no significant improvements in pt's condition, if anything slightly worse.  continues not have significant UO   extensive discussion with pt's family today, totalling 50 minutes.   we agreed to issue a DNR without other restrictions.  if his heart were to sop, it is liekly due to the sevree lung disease and the hope of a meaningful or acceptable recovery are extremely poor.  we spoke at length that we are running out of options and since he is off the transplant list, he is out of options for significant improvement.    wife feels that her  would not want these measures (prolonged intubation, HD, feeding tube). son acknowledges that this is also true.  while this is true, he is not yet at the point that he si ready to ask for withdrawal of care. we spoke at length about when this should happen and also the need to remove our own personal feeling and try to honor/respect the wishes of Mr. Ibrahim. there is no decision as to a timing of withdrawal at this time.    continue all meaures (including reinitiation of pressors and continued HD) if/as needed.     Review of Systems:  Unable to obtain/Limited due to: clinical condition        PHYSICAL EXAM:    Vital Signs Last 24 Hrs  T(C): 37.3 (10 Apr 2023 17:00), Max: 37.8 (10 Apr 2023 00:57)  T(F): 99.2 (10 Apr 2023 17:00), Max: 100 (10 Apr 2023 00:57)  HR: 88 (10 Apr 2023 20:25) (84 - 108)  BP: --  BP(mean): --  RR: 31 (10 Apr 2023 19:00) (24 - 40)  SpO2: 97% (10 Apr 2023 20:25) (88% - 100%)    Parameters below as of 10 Apr 2023 18:00  Patient On (Oxygen Delivery Method): ventilator    O2 Concentration (%): 40  Daily     Daily Weight in k.4 (10 Apr 2023 06:00)    PPSV2:   %  FAST:    General:  - GENERAL: Alert and oriented x 3. No acute distress.   - EYES: EOMI. Anicteric.   - HENT: Moist mucous membranes.   - LUNGS: Clear to auscultation bilaterally. No accessory muscle use. Speaking in complete sentences.   - CARDIOVASCULAR: Regular rate and rhythm. No murmur. No JVD. No edema.   - ABDOMEN: Soft, non-tender and non-distended. No palpable masses. Normal bowel sounds   - EXTREMITIES: No edema. Non-tender.    - SKIN: Warm, no rashes or lesions on visible skin.   - NEUROLOGIC: No focal neurological deficits.    - PSYCHIATRIC: Cooperative. Appropriate mood and affect.      LABS:                        8.3    30.07 )-----------( 385      ( 10 Apr 2023 08:45 )             25.9     04-10    134<L>  |  99  |  107<H>  ----------------------------<  139<H>  4.5   |  22  |  7.98<H>    Ca    8.4<L>      10 Apr 2023 08:45  Phos  7.5       Mg     2.5         TPro  6.2  /  Alb  2.8<L>  /  TBili  0.8  /  DBili  x   /  AST  80<H>  /  ALT  138<H>  /  AlkPhos  148<H>        Albumin: Albumin, Serum: 2.8 g/dL ( @ 09:15)      Allergies    No Known Allergies    Intolerances      MEDICATIONS  (STANDING):  artificial tears (preservative free) Ophthalmic Solution 1 Drop(s) Both EYES four times a day  aspirin  chewable 81 milliGRAM(s) Oral daily  atorvastatin 20 milliGRAM(s) Oral at bedtime  atovaquone  Suspension 750 milliGRAM(s) Oral every 12 hours  chlorhexidine 0.12% Liquid 15 milliLiter(s) Oral Mucosa every 12 hours  chlorhexidine 4% Liquid 1 Application(s) Topical <User Schedule>  chlorhexidine 4% Liquid 1 Application(s) Topical <User Schedule>  heparin   Injectable 5000 Unit(s) SubCutaneous every 12 hours  meropenem  IVPB 500 milliGRAM(s) IV Intermittent every 24 hours  methylPREDNISolone sodium succinate Injectable 40 milliGRAM(s) IV Push every 8 hours  midodrine 10 milliGRAM(s) Oral every 8 hours  mycophenolate mofetil Suspension 1000 milliGRAM(s) Oral two times a day  pantoprazole  Injectable 40 milliGRAM(s) IV Push two times a day  propofol Infusion 20 MICROgram(s)/kG/Min (8.45 mL/Hr) IV Continuous <Continuous>    MEDICATIONS  (PRN):  acetaminophen   Oral Liquid .. 650 milliGRAM(s) Oral every 8 hours PRN Temp greater or equal to 38C (100.4F)  sodium chloride 0.9% lock flush 10 milliLiter(s) IV Push every 1 hour PRN Pre/post blood products, medications, blood draw, and to maintain line patency      RADIOLOGY:

## 2023-04-10 NOTE — PROGRESS NOTE ADULT - SUBJECTIVE AND OBJECTIVE BOX
Patient is a 62y old  Male who presents with a chief complaint of SOB hypoxemia (09 Apr 2023 15:58)      BRIEF HOSPITAL COURSE: ***    Events last 24 hours: ***    PAST MEDICAL & SURGICAL HISTORY:  HTN (hypertension)      Pulmonary fibrosis      Scleroderma          Review of Systems:  CONSTITUTIONAL: No fever, chills, or fatigue  EYES: No eye pain, visual disturbances, or discharge  ENMT:  No difficulty hearing, tinnitus, vertigo; No sinus or throat pain  NECK: No pain or stiffness  RESPIRATORY: No cough, wheezing, chills or hemoptysis; No shortness of breath  CARDIOVASCULAR: No chest pain, palpitations, dizziness, or leg swelling  GASTROINTESTINAL: No abdominal or epigastric pain. No nausea, vomiting, or hematemesis; No diarrhea or constipation. No melena or hematochezia.  GENITOURINARY: No dysuria, frequency, hematuria, or incontinence  NEUROLOGICAL: No headaches, memory loss, loss of strength, numbness, or tremors  SKIN: No itching, burning, rashes, or lesions   MUSCULOSKELETAL: No joint pain or swelling; No muscle, back, or extremity pain  PSYCHIATRIC: No depression, anxiety, mood swings, or difficulty sleeping      Medications:  atovaquone  Suspension 750 milliGRAM(s) Oral every 12 hours  meropenem  IVPB 500 milliGRAM(s) IV Intermittent every 24 hours    midodrine 10 milliGRAM(s) Oral every 8 hours  norepinephrine Infusion 0.05 MICROgram(s)/kG/Min IV Continuous <Continuous>      acetaminophen   Oral Liquid .. 650 milliGRAM(s) Oral every 8 hours PRN  propofol Infusion 20 MICROgram(s)/kG/Min IV Continuous <Continuous>      aspirin  chewable 81 milliGRAM(s) Oral daily  heparin   Injectable 5000 Unit(s) SubCutaneous every 12 hours    pantoprazole  Injectable 40 milliGRAM(s) IV Push two times a day      atorvastatin 20 milliGRAM(s) Oral at bedtime  methylPREDNISolone sodium succinate Injectable 40 milliGRAM(s) IV Push every 8 hours    sodium chloride 0.9% lock flush 10 milliLiter(s) IV Push every 1 hour PRN    mycophenolate mofetil Suspension 1000 milliGRAM(s) Oral two times a day    artificial tears (preservative free) Ophthalmic Solution 1 Drop(s) Both EYES four times a day  chlorhexidine 0.12% Liquid 15 milliLiter(s) Oral Mucosa every 12 hours  chlorhexidine 4% Liquid 1 Application(s) Topical <User Schedule>  chlorhexidine 4% Liquid 1 Application(s) Topical <User Schedule>        Mode: AC/ CMV (Assist Control/ Continuous Mandatory Ventilation)  RR (machine): 24  TV (machine): 450  FiO2: 40  PS: 5  PIP: 26      ICU Vital Signs Last 24 Hrs  T(C): 37.8 (10 Apr 2023 00:57), Max: 37.8 (10 Apr 2023 00:57)  T(F): 100 (10 Apr 2023 00:57), Max: 100 (10 Apr 2023 00:57)  HR: 95 (10 Apr 2023 00:00) (60 - 104)  BP: --  BP(mean): --  ABP: 99/58 (10 Apr 2023 00:00) (95/57 - 130/71)  ABP(mean): 75 (10 Apr 2023 00:00) (72 - 93)  RR: 37 (10 Apr 2023 00:00) (17 - 40)  SpO2: 95% (10 Apr 2023 00:00) (92% - 100%)    O2 Parameters below as of 09 Apr 2023 06:00  Patient On (Oxygen Delivery Method): ventilator    O2 Concentration (%): 40                  LABS:                        7.7    19.07 )-----------( 344      ( 09 Apr 2023 09:15 )             23.7     04-09    136  |  102  |  73<H>  ----------------------------<  139<H>  3.8   |  25  |  6.31<H>    Ca    8.2<L>      09 Apr 2023 09:15  Phos  7.5     04-09  Mg     2.5     04-09    TPro  6.2  /  Alb  2.8<L>  /  TBili  0.8  /  DBili  x   /  AST  80<H>  /  ALT  138<H>  /  AlkPhos  148<H>  04-09          CAPILLARY BLOOD GLUCOSE            CULTURES:  Culture Results:   No growth (04-05 @ 18:40)  Culture Results:   No growth at 48 hours (04-05 @ 14:00)      Physical Examination:    General: No acute distress.  Alert, oriented, interactive, nonfocal    HEENT: Pupils equal, reactive to light.  Symmetric.    PULM: Clear to auscultation bilaterally, no significant sputum production    CVS: Regular rate and rhythm, no murmurs, rubs, or gallops    ABD: Soft, nondistended, nontender, normoactive bowel sounds, no masses    EXT: No edema, nontender    SKIN: Warm and well perfused, no rashes noted.    RADIOLOGY: ***    CRITICAL CARE TIME SPENT: ***   Patient is a 62y old  Male who presents with a chief complaint of SOB hypoxemia (09 Apr 2023 15:58)      BRIEF HOSPITAL COURSE:   62M with PMHx scleroderma on cellcept, IPF on chronic O2 (8L) who was on lung tx list at [facility], HTN, HLD, CAD, sp stents, severe pHTN who presented to ED with worsening hypoxemia at home and SOB. Initially placed on NIPPV. Subsequently with brief cardiac arrest in ED with ROSC, intubated. Course complicated by CHRIS requiring HD, shock requiring pressors, metabolic encephalopathy.      Events last 24 hours: afebrile, cont to fail SBT, off pressors, overnight with desats requiring escalation of O2 to 100%, remains sedated.     PAST MEDICAL & SURGICAL HISTORY:  HTN (hypertension)      Pulmonary fibrosis      Scleroderma          Review of Systems:  unable to perform at this time, pt intubated and sedated      Medications:  atovaquone  Suspension 750 milliGRAM(s) Oral every 12 hours  meropenem  IVPB 500 milliGRAM(s) IV Intermittent every 24 hours    midodrine 10 milliGRAM(s) Oral every 8 hours  norepinephrine Infusion 0.05 MICROgram(s)/kG/Min IV Continuous <Continuous>      acetaminophen   Oral Liquid .. 650 milliGRAM(s) Oral every 8 hours PRN  propofol Infusion 20 MICROgram(s)/kG/Min IV Continuous <Continuous>      aspirin  chewable 81 milliGRAM(s) Oral daily  heparin   Injectable 5000 Unit(s) SubCutaneous every 12 hours    pantoprazole  Injectable 40 milliGRAM(s) IV Push two times a day      atorvastatin 20 milliGRAM(s) Oral at bedtime  methylPREDNISolone sodium succinate Injectable 40 milliGRAM(s) IV Push every 8 hours    sodium chloride 0.9% lock flush 10 milliLiter(s) IV Push every 1 hour PRN    mycophenolate mofetil Suspension 1000 milliGRAM(s) Oral two times a day    artificial tears (preservative free) Ophthalmic Solution 1 Drop(s) Both EYES four times a day  chlorhexidine 0.12% Liquid 15 milliLiter(s) Oral Mucosa every 12 hours  chlorhexidine 4% Liquid 1 Application(s) Topical <User Schedule>  chlorhexidine 4% Liquid 1 Application(s) Topical <User Schedule>        Mode: AC/ CMV (Assist Control/ Continuous Mandatory Ventilation)  RR (machine): 24  TV (machine): 450  FiO2: 40  PS: 5  PIP: 26      ICU Vital Signs Last 24 Hrs  T(C): 37.8 (10 Apr 2023 00:57), Max: 37.8 (10 Apr 2023 00:57)  T(F): 100 (10 Apr 2023 00:57), Max: 100 (10 Apr 2023 00:57)  HR: 95 (10 Apr 2023 00:00) (60 - 104)  BP: --  BP(mean): --  ABP: 99/58 (10 Apr 2023 00:00) (95/57 - 130/71)  ABP(mean): 75 (10 Apr 2023 00:00) (72 - 93)  RR: 37 (10 Apr 2023 00:00) (17 - 40)  SpO2: 95% (10 Apr 2023 00:00) (92% - 100%)    O2 Parameters below as of 09 Apr 2023 06:00  Patient On (Oxygen Delivery Method): ventilator    O2 Concentration (%): 40      I&O's Summary    08 Apr 2023 07:01  -  09 Apr 2023 07:00  --------------------------------------------------------  IN: 1019.7 mL / OUT: 1500 mL / NET: -480.3 mL    09 Apr 2023 07:01  -  10 Apr 2023 03:39  --------------------------------------------------------  IN: 257.3 mL / OUT: 0 mL / NET: 257.3 mL        LABS:                        7.7    19.07 )-----------( 344      ( 09 Apr 2023 09:15 )             23.7     04-09    136  |  102  |  73<H>  ----------------------------<  139<H>  3.8   |  25  |  6.31<H>    Ca    8.2<L>      09 Apr 2023 09:15  Phos  7.5     04-09  Mg     2.5     04-09    TPro  6.2  /  Alb  2.8<L>  /  TBili  0.8  /  DBili  x   /  AST  80<H>  /  ALT  138<H>  /  AlkPhos  148<H>  04-09          CAPILLARY BLOOD GLUCOSE            CULTURES:  Culture Results:   No growth (04-05 @ 18:40)  Culture Results:   No growth at 48 hours (04-05 @ 14:00)      Physical Examination:    General: sedated and intubated    HEENT: Pupils equal, reactive to light.  Symmetric.    PULM: Course BS bilaterally, no wheezing noted    CVS: Regular rate and rhythm    ABD: Soft, nondistended, normoactive bowel sounds    EXT: No edema    SKIN: Warm and well perfused, no rashes noted.    RADIOLOGY:   ACC: [id number] EXAM:  XR CHEST PORTABLE URGENT 1V   ORDERED BY: RYAN JAIN     PROCEDURE DATE:  04/04/2023          INTERPRETATION:  AP chest on erect on April 4, 2022 at 11:48 AM. Patient   had insertion of large right jugular line.    Heart magnified by technique.    Endotracheal tube, nasogastric tube, and left jugular line remain.    There is persistent moderate interstitial CHF similar to April 3.    Mild left base effusion and April 3 improved.    Present film shows a large right jugular line inserted in good position.    IMPRESSION: Persistent CHF. Improved left effusion. Large right jugular   line inserted.    --- End of Report ---            ANT HURT MD; Attending Radiologist  This document has been electronically signed. Apr 4 2023 12:03PM  ACC: [id number] EXAM:  CT BRAIN   ORDERED BY: GENO TIRADO     PROCEDURE DATE:  04/06/2023          INTERPRETATION:  CLINICAL INDICATIONS:  altered mental status    COMPARISON: None.    TECHNIQUE: Noncontrast CT of the head. Multiplanar reformations are   submitted.    FINDINGS:  There is periventricular and subcortical white matter hypodensity without   mass effect, nonspecific, likely representing mild chronic microvascular   ischemic changes. There is no compelling evidence for an acute   transcortical infarction. There is no evidence of mass, mass effect,   midline shift or extra-axial fluid collection. The lateral ventricles and   cortical sulci are age-appropriate in size and configuration. Left-sided   approach NG tube. The orbits, mastoid air cells and visualized paranasal   sinuses are unremarkable. The calvarium is intact. Consider MRI as   clinically warranted.    IMPRESSION:  Mild chronic microvascular changes without evidence of an   acute transcortical infarction or hemorrhage.    --- End of Report ---            SALVATORE BAIRD MD; Attending Radiologist  This document has been electronically signed. Apr 6 2023  4:17PM    ACC: [id number] EXAM:  CT ABDOMEN AND PELVIS   ORDERED BY: GENO TIRADO     PROCEDURE DATE:  04/06/2023          INTERPRETATION:  CLINICAL INFORMATION: Septic shock, advanced pulmonary   fibrosis, now intubated. Abdominal distention    COMPARISON: CT chest 3/20/2023.    CONTRAST/COMPLICATIONS:  IV Contrast: NONE  Oral Contrast: NONE  Complications: None reported at time of study completion    PROCEDURE:  CT of the Abdomen and Pelvis was performed.  Sagittal and coronal reformats were performed.    FINDINGS:  LOWER CHEST: Redemonstrated bilateral groundglass opacities,   reticulation, bronchiectasis and partially visualized bulla in the left   upper lung.    LIVER: Within normal limits.  BILE DUCTS: Normal caliber.  GALLBLADDER: Within normal limits.  SPLEEN: Within normal limits.  PANCREAS: Within normal limits.  ADRENALS: Within normal limits.  KIDNEYS/URETERS: No hydronephrosis. Punctate nonobstructing left renal   calculus.    BLADDER: Within normal limits.  REPRODUCTIVE ORGANS: Partially obscured by streak artifact from right hip   prosthesis.    BOWEL: Enteric tube coiled in the stomach and terminating in the distal   stomach/proximal duodenum. No bowel obstruction. Oral contrast reaches   the rectum. Appendix is normal in caliber. Colonic diverticulosis without   evidence of diverticulosis.  PERITONEUM: No ascites.  VESSELS: Atherosclerotic changes.  RETROPERITONEUM/LYMPH NODES: No lymphadenopathy.  ABDOMINAL WALL: Small fat-containing helical hernia. Ventral subcutaneous   infiltration, may be related to subcutaneous injection. Subcutaneous   edema.  BONES: Degenerative changes. Right hip arthroplasty. Grade 2   anterolisthesis of L5 on S1.    IMPRESSION:  No bowel obstruction.    --- End of Report ---            ROSALVA ESCAMILLA MD; Attending Radiologist  This document has been electronically signed. Apr 6 2023  6:57PM  ACC: [id number] EXAM:  ECHO TTE WO CON COMP W DOPP   ORDERED BY: RALPH PRATER     PROCEDURE DATE:  03/30/2023          INTERPRETATION:  Transthoracic Echocardiography Report (TTE)     Demographics     Patient name          HERRERA CAIN          Age           62 year(s)     Med Rec #             [id number]            Gender        Male     Account #             [id number]         Date of Birth 1960     Interpreting          Sam Rush MD     Room Number   0034   Physician     Referring Physician   MD Shirley       Sonographer   Lacey Hdez     Date of study         03/30/2023 07:17 AM     Height                70.08 in             Weight        154.32 pounds    Type of Study:     TTE procedure: ECHO TTE WO CON COMP W DOP     BP: 88/53 mmHg     Study Location: [facility] Quality: Technically Difficullt    Indications   1) I27.2 - Pulmonary hypertension    M-Mode Measurements (cm)     LVEDd: 4.82 cm            LVESd: 3.22 cm   IVSEd: 1.1 cm   LVPWd: 0.77 cm            AO Root Dimension: 4.1 cm                             ACS: 1.9 cm                             LA: 3 cm    Doppler Measurements:     AV Velocity:144 cm/s               MV Peak E-Wave: 67.6 cm/s   AV Peak Gradient: 8.29 mmHg        MV Peak A-Wave: 45.4 cm/s                                      MV E/A Ratio: 1.49 %   TR Velocity:409 cm/s               MV Peak Gradient: 1.83 mmHg   TR Gradient:66.9124 mmHg   Estimated RAP:15 mmHg   RVSP:82 mmHg     Findings     Mitral Valve   The mitral valve leaflets appear thickened.   Mild to Moderate mitral regurgitation is present.     Aortic Valve   The aortic valve is well visualized, appears calcified. Valve opening   seems to be normal.   Mild (1+) aortic regurgitation is present.     Tricuspid Valve   The tricuspid valve leaflets appear mildly thickened and/or calcified,   but   open well.   Moderate (2+) tricuspid valve regurgitation is present.   Severe pulmonary hypertension.     Pulmonic Valve   Pulmonic valve not well seen.   Mild to moderate pulmonic valvular regurgitation is present.     Left Atrium   Normal appearing left atrium.     Left Ventricle   The left ventricle is normal in size, wall thickness, wall motion and   contractility.   Estimated left ventricular ejection fraction is 55-60 %.     Right Atrium   The right atrium appears dilated.     Right Ventricle   The right ventricle is severely dilated.   Mild to moderate, diffuse hypokinesis of the right ventricle is present.     Pericardial Effusion   No evidence of pericardial effusion.     Miscellaneous   IVC is dilated and not collapsing with inspiration.     Impression     Summary     The mitral valve leaflets appear thickened.   Mild to Moderate mitral regurgitation is present.   The aortic valve is well visualized, appears calcified. Valve opening   seems to be normal.   Mild (1+) aortic regurgitation is present.   The tricuspid valve leaflets appear mildly thickened and/or calcified,   but   open well.   Moderate (2+) tricuspid valve regurgitation is present.   Severe pulmonary hypertension.   Pulmonic valve not well seen.   Mild to moderate pulmonic valvular regurgitation is present.   Normal appearing left atrium.   The left ventricle is normal in size, wall thickness, wall motion and   contractility.   Estimated left ventricular ejection fraction is 55-60 %.   The right atrium appears dilated.   The right ventricle is severely dilated.   Mild to moderate, diffuse hypokinesis of the right ventricle is present.     Signature     ----------------------------------------------------------------   Electronically signed by Sam Rush MD(Interpreting   physician) on 03/30/2023 08:00 PM   ------------------------------------------------------------    CRITICAL CARE TIME SPENT: 40 mins assessing presenting problems of acute illness that poses high probability of life threatening deterioration or end organ damage/dysfunction.  Medical decision making including Initiating plan of care, reviewing data, reviewing radiology, direct patient bedside evaluation and interpretation of vital signs, any necessary ventilator management , discussion with multidisciplinary team,all non inclusive of procedures    Patient is a 62y old  Male who presents with a chief complaint of SOB hypoxemia (09 Apr 2023 15:58)      BRIEF HOSPITAL COURSE:   62M with PMHx scleroderma on cellcept, IPF on chronic O2 (8L) who was on lung tx list at [facility], HTN, HLD, CAD, sp stents, severe pHTN who presented to ED with worsening hypoxemia at home and SOB. Initially placed on NIPPV. Subsequently with brief cardiac arrest in ED with ROSC, intubated. Course complicated by CHRIS requiring HD, shock requiring pressors, metabolic encephalopathy.      Events last 24 hours: afebrile, cont to fail SBT, off pressors, overnight with desats requiring escalation of O2 to 100%, remains sedated.     PAST MEDICAL & SURGICAL HISTORY:  HTN (hypertension)      Pulmonary fibrosis      Scleroderma          Review of Systems:  unable to perform at this time, pt intubated and sedated      Medications:  atovaquone  Suspension 750 milliGRAM(s) Oral every 12 hours  meropenem  IVPB 500 milliGRAM(s) IV Intermittent every 24 hours    midodrine 10 milliGRAM(s) Oral every 8 hours  norepinephrine Infusion 0.05 MICROgram(s)/kG/Min IV Continuous <Continuous>      acetaminophen   Oral Liquid .. 650 milliGRAM(s) Oral every 8 hours PRN  propofol Infusion 20 MICROgram(s)/kG/Min IV Continuous <Continuous>      aspirin  chewable 81 milliGRAM(s) Oral daily  heparin   Injectable 5000 Unit(s) SubCutaneous every 12 hours    pantoprazole  Injectable 40 milliGRAM(s) IV Push two times a day      atorvastatin 20 milliGRAM(s) Oral at bedtime  methylPREDNISolone sodium succinate Injectable 40 milliGRAM(s) IV Push every 8 hours    sodium chloride 0.9% lock flush 10 milliLiter(s) IV Push every 1 hour PRN    mycophenolate mofetil Suspension 1000 milliGRAM(s) Oral two times a day    artificial tears (preservative free) Ophthalmic Solution 1 Drop(s) Both EYES four times a day  chlorhexidine 0.12% Liquid 15 milliLiter(s) Oral Mucosa every 12 hours  chlorhexidine 4% Liquid 1 Application(s) Topical <User Schedule>  chlorhexidine 4% Liquid 1 Application(s) Topical <User Schedule>        Mode: AC/ CMV (Assist Control/ Continuous Mandatory Ventilation)  RR (machine): 24  TV (machine): 450  FiO2: 40  PS: 5  PIP: 26      ICU Vital Signs Last 24 Hrs  T(C): 37.8 (10 Apr 2023 00:57), Max: 37.8 (10 Apr 2023 00:57)  T(F): 100 (10 Apr 2023 00:57), Max: 100 (10 Apr 2023 00:57)  HR: 95 (10 Apr 2023 00:00) (60 - 104)  BP: --  BP(mean): --  ABP: 99/58 (10 Apr 2023 00:00) (95/57 - 130/71)  ABP(mean): 75 (10 Apr 2023 00:00) (72 - 93)  RR: 37 (10 Apr 2023 00:00) (17 - 40)  SpO2: 95% (10 Apr 2023 00:00) (92% - 100%)    O2 Parameters below as of 09 Apr 2023 06:00  Patient On (Oxygen Delivery Method): ventilator    O2 Concentration (%): 40      I&O's Summary    08 Apr 2023 07:01  -  09 Apr 2023 07:00  --------------------------------------------------------  IN: 1019.7 mL / OUT: 1500 mL / NET: -480.3 mL    09 Apr 2023 07:01  -  10 Apr 2023 03:39  --------------------------------------------------------  IN: 257.3 mL / OUT: 0 mL / NET: 257.3 mL        LABS:                        7.7    19.07 )-----------( 344      ( 09 Apr 2023 09:15 )             23.7     04-09    136  |  102  |  73<H>  ----------------------------<  139<H>  3.8   |  25  |  6.31<H>    Ca    8.2<L>      09 Apr 2023 09:15  Phos  7.5     04-09  Mg     2.5     04-09    TPro  6.2  /  Alb  2.8<L>  /  TBili  0.8  /  DBili  x   /  AST  80<H>  /  ALT  138<H>  /  AlkPhos  148<H>  04-09          CAPILLARY BLOOD GLUCOSE            CULTURES:  Culture Results:   No growth (04-05 @ 18:40)  Culture Results:   No growth at 48 hours (04-05 @ 14:00)      Physical Examination:    General: sedated and intubated    HEENT: Pupils equal, reactive to light.  Symmetric.    PULM: Course BS bilaterally, no wheezing noted    CVS: Regular rate and rhythm    ABD: Soft, nondistended, normoactive bowel sounds    EXT: trace pretibial edema    SKIN: Warm and well perfused, no rashes noted.    RADIOLOGY:   ACC: [id number] EXAM:  XR CHEST PORTABLE URGENT 1V   ORDERED BY: RYAN JAIN     PROCEDURE DATE:  04/04/2023          INTERPRETATION:  AP chest on erect on April 4, 2022 at 11:48 AM. Patient   had insertion of large right jugular line.    Heart magnified by technique.    Endotracheal tube, nasogastric tube, and left jugular line remain.    There is persistent moderate interstitial CHF similar to April 3.    Mild left base effusion and April 3 improved.    Present film shows a large right jugular line inserted in good position.    IMPRESSION: Persistent CHF. Improved left effusion. Large right jugular   line inserted.    --- End of Report ---            ANT HURT MD; Attending Radiologist  This document has been electronically signed. Apr 4 2023 12:03PM  ACC: [id number] EXAM:  CT BRAIN   ORDERED BY: GENO TIRADO     PROCEDURE DATE:  04/06/2023          INTERPRETATION:  CLINICAL INDICATIONS:  altered mental status    COMPARISON: None.    TECHNIQUE: Noncontrast CT of the head. Multiplanar reformations are   submitted.    FINDINGS:  There is periventricular and subcortical white matter hypodensity without   mass effect, nonspecific, likely representing mild chronic microvascular   ischemic changes. There is no compelling evidence for an acute   transcortical infarction. There is no evidence of mass, mass effect,   midline shift or extra-axial fluid collection. The lateral ventricles and   cortical sulci are age-appropriate in size and configuration. Left-sided   approach NG tube. The orbits, mastoid air cells and visualized paranasal   sinuses are unremarkable. The calvarium is intact. Consider MRI as   clinically warranted.    IMPRESSION:  Mild chronic microvascular changes without evidence of an   acute transcortical infarction or hemorrhage.    --- End of Report ---            SALVATORE BAIRD MD; Attending Radiologist  This document has been electronically signed. Apr 6 2023  4:17PM    ACC: [id number] EXAM:  CT ABDOMEN AND PELVIS   ORDERED BY: GENO TIRADO     PROCEDURE DATE:  04/06/2023          INTERPRETATION:  CLINICAL INFORMATION: Septic shock, advanced pulmonary   fibrosis, now intubated. Abdominal distention    COMPARISON: CT chest 3/20/2023.    CONTRAST/COMPLICATIONS:  IV Contrast: NONE  Oral Contrast: NONE  Complications: None reported at time of study completion    PROCEDURE:  CT of the Abdomen and Pelvis was performed.  Sagittal and coronal reformats were performed.    FINDINGS:  LOWER CHEST: Redemonstrated bilateral groundglass opacities,   reticulation, bronchiectasis and partially visualized bulla in the left   upper lung.    LIVER: Within normal limits.  BILE DUCTS: Normal caliber.  GALLBLADDER: Within normal limits.  SPLEEN: Within normal limits.  PANCREAS: Within normal limits.  ADRENALS: Within normal limits.  KIDNEYS/URETERS: No hydronephrosis. Punctate nonobstructing left renal   calculus.    BLADDER: Within normal limits.  REPRODUCTIVE ORGANS: Partially obscured by streak artifact from right hip   prosthesis.    BOWEL: Enteric tube coiled in the stomach and terminating in the distal   stomach/proximal duodenum. No bowel obstruction. Oral contrast reaches   the rectum. Appendix is normal in caliber. Colonic diverticulosis without   evidence of diverticulosis.  PERITONEUM: No ascites.  VESSELS: Atherosclerotic changes.  RETROPERITONEUM/LYMPH NODES: No lymphadenopathy.  ABDOMINAL WALL: Small fat-containing helical hernia. Ventral subcutaneous   infiltration, may be related to subcutaneous injection. Subcutaneous   edema.  BONES: Degenerative changes. Right hip arthroplasty. Grade 2   anterolisthesis of L5 on S1.    IMPRESSION:  No bowel obstruction.    --- End of Report ---            ROSALVA ESCAMILLA MD; Attending Radiologist  This document has been electronically signed. Apr 6 2023  6:57PM  ACC: [id number] EXAM:  ECHO TTE WO CON COMP W DOPP   ORDERED BY: RALPH PRATER     PROCEDURE DATE:  03/30/2023          INTERPRETATION:  Transthoracic Echocardiography Report (TTE)     Demographics     Patient name          HERRERA CAIN          Age           62 year(s)     Med Rec #             [id number]            Gender        Male     Account #             [id number]         Date of Birth 1960     Interpreting          Sam Rush MD     Room Number   0034   Physician     Referring Physician   MD Shirley       Sonographer   Lacey Hdez     Date of study         03/30/2023 07:17 AM     Height                70.08 in             Weight        154.32 pounds    Type of Study:     TTE procedure: ECHO TTE WO CON COMP W DOP     BP: 88/53 mmHg     Study Location: [facility] Quality: Technically Difficullt    Indications   1) I27.2 - Pulmonary hypertension    M-Mode Measurements (cm)     LVEDd: 4.82 cm            LVESd: 3.22 cm   IVSEd: 1.1 cm   LVPWd: 0.77 cm            AO Root Dimension: 4.1 cm                             ACS: 1.9 cm                             LA: 3 cm    Doppler Measurements:     AV Velocity:144 cm/s               MV Peak E-Wave: 67.6 cm/s   AV Peak Gradient: 8.29 mmHg        MV Peak A-Wave: 45.4 cm/s                                      MV E/A Ratio: 1.49 %   TR Velocity:409 cm/s               MV Peak Gradient: 1.83 mmHg   TR Gradient:66.9124 mmHg   Estimated RAP:15 mmHg   RVSP:82 mmHg     Findings     Mitral Valve   The mitral valve leaflets appear thickened.   Mild to Moderate mitral regurgitation is present.     Aortic Valve   The aortic valve is well visualized, appears calcified. Valve opening   seems to be normal.   Mild (1+) aortic regurgitation is present.     Tricuspid Valve   The tricuspid valve leaflets appear mildly thickened and/or calcified,   but   open well.   Moderate (2+) tricuspid valve regurgitation is present.   Severe pulmonary hypertension.     Pulmonic Valve   Pulmonic valve not well seen.   Mild to moderate pulmonic valvular regurgitation is present.     Left Atrium   Normal appearing left atrium.     Left Ventricle   The left ventricle is normal in size, wall thickness, wall motion and   contractility.   Estimated left ventricular ejection fraction is 55-60 %.     Right Atrium   The right atrium appears dilated.     Right Ventricle   The right ventricle is severely dilated.   Mild to moderate, diffuse hypokinesis of the right ventricle is present.     Pericardial Effusion   No evidence of pericardial effusion.     Miscellaneous   IVC is dilated and not collapsing with inspiration.     Impression     Summary     The mitral valve leaflets appear thickened.   Mild to Moderate mitral regurgitation is present.   The aortic valve is well visualized, appears calcified. Valve opening   seems to be normal.   Mild (1+) aortic regurgitation is present.   The tricuspid valve leaflets appear mildly thickened and/or calcified,   but   open well.   Moderate (2+) tricuspid valve regurgitation is present.   Severe pulmonary hypertension.   Pulmonic valve not well seen.   Mild to moderate pulmonic valvular regurgitation is present.   Normal appearing left atrium.   The left ventricle is normal in size, wall thickness, wall motion and   contractility.   Estimated left ventricular ejection fraction is 55-60 %.   The right atrium appears dilated.   The right ventricle is severely dilated.   Mild to moderate, diffuse hypokinesis of the right ventricle is present.     Signature     ----------------------------------------------------------------   Electronically signed by Sam Rush MD(Interpreting   physician) on 03/30/2023 08:00 PM   ------------------------------------------------------------    CRITICAL CARE TIME SPENT: 40 mins assessing presenting problems of acute illness that poses high probability of life threatening deterioration or end organ damage/dysfunction.  Medical decision making including Initiating plan of care, reviewing data, reviewing radiology, direct patient bedside evaluation and interpretation of vital signs, any necessary ventilator management , discussion with multidisciplinary team, all non inclusive of procedures

## 2023-04-11 NOTE — PROGRESS NOTE ADULT - ASSESSMENT
61 yo man with scleroderma, pulmonary fibrosis /severe pulmonary HTN admitted with progressive resp failure.  Now intubated and on pressors due to shock and now post cardiac arrest, septic vs cardiogenic.  --CHRIS post CTA ( unclear hx of CKD, creat 1.47 on admission and on ACEI) and shock. oliguric.    No immediate need for dialysis.    Check repeat labs for K and acidosis.    Continue pressor/vent support    Continue abtx.    D/w Dr Watson.    3/31 SY  --CHRIS post cardiac arrest :  Continues to worsen with Oliguria     No immediate need for dialysis.    Will assess again in am    D/w Dr Watson re : poor pulmonary prognosis.    4/1 SY  --CHRIS post cardiac arrest : continues to worsen with no urine output.    No immediate indication for dialysis.    HD to be considered when indicated with electrolyte disturbances.    Oxygenation acceptable.  --Change enteral feeding to Nepro for now.     D/w Dr Watson    4/2 SY  --CHRIS post cardiac arrest : continues to worsen with no urine output.     Continue to assess for indications for dialysis.     No urgent need for dialysis today as electrolytes acceptable.     Would allow more time for BP to stabilize.  --Oxygenation acceptable.  --ID : on Meropenem and Caspofungin  --Scleroderma : continue MMF.  --Severe pulmonary fibrosis and HTN : Not a TP candidate at this time.    4/3 MK   - CHRIS with component of VIET and ATN     no indication for HD today    no FW flushes with TF    Hep panel in anticipation of possible HD   dw rn and wife updated     4/4 MK   - CHRIS with worsening acidosis and hyperkalemia andhyponatremia     dw wife need for HD, consent obtained     TDC by intensivist team     she is concerned about long term need for dialysis if he is in the future deemed not to be a     lung transplant patient   dw wife     4/5 SY  --CHRIS, with worsening metabolic derangement.      1st HD yesterday.    Second HD today with goal of 3 KG UF.  --BP in 100's on Norepi.  --Resp : pulmonary fibrosis : continue vent support.  --Scleroderma : continue MMF.    4/6 SY  --CHRIS : 3rd HD today.  aim for ~ 2 kg UF.   SPA and on pressor.  --Continue Pressor.  --Hyperphosphatemia : start binder once feeding started.  --Resp : pulmonary fibrosis  : Now lung TP consideration on hold  Continue vent support  --Scleroderma : continue MMF.    4/7   Above noted   pt s/p HD x 3  Labs stable  Replace K    4/8  seen on HD   tolerated well  Palliative working w Family  Transfusion decision as per Intensivist team    4/10 MK   - CHRIS/ATN for hd on TTS schedule, oliguric   - hypoatremia, correction with hd   - leukocytosis worsening    on meropenem, atovoquone     methylprednisone   - scleroderma on MMF   wilma faulkner            61 yo man with scleroderma, pulmonary fibrosis /severe pulmonary HTN admitted with progressive resp failure.  Now intubated and on pressors due to shock and now post cardiac arrest, septic vs cardiogenic.  --CHRIS post CTA ( unclear hx of CKD, creat 1.47 on admission and on ACEI) and shock. oliguric.    No immediate need for dialysis.    Check repeat labs for K and acidosis.    Continue pressor/vent support    Continue abtx.    D/w Dr Watson.    3/31 SY  --CHRIS post cardiac arrest :  Continues to worsen with Oliguria     No immediate need for dialysis.    Will assess again in am    D/w Dr Watson re : poor pulmonary prognosis.    4/1 SY  --CHRIS post cardiac arrest : continues to worsen with no urine output.    No immediate indication for dialysis.    HD to be considered when indicated with electrolyte disturbances.    Oxygenation acceptable.  --Change enteral feeding to Nepro for now.     D/w Dr Watson    4/2 SY  --CHRIS post cardiac arrest : continues to worsen with no urine output.     Continue to assess for indications for dialysis.     No urgent need for dialysis today as electrolytes acceptable.     Would allow more time for BP to stabilize.  --Oxygenation acceptable.  --ID : on Meropenem and Caspofungin  --Scleroderma : continue MMF.  --Severe pulmonary fibrosis and HTN : Not a TP candidate at this time.    4/3 MK   - CHRIS with component of VIET and ATN     no indication for HD today    no FW flushes with TF    Hep panel in anticipation of possible HD   dw rn and wife updated     4/4 MK   - CHRIS with worsening acidosis and hyperkalemia andhyponatremia     dw wife need for HD, consent obtained     TDC by intensivist team     she is concerned about long term need for dialysis if he is in the future deemed not to be a     lung transplant patient   dw wife     4/5 SY  --CHRIS, with worsening metabolic derangement.      1st HD yesterday.    Second HD today with goal of 3 KG UF.  --BP in 100's on Norepi.  --Resp : pulmonary fibrosis : continue vent support.  --Scleroderma : continue MMF.    4/6 SY  --CHRIS : 3rd HD today.  aim for ~ 2 kg UF.   SPA and on pressor.  --Continue Pressor.  --Hyperphosphatemia : start binder once feeding started.  --Resp : pulmonary fibrosis  : Now lung TP consideration on hold  Continue vent support  --Scleroderma : continue MMF.    4/7   Above noted   pt s/p HD x 3  Labs stable  Replace K    4/8  seen on HD   tolerated well  Palliative working w Family  Transfusion decision as per Intensivist team    4/10 MK   - CHRIS/ATN for hd on TTS schedule, oliguric   - hypoatremia, correction with hd   - leukocytosis worsening    on meropenem, atovoquone     methylprednisone   - scleroderma on MMF   dw dr faulkner     4/11 MK   - CHRIS/ATN tolerating hd  oliguric  - leukocytosis improving     on meropenem, atovoquone     methylprednisone started 4/5    ID intput noted  - scleroderma on MMF   - GOC discussion noted, pt dnr/dni

## 2023-04-11 NOTE — PROGRESS NOTE ADULT - SUBJECTIVE AND OBJECTIVE BOX
Date of service: 04-11-23 @ 11:50    pt seen and examined  on ventilatory support  off pressors   temps down  encephalopathic    ROS: unable to obtain d/t medical condition      MEDICATIONS  (STANDING):  artificial tears (preservative free) Ophthalmic Solution 1 Drop(s) Both EYES four times a day  aspirin  chewable 81 milliGRAM(s) Oral daily  atorvastatin 20 milliGRAM(s) Oral at bedtime  atovaquone  Suspension 750 milliGRAM(s) Oral every 12 hours  chlorhexidine 0.12% Liquid 15 milliLiter(s) Oral Mucosa every 12 hours  chlorhexidine 4% Liquid 1 Application(s) Topical <User Schedule>  chlorhexidine 4% Liquid 1 Application(s) Topical <User Schedule>  fentaNYL    Injectable 50 MICROGram(s) IV Push once  heparin   Injectable 5000 Unit(s) SubCutaneous every 12 hours  meropenem  IVPB 500 milliGRAM(s) IV Intermittent every 24 hours  methylPREDNISolone sodium succinate Injectable 40 milliGRAM(s) IV Push every 8 hours  midodrine 10 milliGRAM(s) Oral every 8 hours  mycophenolate mofetil Suspension 1000 milliGRAM(s) Oral two times a day  pantoprazole  Injectable 40 milliGRAM(s) IV Push two times a day  propofol Infusion 20 MICROgram(s)/kG/Min (8.45 mL/Hr) IV Continuous <Continuous>      Vital Signs Last 24 Hrs  T(C): 37.2 (11 Apr 2023 10:54), Max: 37.3 (10 Apr 2023 17:00)  T(F): 98.9 (11 Apr 2023 10:54), Max: 99.2 (10 Apr 2023 17:00)  HR: 97 (11 Apr 2023 11:24) (84 - 97)  BP: 114/71 (11 Apr 2023 10:54) (114/71 - 114/71)  BP(mean): --  RR: 35 (11 Apr 2023 11:24) (28 - 35)  SpO2: 94% (11 Apr 2023 11:24) (90% - 98%)    Parameters below as of 11 Apr 2023 11:24  Patient On (Oxygen Delivery Method): ventilator    O2 Concentration (%): 40      PE:  Constitutional: NAD, sedated/intubated   HEENT: NC/AT, EOMI, PERRLA, conjunctivae clear; ears and nose atraumatic; pharynx benign  Neck: supple; thyroid not palpable  Back: no tenderness  Respiratory: decreased breath sounds, rales   Cardiovascular: S1S2 regular, no murmurs  Abdomen: soft, not tender, not distended, positive BS; liver and spleen WNL  Genitourinary: no suprapubic tenderness  Lymphatic: no LN palpable  Musculoskeletal: no muscle tenderness, no joint swelling or tenderness  Extremities: no pedal edema  Neurological/ Psychiatric:  moving all extremities  Skin: no rashes; no palpable lesions    Labs: all available labs reviewed                                   7.8    28.56 )-----------( 375      ( 11 Apr 2023 05:30 )             24.4     04-11    136  |  99  |  131<H>  ----------------------------<  139<H>  4.6   |  20<L>  |  9.18<H>    Ca    7.8<L>      11 Apr 2023 05:30  Phos  10.2     04-11  Mg     2.9     04-11         Cultures:   Culture - Blood (03.30.23 @ 09:21)   Specimen Source: .Blood   Blood-Peripheral  Culture Results:   No growth to date.  Culture - Blood (03.30.23 @ 09:20)   Specimen Source: .Blood None  Culture Results:   No growth to date.  Culture - Sputum . (03.29.23 @ 16:59)   Gram Stain:   Few polymorphonuclear leukocytes per low power field   No Squamous epithelial cells per low power field   No organisms seen per oil power field  Specimen Source: .Sputum Sputum  Culture Results:   No growth at 48 hours    Radiology: all available radiological tests reviewed    ACC: [id number] EXAM:  XR CHEST PORTABLE IMMED 1V   ORDERED BY: RALPH PRATER     PROCEDURE DATE:  03/29/2023          INTERPRETATION:  Portable chest radiograph    CLINICAL INFORMATION:   Short of breath.    TECHNIQUE:  Portable  AP view of the chest was obtained.    COMPARISON: 3/29/2023 chest x-ray available for review.    FINDINGS: ET tube tip above tracheal bifurcation.  NG tube tip beyond GE junction.  LEFT IJ catheter tip in SVC.      There is cardiomegaly, vascular congestion and perihilar interstitial   infiltrates without gross effusion.  Trachea midline. No hilar mass.  Visualized osseous structures are intact.        IMPRESSION: Catheters and tubes in place. An  Cardiomegaly.  Bilateral perihilar/bibasilar diffuse airspace disease.        ACC: [id number] EXAM:  CT ANGIO CHEST PULM [facility]   ORDERED BY: ROME RUFFIN     PROCEDURE DATE:  03/28/2023          INTERPRETATION:  CLINICAL INFORMATION: Shortness of breath. History of   scleroderma.    COMPARISON: 10/28/2018    CONTRAST/COMPLICATIONS:  IV Contrast: Omnipaque 350  90 cc administered   10 cc discarded  Oral Contrast: NONE  Complications: None reported at time of study completion    PROCEDURE:  CT Angiography of the Chest.  Sagittal and coronal reformats were performed as well as 3D (MIP)   reconstructions.    FINDINGS:    LUNGS AND AIRWAYS: Patent central airways.  Revisualized chronic   interstitial disease including hazy bilateral ground glass opacities,   reticulation and worsened bronchiectasis bilaterally. Large bulla in the   left upper lobe.  PLEURA: No pleural effusion.  MEDIASTINUM AND MALLORY: No lymphadenopathy.  VESSELS: Aortic and coronary artery atherosclerosis. Enlarged main   pulmonary artery, unchanged, can be seen with pulmonary arterial   hypertension. No pulmonary embolism.  HEART: Cardiomegaly. Small pericardial effusion.  CHEST WALL AND LOWER NECK: Bilateral mediastinal and hilar adenopathy   increased since prior exam including extensive adenopathy in the   prevascular space.  VISUALIZED UPPER ABDOMEN: Right renal hypodensity too small to   characterize. Colonic diverticulosis.  BONES: Degenerative changes. Dextroscoliosis of the thoracic spine.   Chronic right rib deformities.    IMPRESSION:  No pulmonary embolism.  Chronic interstitial disease with worsened bronchiectasis.  New mediastinal and hilar adenopathy, which is indeterminate but could be   related to scleroderma and interstitial disease. Continued follow-up is   recommended.        --- End of Report ---    < end of copied text >    Advanced directives addressed: full resuscitation

## 2023-04-11 NOTE — PROGRESS NOTE ADULT - SUBJECTIVE AND OBJECTIVE BOX
HPI:  62 year old man with a history of scleroderma, pulmonary fibrosis with chronic respiratory failure (supplemental O2 dependent; listed on lung-transplant list at [place]), severe pulmonary hypertension, CAD s/p RCA stents (2018), HTN, HLD admitted with acute/chronic respiratory failure (required mechanical ventilation) with hospitalization complicated by cardiac arrest preceded by severe bradycardia (3/29/23).    3/29/23: Pt intubated and sedated.  3/30/23:  Events past 24 hours noted and discussed with CCU team; patient is sedated on vent.  3/31/23: Pt sedated on pressors. Family at bedside  4/1/23: Sedated pressors being tapered. Labs reviewed.  4/2/23: Pressors weaning. Arousable  4/3/'23: no changes.  4/4/23;  intubated , sedated , on pressors,  echo showed normal LVEF ; severe pulmonary hypertension   4/6/23 Patient is intubated , on sedation , low dose pressors on dialysis , patient was given tyvaso, did not take due to insurance issue  , febrile episodes   4/7/'23: on sedation, intubated  4/8/'23: no changes overnight.  4/9/'23: intubated, sedated.  4/10/'23: no changes.  4/11/23 same , intubated on sedation ,  on dialysis       MEDICATIONS:  OUTPATIENT  Home Medications:  aspirin 81 mg oral tablet, chewable: 1 tab(s) orally once a day (31 Oct 2018 10:16)  azithromycin 500 mg intravenous injection: 500 milligram(s) intravenous every 24 hours (28 Mar 2023 14:31)  enoxaparin: 40 milligram(s) subcutaneous once a day (28 Mar 2023 14:31)  mycophenolate mofetil 250 mg oral capsule: 4 cap(s) orally 2 times a day (28 Mar 2023 14:31)  piperacillin-tazobactam: 3.375 gram(s) injectable every 8 hours (28 Mar 2023 14:31)  quinapril 10 mg oral tablet: 1 tab(s) orally once a day (28 Oct 2018 22:58)    MEDICATIONS  (STANDING):  artificial tears (preservative free) Ophthalmic Solution 1 Drop(s) Both EYES four times a day  aspirin  chewable 81 milliGRAM(s) Oral daily  atorvastatin 20 milliGRAM(s) Oral at bedtime  atovaquone  Suspension 750 milliGRAM(s) Oral every 12 hours  chlorhexidine 0.12% Liquid 15 milliLiter(s) Oral Mucosa every 12 hours  chlorhexidine 4% Liquid 1 Application(s) Topical <User Schedule>  chlorhexidine 4% Liquid 1 Application(s) Topical <User Schedule>  fentaNYL    Injectable 50 MICROGram(s) IV Push once  heparin   Injectable 5000 Unit(s) SubCutaneous every 12 hours  meropenem  IVPB 500 milliGRAM(s) IV Intermittent every 24 hours  methylPREDNISolone sodium succinate Injectable 40 milliGRAM(s) IV Push every 8 hours  midodrine 10 milliGRAM(s) Oral every 8 hours  mycophenolate mofetil Suspension 1000 milliGRAM(s) Oral two times a day  pantoprazole  Injectable 40 milliGRAM(s) IV Push two times a day  propofol Infusion 20 MICROgram(s)/kG/Min (8.45 mL/Hr) IV Continuous <Continuous>    MEDICATIONS  (PRN):  acetaminophen   Oral Liquid .. 650 milliGRAM(s) Oral every 8 hours PRN Temp greater or equal to 38C (100.4F)  sodium chloride 0.9% lock flush 10 milliLiter(s) IV Push every 1 hour PRN Pre/post blood products, medications, blood draw, and to maintain line patency      Vital Signs Last 24 Hrs  T(C): 36.9 (11 Apr 2023 08:20), Max: 37.3 (10 Apr 2023 17:00)  T(F): 98.5 (11 Apr 2023 08:20), Max: 99.2 (10 Apr 2023 17:00)  HR: 94 (11 Apr 2023 10:54) (84 - 94)  BP: 114/71 (11 Apr 2023 10:54) (114/71 - 114/71)  BP(mean): --  RR: 30 (11 Apr 2023 09:00) (28 - 34)  SpO2: 95% (11 Apr 2023 10:54) (90% - 98%)    Parameters below as of 11 Apr 2023 10:54  Patient On (Oxygen Delivery Method): ventilator    O2 Concentration (%): 40    I&O's Summary    10 Apr 2023 07:01  -  11 Apr 2023 07:00  --------------------------------------------------------  IN: 600 mL / OUT: 0 mL / NET: 600 mL                PHYSICAL EXAM:    Constitutional: intubated sedated.  HEENT: PERR, EOMI,  No oral cyananosis.  Neck:  supple,  No JVD  Respiratory: Breath sounds are clear bilaterally, scattered rhonchi   Cardiovascular: S1 and S2, regular rate and rhythm, no Murmurs, gallops or rubs  Gastrointestinal: Bowel Sounds present, soft, nontender.   Extremities: trace peripheral edema. No clubbing or cyanosis.  Vascular: 2+ peripheral pulses  Musculoskeletal: no calf tenderness.  Skin: No rashes.    ===============================  ===============================  LABS:                                  7.8    28.56 )-----------( 375      ( 11 Apr 2023 05:30 )             24.4     04-11    136  |  99  |  131<H>  ----------------------------<  139<H>  4.6   |  20<L>  |  9.18<H>    Ca    7.8<L>      11 Apr 2023 05:30  Phos  10.2     04-11  Mg     2.9     04-11        ===============================  ECG:      Diagnosis Line Normal sinus rhythm  T wave abnormality, consider anterior ischemia  Abnormal ECG  When compared with ECG of 30-OCT-2018 07:24,  Vent. rate has increased BY  33 BPM  Questionable change in QRS axis  T wave inversion now evident in Anterior leads  Confirmed by DIANE CULVER, SO PINEDA ([id number]) on 3/29/2023 4:48:13 PM    ===============================  RADIOLOGY:  < from: CT Angio Chest PE Protocol w/ IV Cont (03.28.23 @ 00:21) >  ACC: [id number] EXAM:  CT ANGIO CHEST PULM [facility]   ORDERED BY: ROME RUFFIN     PROCEDURE DATE:  03/28/2023          INTERPRETATION:  CLINICAL INFORMATION: Shortness of breath. History of   scleroderma.    COMPARISON: 10/28/2018    CONTRAST/COMPLICATIONS:  IV Contrast: Omnipaque 350  90 cc administered   10 cc discarded  Oral Contrast: NONE  Complications: None reported at time of study completion    PROCEDURE:  CT Angiography of the Chest.  Sagittal and coronal reformats were performed as well as 3D (MIP)   reconstructions.    FINDINGS:    LUNGS AND AIRWAYS: Patent central airways.  Revisualized chronic   interstitial disease including hazy bilateral ground glass opacities,   reticulation and worsened bronchiectasis bilaterally. Large bulla in the   left upper lobe.  PLEURA: No pleural effusion.  MEDIASTINUM AND MALLORY: No lymphadenopathy.  VESSELS: Aortic and coronary artery atherosclerosis. Enlarged main   pulmonary artery, unchanged, can be seen with pulmonary arterial   hypertension. No pulmonary embolism.  HEART: Cardiomegaly. Small pericardial effusion.  CHEST WALL AND LOWER NECK: Bilateral mediastinal and hilar adenopathy   increased since prior exam including extensive adenopathy in the   prevascular space.  VISUALIZED UPPER ABDOMEN: Right renal hypodensity too small to   characterize. Colonic diverticulosis.  BONES: Degenerative changes. Dextroscoliosis of the thoracic spine.   Chronic right rib deformities.    IMPRESSION:  No pulmonary embolism.  Chronic interstitial disease with worsened bronchiectasis.  New mediastinal and hilar adenopathy, which is indeterminate but could be   related to scleroderma and interstitial disease. Continued follow-up is   recommended.        --- End of Report ---            TYE CULVER; Attending Radiologist  This document has been electronically signed. Mar 28 2023  1:01AM    < end of copied text >    ===============================  ECHO:  Outpatient Echo Jun '22: EF 55-60%, borderline pulmonary hypertension.  normal RV size & function.    ===============================  < from: TTE Echo Complete w/o Contrast w/ Doppler (03.30.23 @ 07:51) >     The mitral valve leaflets appear thickened.   Mild to Moderate mitral regurgitation is present.   The aortic valve is well visualized, appears calcified. Valve opening   seems to be normal.   Mild (1+) aortic regurgitation is present.   The tricuspid valve leaflets appear mildly thickened and/or calcified,   but   open well.   Moderate (2+) tricuspid valve regurgitation is present.   Severe pulmonary hypertension.   Pulmonic valve not well seen.   Mild to moderate pulmonic valvular regurgitation is present.   Normal appearing left atrium.   The left ventricle is normal in size, wall thickness, wallmotion and   contractility.   Estimated left ventricular ejection fraction is 55-60 %.   The right atrium appears dilated.   The right ventricle is severely dilated.   Mild to moderate, diffuse hypokinesis of the right ventricle is present.     Signature    < end of copied text >      Monitor sinus rhythm  PVS   reviewed         ===============================

## 2023-04-11 NOTE — PROGRESS NOTE ADULT - SUBJECTIVE AND OBJECTIVE BOX
NEPHROLOGY INTERVAL HPI/OVERNIGHT EVENTS:  04-11-23 @ 11:09    4/11  4/10 events noted for palliative meeting, wbc rising   4/8- seen on HD, tolerating well, intubated  4/7- s/p HD x 3 days, labs stabilized intubated on pressors  4/6--ON vent,  Remains on Norepi.   BP in 100's.  NO enteral feeding  4/5--Remains on vent.  On Norepi.  Second HD in progress.  4/4 remains anuric with inc sob and worsening renal parameters   4/3 still anuric, dec pressors requirements on levo 0.12 only  on, propofol and TF on hold with no fw flushes    HPI:  61 yo man with PMHX of Scleroderma treated with Cellcept and pulmonary fibrosis on home O2 8 L.  Hx of CAD ( stents x 4 to RCA)  Admission at [facility] in Feb due to resp distress and treated in ICU with pulmonary vasodilators.  R cath done with severe pulmonary HTN and was to have evaluation done for TP.  Started on Tyvaso several days ago.  Presented with 3 week hx fo progressive SOB, poor appetite and worsening LACEY walking short distance.  Admitted early 3/28 to CCU and placed on Bipap , given IV lasix,  and Zosyn/Azithromax.  CTA negative for PE.  Was awaiting transfer to [facility] [place].  on 3/29--resp status further deteriorate--Intubated  and started on pressors.  Late yesterday, resuscitated post cardiac arrest.  Now noted with acute worsening of renal function.    PMHX and PSHX.  --Pulmonary fibrosis  --Scleroderma  --CAD ( stent x 4)        MEDICATIONS  (STANDING):  artificial tears (preservative free) Ophthalmic Solution 1 Drop(s) Both EYES four times a day  aspirin  chewable 81 milliGRAM(s) Oral daily  atorvastatin 20 milliGRAM(s) Oral at bedtime  atovaquone  Suspension 750 milliGRAM(s) Oral every 12 hours  chlorhexidine 0.12% Liquid 15 milliLiter(s) Oral Mucosa every 12 hours  chlorhexidine 4% Liquid 1 Application(s) Topical <User Schedule>  chlorhexidine 4% Liquid 1 Application(s) Topical <User Schedule>  fentaNYL    Injectable 50 MICROGram(s) IV Push once  heparin   Injectable 5000 Unit(s) SubCutaneous every 12 hours  meropenem  IVPB 500 milliGRAM(s) IV Intermittent every 24 hours  methylPREDNISolone sodium succinate Injectable 40 milliGRAM(s) IV Push every 8 hours  midodrine 10 milliGRAM(s) Oral every 8 hours  mycophenolate mofetil Suspension 1000 milliGRAM(s) Oral two times a day  pantoprazole  Injectable 40 milliGRAM(s) IV Push two times a day  propofol Infusion 20 MICROgram(s)/kG/Min (8.45 mL/Hr) IV Continuous <Continuous>    MEDICATIONS  (PRN):  acetaminophen   Oral Liquid .. 650 milliGRAM(s) Oral every 8 hours PRN Temp greater or equal to 38C (100.4F)  sodium chloride 0.9% lock flush 10 milliLiter(s) IV Push every 1 hour PRN Pre/post blood products, medications, blood draw, and to maintain line patency      Allergies    No Known Allergies    Intolerances        I&O's Detail    10 Apr 2023 07:01  -  11 Apr 2023 07:00  --------------------------------------------------------  IN:    IV PiggyBack: 50 mL    Nepro with Carb Steady: 360 mL    Propofol: 190 mL  Total IN: 600 mL    OUT:  Total OUT: 0 mL    Total NET: 600 mL          .    Patient was seen and evaluated on dialysis.   Patient is tolerating the procedure well.   Continue dialysis:   Dialyzer:          QB:        QD:   Goal UF ___ over ___ Hours       Vital Signs Last 24 Hrs  T(C): 36.9 (11 Apr 2023 08:20), Max: 37.3 (10 Apr 2023 17:00)  T(F): 98.5 (11 Apr 2023 08:20), Max: 99.2 (10 Apr 2023 17:00)  HR: 94 (11 Apr 2023 09:00) (84 - 94)  BP: --  BP(mean): --  RR: 30 (11 Apr 2023 09:00) (28 - 34)  SpO2: 93% (11 Apr 2023 09:00) (90% - 98%)    Parameters below as of 11 Apr 2023 07:00  Patient On (Oxygen Delivery Method): ventilator    O2 Concentration (%): 40  Daily     Daily     PHYSICAL EXAM:  General: alert. awake Ox3  HEENT: MMM  CV: s1s2 rrr  LUNGS: B/L CTA  EXT: no edema    LABS:                        7.8    28.56 )-----------( 375      ( 11 Apr 2023 05:30 )             24.4     04-11    136  |  99  |  131<H>  ----------------------------<  139<H>  4.6   |  20<L>  |  9.18<H>    Ca    7.8<L>      11 Apr 2023 05:30  Phos  10.2     04-11  Mg     2.9     04-11          Magnesium, Serum: 2.9 mg/dL (04-11 @ 05:30)  Phosphorus Level, Serum: 10.2 mg/dL (04-11 @ 05:30)    ABG - ( 10 Apr 2023 03:50 )  pH, Arterial: 7.30  pH, Blood: x     /  pCO2: 41    /  pO2: 344   / HCO3: 20    / Base Excess: -5.9  /  SaO2: 100                NEPHROLOGY INTERVAL HPI/OVERNIGHT EVENTS:  04-11-23 @ 11:09    4/11 seen at hd, off pressors on tf only   4/10 events noted for palliative meeting, wbc rising   4/8- seen on HD, tolerating well, intubated  4/7- s/p HD x 3 days, labs stabilized intubated on pressors  4/6--ON vent,  Remains on Norepi.   BP in 100's.  NO enteral feeding  4/5--Remains on vent.  On Norepi.  Second HD in progress.  4/4 remains anuric with inc sob and worsening renal parameters   4/3 still anuric, dec pressors requirements on levo 0.12 only  on, propofol and TF on hold with no fw flushes    HPI:  61 yo man with PMHX of Scleroderma treated with Cellcept and pulmonary fibrosis on home O2 8 L.  Hx of CAD ( stents x 4 to RCA)  Admission at [facility] in Feb due to resp distress and treated in ICU with pulmonary vasodilators.  R cath done with severe pulmonary HTN and was to have evaluation done for TP.  Started on Tyvaso several days ago.  Presented with 3 week hx fo progressive SOB, poor appetite and worsening LACEY walking short distance.  Admitted early 3/28 to CCU and placed on Bipap , given IV lasix,  and Zosyn/Azithromax.  CTA negative for PE.  Was awaiting transfer to [facility] [place].  on 3/29--resp status further deteriorate--Intubated  and started on pressors.  Late yesterday, resuscitated post cardiac arrest.  Now noted with acute worsening of renal function.    PMHX and PSHX.  --Pulmonary fibrosis  --Scleroderma  --CAD ( stent x 4)        MEDICATIONS  (STANDING):  artificial tears (preservative free) Ophthalmic Solution 1 Drop(s) Both EYES four times a day  aspirin  chewable 81 milliGRAM(s) Oral daily  atorvastatin 20 milliGRAM(s) Oral at bedtime  atovaquone  Suspension 750 milliGRAM(s) Oral every 12 hours  chlorhexidine 0.12% Liquid 15 milliLiter(s) Oral Mucosa every 12 hours  chlorhexidine 4% Liquid 1 Application(s) Topical <User Schedule>  chlorhexidine 4% Liquid 1 Application(s) Topical <User Schedule>  fentaNYL    Injectable 50 MICROGram(s) IV Push once  heparin   Injectable 5000 Unit(s) SubCutaneous every 12 hours  meropenem  IVPB 500 milliGRAM(s) IV Intermittent every 24 hours  methylPREDNISolone sodium succinate Injectable 40 milliGRAM(s) IV Push every 8 hours  midodrine 10 milliGRAM(s) Oral every 8 hours  mycophenolate mofetil Suspension 1000 milliGRAM(s) Oral two times a day  pantoprazole  Injectable 40 milliGRAM(s) IV Push two times a day  propofol Infusion 20 MICROgram(s)/kG/Min (8.45 mL/Hr) IV Continuous <Continuous>    MEDICATIONS  (PRN):  acetaminophen   Oral Liquid .. 650 milliGRAM(s) Oral every 8 hours PRN Temp greater or equal to 38C (100.4F)  sodium chloride 0.9% lock flush 10 milliLiter(s) IV Push every 1 hour PRN Pre/post blood products, medications, blood draw, and to maintain line patency      Allergies    No Known Allergies    Intolerances        I&O's Detail    10 Apr 2023 07:01  -  11 Apr 2023 07:00  --------------------------------------------------------  IN:    IV PiggyBack: 50 mL    Nepro with Carb Steady: 360 mL    Propofol: 190 mL  Total IN: 600 mL    OUT:  Total OUT: 0 mL    Total NET: 600 mL          .    Patient was seen and evaluated on dialysis.   Patient is tolerating the procedure well.   Continue dialysis:   Dialyzer:     f180 bfr 350-400 uf goal of 2 kg temp amanda       Vital Signs Last 24 Hrs  T(C): 36.9 (11 Apr 2023 08:20), Max: 37.3 (10 Apr 2023 17:00)  T(F): 98.5 (11 Apr 2023 08:20), Max: 99.2 (10 Apr 2023 17:00)  HR: 94 (11 Apr 2023 09:00) (84 - 94)  BP: --  BP(mean): --  RR: 30 (11 Apr 2023 09:00) (28 - 34)  SpO2: 93% (11 Apr 2023 09:00) (90% - 98%)    Parameters below as of 11 Apr 2023 07:00  Patient On (Oxygen Delivery Method): ventilator    O2 Concentration (%): 40  Daily     Daily     PHYSICAL EXAM:  General:sedated  HEENT: orally intubated   CV: s1s2 rrr  LUNGS: B/L CTA  EXT: no edema    LABS:                        7.8    28.56 )-----------( 375      ( 11 Apr 2023 05:30 )             24.4     04-11    136  |  99  |  131<H>  ----------------------------<  139<H>  4.6   |  20<L>  |  9.18<H>    Ca    7.8<L>      11 Apr 2023 05:30  Phos  10.2     04-11  Mg     2.9     04-11          Magnesium, Serum: 2.9 mg/dL (04-11 @ 05:30)  Phosphorus Level, Serum: 10.2 mg/dL (04-11 @ 05:30)    ABG - ( 10 Apr 2023 03:50 )  pH, Arterial: 7.30  pH, Blood: x     /  pCO2: 41    /  pO2: 344   / HCO3: 20    / Base Excess: -5.9  /  SaO2: 100

## 2023-04-11 NOTE — PROGRESS NOTE ADULT - SUBJECTIVE AND OBJECTIVE BOX
Patient is a 62y old  Male who presents with a chief complaint of SOB hypoxemia (10 Apr 2023 16:01)    HPI:  62M hx PSS (scleroderma) on cellcept with pulmonary fibrosis on 8L of 02 24/7, HTN HLD  CAD with 4 stents to the RCA 2018 with Dr Griffith. He is on the lung transplant list at [facility] with Dr Manas Priest (transplant pulmonologist).  Admitted for acute hypoxic respiratory failure eventually requiring mechanical ventilation    At bedside patient is on full mechanical ventilation. Sedated with Propofol gtt while and currently overbreathing ventilator    Allergies: No Known Allergies    PAST MEDICAL & SURGICAL HISTORY:  HTN (hypertension)      Pulmonary fibrosis      Scleroderma        FAMILY HISTORY:    SOCIAL HISTORY:    Home Medications:    Review of Systems:  Unable to participate in ROS while on ventilator    T(F): 98.5 (04-11-23 @ 08:20), Max: 99.2 (04-10-23 @ 17:00)  HR: 94 (04-11-23 @ 09:00) (84 - 94)  BP: --  RR: 30 (04-11-23 @ 09:00) (28 - 34)  SpO2: 93% (04-11-23 @ 09:00)  Wt(kg): --  Mode: AC/ CMV (Assist Control/ Continuous Mandatory Ventilation), RR (machine): 24, TV (machine): 450, FiO2: 40, PEEP: 8, PS: 8  CAPILLARY BLOOD GLUCOSE        I&O's Summary    10 Apr 2023 07:01  -  11 Apr 2023 07:00  --------------------------------------------------------  IN: 600 mL / OUT: 0 mL / NET: 600 mL        Physical Exam:     Gen: critically ill, intubated  Neuro: sedated  CVS: +S1S12  Resp: CTA. accessory muscle use. tachypnic  Abd: soft NT ND  Ext: warm dry no edema  Skin: well perfused    Meds:  atovaquone  Suspension 750 milliGRAM(s) Oral every 12 hours  meropenem  IVPB 500 milliGRAM(s) IV Intermittent every 24 hours    midodrine 10 milliGRAM(s) Oral every 8 hours     atorvastatin 20 milliGRAM(s) Oral at bedtime  methylPREDNISolone sodium succinate Injectable 40 milliGRAM(s) IV Push every 8 hours        acetaminophen   Oral Liquid .. 650 milliGRAM(s) Oral every 8 hours PRN  fentaNYL    Injectable 50 MICROGram(s) IV Push once  propofol Infusion 20 MICROgram(s)/kG/Min IV Continuous <Continuous>        aspirin  chewable 81 milliGRAM(s) Oral daily  heparin   Injectable 5000 Unit(s) SubCutaneous every 12 hours     pantoprazole  Injectable 40 milliGRAM(s) IV Push two times a day        sodium chloride 0.9% lock flush 10 milliLiter(s) IV Push every 1 hour PRN     mycophenolate mofetil Suspension 1000 milliGRAM(s) Oral two times a day     artificial tears (preservative free) Ophthalmic Solution 1 Drop(s) Both EYES four times a day  chlorhexidine 0.12% Liquid 15 milliLiter(s) Oral Mucosa every 12 hours  chlorhexidine 4% Liquid 1 Application(s) Topical <User Schedule>  chlorhexidine 4% Liquid 1 Application(s) Topical <User Schedule>                              7.8    28.56 )-----------( 375      ( 11 Apr 2023 05:30 )             24.4       04-11    136  |  99  |  131<H>  ----------------------------<  139<H>  4.6   |  20<L>  |  9.18<H>    Ca    7.8<L>      11 Apr 2023 05:30  Phos  10.2     04-11  Mg     2.9     04-11                      ABG - ( 10 Apr 2023 03:50 )  pH, Arterial: 7.30  pH, Blood: x     /  pCO2: 41    /  pO2: 344   / HCO3: 20    / Base Excess: -5.9  /  SaO2: 100               Radiology:   < from: CT Abdomen and Pelvis No Cont (04.06.23 @ 16:00) >    ACC: [id number] EXAM:  CT ABDOMEN AND PELVIS   ORDERED BY: GENO TIRADO     PROCEDURE DATE:  04/06/2023          INTERPRETATION:  CLINICAL INFORMATION: Septic shock, advanced pulmonary   fibrosis, now intubated. Abdominal distention    COMPARISON: CT chest 3/20/2023.    CONTRAST/COMPLICATIONS:  IV Contrast: NONE  Oral Contrast: NONE  Complications: None reported at time of study completion    PROCEDURE:  CT of the Abdomen and Pelvis was performed.  Sagittal and coronal reformats were performed.    FINDINGS:  LOWER CHEST: Redemonstrated bilateral groundglass opacities,   reticulation, bronchiectasis and partially visualized bulla in the left   upper lung.    LIVER: Within normal limits.  BILE DUCTS: Normal caliber.  GALLBLADDER: Within normal limits.  SPLEEN: Within normal limits.  PANCREAS: Within normal limits.  ADRENALS: Within normal limits.  KIDNEYS/URETERS: No hydronephrosis. Punctate nonobstructing left renal   calculus.    BLADDER: Within normal limits.  REPRODUCTIVE ORGANS: Partially obscured by streak artifact from right hip   prosthesis.    BOWEL: Enteric tube coiled in the stomach and terminating in the distal   stomach/proximal duodenum. No bowel obstruction. Oral contrast reaches   the rectum. Appendix is normal in caliber. Colonic diverticulosis without   evidence of diverticulosis.  PERITONEUM: No ascites.  VESSELS: Atherosclerotic changes.  RETROPERITONEUM/LYMPH NODES: No lymphadenopathy.  ABDOMINAL WALL: Small fat-containing helical hernia. Ventral subcutaneous   infiltration, may be related to subcutaneous injection. Subcutaneous   edema.  BONES: Degenerative changes. Right hip arthroplasty. Grade 2   anterolisthesis of L5 on S1.    IMPRESSION:  No bowel obstruction.    --- End of Report ---            ROSALVA ESCAMILLA MD; Attending Radiologist  This document has been electronically signed. Apr 6 2023  6:57PM    < end of copied text >      Problems  -Acute hypoxic respiratory failure  -ILD  -CHRIS    Assessment/Plan:    Neuro: Sedation w/ Propofol gtt while on ventilator. Will add Fentanyl IVP PRN for tachypnea  CV: Midodrine for BP support  Resp: Acute hypoxic respiratory failure. Currently requiring 40% FiO2 and PEEP +8. Overbreathing ventilator and in respiratory distress despite continuos sedation. Not candidate for SBT at this time. Solu-medrol 40mg Q8  GI: Tube feeds w/ Nepro. Protonix for GI prophylaxis  Renal: Progressive CHRIS leading to HD. Nephrology following, plan for HD today  ID: Cult[facility] WGTD. Empiric abx coverage w/ Meropenem. Mepron for PCP coverage.   Heme: DVT PPX w/ Heparin SC  Endo: Glycemic monitoring    Palliative care ongoing. Family would not want to pursue tracheostomy  CODE STATUS: DNR    Critical care time spent (mins):  45 minutes including time spent reviewing chart, ordering tests/labs, discussing with interdisciplinary team. Not including time spent performing procedures

## 2023-04-11 NOTE — PROGRESS NOTE ADULT - ASSESSMENT
62M hx PSS (scleroderma) on cellcept with pulmonary fibrosis on 8L of 02  24/7, HTN HLD  CAD with 4 stents to the RCA 2018 with Dr Griffith. He is on the lung transplant list at [facility] with Dr Manas Priest (transplant pulmonologist). He was hospitalized in Feb at [facility] with a flare of fibrosis and was in the ICU on inhaled pulmonary vasodialtors. At that time he had a R heart cath and he was told that he has severe pulmonary hypertension., He was  being teed up for transplant waiting for immunizations/colonoscopy etc. He was discharged on Tyvaso (treprostinil) but only started using it a few days ago due to expense and approval. Over the last three weeks he has had a poor appetite and progressive SOB leaving him unable to walk with a walker more than 10 feet without stopping to catch his breath. He arrived in the ED with severe SOB and hypoxemia placed on BIPAP.  Type 1 respiratory failure Trop and BNP elevation. 3/29 am was intubated, had approximately 5 minute cardiac arrest/ now on pressors and developing worsening renal renal failure creatinine has increased now 6, on high dose pressors echo, dec rv function and severe pulmonary htn, was started on abx, pcp tx, and fungal coverage.     1. Acute respiratory failure. Septic vs cardiogenic shock. Scleroderma. Pulmonary fibrosis with acute flare. Multifocal pneumonia. Immunocompromised host. s/p Cardiac arrest. CHRIS on HD  - imaging and chart reviewed, multiorgan failure, started on HD 4/4  - has wbc ct 30--> 28 started on steroids 4/5 could be d/t this f/u cbc  - s/p zosyn 3/28-3/30, now on merrem [id number] renally dose adjusted #13  - on mepron 750mg BID #12/21 for pcp coverage  - continue with antibiotic coverage   - sputum cx and blood cx 3/28 no growth  - fungitell (-), repeat blood cx from 3/30 no growth  - nephrology f/u noted  - tolerating abx well so far; no side effects noted  - reason for abx use and side effects reviewed with patient  - s/p micafungin x 1 caspofungin 3/31-4/2  - s/p azithromycin 500mg daily atypical coverage #5 --> 4/1 legionella ag (-)  - supportive care  - fu cbc    2. other issues - care per medicine

## 2023-04-12 NOTE — PROGRESS NOTE ADULT - SUBJECTIVE AND OBJECTIVE BOX
NEPHROLOGY INTERVAL HPI/OVERNIGHT EVENTS:  04-12-23 @ 12:21    4/12 no change in status   4/11 seen at hd, off pressors on tf only   4/10 events noted for palliative meeting, wbc rising   4/8- seen on HD, tolerating well, intubated  4/7- s/p HD x 3 days, labs stabilized intubated on pressors  4/6--ON vent,  Remains on Norepi.   BP in 100's.  NO enteral feeding  4/5--Remains on vent.  On Norepi.  Second HD in progress.  4/4 remains anuric with inc sob and worsening renal parameters   4/3 still anuric, dec pressors requirements on levo 0.12 only  on, propofol and TF on hold with no fw flushes    HPI:  63 yo man with PMHX of Scleroderma treated with Cellcept and pulmonary fibrosis on home O2 8 L.  Hx of CAD ( stents x 4 to RCA)  Admission at [facility] in Feb due to resp distress and treated in ICU with pulmonary vasodilators.  R cath done with severe pulmonary HTN and was to have evaluation done for TP.  Started on Tyvaso several days ago.  Presented with 3 week hx fo progressive SOB, poor appetite and worsening LACEY walking short distance.  Admitted early 3/28 to CCU and placed on Bipap , given IV lasix,  and Zosyn/Azithromax.  CTA negative for PE.  Was awaiting transfer to [facility] [place].  on 3/29--resp status further deteriorate--Intubated  and started on pressors.  Late yesterday, resuscitated post cardiac arrest.  Now noted with acute worsening of renal function.    PMHX and PSHX.  --Pulmonary fibrosis  --Scleroderma  --CAD ( stent x 4)        MEDICATIONS  (STANDING):  artificial tears (preservative free) Ophthalmic Solution 1 Drop(s) Both EYES four times a day  aspirin  chewable 81 milliGRAM(s) Oral daily  atorvastatin 20 milliGRAM(s) Oral at bedtime  atovaquone  Suspension 750 milliGRAM(s) Oral every 12 hours  chlorhexidine 0.12% Liquid 15 milliLiter(s) Oral Mucosa every 12 hours  chlorhexidine 4% Liquid 1 Application(s) Topical <User Schedule>  chlorhexidine 4% Liquid 1 Application(s) Topical <User Schedule>  heparin   Injectable 5000 Unit(s) SubCutaneous every 12 hours  meropenem  IVPB 500 milliGRAM(s) IV Intermittent every 24 hours  methylPREDNISolone sodium succinate Injectable 40 milliGRAM(s) IV Push every 12 hours  midodrine 10 milliGRAM(s) Oral every 8 hours  mycophenolate mofetil Suspension 1000 milliGRAM(s) Oral two times a day  pantoprazole  Injectable 40 milliGRAM(s) IV Push two times a day  propofol Infusion 20 MICROgram(s)/kG/Min (8.45 mL/Hr) IV Continuous <Continuous>    MEDICATIONS  (PRN):  acetaminophen   Oral Liquid .. 650 milliGRAM(s) Oral every 8 hours PRN Temp greater or equal to 38C (100.4F)  albumin human 25% IVPB 50 milliLiter(s) IV Intermittent every 1 hour PRN low b/p  sodium chloride 0.9% lock flush 10 milliLiter(s) IV Push every 1 hour PRN Pre/post blood products, medications, blood draw, and to maintain line patency      Allergies    No Known Allergies    Intolerances        I&O's Detail    11 Apr 2023 07:01  -  12 Apr 2023 07:00  --------------------------------------------------------  IN:    Free Water: 100 mL    IV PiggyBack: 50 mL    Nepro with Carb Steady: 300 mL    Norepinephrine: 4 mL    Propofol: 280.8 mL  Total IN: 734.8 mL    OUT:    Rectal Tube (mL): 500 mL  Total OUT: 500 mL    Total NET: 234.8 mL          Vital Signs Last 24 Hrs  T(C): 36.6 (12 Apr 2023 06:10), Max: 36.9 (11 Apr 2023 17:00)  T(F): 97.8 (12 Apr 2023 06:10), Max: 98.4 (11 Apr 2023 17:00)  HR: 76 (12 Apr 2023 09:00) (76 - 114)  BP: --  BP(mean): --  RR: 25 (12 Apr 2023 09:00) (16 - 38)  SpO2: 92% (12 Apr 2023 09:00) (90% - 100%)    Parameters below as of 12 Apr 2023 01:00  Patient On (Oxygen Delivery Method): ventilator    O2 Concentration (%): 50  Daily     Daily     PHYSICAL EXAM:  General: alert. awake NAD  HEENT: MMM  CV: s1s2 rrr  LUNGS: B/L CTA  EXT: no edema    LABS:                        6.6    24.24 )-----------( 286      ( 12 Apr 2023 10:55 )             20.9     04-12    133<L>  |  98  |  113<H>  ----------------------------<  163<H>  4.4   |  23  |  6.66<H>    Ca    8.0<L>      12 Apr 2023 10:55  Phos  10.2     04-11  Mg     2.9     04-11

## 2023-04-12 NOTE — PROGRESS NOTE ADULT - SUBJECTIVE AND OBJECTIVE BOX
Subjective:  seen at bedside this morning, pt's condition is unchanged  returned in afternoon with SHA Huff and met with pt's wife and son.  Clinical course reviewed -- he is now vent and HD dependent and if Mr. jones would want to continue to be kept alive, we need to proceed with trach/peg and TDC placement.  wife said her  would never want those measures and we then spoke about the pt's remaining options, which essentially is a compassionate/palliative withdrawal of care.  family understood and questions were answered.  while no timeline was set, we did ask family to try to determine a date/time that we can prepare for withdrawal.     Review of Systems:  Unable to obtain/Limited due to: clinical condition        PHYSICAL EXAM:    Vital Signs Last 24 Hrs  T(C): 36.4 (12 Apr 2023 11:34), Max: 36.9 (11 Apr 2023 17:00)  T(F): 97.6 (12 Apr 2023 11:34), Max: 98.4 (11 Apr 2023 17:00)  HR: 76 (12 Apr 2023 09:00) (76 - 100)  BP: --  BP(mean): --  RR: 25 (12 Apr 2023 09:00) (16 - 31)  SpO2: 92% (12 Apr 2023 09:00) (92% - 100%)    Parameters below as of 12 Apr 2023 01:00  Patient On (Oxygen Delivery Method): ventilator    O2 Concentration (%): 50  Daily     Daily         General:  - GENERAL: not alert. No acute distress.   - EYES:  Anicteric.   - HENT: Moist mucous membranes.  NGT, ET Tube  - LUNGS: on vent.  diminished to auscultation bilaterally. No accessory muscle use.   - CARDIOVASCULAR: Regular rate and rhythm. No murmur. No JVD. No edema.   - ABDOMEN: Soft, non-tender and non-distended. No palpable masses.   - EXTREMITIES: No edema. Non-tender.    - SKIN: Warm, no rashes or lesions on visible skin.         LABS:                        6.6    24.24 )-----------( 286      ( 12 Apr 2023 10:55 )             20.9     04-12    133<L>  |  98  |  113<H>  ----------------------------<  163<H>  4.4   |  23  |  6.66<H>    Ca    8.0<L>      12 Apr 2023 10:55  Phos  10.2     04-11  Mg     2.9     04-11        Albumin: Albumin, Serum: 2.8 g/dL (04-09 @ 09:15)      Allergies    No Known Allergies    Intolerances      MEDICATIONS  (STANDING):  artificial tears (preservative free) Ophthalmic Solution 1 Drop(s) Both EYES four times a day  aspirin  chewable 81 milliGRAM(s) Oral daily  atorvastatin 20 milliGRAM(s) Oral at bedtime  atovaquone  Suspension 750 milliGRAM(s) Oral every 12 hours  chlorhexidine 0.12% Liquid 15 milliLiter(s) Oral Mucosa every 12 hours  chlorhexidine 4% Liquid 1 Application(s) Topical <User Schedule>  chlorhexidine 4% Liquid 1 Application(s) Topical <User Schedule>  heparin   Injectable 5000 Unit(s) SubCutaneous every 12 hours  meropenem  IVPB 500 milliGRAM(s) IV Intermittent every 24 hours  methylPREDNISolone sodium succinate Injectable 40 milliGRAM(s) IV Push every 12 hours  midodrine 10 milliGRAM(s) Oral every 8 hours  mycophenolate mofetil Suspension 1000 milliGRAM(s) Oral two times a day  pantoprazole  Injectable 40 milliGRAM(s) IV Push two times a day  propofol Infusion 20 MICROgram(s)/kG/Min (8.45 mL/Hr) IV Continuous <Continuous>    MEDICATIONS  (PRN):  acetaminophen   Oral Liquid .. 650 milliGRAM(s) Oral every 8 hours PRN Temp greater or equal to 38C (100.4F)  albumin human 25% IVPB 50 milliLiter(s) IV Intermittent every 1 hour PRN low b/p  sodium chloride 0.9% lock flush 10 milliLiter(s) IV Push every 1 hour PRN Pre/post blood products, medications, blood draw, and to maintain line patency      RADIOLOGY:

## 2023-04-12 NOTE — PROGRESS NOTE ADULT - PROBLEM SELECTOR PROBLEM 1
Acute on chronic respiratory failure with hypoxemia
Dyspnea
Acute on chronic respiratory failure with hypoxemia

## 2023-04-12 NOTE — PROGRESS NOTE ADULT - ASSESSMENT
A:    62M  HD # 16  FULL CODE    Here for:    1. Acute hypoxic resp failure 2/2  2. Pulmonary fibrosis  3. Shock, possible sepsis, not present on admission, 2/2   4. PNA  5. Sarcoidosis  6. CHRIS    This patient requires critical care for support of one or more vital organ systems with a high probability of imminent or life threatening deterioration in his/her condition    P:    Analgosedation, daily sedation vacations holding off at this time 2/2 severe vent synchrony and hypoxia  HD monitoring, vasopressors for distributive shock 2/2 sepsis, levophed, actively titrating as able; midodrine to help wean pressors  s/p emergent intubation 3/28, maintained on vent, actively weaning and titrating; VAC 24/450/.35, f/u ABG, CXR  Metabolic acidosis 2/2 azotemia/CHRIS  s/p Broad spectrum abx, f/u Cx's, white count, fever curve  TF's via NGT  VTE ppx PUD ppx  f/u labs, replete lytes PRN  Maintain central line  No s/s active bleeding  On steroids for ILD  s/p HD catheter, continued RRTs; will require further, remains anuric  NH3 lvl ijmproved  [id number] encephalopathy; doubt seizures or NCSE, suspect more multifocal from azotemia, sepsis    Pt now vent day # 14; decision on trach v palliative extubation needs to be made in the immediate future; to this end, continued GOC discussions with family (wife, son)    Dispo: Cont critical care.    TOTAL CRITICAL CARE TIME: 50 minutes (EXCLUSIVE of any non bundled procedures)    Note: This time spent INCLUDES time spent directly as this patient's bedside with evaluation, review of chart including review of laboratory and imaging studies, interpretation of vital signs and cardiac output measurements, any necessary ventilator management, and time spent discussing plan of care with patient and family, including goals of care discussion.

## 2023-04-12 NOTE — PROGRESS NOTE ADULT - RESPIRATORY
breath sounds equal/rhonchi
clear to auscultation bilaterally/no wheezes/no respiratory distress/rales

## 2023-04-12 NOTE — PROGRESS NOTE ADULT - ASSESSMENT
63 yo man with scleroderma, pulmonary fibrosis /severe pulmonary HTN admitted with progressive resp failure.  Now intubated and on pressors due to shock and now post cardiac arrest, septic vs cardiogenic.  --CHRIS post CTA ( unclear hx of CKD, creat 1.47 on admission and on ACEI) and shock. oliguric.    No immediate need for dialysis.    Check repeat labs for K and acidosis.    Continue pressor/vent support    Continue abtx.    D/w Dr Watson.    3/31 SY  --CHRIS post cardiac arrest :  Continues to worsen with Oliguria     No immediate need for dialysis.    Will assess again in am    D/w Dr Watson re : poor pulmonary prognosis.    4/1 SY  --CHRIS post cardiac arrest : continues to worsen with no urine output.    No immediate indication for dialysis.    HD to be considered when indicated with electrolyte disturbances.    Oxygenation acceptable.  --Change enteral feeding to Nepro for now.     D/w Dr Watson    4/2 SY  --CHRIS post cardiac arrest : continues to worsen with no urine output.     Continue to assess for indications for dialysis.     No urgent need for dialysis today as electrolytes acceptable.     Would allow more time for BP to stabilize.  --Oxygenation acceptable.  --ID : on Meropenem and Caspofungin  --Scleroderma : continue MMF.  --Severe pulmonary fibrosis and HTN : Not a TP candidate at this time.    4/3 MK   - CHRIS with component of VIET and ATN     no indication for HD today    no FW flushes with TF    Hep panel in anticipation of possible HD   dw rn and wife updated     4/4 MK   - CHRIS with worsening acidosis and hyperkalemia andhyponatremia     dw wife need for HD, consent obtained     TDC by intensivist team     she is concerned about long term need for dialysis if he is in the future deemed not to be a     lung transplant patient   dw wife     4/5 SY  --CHRIS, with worsening metabolic derangement.      1st HD yesterday.    Second HD today with goal of 3 KG UF.  --BP in 100's on Norepi.  --Resp : pulmonary fibrosis : continue vent support.  --Scleroderma : continue MMF.    4/6 SY  --CHRIS : 3rd HD today.  aim for ~ 2 kg UF.   SPA and on pressor.  --Continue Pressor.  --Hyperphosphatemia : start binder once feeding started.  --Resp : pulmonary fibrosis  : Now lung TP consideration on hold  Continue vent support  --Scleroderma : continue MMF.    4/7   Above noted   pt s/p HD x 3  Labs stable  Replace K    4/8  seen on HD   tolerated well  Palliative working w Family  Transfusion decision as per Intensivist team    4/10 MK   - CHRIS/ATN for hd on TTS schedule, oliguric   - hypoatremia, correction with hd   - leukocytosis worsening    on meropenem, atovoquone     methylprednisone   - scleroderma on MMF   dw dr fauklner     4/11 MK   - CHRIS/ATN tolerating hd  oliguric  - leukocytosis improving     on meropenem, atovoquone     methylprednisone started 4/5    ID intput noted  - scleroderma on MMF   - GOC discussion noted, pt dnr/dni    4/12 MK   - CHRIS/ATN remains hd dep  - leukocytosis improving     on meropenem, atovoquone     methylprednisone started 4/5    ID intput noted  - scleroderma on MMF   - GOC discussion noted, pt dnr/dni    ongoing discussion

## 2023-04-12 NOTE — PROGRESS NOTE ADULT - SUBJECTIVE AND OBJECTIVE BOX
HPI:  62 year old man with a history of scleroderma, pulmonary fibrosis with chronic respiratory failure (supplemental O2 dependent; listed on lung-transplant list at [place]), severe pulmonary hypertension, CAD s/p RCA stents (2018), HTN, HLD admitted with acute/chronic respiratory failure (required mechanical ventilation) with hospitalization complicated by cardiac arrest preceded by severe bradycardia (3/29/23).    3/29/23: Pt intubated and sedated.  3/30/23:  Events past 24 hours noted and discussed with CCU team; patient is sedated on vent.  3/31/23: Pt sedated on pressors. Family at bedside  4/1/23: Sedated pressors being tapered. Labs reviewed.  4/2/23: Pressors weaning. Arousable  4/3/'23: no changes.  4/4/23;  intubated , sedated , on pressors,  echo showed normal LVEF ; severe pulmonary hypertension   4/6/23 Patient is intubated , on sedation , low dose pressors on dialysis , patient was given tyvaso, did not take due to insurance issue  , febrile episodes   4/7/'23: on sedation, intubated  4/8/'23: no changes overnight.  4/9/'23: intubated, sedated.  4/10/'23: no changes.  4/11/23 same , intubated on sedation ,  on dialysis   4/12/23 no changes, primary team to have conversation regarding advanced directives. Tele with [facility].      MEDICATIONS:  OUTPATIENT  Home Medications:  aspirin 81 mg oral tablet, chewable: 1 tab(s) orally once a day (31 Oct 2018 10:16)  azithromycin 500 mg intravenous injection: 500 milligram(s) intravenous every 24 hours (28 Mar 2023 14:31)  enoxaparin: 40 milligram(s) subcutaneous once a day (28 Mar 2023 14:31)  mycophenolate mofetil 250 mg oral capsule: 4 cap(s) orally 2 times a day (28 Mar 2023 14:31)  piperacillin-tazobactam: 3.375 gram(s) injectable every 8 hours (28 Mar 2023 14:31)  quinapril 10 mg oral tablet: 1 tab(s) orally once a day (28 Oct 2018 22:58)    MEDICATIONS  (STANDING):  artificial tears (preservative free) Ophthalmic Solution 1 Drop(s) Both EYES four times a day  aspirin  chewable 81 milliGRAM(s) Oral daily  atorvastatin 20 milliGRAM(s) Oral at bedtime  atovaquone  Suspension 750 milliGRAM(s) Oral every 12 hours  chlorhexidine 0.12% Liquid 15 milliLiter(s) Oral Mucosa every 12 hours  chlorhexidine 4% Liquid 1 Application(s) Topical <User Schedule>  chlorhexidine 4% Liquid 1 Application(s) Topical <User Schedule>  [id number]    Injectable 50 MICROGram(s) IV Push once  heparin   Injectable 5000 Unit(s) SubCutaneous every 12 hours  meropenem  IVPB 500 milliGRAM(s) IV Intermittent every 24 hours  methylPREDNISolone sodium succinate Injectable 40 milliGRAM(s) IV Push every 8 hours  midodrine 10 milliGRAM(s) Oral every 8 hours  mycophenolate mofetil Suspension 1000 milliGRAM(s) Oral two times a day  pantoprazole  Injectable 40 milliGRAM(s) IV Push two times a day  propofol Infusion 20 MICROgram(s)/kG/Min (8.45 mL/Hr) IV Continuous <Continuous>    MEDICATIONS  (PRN):  acetaminophen   Oral Liquid .. 650 milliGRAM(s) Oral every 8 hours PRN Temp greater or equal to 38C (100.4F)  sodium chloride 0.9% lock flush 10 milliLiter(s) IV Push every 1 hour PRN Pre/post blood products, medications, blood draw, and to maintain line patency      Vital Signs Last 24 Hrs  T(C): 36.9 (11 Apr 2023 08:20), Max: 37.3 (10 Apr 2023 17:00)  T(F): 98.5 (11 Apr 2023 08:20), Max: 99.2 (10 Apr 2023 17:00)  HR: 94 (11 Apr 2023 10:54) (84 - 94)  BP: 114/71 (11 Apr 2023 10:54) (114/71 - 114/71)  BP(mean): --  RR: 30 (11 Apr 2023 09:00) (28 - 34)  SpO2: 95% (11 Apr 2023 10:54) (90% - 98%)    Parameters below as of 11 Apr 2023 10:54  Patient On (Oxygen Delivery Method): ventilator    O2 Concentration (%): 40    I&O's Summary    10 Apr 2023 07:01  -  11 Apr 2023 07:00  --------------------------------------------------------  IN: 600 mL / OUT: 0 mL / NET: 600 mL                PHYSICAL EXAM:    Constitutional: intubated sedated.  HEENT: PERR, EOMI,  No oral cyananosis.  Neck:  supple,  No JVD  Respiratory: Breath sounds are clear bilaterally, scattered rhonchi   Cardiovascular: S1 and S2, regular rate and rhythm, no Murmurs, gallops or rubs  Gastrointestinal: Bowel Sounds present, soft, nontender.   Extremities: trace peripheral edema. No clubbing or cyanosis.  Vascular: 2+ peripheral pulses  Musculoskeletal: no calf tenderness.  Skin: No rashes.    ===============================  ===============================  LABS:                                  7.8    28.56 )-----------( 375      ( 11 Apr 2023 05:30 )             24.4     04-11    136  |  99  |  131<H>  ----------------------------<  139<H>  4.6   |  20<L>  |  9.18<H>    Ca    7.8<L>      11 Apr 2023 05:30  Phos  10.2     04-11  Mg     2.9     04-11        ===============================  ECG:      Diagnosis Line Normal sinus rhythm  T wave abnormality, consider anterior ischemia  Abnormal ECG  When compared with ECG of 30-OCT-2018 07:24,  Vent. rate has increased BY  33 BPM  Questionable change in QRS axis  T wave inversion now evident in Anterior leads  Confirmed by DIANE CULVER, SO PINEDA ([id number]) on 3/29/2023 4:48:13 PM    ===============================  RADIOLOGY:  < from: CT Angio Chest PE Protocol w/ IV Cont (03.28.23 @ 00:21) >  ACC: [id number] EXAM:  [facility]GIO CHEST [facility]ULM [facility]   ORDERED BY: ROME RUFFIN     PROCEDURE DATE:  03/28/2023          INTERPRETATION:  CLINICAL INFORMATION: Shortness of breath. History of   scleroderma.    COMPARISON: 10/28/2018    CONTRAST/COMPLICATIONS:  IV Contrast: Omnipaque 350  90 cc administered   10 cc discarded  Oral Contrast: NONE  Complications: None reported at time of study completion    PROCEDURE:  CT Angiography of the Chest.  Sagittal and coronal reformats were performed as well as 3D (MIP)   reconstructions.    FINDINGS:    LUNGS AND AIRWAYS: Patent central airways.  Revisualized chronic   interstitial disease including hazy bilateral ground glass opacities,   reticulation and worsened bronchiectasis bilaterally. Large bulla in the   left upper lobe.  PLEURA: No pleural effusion.  MEDIASTINUM AND MALLORY: No lymphadenopathy.  VESSELS: Aortic and coronary artery atherosclerosis. Enlarged main   pulmonary artery, unchanged, can be seen with pulmonary arterial   hypertension. No pulmonary embolism.  HEART: Cardiomegaly. Small pericardial effusion.  CHEST WALL AND LOWER NECK: Bilateral mediastinal and hilar adenopathy   increased since prior exam including extensive adenopathy in the   prevascular space.  VISUALIZED UPPER ABDOMEN: Right renal hypodensity too small to   characterize. Colonic diverticulosis.  BONES: Degenerative changes. Dextroscoliosis of the thoracic spine.   Chronic right rib deformities.    IMPRESSION:  No pulmonary embolism.  Chronic interstitial disease with worsened bronchiectasis.  New mediastinal and hilar adenopathy, which is indeterminate but could be   related to scleroderma and interstitial disease. Continued follow-up is   recommended.        --- End of Report ---            TYE CULVER; Attending Radiologist  This document has been electronically signed. Mar 28 2023  1:01AM    < end of copied text >    ===============================  ECHO:  Outpatient Echo Jun '22: EF 55-60%, borderline pulmonary hypertension.  normal RV size & function.    ===============================  < from: TTE Echo Complete w/o Contrast w/ Doppler (03.30.23 @ 07:51) >     The mitral valve leaflets appear thickened.   Mild to Moderate mitral regurgitation is present.   The aortic valve is well visualized, appears calcified. Valve opening   seems to be normal.   Mild (1+) aortic regurgitation is present.   The tricuspid valve leaflets appear mildly thickened and/or calcified,   but   open well.   Moderate (2+) tricuspid valve regurgitation is present.   Severe pulmonary hypertension.   Pulmonic valve not well seen.   Mild to moderate pulmonic valvular regurgitation is present.   Normal appearing left atrium.   The left ventricle is normal in size, wall thickness, wallmotion and   contractility.   Estimated left ventricular ejection fraction is 55-60 %.   The right atrium appears dilated.   The right ventricle is severely dilated.   Mild to moderate, diffuse hypokinesis of the right ventricle is present.     Signature    < end of copied text >      Monitor sinus rhythm  PVS   reviewed         ===============================

## 2023-04-12 NOTE — PROGRESS NOTE ADULT - PROBLEM SELECTOR PLAN 4
likely demand related ischemia associated with hypoxia , sepsis  normal EF , severe pulmonary hypertension , now on pressors and IV antibiotics for sepsis related hypotension

## 2023-04-12 NOTE — PROGRESS NOTE ADULT - PROBLEM SELECTOR PLAN 1
Pulmonary fibrosis associated with severe pulmonary HTN ( likely secondary )  and chronic hypoxia .  Optimization and vent management as per critical care team.  Pt on HD, no longer candidate for lung transplant at [place].  Hypotension on low dose pressors, on IV antibiotics.  ICU team having goals of care discussion w/ family.
Pulmonary fibrosis associated with severe pulmonary HTN ( likely secondary )  and chronic hypoxia .  Optimization and vent management as per critical care team.  Pt on HD, no longer candidate for lung transplant at [place].  Hypotension on low dose pressors, on IV antibiotics.  ICU team having Ggoals of care discussion w/ family.
Pt intubated today. Awaiting transfer to [facility] for possible lung transplant. Symptoms secondary to fibrosis, pulmonary hypertension.
Pulmonary fibrosis associated with severe pulmonary HTN ( likely secondary )  and chronic hypoxia .  Optimization and vent management as per critical care team.
Pulmonary fibrosis associated with severe pulmonary HTN ( likely secondary )  and chronic hypoxia .  Optimization and vent management as per critical care team.  Pt on HD, no longer candidate for lung transplant at [facility].  Hypotension on low dose pressors, on IV antibiotics.   poor prognosis
Pulmonary fibrosis associated with severe pulmonary HTN ( likely secondary )  and chronic hypoxia .  Optimization and vent management as per critical care team.  Pt on HD, no longer candidate for lung transplant at [place].  Hypotension on low dose pressors, on IV antibiotics.  ICU team having goals of care discussion w/ family.
Pulmonary fibrosis associated with severe pulmonary HTN and chronic hypoxia - now worse.  Optimization and vent management as per critical care team.
Pulmonary fibrosis associated with severe pulmonary HTN ( likely secondary )  and chronic hypoxia .  Optimization and vent management as per critical care team.
Pulmonary fibrosis associated with severe pulmonary HTN ( likely secondary )  and chronic hypoxia .  Optimization and vent management as per critical care team.    Hypotension on low dose pressors on IV antibiotics
Pulmonary fibrosis associated with severe pulmonary HTN ( likely secondary )  and chronic hypoxia .  Optimization and vent management as per critical care team.  Pt on HD, no longer candidate for lung transplant at [place].  Hypotension on low dose pressors, on IV antibiotics
Pulmonary fibrosis associated with severe pulmonary HTN ( likely secondary )  and chronic hypoxia .  Optimization and vent management as per critical care team.  Pt on HD, no longer candidate for lung transplant at [place].  Hypotension on low dose pressors, on IV antibiotics.   poor prognosis
Pulmonary fibrosis associated with severe pulmonary HTN and chronic hypoxia - now worse.  Optimization and vent management as per critical care team.
Pulmonary fibrosis associated with severe pulmonary HTN and chronic hypoxia .  Optimization and vent management as per critical care team.
Pulmonary fibrosis associated with severe pulmonary HTN and chronic hypoxia - now worse.  Optimization and vent management as per critical care team.
Pulmonary fibrosis associated with severe pulmonary HTN and chronic hypoxia .  Optimization and vent management as per critical care team.

## 2023-04-12 NOTE — PROGRESS NOTE ADULT - SUBJECTIVE AND OBJECTIVE BOX
Patient is a 62y old  Male who presents with a chief complaint of SOB hypoxemia (12 Apr 2023 11:22)    24 hour events:     Allergies    No Known Allergies    Intolerances      REVIEW OF SYSTEMS: SEE BELOW       ICU Vital Signs Last 24 Hrs  T(C): 36.6 (12 Apr 2023 06:10), Max: 36.9 (11 Apr 2023 17:00)  T(F): 97.8 (12 Apr 2023 06:10), Max: 98.4 (11 Apr 2023 17:00)  HR: 76 (12 Apr 2023 09:00) (76 - 114)  BP: --  BP(mean): --  ABP: 96/59 (12 Apr 2023 09:00) (72/52 - 122/70)  ABP(mean): 74 (12 Apr 2023 09:00) (59 - 92)  RR: 25 (12 Apr 2023 09:00) (16 - 38)  SpO2: 92% (12 Apr 2023 09:00) (90% - 100%)    O2 Parameters below as of 12 Apr 2023 01:00  Patient On (Oxygen Delivery Method): ventilator    O2 Concentration (%): 50        CAPILLARY BLOOD GLUCOSE          I&O's Summary    11 Apr 2023 07:01  -  12 Apr 2023 07:00  --------------------------------------------------------  IN: 734.8 mL / OUT: 500 mL / NET: 234.8 mL        Mode: AC/ CMV (Assist Control/ Continuous Mandatory Ventilation)  RR (machine): 24  TV (machine): 450  FiO2: 50  PEEP: 8  ITime: 1  MAP: 10  PIP: 21      MEDICATIONS  (STANDING):  artificial tears (preservative free) Ophthalmic Solution 1 Drop(s) Both EYES four times a day  aspirin  chewable 81 milliGRAM(s) Oral daily  atorvastatin 20 milliGRAM(s) Oral at bedtime  atovaquone  Suspension 750 milliGRAM(s) Oral every 12 hours  chlorhexidine 0.12% Liquid 15 milliLiter(s) Oral Mucosa every 12 hours  chlorhexidine 4% Liquid 1 Application(s) Topical <User Schedule>  chlorhexidine 4% Liquid 1 Application(s) Topical <User Schedule>  heparin   Injectable 5000 Unit(s) SubCutaneous every 12 hours  meropenem  IVPB 500 milliGRAM(s) IV Intermittent every 24 hours  methylPREDNISolone sodium succinate Injectable 40 milliGRAM(s) IV Push every 8 hours  midodrine 10 milliGRAM(s) Oral every 8 hours  mycophenolate mofetil Suspension 1000 milliGRAM(s) Oral two times a day  norepinephrine Infusion 0.05 MICROgram(s)/kG/Min (3.3 mL/Hr) IV Continuous <Continuous>  pantoprazole  Injectable 40 milliGRAM(s) IV Push two times a day  propofol Infusion 20 MICROgram(s)/kG/Min (8.45 mL/Hr) IV Continuous <Continuous>      MEDICATIONS  (PRN):  acetaminophen   Oral Liquid .. 650 milliGRAM(s) Oral every 8 hours PRN Temp greater or equal to 38C (100.4F)  albumin human 25% IVPB 50 milliLiter(s) IV Intermittent every 1 hour PRN low b/p  sodium chloride 0.9% lock flush 10 milliLiter(s) IV Push every 1 hour PRN Pre/post blood products, medications, blood draw, and to maintain line patency      PHYSICAL EXAM: SEE BELOW                          6.6    24.24 )-----------( 286      ( 12 Apr 2023 10:55 )             20.9       04-12    133<L>  |  98  |  113<H>  ----------------------------<  163<H>  4.4   |  23  |  6.66<H>    Ca    8.0<L>      12 Apr 2023 10:55  Phos  10.2     04-11  Mg     2.9     04-11

## 2023-04-12 NOTE — PROGRESS NOTE ADULT - SUBJECTIVE AND OBJECTIVE BOX
ICU Progress Note    HPI:    S:    Pt seen and examined  HD # 16  FULL CODE  62M hx PSS (scleroderma) on cellcept with pulmonary fibrosis on 8L of 02 24/7, HTN HLD  CAD with 4 stents to the RCA 2018 with Dr Griffith.    He is on the lung transplant list at [facility] with Dr Manas Priest (transplant pulmonologist).    He was hospitalized in Feb at [facility] with a flare of fibrosis and was in the ICU on inhaled pulmonary vasodialtors.    At that time he had a R heart cath and he was told that he has severe pulmonary hypertension.,   He is being teed up for transplant waiting for immunizations/colonoscopy etc.   He was discharged on Tyvaso (treprostinil) but only started using it a few days ago due to expense and approval.       Over the last three weeks he has had a poor appetite and progressive SOB leaving him unable to walk with a walker more than 10 feet without stopping to catch his breath.  He also noted more hypoxemia on his home pulse ox.  He has a cough with clear phlegm.    He  arrived in the ED with severe SOB and hypoxemia placed on BIPAP.  Type 1 respiratory failure   Trop and BNP elevation     3/29: Intubated on pressors.    4/2: Remains intubated, on pressors. Vent being actively titrated.  4/4: Remains intubated. Remains on pressors. Worsening renal function today with indications for emergent RRT.      4/7: Remains intubated on pressors. Has received 3 days straight of HD prior to today. Remains encephalopathic     4/12 AM: Remains intubated, failing weaning trials. Remains on intermittent RRT. Condition has not shown signs of improvement.     ROS: Unable to obtain 2/2 Pt condition (intubated)      Allergies    No Known Allergies    Intolerances        MEDICATIONS  (STANDING):  artificial tears (preservative free) Ophthalmic Solution 1 Drop(s) Both EYES four times a day  aspirin  chewable 81 milliGRAM(s) Oral daily  atorvastatin 20 milliGRAM(s) Oral at bedtime  atovaquone  Suspension 750 milliGRAM(s) Oral every 12 hours  chlorhexidine 0.12% Liquid 15 milliLiter(s) Oral Mucosa every 12 hours  chlorhexidine 4% Liquid 1 Application(s) Topical <User Schedule>  chlorhexidine 4% Liquid 1 Application(s) Topical <User Schedule>  heparin   Injectable 5000 Unit(s) SubCutaneous every 12 hours  methylPREDNISolone sodium succinate Injectable 40 milliGRAM(s) IV Push every 12 hours  midodrine 10 milliGRAM(s) Oral every 8 hours  mycophenolate mofetil Suspension 1000 milliGRAM(s) Oral two times a day  pantoprazole  Injectable 40 milliGRAM(s) IV Push two times a day  propofol Infusion 20 MICROgram(s)/kG/Min (8.45 mL/Hr) IV Continuous <Continuous>    MEDICATIONS  (PRN):  acetaminophen   Oral Liquid .. 650 milliGRAM(s) Oral every 8 hours PRN Temp greater or equal to 38C (100.4F)  albumin human 25% IVPB 50 milliLiter(s) IV Intermittent every 1 hour PRN low b/p  sodium chloride 0.9% lock flush 10 milliLiter(s) IV Push every 1 hour PRN Pre/post blood products, medications, blood draw, and to maintain line patency      Drug Dosing Weight  Height (cm): 177.8 (28 Mar 2023 04:15)  Weight (kg): 70.4 (28 Mar 2023 04:15)  BMI (kg/m2): 22.3 (28 Mar 2023 04:15)  BSA (m2): 1.87 (28 Mar 2023 04:15)    PAST MEDICAL & SURGICAL HISTORY:  HTN (hypertension)      Pulmonary fibrosis      Scleroderma          FAMILY HISTORY:          ROS: See HPI; otherwise, all systems reviewed and negative.    O:    ICU Vital Signs Last 24 Hrs  T(C): 36.9 (12 Apr 2023 16:41), Max: 36.9 (11 Apr 2023 22:12)  T(F): 98.5 (12 Apr 2023 16:41), Max: 98.5 (12 Apr 2023 16:41)  HR: 85 (12 Apr 2023 15:00) (76 - 97)  BP: --  BP(mean): --  ABP: 107/63 (12 Apr 2023 15:00) (83/55 - 122/70)  ABP(mean): 80 (12 Apr 2023 15:00) (66 - 92)  RR: 30 (12 Apr 2023 15:00) (16 - 31)  SpO2: 95% (12 Apr 2023 14:00) (92% - 100%)    O2 Parameters below as of 12 Apr 2023 01:00  Patient On (Oxygen Delivery Method): ventilator    O2 Concentration (%): 50            I&O's Detail    11 Apr 2023 07:01  -  12 Apr 2023 07:00  --------------------------------------------------------  IN:    Free Water: 100 mL    IV PiggyBack: 50 mL    Nepro with Carb Steady: 300 mL    Norepinephrine: 4 mL    Propofol: 280.8 mL  Total IN: 734.8 mL    OUT:    Rectal Tube (mL): 500 mL  Total OUT: 500 mL    Total NET: 234.8 mL          Mode: PS (Pressure Support)/ Spontaneous  FiO2: 50  PEEP: 8      PE:    PE:    Adult M lying in bed  Appears chronically ill  No JVD trachea midline  + NGT in place  S1S2+  Coarse BS B/L  Abd soft NTND  + anasarca  Intubated, sedated  + CVC, keny noted  Skin pink and well perfused    LABS:    CBC Full  -  ( 12 Apr 2023 10:55 )  WBC Count : 24.24 K/uL  RBC Count : 2.20 M/uL  Hemoglobin : 6.6 g/dL  Hematocrit : 20.9 %  Platelet Count - Automated : 286 K/uL  Mean Cell Volume : 95.0 fl  Mean Cell Hemoglobin : 30.0 pg  Mean Cell Hemoglobin Concentration : 31.6 gm/dL  Auto Neutrophil # : x  Auto Lymphocyte # : x  Auto Monocyte # : x  Auto Eosinophil # : x  Auto Basophil # : x  Auto Neutrophil % : x  Auto Lymphocyte % : x  Auto Monocyte % : x  Auto Eosinophil % : x  Auto Basophil % : x    04-12    133<L>  |  98  |  113<H>  ----------------------------<  163<H>  4.4   |  23  |  6.66<H>    Ca    8.0<L>      12 Apr 2023 10:55  Phos  10.2     04-11  Mg     2.9     04-11          CAPILLARY BLOOD GLUCOSE

## 2023-04-12 NOTE — PROGRESS NOTE ADULT - ASSESSMENT
63 y/o male with history of scleroderma on cellcept, pulmonary fibrosis with chronic hypoxic resp failure on home O2, HTN, HLD, CAD with PCI, now with:     Acute hypoxic resp failure due to exacerbation of pulm fibrosis   Cardiac arrest   Multifocal pneumonia   CHRIS due to ATN   Acute metabolic encephalopathy     Intubated 3/29   LIJ CVC and R axillary a-line 3/29   Bradycardic arrest 3/29, 3 min CPR   HD cath and new dialysis 4/4       Recs:     On prop for vent synchrony, wean as tolerated     BP stable off Levo  Continue midodrine     Aspirin, statin     PSV 10/8, 50% as tolerated   Not ready for extubation, will need to discuss GOC prior to attempt   Slowly taper steroids   On Cellcept     Abx per ID (meropenem, Mepron)   Cultures have been neg     Dialysis TTS     Worsening anemia, no active bleeding, will d/w family before transfusion as it won't change outcome     DVT ppx with sc heparin     Patient critically ill with poor prognosis, DNR at this time, will discuss trach vs comfort extubation/DNI with family  61 y/o male with history of scleroderma on cellcept, pulmonary fibrosis with chronic hypoxic resp failure on home O2, HTN, HLD, CAD with PCI, now with:     Acute hypoxic resp failure due to exacerbation of pulm fibrosis   Cardiac arrest   Multifocal pneumonia   CHRIS due to ATN   Acute metabolic encephalopathy     Intubated 3/29   LIJ CVC and R axillary a-line 3/29   Bradycardic arrest 3/29, 3 min CPR   HD cath and new dialysis 4/4       Recs:     On prop for vent synchrony, wean as tolerated     BP stable off Levo  Continue midodrine     Aspirin, statin     PSV 10/8, 50% as tolerated   Not ready for extubation, will need to discuss GOC prior to attempt   Slowly taper steroids   On Cellcept     Abx per ID (meropenem, Mepron)   Cultures have been neg     Dialysis TTS     Worsening anemia, no active bleeding, will d/w family before transfusion as it won't change outcome     DVT ppx with sc heparin     Patient critically ill with poor prognosis, DNR at this time, will discuss trach vs comfort extubation/DNI with family     Addendum: d/w wife and son along with Dr Samson, options of trach/PEG vs comfort extubation vs extubation + DNI discussed with family, wife is thinking about comfort extubation, support provided

## 2023-04-12 NOTE — PROGRESS NOTE ADULT - PROBLEM SELECTOR PROBLEM 2
Bradycardic cardiac arrest
Elevated troponin
Bradycardic cardiac arrest

## 2023-04-12 NOTE — CHART NOTE - NSCHARTNOTEFT_GEN_A_CORE
Met with wife and son separately to follow up and offer support.  Palliative SW role explained.  Emotional support provided.  Families feelings explored in details.  GOC held earlier this date with Dr. Samson and family.  Wife and son discussed feelings related to sadness and grief.  This writer discussed support groups, websites and reading materials.  Educated family of SW availability.  Will f/u with family tomorrow to provide ongoing support.  Families feelings explored.  Support provided.  Our team to continue to follow.

## 2023-04-12 NOTE — PROGRESS NOTE ADULT - ASSESSMENT
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 c/w current management  --Bowel Regimen - on lactulose standing.  Suggest daily dulcolax as needed  --Acute on Chronic Respiratory Failure 2/2 scleroderma - supportive care.  on propofol for vent compliance.  vent setting per primary.  if family decide to withdraw care then pt would require opioid infusion.  given ESRD, suggest either fentanyl or Dilaudid infusion to start 30 minutes prior to the planned withdrawal of care.  titrate up to comfort once withdrawn.  maintain Scleroderma/ILD medications.  ABx per ID.   --Debility/Weakness - fall precautions  --Acute Encephalopathy - hypoxic vs infections + drug induced.  treatment of possible infections. use sedation at lowest possible dose.  Consider switching to precedex.    --esrd - HD per nephrology     Psychological and Psychiatric Aspects of Care:   --Grief/Bereavement: emotional support provided  --Hx of psychiatric dx: none  -Pastoral Care Available PRN     Social Aspects of Care  -Palliative SW are available as needed    Cultural Aspects  -Primary Language: English    Goals of Care:  see above    Prognosis -- very poor    Ethical and Legal Aspects: NA  Capacity- pt does not have capacity 2/2 sedation.     HCP/Surrogate: wife - darren jones [phone]    Code Status- DNR  MOLST- completed 4/10  Dispo Plan- pt unlikely to survive hospitalization

## 2023-04-12 NOTE — PROGRESS NOTE ADULT - PROBLEM SELECTOR PLAN 3
1 vessel CAD (RCA) s/p stents (10/2018); troponin assays (441 --> 477) not suggestive of ACS; continue aspirin, atorvastatin.

## 2023-04-12 NOTE — PROGRESS NOTE ADULT - CONVERSATION DETAILS
together with SHA Huff and met with pt's wife and son.  Clinical course reviewed -- he is now vent and HD dependent and if Mr. jones would want to continue to be kept alive, we need to proceed with trach/peg and TDC placement.  wife said her  would never want those measures and we then spoke about the pt's remaining options, which essentially is a compassionate/palliative withdrawal of care.  family understood and questions were answered.  while no timeline was set, we did ask family to try to determine a date/time that we can prepare for withdrawal.
extensive discussion with pt's family today, totalling 50 minutes.   we agreed to issue a DNR without other restrictions.  if his heart were to sop, it is liekly due to the sevree lung disease and the hope of a meaningful or acceptable recovery are extremely poor.  we spoke at length that we are running out of options and since he is off the transplant list, he is out of options for significant improvement.    wife feels that her  would not want these measures (prolonged intubation, HD, feeding tube). son acknowledges that this is also true.  while this is true, he is not yet at the point that he si ready to ask for withdrawal of care. we spoke at length about when this should happen and also the need to remove our own personal feeling and try to honor/respect the wishes of Mr. Ibrahim. there is no decision as to a timing of withdrawal at this time.    continue all meaures (including reinitiation of pressors and continued HD) if/as needed.

## 2023-04-13 NOTE — PROGRESS NOTE ADULT - NUTRITIONAL ASSESSMENT
This patient has been assessed with a concern for Malnutrition and has been determined to have a diagnosis/diagnoses of Moderate protein-calorie malnutrition.    This patient is being managed with:   Diet NPO with Tube Feed-  Tube Feeding Modality: Orogastric  Glucerna 1.5 El (GLUCERNA1.5)  Total Volume for 24 Hours (mL): 1680  Continuous  Starting Tube Feed Rate {mL per Hour}: 20  Increase Tube Feed Rate by (mL): 10     Every 6 hours  Until Goal Tube Feed Rate (mL per Hour): 70  Tube Feed Duration (in Hours): 24  Tube Feed Start Time: 00:00  Free Water Flush  Free Water Flush Instructions:  Free water flushes per MD  Entered: Mar 29 2023  2:57PM    Diet NPO with Tube Feed-  Tube Feeding Modality: Orogastric  Vital 1.5 El (VITAL1.5)  Total Volume for 24 Hours (mL): 1200  Continuous  Starting Tube Feed Rate {mL per Hour}: 25  Until Goal Tube Feed Rate (mL per Hour): 50  Tube Feed Duration (in Hours): 24  Tube Feed Start Time: 10:00  Entered: Mar 29 2023  9:39AM    The following pending diet order is being considered for treatment of Moderate protein-calorie malnutrition:null
This patient has been assessed with a concern for Malnutrition and has been determined to have a diagnosis/diagnoses of Moderate protein-calorie malnutrition.    This patient is being managed with:   Diet NPO with Tube Feed-  Tube Feeding Modality: Orogastric  Nepro with Carb Steady (NEPRORTH)  Continuous  Starting Tube Feed Rate {mL per Hour}: 10  Until Goal Tube Feed Rate (mL per Hour): 10  Tube Feed Duration (in Hours): 24  Tube Feed Start Time: 13:00  Entered: Apr 6 2023 12:00PM    Diet NPO with Tube Feed-  Tube Feeding Modality: Orogastric  Glucerna 1.5 El (GLUCERNA1.5)  Total Volume for 24 Hours (mL): 1320  Continuous  Starting Tube Feed Rate {mL per Hour}: 20  Increase Tube Feed Rate by (mL): 10     Every 4 hours  Until Goal Tube Feed Rate (mL per Hour): 55  Tube Feed Duration (in Hours): 24  Tube Feed Start Time: 00:00  Free Water Flush  Free Water Flush Instructions:  Free water flushes of 30cc/hr (provides 720cc/day); monitor and adjust free water flushes per MD  No Carb Prosource TF     Qty per Day:  3  Entered: Mar 31 2023 11:04AM    The following pending diet order is being considered for treatment of Moderate protein-calorie malnutrition:null
This patient has been assessed with a concern for Malnutrition and has been determined to have a diagnosis/diagnoses of Moderate protein-calorie malnutrition.    This patient is being managed with:   Diet NPO with Tube Feed-  Tube Feeding Modality: Orogastric  Nepro with Carb Steady (NEPRORTH)  Total Volume for 24 Hours (mL): 1080  Continuous  Starting Tube Feed Rate {mL per Hour}: 20     Every 8 hours  Until Goal Tube Feed Rate (mL per Hour): 45  Tube Feed Duration (in Hours): 24  Tube Feed Start Time: 13:00  Entered: Apr 2 2023 12:43PM    Diet NPO with Tube Feed-  Tube Feeding Modality: Orogastric  Glucerna 1.5 El (GLUCERNA1.5)  Total Volume for 24 Hours (mL): 1320  Continuous  Starting Tube Feed Rate {mL per Hour}: 20  Increase Tube Feed Rate by (mL): 10     Every 4 hours  Until Goal Tube Feed Rate (mL per Hour): 55  Tube Feed Duration (in Hours): 24  Tube Feed Start Time: 00:00  Free Water Flush  Free Water Flush Instructions:  Free water flushes of 30cc/hr (provides 720cc/day); monitor and adjust free water flushes per MD Hill Carb Prosource TF     Qty per Day:  3  Entered: Mar 31 2023 11:04AM    The following pending diet order is being considered for treatment of Moderate protein-calorie malnutrition:null
This patient has been assessed with a concern for Malnutrition and has been determined to have a diagnosis/diagnoses of Moderate protein-calorie malnutrition.    This patient is being managed with:   Diet NPO with Tube Feed-  Tube Feeding Modality: Orogastric  Glucerna 1.5 El (GLUCERNA1.5)  Total Volume for 24 Hours (mL): 1680  Continuous  Starting Tube Feed Rate {mL per Hour}: 20  Increase Tube Feed Rate by (mL): 10     Every 6 hours  Until Goal Tube Feed Rate (mL per Hour): 70  Tube Feed Duration (in Hours): 24  Tube Feed Start Time: 00:00  Free Water Flush  Free Water Flush Instructions:  Free water flushes per MD  Entered: Mar 29 2023  2:57PM  
This patient has been assessed with a concern for Malnutrition and has been determined to have a diagnosis/diagnoses of Moderate protein-calorie malnutrition.    This patient is being managed with:   Diet NPO with Tube Feed-  Tube Feeding Modality: Orogastric  Nepro with Carb Steady (NEPRORTH)  Continuous  Starting Tube Feed Rate {mL per Hour}: 10  Until Goal Tube Feed Rate (mL per Hour): 10  Tube Feed Duration (in Hours): 24  Tube Feed Start Time: 13:00  Entered: Apr 6 2023 12:00PM    Diet NPO with Tube Feed-  Tube Feeding Modality: Orogastric  Glucerna 1.5 El (GLUCERNA1.5)  Total Volume for 24 Hours (mL): 1320  Continuous  Starting Tube Feed Rate {mL per Hour}: 20  Increase Tube Feed Rate by (mL): 10     Every 4 hours  Until Goal Tube Feed Rate (mL per Hour): 55  Tube Feed Duration (in Hours): 24  Tube Feed Start Time: 00:00  Free Water Flush  Free Water Flush Instructions:  Free water flushes of 30cc/hr (provides 720cc/day); monitor and adjust free water flushes per MD  No Carb Prosource TF     Qty per Day:  3  Entered: Mar 31 2023 11:04AM    The following pending diet order is being considered for treatment of Moderate protein-calorie malnutrition:null
This patient has been assessed with a concern for Malnutrition and has been determined to have a diagnosis/diagnoses of Moderate protein-calorie malnutrition.    This patient is being managed with:   Diet NPO with Tube Feed-  Tube Feeding Modality: Orogastric  Nepro with Carb Steady (NEPRORTH)  Continuous  Starting Tube Feed Rate {mL per Hour}: 10  Until Goal Tube Feed Rate (mL per Hour): 10  Tube Feed Duration (in Hours): 24  Tube Feed Start Time: 13:00  Entered: Apr 6 2023 12:00PM    Diet NPO with Tube Feed-  Tube Feeding Modality: Orogastric  Glucerna 1.5 El (GLUCERNA1.5)  Total Volume for 24 Hours (mL): 1320  Continuous  Starting Tube Feed Rate {mL per Hour}: 20  Increase Tube Feed Rate by (mL): 10     Every 4 hours  Until Goal Tube Feed Rate (mL per Hour): 55  Tube Feed Duration (in Hours): 24  Tube Feed Start Time: 00:00  Free Water Flush  Free Water Flush Instructions:  Free water flushes of 30cc/hr (provides 720cc/day); monitor and adjust free water flushes per MD  No Carb Prosource TF     Qty per Day:  3  Entered: Mar 31 2023 11:04AM    The following pending diet order is being considered for treatment of Moderate protein-calorie malnutrition:null
This patient has been assessed with a concern for Malnutrition and has been determined to have a diagnosis/diagnoses of Moderate protein-calorie malnutrition.    This patient is being managed with:   Diet NPO with Tube Feed-  Tube Feeding Modality: Orogastric  Nepro with Carb Steady (NEPRORTH)  Total Volume for 24 Hours (mL): 720  Continuous  Starting Tube Feed Rate {mL per Hour}: 30  Until Goal Tube Feed Rate (mL per Hour): 30  Tube Feed Duration (in Hours): 24  Tube Feed Start Time: 22:00  Entered: Apr 10 2023  9:40PM    Diet NPO with Tube Feed-  Tube Feeding Modality: Orogastric  Glucerna 1.5 El (GLUCERNA1.5)  Total Volume for 24 Hours (mL): 1320  Continuous  Starting Tube Feed Rate {mL per Hour}: 20  Increase Tube Feed Rate by (mL): 10     Every 4 hours  Until Goal Tube Feed Rate (mL per Hour): 55  Tube Feed Duration (in Hours): 24  Tube Feed Start Time: 00:00  Free Water Flush  Free Water Flush Instructions:  Free water flushes of 30cc/hr (provides 720cc/day); monitor and adjust free water flushes per MD Hill Carb Prosource TF     Qty per Day:  3  Entered: Mar 31 2023 11:04AM    The following pending diet order is being considered for treatment of Moderate protein-calorie malnutrition:null
This patient has been assessed with a concern for Malnutrition and has been determined to have a diagnosis/diagnoses of Moderate protein-calorie malnutrition.    This patient is being managed with:   Diet NPO with Tube Feed-  Tube Feeding Modality: Orogastric  Nepro with Carb Steady (NEPRORTH)  Total Volume for 24 Hours (mL): 720  Continuous  Starting Tube Feed Rate {mL per Hour}: 30  Until Goal Tube Feed Rate (mL per Hour): 30  Tube Feed Duration (in Hours): 24  Tube Feed Start Time: 22:00  Entered: Apr 10 2023  9:40PM    Diet NPO with Tube Feed-  Tube Feeding Modality: Orogastric  Glucerna 1.5 El (GLUCERNA1.5)  Total Volume for 24 Hours (mL): 1320  Continuous  Starting Tube Feed Rate {mL per Hour}: 20  Increase Tube Feed Rate by (mL): 10     Every 4 hours  Until Goal Tube Feed Rate (mL per Hour): 55  Tube Feed Duration (in Hours): 24  Tube Feed Start Time: 00:00  Free Water Flush  Free Water Flush Instructions:  Free water flushes of 30cc/hr (provides 720cc/day); monitor and adjust free water flushes per MD Hill Carb Prosource TF     Qty per Day:  3  Entered: Mar 31 2023 11:04AM    The following pending diet order is being considered for treatment of Moderate protein-calorie malnutrition:null
This patient has been assessed with a concern for Malnutrition and has been determined to have a diagnosis/diagnoses of Moderate protein-calorie malnutrition.    This patient is being managed with:   Diet NPO with Tube Feed-  Tube Feeding Modality: Orogastric  Glucerna 1.5 El (GLUCERNA1.5)  Total Volume for 24 Hours (mL): 1680  Continuous  Starting Tube Feed Rate {mL per Hour}: 20  Increase Tube Feed Rate by (mL): 10     Every 6 hours  Until Goal Tube Feed Rate (mL per Hour): 70  Tube Feed Duration (in Hours): 24  Tube Feed Start Time: 00:00  Free Water Flush  Free Water Flush Instructions:  Free water flushes per MD  Entered: Mar 29 2023  2:57PM  
This patient has been assessed with a concern for Malnutrition and has been determined to have a diagnosis/diagnoses of Moderate protein-calorie malnutrition.    This patient is being managed with:   Diet NPO with Tube Feed-  Tube Feeding Modality: Orogastric  Glucerna 1.5 El (GLUCERNA1.5)  Total Volume for 24 Hours (mL): 1680  Continuous  Starting Tube Feed Rate {mL per Hour}: 20  Increase Tube Feed Rate by (mL): 10     Every 6 hours  Until Goal Tube Feed Rate (mL per Hour): 70  Tube Feed Duration (in Hours): 24  Tube Feed Start Time: 00:00  Free Water Flush  Free Water Flush Instructions:  Free water flushes per MD  Entered: Mar 29 2023  2:57PM    Diet NPO with Tube Feed-  Tube Feeding Modality: Orogastric  Vital 1.5 El (VITAL1.5)  Total Volume for 24 Hours (mL): 1200  Continuous  Starting Tube Feed Rate {mL per Hour}: 25  Until Goal Tube Feed Rate (mL per Hour): 50  Tube Feed Duration (in Hours): 24  Tube Feed Start Time: 10:00  Entered: Mar 29 2023  9:39AM    The following pending diet order is being considered for treatment of Moderate protein-calorie malnutrition:null
This patient has been assessed with a concern for Malnutrition and has been determined to have a diagnosis/diagnoses of Moderate protein-calorie malnutrition.    This patient is being managed with:   Diet NPO with Tube Feed-  Tube Feeding Modality: Orogastric  Nepro with Carb Steady (NEPRORTH)  Continuous  Starting Tube Feed Rate {mL per Hour}: 10  Until Goal Tube Feed Rate (mL per Hour): 10  Tube Feed Duration (in Hours): 24  Tube Feed Start Time: 13:00  Entered: Apr 6 2023 12:00PM    Diet NPO with Tube Feed-  Tube Feeding Modality: Orogastric  Glucerna 1.5 El (GLUCERNA1.5)  Total Volume for 24 Hours (mL): 1320  Continuous  Starting Tube Feed Rate {mL per Hour}: 20  Increase Tube Feed Rate by (mL): 10     Every 4 hours  Until Goal Tube Feed Rate (mL per Hour): 55  Tube Feed Duration (in Hours): 24  Tube Feed Start Time: 00:00  Free Water Flush  Free Water Flush Instructions:  Free water flushes of 30cc/hr (provides 720cc/day); monitor and adjust free water flushes per MD  No Carb Prosource TF     Qty per Day:  3  Entered: Mar 31 2023 11:04AM    The following pending diet order is being considered for treatment of Moderate protein-calorie malnutrition:null
This patient has been assessed with a concern for Malnutrition and has been determined to have a diagnosis/diagnoses of Moderate protein-calorie malnutrition.    This patient is being managed with:   Diet NPO with Tube Feed-  Tube Feeding Modality: Orogastric  Nepro with Carb Steady (NEPRORTH)  Total Volume for 24 Hours (mL): 1080  Continuous  Starting Tube Feed Rate {mL per Hour}: 20     Every 8 hours  Until Goal Tube Feed Rate (mL per Hour): 45  Tube Feed Duration (in Hours): 24  Tube Feed Start Time: 13:00  Entered: Apr 2 2023 12:43PM    Diet NPO with Tube Feed-  Tube Feeding Modality: Orogastric  Glucerna 1.5 El (GLUCERNA1.5)  Total Volume for 24 Hours (mL): 1320  Continuous  Starting Tube Feed Rate {mL per Hour}: 20  Increase Tube Feed Rate by (mL): 10     Every 4 hours  Until Goal Tube Feed Rate (mL per Hour): 55  Tube Feed Duration (in Hours): 24  Tube Feed Start Time: 00:00  Free Water Flush  Free Water Flush Instructions:  Free water flushes of 30cc/hr (provides 720cc/day); monitor and adjust free water flushes per MD Hill Carb Prosource TF     Qty per Day:  3  Entered: Mar 31 2023 11:04AM    The following pending diet order is being considered for treatment of Moderate protein-calorie malnutrition:null
This patient has been assessed with a concern for Malnutrition and has been determined to have a diagnosis/diagnoses of Moderate protein-calorie malnutrition.    This patient is being managed with:   Diet NPO with Tube Feed-  Tube Feeding Modality: Orogastric  Nepro with Carb Steady (NEPRORTH)  Continuous  Starting Tube Feed Rate {mL per Hour}: 10  Until Goal Tube Feed Rate (mL per Hour): 10  Tube Feed Duration (in Hours): 24  Tube Feed Start Time: 13:00  Entered: Apr 6 2023 12:00PM    Diet NPO with Tube Feed-  Tube Feeding Modality: Orogastric  Glucerna 1.5 El (GLUCERNA1.5)  Total Volume for 24 Hours (mL): 1320  Continuous  Starting Tube Feed Rate {mL per Hour}: 20  Increase Tube Feed Rate by (mL): 10     Every 4 hours  Until Goal Tube Feed Rate (mL per Hour): 55  Tube Feed Duration (in Hours): 24  Tube Feed Start Time: 00:00  Free Water Flush  Free Water Flush Instructions:  Free water flushes of 30cc/hr (provides 720cc/day); monitor and adjust free water flushes per MD  No Carb Prosource TF     Qty per Day:  3  Entered: Mar 31 2023 11:04AM    The following pending diet order is being considered for treatment of Moderate protein-calorie malnutrition:null
This patient has been assessed with a concern for Malnutrition and has been determined to have a diagnosis/diagnoses of Moderate protein-calorie malnutrition.    This patient is being managed with:   Diet NPO with Tube Feed-  Tube Feeding Modality: Orogastric  Nepro with Carb Steady (NEPRORTH)  Total Volume for 24 Hours (mL): 720  Continuous  Starting Tube Feed Rate {mL per Hour}: 30  Until Goal Tube Feed Rate (mL per Hour): 30  Tube Feed Duration (in Hours): 24  Tube Feed Start Time: 22:00  Entered: Apr 10 2023  9:40PM    Diet NPO with Tube Feed-  Tube Feeding Modality: Orogastric  Glucerna 1.5 El (GLUCERNA1.5)  Total Volume for 24 Hours (mL): 1320  Continuous  Starting Tube Feed Rate {mL per Hour}: 20  Increase Tube Feed Rate by (mL): 10     Every 4 hours  Until Goal Tube Feed Rate (mL per Hour): 55  Tube Feed Duration (in Hours): 24  Tube Feed Start Time: 00:00  Free Water Flush  Free Water Flush Instructions:  Free water flushes of 30cc/hr (provides 720cc/day); monitor and adjust free water flushes per MD Hill Carb Prosource TF     Qty per Day:  3  Entered: Mar 31 2023 11:04AM    The following pending diet order is being considered for treatment of Moderate protein-calorie malnutrition:null
This patient has been assessed with a concern for Malnutrition and has been determined to have a diagnosis/diagnoses of Moderate protein-calorie malnutrition.    The following pending diet order is being considered for treatment of Moderate protein-calorie malnutrition:  Diet NPO with Tube Feed-  Tube Feeding Modality: Orogastric  Glucerna 1.5 El (GLUCERNA1.5)  Total Volume for 24 Hours (mL): 1320  Continuous  Starting Tube Feed Rate {mL per Hour}: 20  Increase Tube Feed Rate by (mL): 10     Every 4 hours  Until Goal Tube Feed Rate (mL per Hour): 55  Tube Feed Duration (in Hours): 24  Tube Feed Start Time: 00:00  Free Water Flush  Free Water Flush Instructions:  Free water flushes of 30cc/hr (provides 720cc/day); monitor and adjust free water flushes per MD Hill Carb Prosource TF     Qty per Day:  3  Entered: Mar 31 2023 11:04AM  
This patient has been assessed with a concern for Malnutrition and has been determined to have a diagnosis/diagnoses of Moderate protein-calorie malnutrition.    This patient is being managed with:   Diet NPO with Tube Feed-  Tube Feeding Modality: Orogastric  Glucerna 1.5 El (GLUCERNA1.5)  Total Volume for 24 Hours (mL): 1680  Bolus  Total Volume of Bolus (mL):  420  Total # of Feeds: 4  Tube Feed Frequency: Every 6 hours   Tube Feed Start Time: 13:00  Bolus Feed Rate (mL per Hour): 420   Bolus Feed Duration (in Hours): 1  Entered: Mar 31 2023 12:05PM    Diet NPO with Tube Feed-  Tube Feeding Modality: Orogastric  Glucerna 1.5 El (GLUCERNA1.5)  Total Volume for 24 Hours (mL): 1320  Continuous  Starting Tube Feed Rate {mL per Hour}: 20  Increase Tube Feed Rate by (mL): 10     Every 4 hours  Until Goal Tube Feed Rate (mL per Hour): 55  Tube Feed Duration (in Hours): 24  Tube Feed Start Time: 00:00  Free Water Flush  Free Water Flush Instructions:  Free water flushes of 30cc/hr (provides 720cc/day); monitor and adjust free water flushes per MD  No Carb Prosource TF     Qty per Day:  3  Entered: Mar 31 2023 11:04AM    The following pending diet order is being considered for treatment of Moderate protein-calorie malnutrition:null
This patient has been assessed with a concern for Malnutrition and has been determined to have a diagnosis/diagnoses of Moderate protein-calorie malnutrition.    This patient is being managed with:   Diet NPO with Tube Feed-  Tube Feeding Modality: Orogastric  Glucerna 1.5 El (GLUCERNA1.5)  Total Volume for 24 Hours (mL): 1680  Continuous  Starting Tube Feed Rate {mL per Hour}: 20  Increase Tube Feed Rate by (mL): 10     Every 6 hours  Until Goal Tube Feed Rate (mL per Hour): 70  Tube Feed Duration (in Hours): 24  Tube Feed Start Time: 00:00  Free Water Flush  Free Water Flush Instructions:  Free water flushes per MD  Entered: Mar 29 2023  2:57PM  
This patient has been assessed with a concern for Malnutrition and has been determined to have a diagnosis/diagnoses of Moderate protein-calorie malnutrition.    This patient is being managed with:   Diet NPO with Tube Feed-  Tube Feeding Modality: Orogastric  Glucerna 1.5 El (GLUCERNA1.5)  Total Volume for 24 Hours (mL): 1680  Continuous  Starting Tube Feed Rate {mL per Hour}: 20  Increase Tube Feed Rate by (mL): 10     Every 6 hours  Until Goal Tube Feed Rate (mL per Hour): 70  Tube Feed Duration (in Hours): 24  Tube Feed Start Time: 00:00  Free Water Flush  Free Water Flush Instructions:  Free water flushes per MD  Entered: Mar 29 2023  2:57PM    Diet NPO with Tube Feed-  Tube Feeding Modality: Orogastric  Vital 1.5 El (VITAL1.5)  Total Volume for 24 Hours (mL): 1200  Continuous  Starting Tube Feed Rate {mL per Hour}: 25  Until Goal Tube Feed Rate (mL per Hour): 50  Tube Feed Duration (in Hours): 24  Tube Feed Start Time: 10:00  Entered: Mar 29 2023  9:39AM    The following pending diet order is being considered for treatment of Moderate protein-calorie malnutrition:null
This patient has been assessed with a concern for Malnutrition and has been determined to have a diagnosis/diagnoses of Moderate protein-calorie malnutrition.    This patient is being managed with:   Diet NPO with Tube Feed-  Tube Feeding Modality: Orogastric  Nepro with Carb Steady (NEPRORTH)  Continuous  Starting Tube Feed Rate {mL per Hour}: 10  Until Goal Tube Feed Rate (mL per Hour): 10  Tube Feed Duration (in Hours): 24  Tube Feed Start Time: 13:00  Entered: Apr 6 2023 12:00PM    Diet NPO with Tube Feed-  Tube Feeding Modality: Orogastric  Glucerna 1.5 El (GLUCERNA1.5)  Total Volume for 24 Hours (mL): 1320  Continuous  Starting Tube Feed Rate {mL per Hour}: 20  Increase Tube Feed Rate by (mL): 10     Every 4 hours  Until Goal Tube Feed Rate (mL per Hour): 55  Tube Feed Duration (in Hours): 24  Tube Feed Start Time: 00:00  Free Water Flush  Free Water Flush Instructions:  Free water flushes of 30cc/hr (provides 720cc/day); monitor and adjust free water flushes per MD  No Carb Prosource TF     Qty per Day:  3  Entered: Mar 31 2023 11:04AM    The following pending diet order is being considered for treatment of Moderate protein-calorie malnutrition:null
This patient has been assessed with a concern for Malnutrition and has been determined to have a diagnosis/diagnoses of Moderate protein-calorie malnutrition.    This patient is being managed with:   Diet NPO with Tube Feed-  Tube Feeding Modality: Orogastric  Nepro with Carb Steady (NEPRORTH)  Total Volume for 24 Hours (mL): 1080  Continuous  Starting Tube Feed Rate {mL per Hour}: 20     Every 8 hours  Until Goal Tube Feed Rate (mL per Hour): 45  Tube Feed Duration (in Hours): 24  Tube Feed Start Time: 13:00  Entered: Apr 2 2023 12:43PM    Diet NPO with Tube Feed-  Tube Feeding Modality: Orogastric  Glucerna 1.5 El (GLUCERNA1.5)  Total Volume for 24 Hours (mL): 1320  Continuous  Starting Tube Feed Rate {mL per Hour}: 20  Increase Tube Feed Rate by (mL): 10     Every 4 hours  Until Goal Tube Feed Rate (mL per Hour): 55  Tube Feed Duration (in Hours): 24  Tube Feed Start Time: 00:00  Free Water Flush  Free Water Flush Instructions:  Free water flushes of 30cc/hr (provides 720cc/day); monitor and adjust free water flushes per MD Hill Carb Prosource TF     Qty per Day:  3  Entered: Mar 31 2023 11:04AM    The following pending diet order is being considered for treatment of Moderate protein-calorie malnutrition:null
This patient has been assessed with a concern for Malnutrition and has been determined to have a diagnosis/diagnoses of Moderate protein-calorie malnutrition.    This patient is being managed with:   Diet NPO with Tube Feed-  Tube Feeding Modality: Orogastric  Nepro with Carb Steady (NEPRORTH)  Total Volume for 24 Hours (mL): 1080  Continuous  Starting Tube Feed Rate {mL per Hour}: 45  Until Goal Tube Feed Rate (mL per Hour): 45  Tube Feed Duration (in Hours): 24  Tube Feed Start Time: 13:00  Entered: Apr 1 2023 12:11PM    Diet NPO with Tube Feed-  Tube Feeding Modality: Orogastric  Glucerna 1.5 El (GLUCERNA1.5)  Total Volume for 24 Hours (mL): 1320  Continuous  Starting Tube Feed Rate {mL per Hour}: 20  Increase Tube Feed Rate by (mL): 10     Every 4 hours  Until Goal Tube Feed Rate (mL per Hour): 55  Tube Feed Duration (in Hours): 24  Tube Feed Start Time: 00:00  Free Water Flush  Free Water Flush Instructions:  Free water flushes of 30cc/hr (provides 720cc/day); monitor and adjust free water flushes per MD Hill Carb Prosource TF     Qty per Day:  3  Entered: Mar 31 2023 11:04AM    The following pending diet order is being considered for treatment of Moderate protein-calorie malnutrition:null
This patient has been assessed with a concern for Malnutrition and has been determined to have a diagnosis/diagnoses of Moderate protein-calorie malnutrition.    This patient is being managed with:   Diet NPO with Tube Feed-  Tube Feeding Modality: Orogastric  Nepro with Carb Steady (NEPRORTH)  Total Volume for 24 Hours (mL): 1080  Continuous  Starting Tube Feed Rate {mL per Hour}: 20     Every 8 hours  Until Goal Tube Feed Rate (mL per Hour): 45  Tube Feed Duration (in Hours): 24  Tube Feed Start Time: 13:00  Entered: Apr 2 2023 12:43PM    Diet NPO with Tube Feed-  Tube Feeding Modality: Orogastric  Glucerna 1.5 El (GLUCERNA1.5)  Total Volume for 24 Hours (mL): 1320  Continuous  Starting Tube Feed Rate {mL per Hour}: 20  Increase Tube Feed Rate by (mL): 10     Every 4 hours  Until Goal Tube Feed Rate (mL per Hour): 55  Tube Feed Duration (in Hours): 24  Tube Feed Start Time: 00:00  Free Water Flush  Free Water Flush Instructions:  Free water flushes of 30cc/hr (provides 720cc/day); monitor and adjust free water flushes per [facility] [employer]     Qty per Day:  3  Entered: Mar 31 2023 11:04AM    The following pending diet order is being considered for treatment of Moderate protein-calorie malnutrition:null
This patient has been assessed with a concern for Malnutrition and has been determined to have a diagnosis/diagnoses of Moderate protein-calorie malnutrition.    This patient is being managed with:   Diet NPO with Tube Feed-  Tube Feeding Modality: Orogastric  Nepro with Carb Steady (NEPRORTH)  Total Volume for 24 Hours (mL): 720  Continuous  Starting Tube Feed Rate {mL per Hour}: 30  Until Goal Tube Feed Rate (mL per Hour): 30  Tube Feed Duration (in Hours): 24  Tube Feed Start Time: 22:00  Entered: Apr 10 2023  9:40PM    Diet NPO with Tube Feed-  Tube Feeding Modality: Orogastric  Glucerna 1.5 El (GLUCERNA1.5)  Total Volume for 24 Hours (mL): 1320  Continuous  Starting Tube Feed Rate {mL per Hour}: 20  Increase Tube Feed Rate by (mL): 10     Every 4 hours  Until Goal Tube Feed Rate (mL per Hour): 55  Tube Feed Duration (in Hours): 24  Tube Feed Start Time: 00:00  Free Water Flush  Free Water Flush Instructions:  Free water flushes of 30cc/hr (provides 720cc/day); monitor and adjust free water flushes per [facility] [employer]     Qty per Day:  3  Entered: Mar 31 2023 11:04AM    The following pending diet order is being considered for treatment of Moderate protein-calorie malnutrition:null

## 2023-04-13 NOTE — PROVIDER CONTACT NOTE (OTHER) - SITUATION
Organ donor called. Spoke with oCrtney reference number [id number]. Awaiting call back to pedro if a candidate.

## 2023-04-13 NOTE — DISCHARGE NOTE FOR THE EXPIRED PATIENT - HOSPITAL COURSE
62M hx PSS (scleroderma) on cellcept with pulmonary fibrosis on 8L of 02 24/7, HTN HLD  CAD with 4 stents to the RCA 2018 with Dr Griffith. He is on the lung transplant list at [facility] with Dr Manas Priest (transplant pulmonologist). Admitted for acute hypoxic respiratory failure eventually requiring mechanical ventilation. During hospitalization, patient suffered cardiac arrest, renal failure requiring HD, and shock requiring vasopressor therapy     Patient passed away at 02:50, patient was DNR   Family was called prior during kyara arrest state to come  Family at bedside

## 2023-04-13 NOTE — PROGRESS NOTE ADULT - ASSESSMENT
Assessment:  62M hx PSS (scleroderma) on cellcept with pulmonary fibrosis on 8L of 02 24/7, HTN HLD  CAD with 4 stents to the RCA 2018 with Dr Griffith. He is on the lung transplant list at [facility] with Dr Manas Priest (transplant pulmonologist). Admitted for acute hypoxic respiratory failure eventually requiring mechanical ventilation    Problem List:  1. Acute hypoxic respiratory failure 2/2  2. Pulmonary fibrosis   3. Cardiac arrest   4. Severe pulm HTN  5. CHRIS    Plan:  Neuro: Sedated on propofol, titrate for RASS 0 - -2. Monitor mental status, avoid deliriogenic medications. Pain & fever control as needed  CV: Patient off vasopressors. Now on midodrine. Monitor HR and BP. Patient w/severe pulm HTN.   Pulm: Patient currently on Full vent support. Titrate settings to maintain SaO2 >90%, or pH >7.25. Consider low tidal volume ventilation strategy w/ goal Tv 4-6 cc/kg of ideal body weight. Plateau pressure goal <30. Peridex oral care. Aggressive pulmonary toilet. Daily sedation vacation with spontaneous breathing trial if clinical condition warrants, discuss with respiratory therapy. Patient was treated for PNA. Ongoing GOC with family w/trache vs comfort care   Renal: CHRIS, requiring HD, HD per nephrology. Strict I/Os, goal UOP >0.5cc/kg/hr. Trend renal function and electrolytes, replete as needed. Avoid nephrotoxic agents  GI: Tube feeds PPI   ID: Atovaquone for PCP coverage    Heme: HSQ for DVT prophylaxis   Endo: Goal blood glucose <180. C/w Solu-medrol   Dispo: Prognosis poor, ongoing [facility]     CRITICAL CARE TIME SPENT: 35 minutes assessing presenting problems of acute illness, which pose high probability of life threatening deterioration or end organ damage/dysfunction, as well as medical decision making including initiating plan of care, reviewing data, reviewing radiologic exams, discussing with multidisciplinary team,  discussing goals of care with patient/family, and writing this note.  Non-inclusive of procedures performed

## 2023-04-13 NOTE — CHART NOTE - NSCHARTNOTESELECT_GEN_ALL_CORE
nephrology/Event Note
Dietitian Brief Note Intern
Dietitian follow up
Event Note

## 2023-04-13 NOTE — PROGRESS NOTE ADULT - PROVIDER SPECIALTY LIST ADULT
Critical Care
Infectious Disease
Infectious Disease
Critical Care
Infectious Disease
MICU
Critical Care
Infectious Disease
Nephrology
Palliative Care
Cardiology
Critical Care
Infectious Disease
Nephrology
Palliative Care
Critical Care
Cardiology
Critical Care
Cardiology

## 2023-04-13 NOTE — PROGRESS NOTE ADULT - REASON FOR ADMISSION
SOB hypoxemia

## 2023-04-13 NOTE — PROGRESS NOTE ADULT - SUBJECTIVE AND OBJECTIVE BOX
Patient is a 62y old  Male who presents with a chief complaint of SOB hypoxemia (12 Apr 2023 15:35)      BRIEF HOSPITAL COURSE:   62M hx PSS (scleroderma) on cellcept with pulmonary fibrosis on 8L of 02 24/7, HTN HLD  CAD with 4 stents to the RCA 2018 with Dr Griffith. He is on the lung transplant list at [facility] with Dr Manas Priest (transplant pulmonologist). Admitted for acute hypoxic respiratory failure eventually requiring mechanical ventilation    Events last 24 hours:   - Off vasopressor support  - Full vent settings  - On propofol     Review of Systems:  Unable to assess given clinical condition     PAST MEDICAL & SURGICAL HISTORY:  HTN (hypertension)      Pulmonary fibrosis      Scleroderma          Medications:  atovaquone  Suspension 750 milliGRAM(s) Oral every 12 hours    midodrine 10 milliGRAM(s) Oral every 8 hours      acetaminophen   Oral Liquid .. 650 milliGRAM(s) Oral every 8 hours PRN  propofol Infusion 20 MICROgram(s)/kG/Min IV Continuous <Continuous>      aspirin  chewable 81 milliGRAM(s) Oral daily  heparin   Injectable 5000 Unit(s) SubCutaneous every 12 hours    pantoprazole  Injectable 40 milliGRAM(s) IV Push two times a day      atorvastatin 20 milliGRAM(s) Oral at bedtime  methylPREDNISolone sodium succinate Injectable 40 milliGRAM(s) IV Push every 12 hours    albumin human 25% IVPB 50 milliLiter(s) IV Intermittent every 1 hour PRN  sodium chloride 0.9% lock flush 10 milliLiter(s) IV Push every 1 hour PRN    mycophenolate mofetil Suspension 1000 milliGRAM(s) Oral two times a day    artificial tears (preservative free) Ophthalmic Solution 1 Drop(s) Both EYES four times a day  chlorhexidine 0.12% Liquid 15 milliLiter(s) Oral Mucosa every 12 hours  chlorhexidine 4% Liquid 1 Application(s) Topical <User Schedule>  chlorhexidine 4% Liquid 1 Application(s) Topical <User Schedule>        Mode: AC/ CMV (Assist Control/ Continuous Mandatory Ventilation)  RR (machine): 24  TV (machine): 450  FiO2: 50  PEEP: 5  ITime: 1  MAP: 10  PIP: 21      ICU Vital Signs Last 24 Hrs  T(C): 38.1 (13 Apr 2023 01:27), Max: 38.1 (13 Apr 2023 01:27)  T(F): 100.5 (13 Apr 2023 01:27), Max: 100.5 (13 Apr 2023 01:27)  HR: 108 (13 Apr 2023 01:00) (76 - 108)  BP: --  BP(mean): --  ABP: 98/58 (12 Apr 2023 21:00) (87/53 - 122/70)  ABP(mean): 73 (12 Apr 2023 21:00) (66 - 91)  RR: 38 (13 Apr 2023 01:00) (18 - 38)  SpO2: 96% (13 Apr 2023 01:00) (92% - 100%)    O2 Parameters below as of 13 Apr 2023 01:00  Patient On (Oxygen Delivery Method): ventilator    O2 Concentration (%): 50            I&O's Detail    11 Apr 2023 07:01  -  12 Apr 2023 07:00  --------------------------------------------------------  IN:    Free Water: 100 mL    IV PiggyBack: 50 mL    Nepro with Carb Steady: 300 mL    Norepinephrine: 4 mL    Propofol: 280.8 mL  Total IN: 734.8 mL    OUT:    Rectal Tube (mL): 500 mL  Total OUT: 500 mL    Total NET: 234.8 mL      12 Apr 2023 07:01  -  13 Apr 2023 01:44  --------------------------------------------------------  IN:    Free Water: 100 mL    Nepro with Carb Steady: 240 mL    Propofol: 109.2 mL  Total IN: 449.2 mL    OUT:  Total OUT: 0 mL    Total NET: 449.2 mL          LABS:                        6.6    24.24 )-----------( 286      ( 12 Apr 2023 10:55 )             20.9     04-12    133<L>  |  98  |  113<H>  ----------------------------<  163<H>  4.4   |  23  |  6.66<H>    Ca    8.0<L>      12 Apr 2023 10:55  Phos  10.2     04-11  Mg     2.9     04-11            CAPILLARY BLOOD GLUCOSE            CULTURES:      Physical Examination:  General: No acute distress.    HEENT: Pupils equal, reactive to light.  Symmetric.  PULM: Diminished breath sounds b/l   NECK: Supple, no lymphadenopathy, trachea midline  CVS: Regular rate and rhythm, no murmurs, rubs, or gallops  ABD: Soft, nondistended, nontender, normoactive bowel sounds, no masses  SKIN: Warm and well perfused  RADIOLOGY:   < from: Xray Chest 1 View- PORTABLE-Urgent (Xray Chest 1 View- PORTABLE-Urgent .) (04.10.23 @ 04:04) >    ACC: [id number] EXAM:  XR CHEST PORTABLE URGENT 1V   ORDERED BY: DANI LEON     PROCEDURE DATE:  04/10/2023          INTERPRETATION:  INDICATION: Hypoxemia, shortness of breath    COMPARISON: 4/4/2023    FINDINGS:  An AP portable chest radiograph demonstrates an endotracheal tube   terminating approximately 2 to 3 cm above the roberta. There are bilateral   central venous catheters, with the left-sided catheter terminating in the   right atrium while the right-sided catheter terminates at the junction of   the SVC and RA. There is no pneumothorax. There is a nasogastric tube   extending below the diaphragm, with the tip out of the field-of-view.   There is left lower lobe atelectasis. There are interstitial opacities on   the right greater than left sides, showing little change compared to the   prior exam, with a reticulonodular appearance. Heart size remains stable.   The bony thorax is unchanged.    IMPRESSION:  1. Bilateral central venous catheters remain unchanged, without   pneumothorax.  2. Endotracheal tube in satisfactory location.  3. Nasogastric tube extends below the diaphragm, with the tip out of the   field-of-view.  4. Left lower lobe atelectasis.  5. Ill-defined interstitial or reticulonodular opacities in the right   greater than left sides are unchanged, more peripheral than central and   may indicate infection or inflammatory etiologies over cardiac   decompensation..    --- End of Report ---            JOHN MASON MD; Attending Radiologist  This document hasbeen electronically signed. Apr 10 2023  5:57PM    < end of copied text >     Patient is a 62y old  Male who presents with a chief complaint of SOB hypoxemia (12 Apr 2023 15:35)      BRIEF HOSPITAL COURSE:   62M hx PSS (scleroderma) on cellcept with pulmonary fibrosis on 8L of 02 24/7, HTN HLD  CAD with 4 stents to the RCA 2018 with Dr Griffith. He is on the lung transplant list at [facility] with Dr Manas Priest (transplant pulmonologist). Admitted for acute hypoxic respiratory failure eventually requiring mechanical ventilation    Events last 24 hours:   - Off vasopressor support  - Full vent settings  - On propofol   - Called by RN for tachypnea on vent, will order Versed 2mg ivp x 1    Review of Systems:  Unable to assess given clinical condition     PAST MEDICAL & SURGICAL HISTORY:  HTN (hypertension)      Pulmonary fibrosis      Scleroderma          Medications:  atovaquone  Suspension 750 milliGRAM(s) Oral every 12 hours    midodrine 10 milliGRAM(s) Oral every 8 hours      acetaminophen   Oral Liquid .. 650 milliGRAM(s) Oral every 8 hours PRN  propofol Infusion 20 MICROgram(s)/kG/Min IV Continuous <Continuous>      aspirin  chewable 81 milliGRAM(s) Oral daily  heparin   Injectable 5000 Unit(s) SubCutaneous every 12 hours    pantoprazole  Injectable 40 milliGRAM(s) IV Push two times a day      atorvastatin 20 milliGRAM(s) Oral at bedtime  methylPREDNISolone sodium succinate Injectable 40 milliGRAM(s) IV Push every 12 hours    albumin human 25% IVPB 50 milliLiter(s) IV Intermittent every 1 hour PRN  sodium chloride 0.9% lock flush 10 milliLiter(s) IV Push every 1 hour PRN    mycophenolate mofetil Suspension 1000 milliGRAM(s) Oral two times a day    artificial tears (preservative free) Ophthalmic Solution 1 Drop(s) Both EYES four times a day  chlorhexidine 0.12% Liquid 15 milliLiter(s) Oral Mucosa every 12 hours  chlorhexidine 4% Liquid 1 Application(s) Topical <User Schedule>  chlorhexidine 4% Liquid 1 Application(s) Topical <User Schedule>        Mode: AC/ CMV (Assist Control/ Continuous Mandatory Ventilation)  RR (machine): 24  TV (machine): 450  FiO2: 50  PEEP: 5  ITime: 1  MAP: 10  PIP: 21      ICU Vital Signs Last 24 Hrs  T(C): 38.1 (13 Apr 2023 01:27), Max: 38.1 (13 Apr 2023 01:27)  T(F): 100.5 (13 Apr 2023 01:27), Max: 100.5 (13 Apr 2023 01:27)  HR: 108 (13 Apr 2023 01:00) (76 - 108)  BP: --  BP(mean): --  ABP: 98/58 (12 Apr 2023 21:00) (87/53 - 122/70)  ABP(mean): 73 (12 Apr 2023 21:00) (66 - 91)  RR: 38 (13 Apr 2023 01:00) (18 - 38)  SpO2: 96% (13 Apr 2023 01:00) (92% - 100%)    O2 Parameters below as of 13 Apr 2023 01:00  Patient On (Oxygen Delivery Method): ventilator    O2 Concentration (%): 50            I&O's Detail    11 Apr 2023 07:01  -  12 Apr 2023 07:00  --------------------------------------------------------  IN:    Free Water: 100 mL    IV PiggyBack: 50 mL    Nepro with Carb Steady: 300 mL    Norepinephrine: 4 mL    Propofol: 280.8 mL  Total IN: 734.8 mL    OUT:    Rectal Tube (mL): 500 mL  Total OUT: 500 mL    Total NET: 234.8 mL      12 Apr 2023 07:01  -  13 Apr 2023 01:44  --------------------------------------------------------  IN:    Free Water: 100 mL    Nepro with Carb Steady: 240 mL    Propofol: 109.2 mL  Total IN: 449.2 mL    OUT:  Total OUT: 0 mL    Total NET: 449.2 mL          LABS:                        6.6    24.24 )-----------( 286      ( 12 Apr 2023 10:55 )             20.9     04-12    133<L>  |  98  |  113<H>  ----------------------------<  163<H>  4.4   |  23  |  6.66<H>    Ca    8.0<L>      12 Apr 2023 10:55  Phos  10.2     04-11  Mg     2.9     04-11            CAPILLARY BLOOD GLUCOSE            CULTURES:      Physical Examination:  General: No acute distress.    HEENT: Pupils equal, reactive to light.  Symmetric.  PULM: Diminished breath sounds b/l   NECK: Supple, no lymphadenopathy, trachea midline  CVS: Regular rate and rhythm, no murmurs, rubs, or gallops  ABD: Soft, nondistended, nontender, normoactive bowel sounds, no masses  SKIN: Warm and well perfused  RADIOLOGY:   < from: Xray Chest 1 View- PORTABLE-Urgent (Xray Chest 1 View- PORTABLE-Urgent .) (04.10.23 @ 04:04) >    ACC: [id number] EXAM:  XR CHEST PORTABLE URGENT 1V   ORDERED BY: DANI LEON     PROCEDURE DATE:  04/10/2023          INTERPRETATION:  INDICATION: Hypoxemia, shortness of breath    COMPARISON: 4/4/2023    FINDINGS:  An AP portable chest radiograph demonstrates an endotracheal tube   terminating approximately 2 to 3 cm above the roberta. There are bilateral   central venous catheters, with the left-sided catheter terminating in the   right atrium while the right-sided catheter terminates at the junction of   the SVC and RA. There is no pneumothorax. There is a nasogastric tube   extending below the diaphragm, with the tip out of the field-of-view.   There is left lower lobe atelectasis. There are interstitial opacities on   the right greater than left sides, showing little change compared to the   prior exam, with a reticulonodular appearance. Heart size remains stable.   The bony thorax is unchanged.    IMPRESSION:  1. Bilateral central venous catheters remain unchanged, without   pneumothorax.  2. Endotracheal tube in satisfactory location.  3. Nasogastric tube extends below the diaphragm, with the tip out of the   field-of-view.  4. Left lower lobe atelectasis.  5. Ill-defined interstitial or reticulonodular opacities in the right   greater than left sides are unchanged, more peripheral than central and   may indicate infection or inflammatory etiologies over cardiac   decompensation..    --- End of Report ---            JOHN MASON MD; Attending Radiologist  This document hasbeen electronically signed. Apr 10 2023  5:57PM    < end of copied text >

## 2023-04-19 ENCOUNTER — APPOINTMENT (OUTPATIENT)
Dept: NEPHROLOGY | Facility: CLINIC | Age: 63
End: 2023-04-19

## 2023-04-20 ENCOUNTER — APPOINTMENT (OUTPATIENT)
Dept: CARDIOLOGY | Facility: CLINIC | Age: 63
End: 2023-04-20

## 2023-04-24 DIAGNOSIS — I25.10 ATHEROSCLEROTIC HEART DISEASE OF NATIVE CORONARY ARTERY WITHOUT ANGINA PECTORIS: ICD-10-CM

## 2023-04-24 DIAGNOSIS — I27.20 PULMONARY HYPERTENSION, UNSPECIFIED: ICD-10-CM

## 2023-04-24 DIAGNOSIS — R77.8 OTHER SPECIFIED ABNORMALITIES OF PLASMA PROTEINS: ICD-10-CM

## 2023-04-24 DIAGNOSIS — Z99.81 DEPENDENCE ON SUPPLEMENTAL OXYGEN: ICD-10-CM

## 2023-04-24 DIAGNOSIS — Z20.822 CONTACT WITH AND (SUSPECTED) EXPOSURE TO COVID-19: ICD-10-CM

## 2023-04-24 DIAGNOSIS — E78.5 HYPERLIPIDEMIA, UNSPECIFIED: ICD-10-CM

## 2023-04-24 DIAGNOSIS — E83.39 OTHER DISORDERS OF PHOSPHORUS METABOLISM: ICD-10-CM

## 2023-04-24 DIAGNOSIS — E87.20 ACIDOSIS, UNSPECIFIED: ICD-10-CM

## 2023-04-24 DIAGNOSIS — G93.49 OTHER ENCEPHALOPATHY: ICD-10-CM

## 2023-04-24 DIAGNOSIS — I10 ESSENTIAL (PRIMARY) HYPERTENSION: ICD-10-CM

## 2023-04-24 DIAGNOSIS — R57.0 CARDIOGENIC SHOCK: ICD-10-CM

## 2023-04-24 DIAGNOSIS — D86.9 SARCOIDOSIS, UNSPECIFIED: ICD-10-CM

## 2023-04-24 DIAGNOSIS — Z66 DO NOT RESUSCITATE: ICD-10-CM

## 2023-04-24 DIAGNOSIS — E87.1 HYPO-OSMOLALITY AND HYPONATREMIA: ICD-10-CM

## 2023-04-24 DIAGNOSIS — E44.0 MODERATE PROTEIN-CALORIE MALNUTRITION: ICD-10-CM

## 2023-04-24 DIAGNOSIS — J96.02 ACUTE RESPIRATORY FAILURE WITH HYPERCAPNIA: ICD-10-CM

## 2023-04-24 DIAGNOSIS — Z79.82 LONG TERM (CURRENT) USE OF ASPIRIN: ICD-10-CM

## 2023-04-24 DIAGNOSIS — G93.41 METABOLIC ENCEPHALOPATHY: ICD-10-CM

## 2023-04-24 DIAGNOSIS — R00.1 BRADYCARDIA, UNSPECIFIED: ICD-10-CM

## 2023-04-24 DIAGNOSIS — I46.9 CARDIAC ARREST, CAUSE UNSPECIFIED: ICD-10-CM

## 2023-04-24 DIAGNOSIS — E87.5 HYPERKALEMIA: ICD-10-CM

## 2023-04-24 DIAGNOSIS — M34.9 SYSTEMIC SCLEROSIS, UNSPECIFIED: ICD-10-CM

## 2023-04-24 DIAGNOSIS — K72.00 ACUTE AND SUBACUTE HEPATIC FAILURE WITHOUT COMA: ICD-10-CM

## 2023-04-24 DIAGNOSIS — R65.21 SEVERE SEPSIS WITH SEPTIC SHOCK: ICD-10-CM

## 2023-04-24 DIAGNOSIS — A41.9 SEPSIS, UNSPECIFIED ORGANISM: ICD-10-CM

## 2023-04-24 DIAGNOSIS — J84.10 PULMONARY FIBROSIS, UNSPECIFIED: ICD-10-CM

## 2023-04-24 DIAGNOSIS — N17.0 ACUTE KIDNEY FAILURE WITH TUBULAR NECROSIS: ICD-10-CM

## 2023-04-24 DIAGNOSIS — Z95.5 PRESENCE OF CORONARY ANGIOPLASTY IMPLANT AND GRAFT: ICD-10-CM

## 2023-04-24 DIAGNOSIS — J18.9 PNEUMONIA, UNSPECIFIED ORGANISM: ICD-10-CM

## 2023-04-24 DIAGNOSIS — J96.21 ACUTE AND CHRONIC RESPIRATORY FAILURE WITH HYPOXIA: ICD-10-CM

## 2023-05-02 VITALS — HEIGHT: 70 IN

## 2023-05-02 NOTE — ED ADULT NURSE REASSESSMENT NOTE - NS ED NURSE REASSESS COMMENT FT1
Downtime Procedure in place from 3/24/23 @ 2200 – 3/30/23 @ 1500. See paper chart for documentation. Pt arrival on 3/27/23 @ 0125 and Admitted on 3/28/23 @ 0135.

## 2023-05-22 ENCOUNTER — RX RENEWAL (OUTPATIENT)
Age: 63
End: 2023-05-22

## 2023-05-22 RX ORDER — PANTOPRAZOLE 20 MG/1
20 TABLET, DELAYED RELEASE ORAL
Qty: 90 | Refills: 3 | Status: ACTIVE | COMMUNITY
Start: 2020-08-11 | End: 1900-01-01

## 2023-10-18 NOTE — PATIENT PROFILE ADULT - NSALCOHOLTYPE_GEN_A_NUR
The problem we are working together to treat is: Right knee     IMAGING:     You have been recommended to undergo an MRI. Please call Central Scheduling for Imaging appointment at [phone].     Please make a follow up appointment to discuss the results with Dr. Marsh. Do not wait until after your imaging is complete to schedule your follow up. You can call our office at either number listed below to schedule.     If you have your imaging study at a non [facility] facility, please obtain a copy of the images on a disc to bring to your follow up appointment.        [facility]  [address], [address]  [place] [address] [phone]     [facility]   [place] [address]  [phone]    Office hours are 8:00 am to 5:00 pm Monday through Friday.  If it is urgent that you speak with someone outside of these hours, our [facility] Call Center will be able to assist you.  You can reach the office by calling:       We do highly recommend [employer], if you do not already have this.  You can request access via the internet or by simply talking with a  at any of the clinics.   [email or web address].     You may receive a survey in the mail from a company called [employer]  They mail and process patient satisfaction surveys for our clinic.  Should you receive a survey, please take a few minutes to rate your experience with your visit.  We value your opinions and insights.  Thank you in advance for your time and interest in responding.     Thank you for choosing [facility] as your Orthopaedic provider!           liquor

## 2025-04-29 NOTE — REVIEW OF SYSTEMS
16 [Fever] : no fever [Recent Wt Gain (___ Lbs)] : ~T no recent weight gain [Chills] : no chills [Poor Appetite] : poor appetite [Recent Wt Loss (___ Lbs)] : ~T recent [unfilled] lb weight loss [Dry Eyes] : dry eyes [Epistaxis] : no epistaxis [Mouth Ulcers] : no mouth ulcers [Cough] : cough [Chest Tightness] : chest tightness [Sputum] : sputum [Dyspnea] : dyspnea [Chest Discomfort] : chest discomfort [Watery Eyes] : watery eyes [Seasonal Allergies] : no seasonal allergies [Angioedema] : no angioedema [Immunocompromised] : immunocompromised [GERD] : gerd [Nausea] : nausea [Diarrhea] : no diarrhea [Arthralgias] : arthralgias [Myalgias] : myalgias [Back Pain] : back pain [Ulcerations] : ulcerations [Anemia] : no anemia [Blood Transfusion] : no blood transfusion [Headache] : no headache [Dizziness] : no dizziness [Numbness] : no numbness [Memory Loss] : no memory loss [Depression] : no depression [Diabetes] : no diabetes
